# Patient Record
Sex: MALE | Race: WHITE | Employment: OTHER | URBAN - METROPOLITAN AREA
[De-identification: names, ages, dates, MRNs, and addresses within clinical notes are randomized per-mention and may not be internally consistent; named-entity substitution may affect disease eponyms.]

---

## 2017-01-23 ENCOUNTER — OFFICE VISIT (OUTPATIENT)
Dept: INTERNAL MEDICINE CLINIC | Age: 72
End: 2017-01-23

## 2017-01-23 VITALS
TEMPERATURE: 97.8 F | RESPIRATION RATE: 18 BRPM | HEIGHT: 68 IN | SYSTOLIC BLOOD PRESSURE: 143 MMHG | OXYGEN SATURATION: 97 % | HEART RATE: 81 BPM | WEIGHT: 225 LBS | BODY MASS INDEX: 34.1 KG/M2 | DIASTOLIC BLOOD PRESSURE: 82 MMHG

## 2017-01-23 DIAGNOSIS — E66.9 NON MORBID OBESITY, UNSPECIFIED OBESITY TYPE: ICD-10-CM

## 2017-01-23 DIAGNOSIS — K21.9 GASTROESOPHAGEAL REFLUX DISEASE WITHOUT ESOPHAGITIS: ICD-10-CM

## 2017-01-23 DIAGNOSIS — I87.2 VENOUS INSUFFICIENCY OF BOTH LOWER EXTREMITIES: ICD-10-CM

## 2017-01-23 DIAGNOSIS — H26.9 RIGHT CATARACT: Primary | ICD-10-CM

## 2017-01-23 DIAGNOSIS — Z01.818 PREOP EXAMINATION: ICD-10-CM

## 2017-01-23 RX ORDER — FUROSEMIDE 20 MG/1
20 TABLET ORAL
Qty: 30 TAB | Refills: 5 | Status: SHIPPED | OUTPATIENT
Start: 2017-01-23 | End: 2017-05-23 | Stop reason: ALTCHOICE

## 2017-01-23 NOTE — MR AVS SNAPSHOT
Visit Information Date & Time Provider Department Dept. Phone Encounter #  
 1/23/2017  9:00 AM Sisto Blizzard, Christopher [facility] Internal Medicine [phone] [id number] Follow-up Instructions Return if symptoms worsen or fail to improve. Upcoming Health Maintenance Date Due COLONOSCOPY 3/24/1963 Pneumococcal 65+ Low/Medium Risk (2 of 2 - PPSV23) 7/13/2016 MEDICARE YEARLY EXAM 9/28/2017 GLAUCOMA SCREENING Q2Y 11/24/2017 DTaP/Tdap/Td series (2 - Td) 9/27/2026 Allergies as of 1/23/2017  Review Complete On: 1/23/2017 By: Sisto Blizzard, DO Severity Noted Reaction Type Reactions Darvocet A500 [Propoxyphene N-acetaminophen] High 05/24/2011   Intolerance Other (comments)  
 hallucinate Current Immunizations  Reviewed on 1/23/2017 Name Date Influenza High Dose Vaccine PF 9/27/2016 Influenza Vaccine 12/29/2014, 1/3/2014 Influenza Vaccine Whole 10/14/2011 Pneumococcal Conjugate (PCV-13) 7/13/2015 Reviewed by Sisto Blizzard, DO on 1/23/2017 at  9:37 AM  
You Were Diagnosed With   
  
 Codes Comments Right cataract    -  Primary ICD-10-CM: H26.9 ICD-9-CM: 366.9 Preop examination     ICD-10-CM: T03.113 ICD-9-CM: V72.84 Non morbid obesity, unspecified obesity type     ICD-10-CM: E66.9 ICD-9-CM: 278.00 Venous insufficiency of both lower extremities     ICD-10-CM: I87.2 ICD-9-CM: 459.81 Gastroesophageal reflux disease without esophagitis     ICD-10-CM: K21.9 ICD-9-CM: 530.81 Vitals BP Pulse Temp Resp Height(growth percentile) Weight(growth percentile) 143/82 (BP 1 Location: Right arm, BP Patient Position: Sitting) 81 97.8 °F (36.6 °C) (Oral) 18 5' 8\" (1.727 m) 225 lb (102.1 kg) SpO2 BMI Smoking Status 97% 34.21 kg/m2 Never Smoker BMI and BSA Data Body Mass Index Body Surface Area  
 34.21 kg/m 2 2.21 m 2 Preferred Pharmacy Pharmacy Name Phone [facility], [address] [phone] Your Updated Medication List  
  
   
This list is accurate as of: 1/23/17  9:45 AM.  Always use your most recent med list.  
  
  
  
  
 aspirin 325 mg tablet Commonly known as:  ASPIRIN Take 325 mg by mouth daily. Cholecalciferol (Vitamin D3) 3,000 unit Tab Take 3,000 Units by mouth daily. furosemide 20 mg tablet Commonly known as:  LASIX Take 1 Tab by mouth daily as needed. HYDROcodone-acetaminophen 7.5-325 mg per tablet Commonly known as:  March Castles Take 1 Tab by mouth daily as needed for Pain. I-CAPS PO Take  by mouth.  
  
 lisinopril 10 mg tablet Commonly known as:  Sheri Primmer  
take 1 tablet by mouth once daily  
  
 multivitamin with iron and minerals tablet Commonly known as:  Lender Reels Take  by mouth daily. pantoprazole 40 mg tablet Commonly known as:  PROTONIX Take 1 Tab by mouth Daily (before breakfast). sildenafil citrate 100 mg tablet Commonly known as:  VIAGRA Take 1 Tab by mouth as needed. VITAMIN B-12 1,000 mcg tablet Generic drug:  cyanocobalamin Take 1,000 mcg by mouth daily. VITAMIN C 500 mg tablet Generic drug:  ascorbic acid (vitamin C) Take  by mouth. Prescriptions Sent to Pharmacy Refills  
 furosemide (LASIX) 20 mg tablet 5 Sig: Take 1 Tab by mouth daily as needed. Class: Normal  
 [facility]: [id number] [address] [place] [address] [address], [address] #: [phone] Route: Oral  
  
Follow-up Instructions Return if symptoms worsen or fail to improve. Introducing [facility] & HEALTH SERVICES! Dear Gal Mcmillan: Thank you for requesting a AMKAI account. Our records indicate that you already have an active AMKAI account. You can access your account anytime at [email or web address] Did you know that you can access your hospital and ER discharge instructions at any time in Woofound? You can also review all of your test results from your hospital stay or ER visit. Additional Information If you have questions, please visit the Frequently Asked Questions section of the Woofound website at [email or web address] Remember, Woofound is NOT to be used for urgent needs. For medical emergencies, dial 911. Now available from your iPhone and Android! Please provide this summary of care documentation to your next provider. Your primary care clinician is listed as Kenzie Han. If you have any questions after today's visit, please call [phone].

## 2017-01-23 NOTE — PROGRESS NOTES
Reviewed record in preparation for visit and have obtained necessary documentation. Identified pt with two pt identifiers(name and ). Health Maintenance Due   Topic    COLONOSCOPY     Pneumococcal 65+ Low/Medium Risk (2 of 2 - PPSV23)         Chief Complaint   Patient presents with    Pre-op Exam    Cataract     R    Hypertension          Learning Assessment:  :     Learning Assessment 3/29/2016 2014 12/3/2013   PRIMARY LEARNER Patient Patient Patient   HIGHEST LEVEL OF EDUCATION - PRIMARY LEARNER  GRADUATED HIGH SCHOOL OR GED GRADUATED HIGH SCHOOL OR GED GRADUATED HIGH SCHOOL OR GED   BARRIERS PRIMARY LEARNER NONE NONE NONE   CO-LEARNER CAREGIVER No No No   PRIMARY LANGUAGE ENGLISH ENGLISH ENGLISH   LEARNER PREFERENCE PRIMARY DEMONSTRATION DEMONSTRATION LISTENING   LEARNING SPECIAL TOPICS - no -   ANSWERED BY patient patient self   RELATIONSHIP SELF SELF SELF       Depression Screening:  :     PHQ 2 / 9, over the last two weeks 2016   Little interest or pleasure in doing things Not at all   Feeling down, depressed or hopeless Not at all   Total Score PHQ 2 0       Fall Risk Assessment:  :     Fall Risk Assessment, last 12 mths 2016   Able to walk? Yes   Fall in past 12 months? Yes   Fall with injury? Yes   Number of falls in past 12 months 1   Fall Risk Score 2       Abuse Screening:  :     Abuse Screening Questionnaire 2016 3/29/2016 2014   Do you ever feel afraid of your partner? N N N   Are you in a relationship with someone who physically or mentally threatens you? N N N   Is it safe for you to go home? Esau Agudelo       Coordination of Care Questionnaire:  :     1) Have you been to an emergency room, urgent care clinic since your last visit?  NO   Hospitalized since your last visit? no             2) Have you seen or consulted any other health care providers outside of [address] [place] [address] since your last visit? no  (Include any pap smears or colon screenings in this section.)    3) Do you have an Advance Directive on file? no    4) Are you interested in receiving information on Advance Directives? YES      Patient is accompanied by patient I have received [facility] consent [facility] to discuss any/all medical information while they are present in the room.

## 2017-01-23 NOTE — PROGRESS NOTES
HISTORY OF PRESENT ILLNESS  Lito Mcdaniel is a 80 y.o. male. Pt. comes in for a pre-op exam for R cataract by Dr. Fredy Tran next week. S/p L cataract surgery. Has multiple medical problems. Has  chronic joint pain. Has gained more weight. S/p gastric bypass many years ago. Reports increased pedal  Edema R>L. Lasix helped in past. Also reports GERD issues. ahs been taking omeprazole prn with some help. Reports compliance with medications and diet. Med list and most recent labs/studies reviewed with pt. All look stable. Trying to be active physically as tolerated. Continued to drink ETOH but mostly on weekends. Reports no other new c/o. Pre-op Exam   Pertinent negatives include no chest pain, no abdominal pain, no headaches and no shortness of breath. Cataract   Pertinent negatives include no chest pain, no abdominal pain, no headaches and no shortness of breath. Hypertension    Associated symptoms include blurred vision (R cataracts). Pertinent negatives include no chest pain, no headaches, no dizziness, no shortness of breath, no nausea and no vomiting. Leg Swelling   Pertinent negatives include no chest pain, no abdominal pain, no headaches and no shortness of breath. GERD   Pertinent negatives include no chest pain, no abdominal pain, no headaches and no shortness of breath. Review of Systems   Constitutional: Negative. Eyes: Positive for blurred vision (R cataracts). Respiratory: Negative for shortness of breath. Cardiovascular: Positive for leg swelling. Negative for chest pain. Gastrointestinal: Positive for heartburn. Negative for abdominal pain, nausea and vomiting. Genitourinary: Negative for dysuria. Musculoskeletal: Positive for joint pain. Negative for falls. Skin: Negative. Neurological: Negative for dizziness, sensory change and headaches. Psychiatric/Behavioral: Negative. All other systems reviewed and are negative.       Physical Exam   Constitutional: He is oriented to person, place, and time. He appears well-developed and well-nourished. No distress. Obese pleasant   HENT:   Head: Normocephalic and atraumatic. Mouth/Throat: Oropharynx is clear and moist.   Eyes: Conjunctivae are normal. No scleral icterus. R cataract   Neck: Normal range of motion. Neck supple. No JVD present. No thyromegaly present. Cardiovascular: Normal rate, regular rhythm, normal heart sounds and intact distal pulses. No murmur heard. Pulmonary/Chest: Effort normal and breath sounds normal. No respiratory distress. He has no wheezes. He has no rales. Abdominal: Soft. Bowel sounds are normal. He exhibits no distension. There is no tenderness. obese   Musculoskeletal: Normal range of motion. He exhibits edema (trace pedal, R>L, chronic). He exhibits no tenderness. Neurological: He is alert and oriented to person, place, and time. Coordination normal.   Skin: Skin is warm and dry. No rash noted. Psychiatric: He has a normal mood and affect. His behavior is normal.   Nursing note and vitals reviewed.       ASSESSMENT and PLAN  R cataract  Pre-op exam  HTN  GERD  B LE venous Insufficiency/edema    Pre-op form filled and faxed to Dr. Waleska Hein for surgery  Restart lasix 20 mg 1 every day prn edema  Omeprazole 20 mg 1 Q am  Quit ETOH use  Cont other meds    Follow-up Disposition:  Return if symptoms worsen or fail to improve.  lab results and schedule of future lab studies reviewed with patient  reviewed diet, exercise and weight control  reviewed medications and side effects in detail

## 2017-01-24 RX ORDER — HYDROCODONE BITARTRATE AND ACETAMINOPHEN 7.5; 325 MG/1; MG/1
1 TABLET ORAL
Qty: 30 TAB | Refills: 0 | Status: SHIPPED | OUTPATIENT
Start: 2017-01-24 | End: 2017-02-27 | Stop reason: SDUPTHER

## 2017-04-09 RX ORDER — LISINOPRIL 10 MG/1
TABLET ORAL
Qty: 30 TAB | Refills: 5 | Status: SHIPPED | OUTPATIENT
Start: 2017-04-09 | End: 2017-10-28 | Stop reason: SDUPTHER

## 2017-04-14 RX ORDER — HYDROCODONE BITARTRATE AND ACETAMINOPHEN 7.5; 325 MG/1; MG/1
1 TABLET ORAL
Qty: 30 TAB | Refills: 0 | Status: SHIPPED | OUTPATIENT
Start: 2017-04-14 | End: 2017-05-23 | Stop reason: SDUPTHER

## 2017-05-23 ENCOUNTER — OFFICE VISIT (OUTPATIENT)
Dept: INTERNAL MEDICINE CLINIC | Age: 72
End: 2017-05-23

## 2017-05-23 VITALS
RESPIRATION RATE: 22 BRPM | WEIGHT: 191 LBS | DIASTOLIC BLOOD PRESSURE: 67 MMHG | TEMPERATURE: 98.5 F | HEIGHT: 68 IN | SYSTOLIC BLOOD PRESSURE: 116 MMHG | HEART RATE: 84 BPM | BODY MASS INDEX: 28.95 KG/M2 | OXYGEN SATURATION: 98 %

## 2017-05-23 DIAGNOSIS — F10.29 ALCOHOL DEPENDENCE WITH UNSPECIFIED ALCOHOL-INDUCED DISORDER (HCC): ICD-10-CM

## 2017-05-23 DIAGNOSIS — Z98.84 S/P GASTRIC BYPASS: ICD-10-CM

## 2017-05-23 DIAGNOSIS — Z23 ENCOUNTER FOR IMMUNIZATION: ICD-10-CM

## 2017-05-23 DIAGNOSIS — K21.9 GASTROESOPHAGEAL REFLUX DISEASE WITHOUT ESOPHAGITIS: ICD-10-CM

## 2017-05-23 DIAGNOSIS — E66.3 OVERWEIGHT (BMI 25.0-29.9): ICD-10-CM

## 2017-05-23 DIAGNOSIS — I10 ESSENTIAL HYPERTENSION: Primary | ICD-10-CM

## 2017-05-23 DIAGNOSIS — M15.9 PRIMARY OSTEOARTHRITIS INVOLVING MULTIPLE JOINTS: ICD-10-CM

## 2017-05-23 DIAGNOSIS — I87.2 VENOUS INSUFFICIENCY OF BOTH LOWER EXTREMITIES: ICD-10-CM

## 2017-05-23 RX ORDER — HYDROCODONE BITARTRATE AND ACETAMINOPHEN 7.5; 325 MG/1; MG/1
1 TABLET ORAL
Qty: 30 TAB | Refills: 0 | Status: ON HOLD | OUTPATIENT
Start: 2017-05-23 | End: 2017-08-01

## 2017-05-23 RX ORDER — PANTOPRAZOLE SODIUM 40 MG/1
40 TABLET, DELAYED RELEASE ORAL
Qty: 30 TAB | Refills: 5 | Status: SHIPPED | OUTPATIENT
Start: 2017-05-23 | End: 2017-08-15 | Stop reason: SDUPTHER

## 2017-05-23 NOTE — PROGRESS NOTES
Chief Complaint   Patient presents with    Hypertension     follow up     1. Have you been to the ER, urgent care clinic since your last visit? Hospitalized since your last visit? No    2. Have you seen or consulted any other health care providers outside of the [facility] since your last visit? Include any pap smears or colon screening.  No    Used 2 patient I. D. 's

## 2017-05-23 NOTE — MR AVS SNAPSHOT
Visit Information Date & Time Provider Department Dept. Phone Encounter #  
 5/23/2017  8:30 AM Georgiana Garcia, [address] [facility] Internal Medicine [phone] [id number] Follow-up Instructions Return in about 6 months (around 11/23/2017). Upcoming Health Maintenance Date Due COLONOSCOPY 3/24/1963 Pneumococcal 65+ Low/Medium Risk (2 of 2 - PPSV23) 7/13/2016 INFLUENZA AGE 9 TO ADULT 8/1/2017 MEDICARE YEARLY EXAM 9/28/2017 GLAUCOMA SCREENING Q2Y 1/25/2019 DTaP/Tdap/Td series (2 - Td) 9/27/2026 Allergies as of 5/23/2017  Review Complete On: 5/23/2017 By: Georgiana Garcia DO Severity Noted Reaction Type Reactions Darvocet A500 [Propoxyphene N-acetaminophen] High 05/24/2011   Intolerance Other (comments)  
 hallucinate Current Immunizations  Reviewed on 5/23/2017 Name Date Influenza High Dose Vaccine PF 9/27/2016 Influenza Vaccine 12/29/2014, 1/3/2014 Influenza Vaccine Whole 10/14/2011 Pneumococcal Conjugate (PCV-13) 7/13/2015 Pneumococcal Polysaccharide (PPSV-23)  Incomplete Reviewed by Georgiana Garcia DO on 5/23/2017 at  9:10 AM  
You Were Diagnosed With   
  
 Codes Comments Essential hypertension    -  Primary ICD-10-CM: I10 
ICD-9-CM: 401.9 Gastroesophageal reflux disease without esophagitis     ICD-10-CM: K21.9 ICD-9-CM: 530.81 Venous insufficiency of both lower extremities     ICD-10-CM: I87.2 ICD-9-CM: 459.81 Primary osteoarthritis involving multiple joints     ICD-10-CM: M15.0 ICD-9-CM: 715.09 S/P gastric bypass     ICD-10-CM: M02.53 ICD-9-CM: V45.86 Overweight (BMI 25.0-29. 9)     ICD-10-CM: P01.3 ICD-9-CM: 278.02 Alcohol dependence with unspecified alcohol-induced disorder ([facility] [address]     ICD-10-CM: G63.37 ICD-9-CM: 303.90, 291.9 Encounter for immunization     ICD-10-CM: M14 ICD-9-CM: V03.89 Vitals BP Pulse Temp Resp Height(growth percentile) Weight(growth percentile) 116/67 84 98.5 °F (36.9 °C) (Oral) 22 5' 8\" (1.727 m) 191 lb (86.6 kg) SpO2 BMI Smoking Status 98% 29.04 kg/m2 Never Smoker BMI and BSA Data Body Mass Index Body Surface Area 29.04 kg/m 2 2.04 m 2 Preferred Pharmacy Pharmacy Name Phone [facility], [address] [phone] Your Updated Medication List  
  
   
This list is accurate as of: 5/23/17  9:11 AM.  Always use your most recent med list.  
  
  
  
  
 aspirin 325 mg tablet Commonly known as:  ASPIRIN Take 325 mg by mouth daily. Cholecalciferol (Vitamin D3) 3,000 unit Tab Take 3,000 Units by mouth daily. HYDROcodone-acetaminophen 7.5-325 mg per tablet Commonly known as:  Ceil Heap Take 1 Tab by mouth daily as needed for Pain. I-CAPS PO Take  by mouth.  
  
 lisinopril 10 mg tablet Commonly known as:  Minus Salk  
take 1 tablet by mouth once daily  
  
 multivitamin with iron and minerals tablet Commonly known as:  [place] Gazella Take  by mouth daily. pantoprazole 40 mg tablet Commonly known as:  PROTONIX Take 1 Tab by mouth Daily (before breakfast). sildenafil citrate 100 mg tablet Commonly known as:  VIAGRA Take 1 Tab by mouth as needed. VITAMIN B-12 1,000 mcg tablet Generic drug:  cyanocobalamin Take 1,000 mcg by mouth daily. VITAMIN C 500 mg tablet Generic drug:  ascorbic acid (vitamin C) Take  by mouth. Prescriptions Printed Refills HYDROcodone-acetaminophen (NORCO) 7.5-325 mg per tablet 0 Sig: Take 1 Tab by mouth daily as needed for Pain. Class: Print Route: Oral  
  
Prescriptions Sent to Pharmacy Refills  
 pantoprazole (PROTONIX) 40 mg tablet 5 Sig: Take 1 Tab by mouth Daily (before breakfast). Class: Normal  
 [facility] [place] [address], [address], [address] Ph #: [phone]  Route: Oral  
  
 We Performed the Following METABOLIC PANEL, COMPREHENSIVE [15418 CPT(R)] PNEUMOCOCCAL POLYSACCHARIDE VACCINE, 23-VALENT, ADULT OR IMMUNOSUPPRESSED PT DOSE, [90817 CPT(R)] Follow-up Instructions Return in about 6 months (around 11/23/2017). Introducing [facility] & HEALTH SERVICES! Dear Alexander: Thank you for requesting a [employer] account. Our records indicate that you already have an active [employer] account. You can access your account anytime at [email or web address] Did you know that you can access your hospital and ER discharge instructions at any time in [employer]? You can also review all of your test results from your hospital stay or ER visit. Additional Information If you have questions, please visit the Frequently Asked Questions section of the [employer] website at [email or web address] Remember, [employer] is NOT to be used for urgent needs. For medical emergencies, dial 911. Now available from your iPhone and Android! Please provide this summary of care documentation to your next provider. Your primary care clinician is listed as Latasha Vogel. If you have any questions after today's visit, please call [phone].

## 2017-05-23 NOTE — LETTER
5/25/2017 2:24 PM 
 
Mr. Elier Montero [address] [facility] [place] [address] Dear Elier Montero: 
 
Please find your most recent results below. Resulted Orders METABOLIC PANEL, COMPREHENSIVE Result Value Ref Range Glucose 102 (H) 65 - 99 mg/dL BUN 7 (L) 8 - 27 mg/dL Creatinine 0.73 (L) 0.76 - 1.27 mg/dL GFR est non-AA 93 >59 mL/min/1.73 GFR est  >59 mL/min/1.73  
 BUN/Creatinine ratio 10 10 - 24 Sodium 137 134 - 144 mmol/L Potassium 5.4 (H) 3.5 - 5.2 mmol/L Chloride 95 (L) 96 - 106 mmol/L  
 CO2 25 18 - 29 mmol/L Calcium 9.8 8.6 - 10.2 mg/dL Protein, total 6.4 6.0 - 8.5 g/dL Albumin 4.2 3.5 - 4.8 g/dL GLOBULIN, TOTAL 2.2 1.5 - 4.5 g/dL A-G Ratio 1.9 1.2 - 2.2 Bilirubin, total 1.2 0.0 - 1.2 mg/dL Alk. phosphatase 113 39 - 117 IU/L  
 AST (SGOT) 50 (H) 0 - 40 IU/L  
 ALT (SGPT) 27 0 - 44 IU/L Narrative Performed at:  [address]  [id number] : Josie Neal MD, Phone:  [phone] RECOMMENDATIONS: 
 
 
  Mild elevated liver number from alcohol use Otherwise All labs are stable Please call me if you have any questions: [phone] Sincerely, 
 
 
Deon Serum, DO

## 2017-05-23 NOTE — PROGRESS NOTES
HISTORY OF PRESENT ILLNESS  Saeed Gong is a 71 y.o. male. Pt. comes in for f/u. Has multiple medical problems. Reports chronic back,hip and leg pain. Has lost a lot of wt. Has GERD. Pedal edema is better. Stopped lasix. Continues to drink alcohol daily. No smoking. Reports compliance with medications and diet. Med list and most recent labs/studies reviewed with pt. Trying to be active physically. S/p gastric bypass a few years ago. Needs med refills and labs. Reports no other new c/o. Hypertension    Pertinent negatives include no chest pain, no blurred vision, no headaches, no dizziness and no shortness of breath. Weight Management   Pertinent negatives include no chest pain, no abdominal pain, no headaches and no shortness of breath. Follow Up Chronic Condition   Pertinent negatives include no chest pain, no abdominal pain, no headaches and no shortness of breath. GERD   Pertinent negatives include no chest pain, no abdominal pain, no headaches and no shortness of breath. Review of Systems   Constitutional: Positive for weight gain. Eyes: Negative for blurred vision. Respiratory: Negative for shortness of breath. Cardiovascular: Positive for leg swelling. Negative for chest pain. Gastrointestinal: Negative for abdominal pain. Genitourinary: Negative for dysuria. Musculoskeletal: Positive for back pain and joint pain. Negative for falls. Skin: Negative. Neurological: Negative for dizziness, sensory change and headaches. Psychiatric/Behavioral: Negative. All other systems reviewed and are negative. Physical Exam   Constitutional: He is oriented to person, place, and time. He appears well-developed and well-nourished. No distress. Obese pleasant   HENT:   Head: Normocephalic and atraumatic. Mouth/Throat: Oropharynx is clear and moist.   Eyes: Conjunctivae are normal. No scleral icterus. Neck: Normal range of motion. Neck supple. No JVD present.  No thyromegaly present. Cardiovascular: Normal rate, regular rhythm, normal heart sounds and intact distal pulses. No murmur heard. Pulmonary/Chest: Effort normal and breath sounds normal. No respiratory distress. He has no wheezes. He has no rales. Abdominal: Soft. Bowel sounds are normal. He exhibits no distension. There is no tenderness. obese   Musculoskeletal: Normal range of motion. He exhibits edema (trace pedal, bilat,) and tenderness (lumbars/hips). Neurological: He is alert and oriented to person, place, and time. Coordination normal.   Skin: Skin is warm and dry. No rash noted. Psychiatric: His behavior is normal.   Nursing note and vitals reviewed. ASSESSMENT and Kenblanca Lynne was seen today for hypertension, weight management, follow up chronic condition and gerd. Diagnoses and all orders for this visit:    Essential hypertension  -     METABOLIC PANEL, COMPREHENSIVE    Gastroesophageal reflux disease without esophagitis    Venous insufficiency of both lower extremities    Primary osteoarthritis involving multiple joints    S/P gastric bypass    Overweight (BMI 25.0-29. 9)    Alcohol dependence with unspecified alcohol-induced disorder ([facility] [address]    Encounter for immunization  -     PNEUMOCOCCAL POLYSACCHARIDE VACCINE, 23-VALENT, ADULT OR IMMUNOSUPPRESSED PT DOSE,    Other orders  -     pantoprazole (PROTONIX) 40 mg tablet; Take 1 Tab by mouth Daily (before breakfast). -     HYDROcodone-acetaminophen (NORCO) 7.5-325 mg per tablet; Take 1 Tab by mouth daily as needed for Pain. Follow-up Disposition:  Return in about 6 months (around 11/23/2017).    lab results and schedule of future lab studies reviewed with patient  reviewed diet, exercise and weight control  reviewed medications and side effects in detail  F/u with other MD's as scheduled  Stop alcohol use

## 2017-05-24 LAB
ALBUMIN SERPL-MCNC: 4.2 G/DL (ref 3.5–4.8)
ALBUMIN/GLOB SERPL: 1.9 {RATIO} (ref 1.2–2.2)
ALP SERPL-CCNC: 113 IU/L (ref 39–117)
ALT SERPL-CCNC: 27 IU/L (ref 0–44)
AST SERPL-CCNC: 50 IU/L (ref 0–40)
BILIRUB SERPL-MCNC: 1.2 MG/DL (ref 0–1.2)
BUN SERPL-MCNC: 7 MG/DL (ref 8–27)
BUN/CREAT SERPL: 10 (ref 10–24)
CALCIUM SERPL-MCNC: 9.8 MG/DL (ref 8.6–10.2)
CHLORIDE SERPL-SCNC: 95 MMOL/L (ref 96–106)
CO2 SERPL-SCNC: 25 MMOL/L (ref 18–29)
CREAT SERPL-MCNC: 0.73 MG/DL (ref 0.76–1.27)
GLOBULIN SER CALC-MCNC: 2.2 G/DL (ref 1.5–4.5)
GLUCOSE SERPL-MCNC: 102 MG/DL (ref 65–99)
POTASSIUM SERPL-SCNC: 5.4 MMOL/L (ref 3.5–5.2)
PROT SERPL-MCNC: 6.4 G/DL (ref 6–8.5)
SODIUM SERPL-SCNC: 137 MMOL/L (ref 134–144)

## 2017-07-31 ENCOUNTER — APPOINTMENT (OUTPATIENT)
Dept: CT IMAGING | Age: 72
DRG: 087 | End: 2017-07-31
Attending: EMERGENCY MEDICINE
Payer: MEDICARE

## 2017-07-31 ENCOUNTER — HOSPITAL ENCOUNTER (INPATIENT)
Age: 72
LOS: 1 days | Discharge: HOME | DRG: 087 | End: 2017-08-01
Attending: EMERGENCY MEDICINE | Admitting: FAMILY MEDICINE
Payer: MEDICARE

## 2017-07-31 DIAGNOSIS — S01.01XA SCALP LACERATION, INITIAL ENCOUNTER: ICD-10-CM

## 2017-07-31 DIAGNOSIS — F10.920 ALCOHOL INTOXICATION, UNCOMPLICATED (HCC): ICD-10-CM

## 2017-07-31 DIAGNOSIS — S06.5XAA SUBDURAL HEMATOMA: Primary | ICD-10-CM

## 2017-07-31 PROCEDURE — 99285 EMERGENCY DEPT VISIT HI MDM: CPT

## 2017-07-31 PROCEDURE — 77030018836 HC SOL IRR NACL ICUM -A

## 2017-07-31 PROCEDURE — 70450 CT HEAD/BRAIN W/O DYE: CPT

## 2017-07-31 PROCEDURE — 72125 CT NECK SPINE W/O DYE: CPT

## 2017-07-31 PROCEDURE — 77030008460 HC STPLR SKN PRECIS 3M -A

## 2017-07-31 PROCEDURE — 75810000293 HC SIMP/SUPERF WND  RPR

## 2017-07-31 NOTE — IP AVS SNAPSHOT
Monique [address] [place] [address] 
[phone] Patient: Eve Witt MRN: [id number] Current Discharge Medication List  
  
CONTINUE these medications which have NOT CHANGED Dose & Instructions Dispensing Information Comments Morning Noon Evening Bedtime FISH -1,000 mg capsule Generic drug:  fish oil-omega-3 fatty acids Your last dose was: Your next dose is: Take  by mouth daily. Takes unknown dose Refills:  0  
     
   
   
   
  
 lisinopril 10 mg tablet Commonly known as:  Neli [place] Your last dose was: Your next dose is:    
   
   
 take 1 tablet by mouth once daily Quantity:  30 Tab Refills:  5 Hold until patient is ready for   
    
   
   
   
  
 melatonin 5 mg Cap capsule Your last dose was: Your next dose is: Take  by mouth. Takes unknown dose nightly as needed Refills:  0 NORCO 7.5-325 mg per tablet Generic drug:  HYDROcodone-acetaminophen Your last dose was: Your next dose is:    
   
   
 Dose:  0.5 Tab Take 0.5 Tabs by mouth daily as needed for Pain. Refills:  0  
     
   
   
   
  
 pantoprazole 40 mg tablet Commonly known as:  PROTONIX Your last dose was: Your next dose is:    
   
   
 Dose:  40 mg Take 1 Tab by mouth Daily (before breakfast). Quantity:  30 Tab Refills:  5 VITAMIN B-12 1,000 mcg tablet Generic drug:  cyanocobalamin Your last dose was: Your next dose is: Take  by mouth daily. Takes unknown dose Refills:  0  
     
   
   
   
  
 VITAMIN C 500 mg tablet Generic drug:  ascorbic acid (vitamin C) Your last dose was: Your next dose is: Take  by mouth daily. Takes unknown dose Refills:  0 STOP taking these medications   
 aspirin 325 mg tablet Commonly known as:  ASPIRIN  
   
  
 sildenafil citrate 100 mg tablet Commonly known as:  VIAGRA

## 2017-07-31 NOTE — IP AVS SNAPSHOT
[address] [facility] [address] [place] [address] 
[phone] Patient: Iglesia Moon MRN: [id number] MAUDE:4/58/9235 You are allergic to the following Allergen Reactions Darvocet A500 (Propoxyphene N-Acetaminophen) Other (comments)  
 hallucinate Recent Documentation Height Weight BMI Smoking Status 1.778 m 91.2 kg 28.85 kg/m2 Never Smoker Emergency Contacts Name Discharge Info Relation Home Work Mobile Loni Ruiz  Spouse [3] [phone] About your hospitalization You were admitted on:  August 1, 2017 You last received care in the:  [address] [place] [address] You were discharged on:  August 1, 2017 Why you were hospitalized Your primary diagnosis was:  Subdural Hemorrhage (Hcc) Providers Seen During Your Hospitalizations Provider Role Specialty Primary office phone Annika Patton MD Attending Provider Emergency Medicine [phone] Agustina Horton MD Attending Provider [facility] [phone] Briseyda Bernal MD Attending Provider Internal Medicine [phone] Your Primary Care Physician (PCP) Primary Care Physician Office Phone Office Fax Hellen Blanc [phone] Follow-up Information Follow up With Details Comments Contact Info Santana Joshua DO In 1 week  [address] [facility] [address] [place] [address] 
[phone] Abran Durán MD In 2 weeks  [address] [place] [address] 
[phone] Current Discharge Medication List  
  
CONTINUE these medications which have NOT CHANGED Dose & Instructions Dispensing Information Comments Morning Noon Evening Bedtime FISH -1,000 mg capsule Generic drug:  fish oil-omega-3 fatty acids Your last dose was: Your next dose is: Take  by mouth daily. Takes unknown dose Refills:  0 lisinopril 10 mg tablet Commonly known as:  Tildon Michael Your last dose was: Your next dose is:    
   
   
 take 1 tablet by mouth once daily Quantity:  30 Tab Refills:  5 Hold until patient is ready for   
    
   
   
   
  
 melatonin 5 mg Cap capsule Your last dose was: Your next dose is: Take  by mouth. Takes unknown dose nightly as needed Refills:  0 NORCO 7.5-325 mg per tablet Generic drug:  HYDROcodone-acetaminophen Your last dose was: Your next dose is:    
   
   
 Dose:  0.5 Tab Take 0.5 Tabs by mouth daily as needed for Pain. Refills:  0  
     
   
   
   
  
 pantoprazole 40 mg tablet Commonly known as:  PROTONIX Your last dose was: Your next dose is:    
   
   
 Dose:  40 mg Take 1 Tab by mouth Daily (before breakfast). Quantity:  30 Tab Refills:  5 VITAMIN B-12 1,000 mcg tablet Generic drug:  cyanocobalamin Your last dose was: Your next dose is: Take  by mouth daily. Takes unknown dose Refills:  0  
     
   
   
   
  
 VITAMIN C 500 mg tablet Generic drug:  ascorbic acid (vitamin C) Your last dose was: Your next dose is: Take  by mouth daily. Takes unknown dose Refills:  0 STOP taking these medications   
 aspirin 325 mg tablet Commonly known as:  ASPIRIN  
   
  
 sildenafil citrate 100 mg tablet Commonly known as:  VIAGRA Discharge Instructions Fanaticall Activation Thank you for requesting access to Fanaticall. Please follow the instructions below to securely access and download your online medical record. Fanaticall allows you to send messages to your doctor, view your test results, renew your prescriptions, schedule appointments, and more. How Do I Sign Up? 1.  In your internet browser, go to [email or web address]. 2. Click on the First Time User? Click Here link in the Sign In box. You will see the New Member Sign Up page. 3. Enter your BioBeats Access Code exactly as it appears below. You will not need to use this code after youve completed the sign-up process. If you do not sign up before the expiration date, you must request a new code. Shipping Easyt Access Code: Activation code not generated Current BioBeats Status: Active (This is the date your Shipping Easyt access code will ) 4. Enter the last four digits of your Social Security Number (xxxx) and Date of Birth (mm/dd/yyyy) as indicated and click Submit. You will be taken to the next sign-up page. 5. Create a Shipping Easyt ID. This will be your BioBeats login ID and cannot be changed, so think of one that is secure and easy to remember. 6. Create a BioBeats password. You can change your password at any time. 7. Enter your Password Reset Question and Answer. This can be used at a later time if you forget your password. 8. Enter your e-mail address. You will receive e-mail notification when new information is available in [address] 9. Click Sign Up. You can now view and download portions of your medical record. 10. Click the Download Summary menu link to download a portable copy of your medical information. Additional Information If you have questions, please visit the Frequently Asked Questions section of the BioBeats website at [email or web address] Remember, BioBeats is NOT to be used for urgent needs. For medical emergencies, dial 911. Discharge Instructions PATIENT ID: Quynh Pope MRN: [id number] YOB: 1945 DATE OF ADMISSION: 2017 11:20 PM   
DATE OF DISCHARGE: 2017 PRIMARY CARE PROVIDER: Efe Moon DO  
 
ATTENDING PHYSICIAN: Ashlyn Dewitt MD 
DISCHARGING PROVIDER: Ashlyn Dewitt MD   
 To contact this individual call [phone] and ask the  to page. If unavailable ask to be transferred the Adult Hospitalist Department. DISCHARGE DIAGNOSES SDH 
 
CONSULTATIONS: IP CONSULT TO NEUROSURGERY 
IP CONSULT TO HOSPITALIST 
 
PROCEDURES/SURGERIES: * No surgery found * PENDING TEST RESULTS:  
At the time of discharge the following test results are still pending: FOLLOW UP APPOINTMENTS:  
Follow-up Information Follow up With Details Comments Contact Info Yunier Abdullahi DO In 1 week  [address] [facility] [place] 
[phone] Harriett Escobar MD In 2 weeks  [address] One [facility] [place] 
[phone] ADDITIONAL CARE RECOMMENDATIONS: pl do not take aspirin or viagra for next 2 weeks DIET: Regular Diet and Cardiac Diet ACTIVITY: Activity as tolerated WOUND CARE:  
 
EQUIPMENT needed:  
 
 
  
 SNF/Inpatient Rehab/LTAC Independent/assisted living Hospice Other: CDMP Checked:  
Yes x PROBLEM LIST Updated: 
Yes x Signed:  
Lex Garg MD 
8/1/2017 
3:05 PM 
 
Discharge Orders None General Information Please provide this summary of care documentation to your next provider. Introducing [facility]! Dear Lindsey Shook: Thank you for requesting a InsideTrack account. Our records indicate that you already have an active InsideTrack account. You can access your account anytime at [email or web address] Did you know that you can access your hospital and ER discharge instructions at any time in InsideTrack? You can also review all of your test results from your hospital stay or ER visit. Additional Information If you have questions, please visit the Frequently Asked Questions section of the InsideTrack website at [email or web address] Remember, InsideTrack is NOT to be used for urgent needs. For medical emergencies, dial 911. Now available from your iPhone and Android! Patient Signature:  ____________________________________________________________ Date:  ____________________________________________________________  
  
Deon Michel Provider Signature:  ____________________________________________________________ Date:  ____________________________________________________________

## 2017-08-01 ENCOUNTER — APPOINTMENT (OUTPATIENT)
Dept: CT IMAGING | Age: 72
DRG: 087 | End: 2017-08-01
Attending: FAMILY MEDICINE
Payer: MEDICARE

## 2017-08-01 ENCOUNTER — APPOINTMENT (OUTPATIENT)
Dept: GENERAL RADIOLOGY | Age: 72
DRG: 087 | End: 2017-08-01
Attending: EMERGENCY MEDICINE
Payer: MEDICARE

## 2017-08-01 VITALS
OXYGEN SATURATION: 96 % | TEMPERATURE: 98 F | SYSTOLIC BLOOD PRESSURE: 131 MMHG | BODY MASS INDEX: 28.78 KG/M2 | DIASTOLIC BLOOD PRESSURE: 79 MMHG | WEIGHT: 201.06 LBS | RESPIRATION RATE: 23 BRPM | HEART RATE: 83 BPM | HEIGHT: 70 IN

## 2017-08-01 PROBLEM — I62.00 SUBDURAL HEMORRHAGE (HCC): Status: RESOLVED | Noted: 2017-08-01 | Resolved: 2017-08-01

## 2017-08-01 PROBLEM — I62.00 SUBDURAL HEMORRHAGE (HCC): Status: ACTIVE | Noted: 2017-08-01

## 2017-08-01 LAB
ALBUMIN SERPL BCP-MCNC: 3 G/DL (ref 3.5–5)
ALBUMIN/GLOB SERPL: 0.9 {RATIO} (ref 1.1–2.2)
ALP SERPL-CCNC: 111 U/L (ref 45–117)
ALT SERPL-CCNC: 24 U/L (ref 12–78)
AMPHET UR QL SCN: NEGATIVE
ANION GAP BLD CALC-SCNC: 8 MMOL/L (ref 5–15)
APPEARANCE UR: CLEAR
AST SERPL W P-5'-P-CCNC: 29 U/L (ref 15–37)
ATRIAL RATE: 72 BPM
BACTERIA URNS QL MICRO: NEGATIVE /HPF
BARBITURATES UR QL SCN: NEGATIVE
BASOPHILS # BLD AUTO: 0 K/UL (ref 0–0.1)
BASOPHILS # BLD: 0 % (ref 0–1)
BENZODIAZ UR QL: NEGATIVE
BILIRUB SERPL-MCNC: 0.3 MG/DL (ref 0.2–1)
BILIRUB UR QL: NEGATIVE
BUN SERPL-MCNC: 10 MG/DL (ref 6–20)
BUN/CREAT SERPL: 17 (ref 12–20)
CALCIUM SERPL-MCNC: 8.5 MG/DL (ref 8.5–10.1)
CALCULATED P AXIS, ECG09: 39 DEGREES
CALCULATED R AXIS, ECG10: -29 DEGREES
CALCULATED T AXIS, ECG11: 14 DEGREES
CANNABINOIDS UR QL SCN: NEGATIVE
CHLORIDE SERPL-SCNC: 102 MMOL/L (ref 97–108)
CO2 SERPL-SCNC: 28 MMOL/L (ref 21–32)
COCAINE UR QL SCN: NEGATIVE
COLOR UR: NORMAL
CREAT SERPL-MCNC: 0.59 MG/DL (ref 0.7–1.3)
DIAGNOSIS, 93000: NORMAL
DRUG SCRN COMMENT,DRGCM: NORMAL
EOSINOPHIL # BLD: 0.2 K/UL (ref 0–0.4)
EOSINOPHIL NFR BLD: 3 % (ref 0–7)
EPITH CASTS URNS QL MICRO: NORMAL /LPF
ERYTHROCYTE [DISTWIDTH] IN BLOOD BY AUTOMATED COUNT: 13.6 % (ref 11.5–14.5)
ETHANOL SERPL-MCNC: 248 MG/DL
GLOBULIN SER CALC-MCNC: 3.4 G/DL (ref 2–4)
GLUCOSE SERPL-MCNC: 82 MG/DL (ref 65–100)
GLUCOSE UR STRIP.AUTO-MCNC: NEGATIVE MG/DL
HCT VFR BLD AUTO: 39.7 % (ref 36.6–50.3)
HGB BLD-MCNC: 13.3 G/DL (ref 12.1–17)
HGB UR QL STRIP: NEGATIVE
HYALINE CASTS URNS QL MICRO: NORMAL /LPF (ref 0–5)
INR PPP: 1 (ref 0.9–1.1)
KETONES UR QL STRIP.AUTO: NEGATIVE MG/DL
LEUKOCYTE ESTERASE UR QL STRIP.AUTO: NEGATIVE
LYMPHOCYTES # BLD AUTO: 20 % (ref 12–49)
LYMPHOCYTES # BLD: 1.2 K/UL (ref 0.8–3.5)
MCH RBC QN AUTO: 33.4 PG (ref 26–34)
MCHC RBC AUTO-ENTMCNC: 33.5 G/DL (ref 30–36.5)
MCV RBC AUTO: 99.7 FL (ref 80–99)
METHADONE UR QL: NEGATIVE
MONOCYTES # BLD: 0.7 K/UL (ref 0–1)
MONOCYTES NFR BLD AUTO: 12 % (ref 5–13)
NEUTS SEG # BLD: 3.9 K/UL (ref 1.8–8)
NEUTS SEG NFR BLD AUTO: 65 % (ref 32–75)
NITRITE UR QL STRIP.AUTO: NEGATIVE
OPIATES UR QL: NEGATIVE
P-R INTERVAL, ECG05: 126 MS
PCP UR QL: NEGATIVE
PH UR STRIP: 5 [PH] (ref 5–8)
PLATELET # BLD AUTO: 242 K/UL (ref 150–400)
POTASSIUM SERPL-SCNC: 3.8 MMOL/L (ref 3.5–5.1)
PROT SERPL-MCNC: 6.4 G/DL (ref 6.4–8.2)
PROT UR STRIP-MCNC: NEGATIVE MG/DL
PROTHROMBIN TIME: 10.5 SEC (ref 9–11.1)
Q-T INTERVAL, ECG07: 402 MS
QRS DURATION, ECG06: 138 MS
QTC CALCULATION (BEZET), ECG08: 440 MS
RBC # BLD AUTO: 3.98 M/UL (ref 4.1–5.7)
RBC #/AREA URNS HPF: NORMAL /HPF (ref 0–5)
SODIUM SERPL-SCNC: 138 MMOL/L (ref 136–145)
SP GR UR REFRACTOMETRY: 1.01 (ref 1–1.03)
TROPONIN I SERPL-MCNC: <0.04 NG/ML
UA: UC IF INDICATED,UAUC: NORMAL
UROBILINOGEN UR QL STRIP.AUTO: 1 EU/DL (ref 0.2–1)
VENTRICULAR RATE, ECG03: 72 BPM
WBC # BLD AUTO: 6 K/UL (ref 4.1–11.1)
WBC URNS QL MICRO: NORMAL /HPF (ref 0–4)

## 2017-08-01 PROCEDURE — 70450 CT HEAD/BRAIN W/O DYE: CPT

## 2017-08-01 PROCEDURE — 81001 URINALYSIS AUTO W/SCOPE: CPT | Performed by: FAMILY MEDICINE

## 2017-08-01 PROCEDURE — 97161 PT EVAL LOW COMPLEX 20 MIN: CPT

## 2017-08-01 PROCEDURE — 74011250637 HC RX REV CODE- 250/637: Performed by: INTERNAL MEDICINE

## 2017-08-01 PROCEDURE — 74011250636 HC RX REV CODE- 250/636: Performed by: FAMILY MEDICINE

## 2017-08-01 PROCEDURE — 36415 COLL VENOUS BLD VENIPUNCTURE: CPT | Performed by: EMERGENCY MEDICINE

## 2017-08-01 PROCEDURE — 80307 DRUG TEST PRSMV CHEM ANLYZR: CPT | Performed by: EMERGENCY MEDICINE

## 2017-08-01 PROCEDURE — 85025 COMPLETE CBC W/AUTO DIFF WBC: CPT | Performed by: EMERGENCY MEDICINE

## 2017-08-01 PROCEDURE — 65610000006 HC RM INTENSIVE CARE

## 2017-08-01 PROCEDURE — 0HQ0XZZ REPAIR SCALP SKIN, EXTERNAL APPROACH: ICD-10-PCS | Performed by: EMERGENCY MEDICINE

## 2017-08-01 PROCEDURE — 74011250637 HC RX REV CODE- 250/637: Performed by: NURSE PRACTITIONER

## 2017-08-01 PROCEDURE — 74011000250 HC RX REV CODE- 250: Performed by: FAMILY MEDICINE

## 2017-08-01 PROCEDURE — 71010 XR CHEST PORT: CPT

## 2017-08-01 PROCEDURE — 80307 DRUG TEST PRSMV CHEM ANLYZR: CPT | Performed by: FAMILY MEDICINE

## 2017-08-01 PROCEDURE — 80053 COMPREHEN METABOLIC PANEL: CPT | Performed by: EMERGENCY MEDICINE

## 2017-08-01 PROCEDURE — 85610 PROTHROMBIN TIME: CPT | Performed by: EMERGENCY MEDICINE

## 2017-08-01 PROCEDURE — 93005 ELECTROCARDIOGRAM TRACING: CPT

## 2017-08-01 PROCEDURE — 84484 ASSAY OF TROPONIN QUANT: CPT | Performed by: EMERGENCY MEDICINE

## 2017-08-01 RX ORDER — LORAZEPAM 2 MG/ML
2 INJECTION INTRAMUSCULAR
Status: DISCONTINUED | OUTPATIENT
Start: 2017-08-01 | End: 2017-08-01 | Stop reason: HOSPADM

## 2017-08-01 RX ORDER — LABETALOL HYDROCHLORIDE 5 MG/ML
10 INJECTION, SOLUTION INTRAVENOUS
Status: DISCONTINUED | OUTPATIENT
Start: 2017-08-01 | End: 2017-08-01 | Stop reason: HOSPADM

## 2017-08-01 RX ORDER — SODIUM CHLORIDE 9 MG/ML
75 INJECTION, SOLUTION INTRAVENOUS CONTINUOUS
Status: DISCONTINUED | OUTPATIENT
Start: 2017-08-01 | End: 2017-08-01

## 2017-08-01 RX ORDER — LORAZEPAM 2 MG/ML
1 INJECTION INTRAMUSCULAR
Status: DISCONTINUED | OUTPATIENT
Start: 2017-08-01 | End: 2017-08-01 | Stop reason: HOSPADM

## 2017-08-01 RX ORDER — ACETAMINOPHEN 325 MG/1
650 TABLET ORAL
Status: DISCONTINUED | OUTPATIENT
Start: 2017-08-01 | End: 2017-08-01 | Stop reason: HOSPADM

## 2017-08-01 RX ORDER — HYDROCODONE BITARTRATE AND ACETAMINOPHEN 7.5; 325 MG/1; MG/1
0.5 TABLET ORAL
COMMUNITY
End: 2017-08-15 | Stop reason: SDUPTHER

## 2017-08-01 RX ORDER — HYDRALAZINE HYDROCHLORIDE 20 MG/ML
10 INJECTION INTRAMUSCULAR; INTRAVENOUS
Status: DISCONTINUED | OUTPATIENT
Start: 2017-08-01 | End: 2017-08-01 | Stop reason: HOSPADM

## 2017-08-01 RX ORDER — LISINOPRIL 10 MG/1
10 TABLET ORAL DAILY
Status: DISCONTINUED | OUTPATIENT
Start: 2017-08-01 | End: 2017-08-01 | Stop reason: HOSPADM

## 2017-08-01 RX ORDER — ONDANSETRON 2 MG/ML
4 INJECTION INTRAMUSCULAR; INTRAVENOUS
Status: DISCONTINUED | OUTPATIENT
Start: 2017-08-01 | End: 2017-08-01 | Stop reason: HOSPADM

## 2017-08-01 RX ORDER — LIDOCAINE HYDROCHLORIDE AND EPINEPHRINE 20; 10 MG/ML; UG/ML
10 INJECTION, SOLUTION INFILTRATION; PERINEURAL
Status: DISCONTINUED | OUTPATIENT
Start: 2017-08-01 | End: 2017-08-01

## 2017-08-01 RX ORDER — MELATONIN 5 MG
CAPSULE ORAL
COMMUNITY
End: 2019-12-03

## 2017-08-01 RX ORDER — NALOXONE HYDROCHLORIDE 0.4 MG/ML
0.4 INJECTION, SOLUTION INTRAMUSCULAR; INTRAVENOUS; SUBCUTANEOUS AS NEEDED
Status: DISCONTINUED | OUTPATIENT
Start: 2017-08-01 | End: 2017-08-01 | Stop reason: HOSPADM

## 2017-08-01 RX ADMIN — ACETAMINOPHEN 650 MG: 325 TABLET, FILM COATED ORAL at 08:55

## 2017-08-01 RX ADMIN — LISINOPRIL 10 MG: 10 TABLET ORAL at 11:08

## 2017-08-01 RX ADMIN — ACETAMINOPHEN 650 MG: 325 TABLET, FILM COATED ORAL at 14:03

## 2017-08-01 RX ADMIN — FOLIC ACID: 5 INJECTION, SOLUTION INTRAMUSCULAR; INTRAVENOUS; SUBCUTANEOUS at 03:30

## 2017-08-01 NOTE — ED NOTES
Dr. Sandro Villa placed 3 staples in pt's head wound. Pt declined the ordered Lidocaine. Bulky dressing placed over wound. Neuro exam remains normal and unchanged.   Pt reports drinking 4-5 beers and 3-4 mixed drinks daily and states he's never had symptoms of withdrawals in the past.

## 2017-08-01 NOTE — PROGRESS NOTES
0318-arrived on stretcher to ICU pt ambulated with assist from stretcher to bed/ pt with strong smell of alcohol -oriented to room/call bell-neuro intact-plan of care/MD orders reviewed with pt-Mr. Bere Calderon voiced his understanding and has no questions at this time  0330-c/o dull HA left post head at site of laceration( pt had 3 staples placed to close laceration in ER w/o xylocaine per his request) 3/10-no prn tylenol ordered-Dr. Leonard Roberts needs to assess pt before pain meds ordered  0336-Dr. Mccauley at bedside to see pt/ CIWA 1  0345-Dr. Mccauley did not want to give pt tylenol at this time-will cont to monitor  0430-few ice chips given for comfort  0600-transported to/from CT w/o incident-scant SS ooze from left post head wound- EKG not  repeated pt had EKG in ER and has been scanned into [facility] care  0618-urine collected and sent to lab per order    0730-bedside shift report given to oncoming RN using sbar format

## 2017-08-01 NOTE — ROUTINE PROCESS
TRANSFER - IN REPORT:    Verbal report received from [facility] Herscher RN(name) on Deliliah Felty  being received from ER(unit) for routine progression of care      Report consisted of patients Situation, Background, Assessment and   Recommendations(SBAR). Information from the following report(s) SBAR, ED Summary, [facility] and Recent Results was reviewed with the receiving nurse. Opportunity for questions and clarification was provided. Assessment completed upon patients arrival to unit and care assumed.

## 2017-08-01 NOTE — ED NOTES
Found pt and his friend walking around the department, drinking water and eating crackers. Advised pt to remain in room and nothing else by mouth until evaluated by admitting doctor. Verbalizes understanding.

## 2017-08-01 NOTE — DISCHARGE SUMMARY
Discharge Summary       PATIENT ID: Liliana Cardenas  MRN: [id number]   YOB: 1945    DATE OF ADMISSION: 7/31/2017 11:20 PM    DATE OF DISCHARGE: 8/1/17   PRIMARY CARE PROVIDER: Neris Gray DO     ATTENDING PHYSICIAN: Winsome Martin  DISCHARGING PROVIDER: Britt Glez MD    To contact this individual call [phone] and ask the  to page. If unavailable ask to be transferred the Adult Hospitalist Department. CONSULTATIONS: IP CONSULT TO NEUROSURGERY  IP CONSULT TO HOSPITALIST    PROCEDURES/SURGERIES: * No surgery found *    ADMITTING DIAGNOSES & HOSPITAL COURSE:     1. Subdural hematoma. Consult with neurosurgeon. Repeat CT of the head   Stable  and follow up out pt in 2 weeks  2. Alcohol intoxication. Place on [facility] alcohol withdrawal protocol   3. Left scalp laceration, status post repair, wound closure   4. Falls. Continue with fall precautions. 5. Hypertension. Antihypertensive medications as needed. 6. Stop asa and viagra for 2 weeks           PENDING TEST RESULTS:   At the time of discharge the following test results are still pending:     FOLLOW UP APPOINTMENTS:    Follow-up Information     Follow up With Details Comments Contact Info    Neris Gray DO In 1 week  [address] [place] [address]      Preet Monaco MD In 2 weeks  [address]  [phone]             ADDITIONAL CARE RECOMMENDATIONS:     DIET: Regular Diet and Cardiac Diet  ACTIVITY: Activity as tolerated    WOUND CARE:     EQUIPMENT needed:       DISCHARGE MEDICATIONS:  Current Discharge Medication List      CONTINUE these medications which have NOT CHANGED    Details   fish oil-omega-3 fatty acids (FISH OIL) 340-1,000 mg capsule Take  by mouth daily. Takes unknown dose      melatonin 5 mg cap capsule Take  by mouth.  Takes unknown dose nightly as needed      HYDROcodone-acetaminophen (NORCO) 7.5-325 mg per tablet Take 0.5 Tabs by mouth daily as needed for Pain. lisinopril (PRINIVIL, ZESTRIL) 10 mg tablet take 1 tablet by mouth once daily  Qty: 30 Tab, Refills: 5    Comments: Hold until patient is ready for       cyanocobalamin (VITAMIN B-12) 1,000 mcg tablet Take  by mouth daily. Takes unknown dose      ascorbic acid (VITAMIN C) 500 mg tablet Take  by mouth daily. Takes unknown dose      pantoprazole (PROTONIX) 40 mg tablet Take 1 Tab by mouth Daily (before breakfast). Qty: 30 Tab, Refills: 5         STOP taking these medications       sildenafil citrate (VIAGRA) 100 mg tablet Comments:   Reason for Stopping:         aspirin (ASPIRIN) 325 mg tablet Comments:   Reason for Stopping:                 NOTIFY YOUR PHYSICIAN FOR ANY OF THE FOLLOWING:   Fever over 101 degrees for 24 hours. Chest pain, shortness of breath, fever, chills, nausea, vomiting, diarrhea, change in mentation, falling, weakness, bleeding. Severe pain or pain not relieved by medications. Or, any other signs or symptoms that you may have questions about.     DISPOSITION:  x  Home With:   OT  PT  HH  RN       Long term SNF/Inpatient Rehab    Independent/assisted living    Hospice    Other:       PATIENT CONDITION AT DISCHARGE:     Functional status    Poor    x Deconditioned     Independent      Cognition    x Lucid     Forgetful     Dementia      Catheters/lines (plus indication)    Shah     PICC     PEG    x None      Code status   x  Full code     DNR      PHYSICAL EXAMINATION AT DISCHARGE:   Refer to Progress Note      CHRONIC MEDICAL DIAGNOSES:  Problem List as of 8/1/2017  Date Reviewed: 8/1/2017          Codes Class Noted - Resolved    Hyponatremia ICD-10-CM: E87.1  ICD-9-CM: 276.1  10/4/2016 - Present        Hyperglycemia ICD-10-CM: R73.9  ICD-9-CM: 790.29  7/19/2016 - Present        Venous insufficiency of both lower extremities ICD-10-CM: I87.2  ICD-9-CM: 459.81  3/29/2016 - Present        Chronic right shoulder pain ICD-10-CM: M25.511, G89.29  ICD-9-CM: 719.41, 338.29 3/29/2016 - Present        Cataract ICD-10-CM: H26.9  ICD-9-CM: 366.9  7/13/2015 - Present        Bilateral knee pain ICD-10-CM: M25.561, M25.562  ICD-9-CM: 719.46  1/12/2015 - Present        ED (erectile dysfunction) ICD-10-CM: N52.9  ICD-9-CM: 607.84  1/12/2015 - Present        Alcohol dependence ([facility] ICD-10-CM: F10.20  ICD-9-CM: 303.90  12/3/2013 - Present        Pedal edema ICD-10-CM: R60.0  ICD-9-CM: 782.3  12/3/2013 - Present        Vitamin D deficiency ICD-10-CM: E55.9  ICD-9-CM: 268.9  11/27/2012 - Present        Anxiety ICD-10-CM: F41.9  ICD-9-CM: 300.00  12/2/2011 - Present        DJD (degenerative joint disease) ICD-10-CM: M19.90  ICD-9-CM: 715.90  5/24/2011 - Present        HTN (hypertension) ICD-10-CM: I10  ICD-9-CM: 401.9  11/15/2010 - Present        Obesity ICD-10-CM: E66.9  ICD-9-CM: 278.00  11/15/2010 - Present        S/P gastric bypass ICD-10-CM: Z98.84  ICD-9-CM: V45.86  11/15/2010 - Present        Plantar fasciitis ICD-10-CM: M72.2  ICD-9-CM: 728.71  11/15/2010 - Present        * (Principal)RESOLVED: Subdural hemorrhage ([facility] ICD-10-CM: I62.00  ICD-9-CM: 432.1  8/1/2017 - 8/1/2017        RESOLVED: Acute hyponatremia ICD-10-CM: E87.1  ICD-9-CM: 276.1  9/29/2016 - 10/2/2016              Greater than 20  minutes were spent with the patient on counseling and coordination of care    Signed:   Norberto Mckeon MD  8/1/2017  3:06 PM

## 2017-08-01 NOTE — PROGRESS NOTES
Problem: Mobility Impaired (Adult and Pediatric)  Goal: *Acute Goals and Plan of Care (Insert Text)  PHYSICAL THERAPY NEURO EVALUATION/DISCHARGE      Patient: Chyna Gallardo (89 y.o. male)  Date: 8/1/2017  Primary Diagnosis: Subdural hemorrhage (HCC)        Precautions: Fall         ASSESSMENT :  Based on the objective data described below, the patient presents with independence with mobility consistent with his baseline level prior to admission. Patient admitted for fall resulting in SDH. Cleared by RN for assessment. Patient A&O x 4 and able to recall his fall. No syncopal episode. VSS. Patient is independent with occasional supervision for all mobility this date. He requires additional time due to pain/soreness from fall. No bruising or new abrasions noted. He ambulates independently with slow gait pattern and states he is slower d/t pain. He is able to scan environment and turn on command with no LOB or difficulty. He is able to navigate stairs with step over step pattern and B UE support of railings. He has a cane at home and plans to use it until pain subsides. No PT needs at this time. Cleared to d/c home. .     Skilled physical therapy is not indicated at this time. PLAN :  Discharge Recommendations: None  Further Equipment Recommendations for Discharge: none          SUBJECTIVE:   Patient stated Denise Locks had a few beers but I wasn't drunk.       OBJECTIVE DATA SUMMARY:   HISTORY:    Past Medical History:   Diagnosis Date    DJD (degenerative joint disease)      Hypertension      Obesity       Past Surgical History:   Procedure Laterality Date    GASTRIC BYPASS,OBESE<150CM TAO-EN-Y        HX ORTHOPAEDIC         bilat hip replacements     Prior Level of Function/Home Situation: Independent. Retired.    Personal factors and/or comorbidities impacting plan of care:      Home Situation  Home Environment: Private residence  # Steps to Enter: 3  Rails to Enter: Yes  Hand Rails : Bilateral  One/Two Story Residence: One story  Living Alone: Yes  Support Systems: Spouse/Significant Other/Partner  Patient Expects to be Discharged to[de-identified] Private residence  Current DME Used/Available at Home: [address] [facility] [place], straight  Tub or Shower Type: Tub/Shower combination     EXAMINATION/PRESENTATION/DECISION MAKING:   Critical Behavior:  Neurologic State: Alert, Appropriate for age  Orientation Level: Oriented X4  Cognition: Appropriate decision making, Appropriate for age attention/concentration, Appropriate safety awareness, Follows commands     Hearing: Auditory  Auditory Impairment: None     Range Of Motion:  AROM: Within functional limits                       Strength:    Strength: Within functional limits                    Tone & Sensation:   Tone: Normal              Sensation: Intact               Coordination:  Coordination: Generally decreased, functional  Vision:      Functional Mobility:  Bed Mobility:  Rolling: Independent  Supine to Sit: Independent     Scooting: Independent  Transfers:  Sit to Stand: Independent  Stand to Sit: Independent                       Balance:   Sitting: Intact  Standing: Intact  Ambulation/Gait Training:  Distance (ft): 300 Feet (ft)  Assistive Device: Gait belt  Ambulation - Level of Assistance: Supervision     Gait Description (WDL): Exceptions to WDL  Gait Abnormalities: Decreased step clearance;Trunk sway increased; Antalgic        Base of Support: Narrowed     Speed/Mady: Slow                       Functional Measure:  Lerner Balance Test:      Sitting to Standin  Standing Unsupported: 4  Sitting with Back Unsupported: 4  Standing to Sittin  Transfers: 4  Standing Unsupported with Eyes Closed: 3  Standing Unsupported with Feet Together: 3  Reach Forward with Outstretched Arm: 4   Object: 3  Turn to Look Over Shoulders: 4  Turn 360 Degrees: 4  Alternate Foot on Step/Stool: 3  Standing Unsupported One Foot in Front: 3  Stand on One Le  Total: 49             56=Maximum possible score;   0-20=High fall risk  21-40=Moderate fall risk   41-56=Low fall risk      Lerner Balance Test and G-code impairment scale:  Percentage of Impairment CH     0%    CI     1-19% CJ     20-39% CK     40-59% CL     60-79% CM     80-99% CN      100%   Lerner   Score 0-56 56 45-55 34-44 23-33 12-22 1-11 0      G codes: In compliance with CMSs Claims Based Outcome Reporting, the following G-code set was chosen for this patient based on their primary functional limitation being treated: The outcome measure chosen to determine the severity of the functional limitation was the Nunez with a score of 49/56 which was correlated with the impairment scale. · Mobility - Walking and Moving Around:               - CURRENT STATUS:    CI - 1%-19% impaired, limited or restricted               - GOAL STATUS:           CI - 1%-19% impaired, limited or restricted               - D/C STATUS:                       CI - 1%-19% impaired, limited or restricted      Physical Therapy Evaluation Charge Determination   History Examination Presentation Decision-Making   LOW Complexity : Zero comorbidities / personal factors that will impact the outcome / POC LOW Complexity : 1-2 Standardized tests and measures addressing body structure, function, activity limitation and / or participation in recreation  LOW Complexity : Stable, uncomplicated  LOW Complexity : FOTO score of       Based on the above components, the patient evaluation is determined to be of the following complexity level: LOW      Pain:  Pain Scale 1: Numeric (0 - 10)  Pain Intensity 1: 0  Pain Location 1: Head  Pain Orientation 1: Left;Posterior  Pain Description 1: Aching;Dull  Pain Intervention(s) 1: Emotional support; Environmental changes  Activity Tolerance:   No apparent distress  Please refer to the flowsheet for vital signs taken during this treatment.   After treatment:   [X]         Patient left in no apparent distress sitting up in chair  [ ] Patient left in no apparent distress in bed  [X]         Call bell left within reach  [X]         Nursing notified  [ ]         Caregiver present  [ ]         Bed alarm activated      COMMUNICATION/EDUCATION:   Patient was educated regarding His deficit(s) of pain and slow gait as this relates to His diagnosis of SDH s/p fall. He demonstrated Good understanding as evidenced by teach back. [X]   Fall prevention education was provided and the patient/caregiver indicated understanding. [X]   Patient/family have participated as able and agree with findings and recommendations. [ ]   Patient is unable to participate in plan of care at this time.      Findings and recommendations were discussed with: Occupational Therapist and Registered Nurse     Thank you for this referral.  Jayme Matthews, PT, DPT   Time Calculation: 28 mins

## 2017-08-01 NOTE — ROUTINE PROCESS
TRANSFER - OUT REPORT:    Verbal report given to Sabiha Kahn RN (name) on [address]  being transferred to ICU 4 (unit) for routine progression of care       Report consisted of patients Situation, Background, Assessment and   Recommendations(SBAR). Information from the following report(s) SBAR, ED Summary, MAR, Recent Results and Cardiac Rhythm SR was reviewed with the receiving nurse. Lines:   Peripheral IV 08/01/17 Left Antecubital (Active)   Site Assessment Clean, dry, & intact 8/1/2017 12:28 AM   Phlebitis Assessment 0 8/1/2017 12:28 AM   Infiltration Assessment 0 8/1/2017 12:28 AM   Dressing Status Clean, dry, & intact 8/1/2017 12:28 AM   Dressing Type Transparent 8/1/2017 12:28 AM   Hub Color/Line Status Pink;Capped;Flushed;Patent 8/1/2017 12:28 AM   Action Taken Blood drawn 8/1/2017 12:28 AM        Opportunity for questions and clarification was provided.       Patient transported with:   Monitor  Registered Nurse

## 2017-08-01 NOTE — ED TRIAGE NOTES
Patient arrives to ED via EMS for a GLF, patient tripped and fell backward hitting the back of his head, resulting in a small lac to posterior of head, bleeding well controlled, + ETOH.

## 2017-08-01 NOTE — DISCHARGE INSTRUCTIONS
OpenFeinth[employer]Hound Activation    Thank you for requesting access to [facility]. Please follow the instructions below to securely access and download your online medical record. [facility] allows you to send messages to your doctor, view your test results, renew your prescriptions, schedule appointments, and more. How Do I Sign Up? 1. In your internet browser, go to [email or web address]. 2. Click on the First Time User? Click Here link in the Sign In box. You will see the New Member Sign Up page. 3. Enter your [facility] [facility] Access Code exactly as it appears below. You will not need to use this code after youve completed the sign-up process. If you do not sign up before the expiration date, you must request a new code. [facility] [facility] Access Code: Activation code not generated  Current [facility] Status: Active (This is the date your [facility] [facility] access code will )    4. Enter the last four digits of your Social Security Number (xxxx) and Date of Birth (mm/dd/yyyy) as indicated and click Submit. You will be taken to the next sign-up page. 5. Create a [facility] ID. This will be your [facility] login ID and cannot be changed, so think of one that is secure and easy to remember. 6. Create a [facility] [facility] password. You can change your password at any time. 7. Enter your Password Reset Question and Answer. This can be used at a later time if you forget your password. 8. Enter your e-mail address. You will receive e-mail notification when new information is available in [address] 9. Click Sign Up. You can now view and download portions of your medical record. 10. Click the Download Summary menu link to download a portable copy of your medical information. Additional Information    If you have questions, please visit the Frequently Asked Questions section of the [employer] website at [email or web address] Remember, [facility] [facility] is NOT to be used for urgent needs. For medical emergencies, dial 911.        Discharge Instructions       PATIENT ID: Sascha Dueñas  MRN: [id number]   YOB: 1945    DATE OF ADMISSION: 7/31/2017 11:20 PM    DATE OF DISCHARGE: 8/1/2017    PRIMARY CARE PROVIDER: Virgen Jacobson DO     ATTENDING PHYSICIAN: Lj Altman MD  DISCHARGING PROVIDER: Lj Altman MD    To contact this individual call [phone] and ask the  to page. If unavailable ask to be transferred the Adult Hospitalist Department. DISCHARGE DIAGNOSES SDH    CONSULTATIONS: IP CONSULT TO NEUROSURGERY  IP CONSULT TO HOSPITALIST    PROCEDURES/SURGERIES: * No surgery found *    PENDING TEST RESULTS:   At the time of discharge the following test results are still pending:     FOLLOW UP APPOINTMENTS:   Follow-up Information     Follow up With Details Comments [address], DO In 1 week  [address] [facility]  [address] [facility] [address] Regis Burkitt, MD In 2 weeks  [address] [facility]  [phone]             ADDITIONAL CARE RECOMMENDATIONS: pl do not take aspirin or viagra for next 2 weeks     DIET: Regular Diet and Cardiac Diet    ACTIVITY: Activity as tolerated    WOUND CARE:     EQUIPMENT needed:       DISCHARGE MEDICATIONS:   See Medication Reconciliation Form    · It is important that you take the medication exactly as they are prescribed. · Keep your medication in the bottles provided by the pharmacist and keep a list of the medication names, dosages, and times to be taken in your wallet. · Do not take other medications without consulting your doctor. NOTIFY YOUR PHYSICIAN FOR ANY OF THE FOLLOWING:   Fever over 101 degrees for 24 hours. Chest pain, shortness of breath, fever, chills, nausea, vomiting, diarrhea, change in mentation, falling, weakness, bleeding. Severe pain or pain not relieved by medications. Or, any other signs or symptoms that you may have questions about.       DISPOSITION:  x  Home With:   OT  PT  [facility]  RN SNF/Inpatient Rehab/LTAC    Independent/assisted living    Hospice    Other:     CDMP Checked:   Yes x     PROBLEM LIST Updated:  Yes x       Signed:   Henry Das MD  8/1/2017  3:05 PM

## 2017-08-01 NOTE — PROGRESS NOTES
0800- Bedside and Verbal shift change report given to Mason [address] (oncoming nurse) by Alessandra Corrales (offgoing nurse). Report included the following information SBAR, Kardex, Intake/Output, MAR and Recent Results. 1100- Patient ambulating around unit with PT.  1515- DISCHARGE SUMMARY from Nurse    PATIENT INSTRUCTIONS:    After general anesthesia or intravenous sedation, for 24 hours or while taking prescription Narcotics:  · Limit your activities  · Do not drive and operate hazardous machinery  · Do not make important personal or business decisions  · Do  not drink alcoholic beverages  · If you have not urinated within 8 hours after discharge, please contact your surgeon on call. Report the following to your surgeon:  · Excessive pain, swelling, redness or odor of or around the surgical area  · Temperature over 100.5  · Nausea and vomiting lasting longer than 4 hours or if unable to take medications  · Any signs of decreased circulation or nerve impairment to extremity: change in color, persistent  numbness, tingling, coldness or increase pain  · Any questions    What to do at Home:  Recommended activity: Activity as tolerated  *  Please give a list of your current medications to your Primary Care Provider. *  Please update this list whenever your medications are discontinued, doses are      changed, or new medications (including over-the-counter products) are added. *  Please carry medication information at all times in case of emergency situations. These are general instructions for a healthy lifestyle:    No smoking/ No tobacco products/ Avoid exposure to second hand smoke    Surgeon General's Warning:  Quitting smoking now greatly reduces serious risk to your health.     Obesity, smoking, and sedentary lifestyle greatly increases your risk for illness    A healthy diet, regular physical exercise & weight monitoring are important for maintaining a healthy lifestyle    Recognize signs and symptoms of STROKE:    F-face looks uneven    A-arms unable to move or move unevenly    S-speech slurred or non-existent    T-time-call 911 as soon as signs and symptoms begin-DO NOT go       Back to bed or wait to see if you get better-TIME IS BRAIN. Warning Signs of HEART ATTACK     Call 911 if you have these symptoms:   Chest discomfort. Most heart attacks involve discomfort in the center of the chest that lasts more than a few minutes, or that goes away and comes back. It can feel like uncomfortable pressure, squeezing, fullness, or pain.  Discomfort in other areas of the upper body. Symptoms can include pain or discomfort in one or both arms, the back, neck, jaw, or stomach.  Shortness of breath with or without chest discomfort.  Other signs may include breaking out in a cold sweat, nausea, or lightheadedness. Don't wait more than five minutes to call [address]! Fast action can save your life. Calling 911 is almost always the fastest way to get lifesaving treatment. Emergency Medical Services staff can begin treatment when they arrive  up to an hour sooner than if someone gets to the hospital by car. The discharge information has been reviewed with the patient. The patient verbalized understanding. Discharge medications reviewed with the patient and appropriate educational materials and side effects teaching were provided.

## 2017-08-01 NOTE — PROGRESS NOTES
Reviewed chart and discussed case with nsg who reports no difficulty with speech or swallowing function. Patient currently eating meal tray at bedside without difficulty. Formal SLP evaluation not clinically indicated at this time. Please re-consult if further needs arise. Thanks. Kristina Mccray M.CD.  CCC-SLP

## 2017-08-01 NOTE — ED PROVIDER NOTES
HPI Comments: 71 y.o. male with past medical history significant for HTN, DJD, Gastric bypass, b/l hip replacements who presents via EMS for evaluation s/p fall. Pt reports while ambulating out of his  truck this evening, he lost his balance and fell backwards striking his head. Per EMS, moderate blood noted from laceration to left scalp. While in ED, pt states he consumed \"3-4 beers\" this evening. He denies any pain. He has an abrasion to his left elbow of which he reports occurred while on tractor today. He is takes 325mg ASA daily. Pt further notes 1 week hx of cough, congestion and wheezing which is most significant at night. He has not taken anything for his symptoms. Pt denies headache, chest pain, SOB, syncope, LOC, dizziness, lightheadedness, numbness, weakness, nausea, vomiting, abdominal pain, urinary symptoms, back pain, neck pain, leg pain, leg swelling or fever. No hx of stroke or MI. There are no other acute medical concerns at this time. Social hx: + EtOH (daily, 3+ beers), No tobacco use. PCP: Reed Baxter DO    Note written by Inge Cagle, as dictated by Page Morrison MD 12:03 AM    The history is provided by the patient. No  was used. Past Medical History:   Diagnosis Date    DJD (degenerative joint disease)     Hypertension     Obesity        Past Surgical History:   Procedure Laterality Date    GASTRIC BYPASS,OBESE<150CM TAO-EN-Y      HX ORTHOPAEDIC      bilat hip replacements         Family History:   Problem Relation Age of Onset    Diabetes Maternal Grandmother     Heart Disease Maternal Grandmother     Diabetes Maternal Grandfather     Heart Disease Maternal Grandfather     No Known Problems Mother     Stroke Father        Social History     Social History    Marital status:      Spouse name: N/A    Number of children: N/A    Years of education: N/A     Occupational History    Not on file.      Social History Main Topics    Smoking status: Never Smoker    Smokeless tobacco: Never Used    Alcohol use 81.6 oz/week     10 Shots of liquor, 126 Cans of beer per week      Comment: 18 beer a day & /8-10 shots per week    Drug use: Yes     Special: Marijuana      Comment: rare    Sexual activity: Yes     Partners: Female     Other Topics Concern    Not on file     Social History Narrative         ALLERGIES: Darvocet a500 [propoxyphene n-acetaminophen]    Review of Systems   Constitutional: Negative for fever. HENT: Positive for congestion. Respiratory: Positive for cough and wheezing. Negative for shortness of breath. Cardiovascular: Negative for chest pain and leg swelling. Gastrointestinal: Negative for abdominal pain, nausea and vomiting. Musculoskeletal: Negative for back pain and neck pain. Skin: Positive for wound (left scalp). Neurological: Negative for dizziness, syncope, speech difficulty, weakness, light-headedness, numbness and headaches. - LOC   All other systems reviewed and are negative. Vitals:    07/31/17 2341   BP: 146/88   Pulse: 85   Resp: 18   Temp: 98.1 °F (36.7 °C)   SpO2: 96%   Weight: 132 kg (291 lb)   Height: 5' 10\" (1.778 m)            Physical Exam   Constitutional: He is oriented to person, place, and time. He appears well-developed and well-nourished. HENT:   Head: Normocephalic. Nose: Nose normal.   laceration left parietal scalp   Eyes: Conjunctivae and EOM are normal. Pupils are equal, round, and reactive to light. Neck: Normal range of motion. Neck supple. Cardiovascular: Normal rate, regular rhythm, normal heart sounds and intact distal pulses. Pulmonary/Chest: Effort normal. He has wheezes. scant wheezing on left   Abdominal: Soft. Bowel sounds are normal.   Musculoskeletal: Normal range of motion. Neurological: He is oriented to person, place, and time. He has normal reflexes. Skin: Skin is warm and dry. Abrasion (left elbow) noted.    Psychiatric: He has a normal mood and affect. His behavior is normal.   Nursing note and vitals reviewed. Note written by Inge Quinones, as dictated by Ignacio Lazaro MD 12:43 AM    [facility]  ED Course     CONSULT NOTE:  1:23 AM Ignacio Lazaro MD spoke with Dr. Russel Oden, Consult for Neurosurgery. Discussed available diagnostic tests and clinical findings. Admit to hospitalist and repeat CT in 6 hours. CONSULT NOTE:  1:27 AM Ignacio Lazaro MD spoke with Dr. Jacquelyn Henson, Consult for Hospitalist.  Discussed available diagnostic tests and clinical findings. Dr. Jacquelyn Henson will see and admit. Wound Repair  Date/Time: 8/1/2017 3:12 AM  Performed by: attendingPreparation: skin prepped with Betadine  Pre-procedure re-eval: Immediately prior to the procedure, the patient was reevaluated and found suitable for the planned procedure and any planned medications. Time out: Immediately prior to the procedure a time out was called to verify the correct patient, procedure, equipment, staff and marking as appropriate. .  Location details: scalp  Wound length:2.6 - 7.5 cm  Irrigation solution: saline  Irrigation method: jet lavage  Debridement: none  Skin closure: staples  Number of sutures: 3  Technique: simple  Approximation: close  Dressing: antibiotic ointment  Patient tolerance: Patient tolerated the procedure well with no immediate complications  My total time at bedside, performing this procedure was 1-15 minutes. ED EKG interpretation:  Rhythm: normal sinus rhythm; and regular . Rate (approx.): 72; Axis: left axis deviation; P wave: normal; QRS interval: prolonged; ST/T wave: non-specific changes; This EKG was interpreted by Ignacio Lazaro MD,ED Provider. Subdural- non focal neuro exam, A&Ox4, labs ok, not on any blood thinners. Repeat CT in 6 hours, admit to medicine.

## 2017-08-01 NOTE — CONSULTS
Full consult to follow. Briefly, 73yo s/p fall, no LOC, + EtOH. Neuro intact. Head CT with very small parafalcine SDH. SDH slightly improved on repeat imaging. No events overnight. OK to transfer out of ICU. Thank you for this consultation.

## 2017-08-01 NOTE — PROGRESS NOTES
Admission Medication Reconciliation:    Information obtained from: patient, rx query    Significant PMH/Disease States:   Past Medical History:   Diagnosis Date    DJD (degenerative joint disease)     Hypertension     Obesity        Chief Complaint for this Admission:    Chief Complaint   Patient presents with    Fall         Allergies:  Darvocet a500 [propoxyphene n-acetaminophen]    Prior to Admission Medications:   Prior to Admission Medications   Prescriptions Last Dose Informant Patient Reported? Taking? HYDROcodone-acetaminophen (NORCO) 7.5-325 mg per tablet   Yes Yes   Sig: Take 0.5 Tabs by mouth daily as needed for Pain. ascorbic acid (VITAMIN C) 500 mg tablet 7/25/2017 at Unknown time  Yes Yes   Sig: Take  by mouth daily. Takes unknown dose   aspirin (ASPIRIN) 325 mg tablet 7/31/2017 at am  Yes Yes   Sig: Take 325 mg by mouth daily. cyanocobalamin (VITAMIN B-12) 1,000 mcg tablet 7/25/2017 at Unknown time  Yes Yes   Sig: Take  by mouth daily. Takes unknown dose   fish oil-omega-3 fatty acids (FISH OIL) 340-1,000 mg capsule 7/25/2017 at Unknown time  Yes Yes   Sig: Take  by mouth daily. Takes unknown dose   lisinopril (PRINIVIL, ZESTRIL) 10 mg tablet 7/31/2017 at am  No Yes   Sig: take 1 tablet by mouth once daily   melatonin 5 mg cap capsule   Yes Yes   Sig: Take  by mouth. Takes unknown dose nightly as needed   pantoprazole (PROTONIX) 40 mg tablet Not Taking at Unknown time  No No   Sig: Take 1 Tab by mouth Daily (before breakfast). sildenafil citrate (VIAGRA) 100 mg tablet   No Yes   Sig: Take 1 Tab by mouth as needed. Facility-Administered Medications: None         Comments/Recommendations:   Patient was a good historian but did not know doses of the vitamins/supplements he takes. Has a prescription for pantoprazole which he has not yet filled but would like to start while admitted due to symptoms prior to admission. Added melatonin and fish oil.   Removed I-CAPS, vitamin D3, and Afshan Babb Changed norco to 0.5 tab daily as needed.

## 2017-08-01 NOTE — PROGRESS NOTES
Neurosurgery Progress Note    Lorne Wray, ACNP-BC  [phone]    Admit Date: 2017   LOS: 0 days        Daily Progress Note: 2017      Subjective: The patient fell yesterday while trying to exit his truck intoxicated. He drank about 6 beers prior to his fall. He hit his head. He called his friend who came to him and called EMS. In the ER, the patient was found to have a small amount of SDH along the interhemispheric falx. He was admitted to the ICU. This morning the patient is sitting up in bed, stating he is hungry and hoping he will be able to go home today. He is oozing from his scalp wound. Two staples were placed in the ER. He admits to taking a full strength aspirin daily. Alcohol level was 248 mg/DL at admission. Denies numbness, tingling, chest pain, leg pain, nausea, vomiting, difficulty swallowing, and dyspnea. Objective:     Vital signs  Temp (24hrs), Av.1 °F (36.7 °C), Min:97.8 °F (36.6 °C), Max:98.4 °F (36.9 °C)   701 -  1900  In: 375 [I.V.:375]  Out: -   1901 -  0700  In: 412.5 [P.O.:100; I.V.:312.5]  Out: 700 [Urine:700]    Visit Vitals    /81    Pulse 73    Temp 97.8 °F (36.6 °C)    Resp 14    Ht 5' 10\" (1.778 m)    Wt 91.2 kg (201 lb 1 oz)    SpO2 100%    BMI 28.85 kg/m2      O2 Device: Room air     Pain control  Pain Assessment  Pain Scale 1: Numeric (0 - 10)  Pain Intensity 1: 0  Pain Onset 1: post fall  Pain Location 1: Head  Pain Orientation 1: Left, Posterior  Pain Description 1: Aching, Dull  Pain Intervention(s) 1: Emotional support, Environmental changes    PT/OT  Gait                 Physical Exam:  Gen:NAD. Neuro: A&Ox3. Follows commands. Speech clear. Affect normal.  PERRL. EOMI. Face symmetric. Palate symmetric. Tongue midline. TIM. Strength 5/5 in UE and LE BL. Negative drift. Gait deferred. Skin: Left parietal scalp laceration and abrasion. Two staples noted. Loose skin in wound bed.  Area trimmed up, cleaned, redressed. CT head without contrast on 07/31/17 shows small amount of acute subdural hemorrhage along the interhemispheric falx. Left parietal scalp hematoma. CT C-spine without contrast on 07/31/17 shows no acute fracture. CT head without contrast on 08/01/17 shows trace stable subdural hemorrhage along the interhemispheric falx. Left parietal scalp hematoma diminished in size. 24 hour results:    Recent Results (from the past 24 hour(s))   ETHYL ALCOHOL    Collection Time: 08/01/17 12:17 AM   Result Value Ref Range    ALCOHOL(ETHYL),SERUM 248 (H) <10 MG/DL   CBC WITH AUTOMATED DIFF    Collection Time: 08/01/17 12:17 AM   Result Value Ref Range    WBC 6.0 4.1 - 11.1 K/uL    RBC 3.98 (L) 4.10 - 5.70 M/uL    HGB 13.3 12.1 - 17.0 g/dL    HCT 39.7 36.6 - 50.3 %    MCV 99.7 (H) 80.0 - 99.0 FL    MCH 33.4 26.0 - 34.0 PG    MCHC 33.5 30.0 - 36.5 g/dL    RDW 13.6 11.5 - 14.5 %    PLATELET 241 039 - 218 K/uL    NEUTROPHILS 65 32 - 75 %    LYMPHOCYTES 20 12 - 49 %    MONOCYTES 12 5 - 13 %    EOSINOPHILS 3 0 - 7 %    BASOPHILS 0 0 - 1 %    ABS. NEUTROPHILS 3.9 1.8 - 8.0 K/UL    ABS. LYMPHOCYTES 1.2 0.8 - 3.5 K/UL    ABS. MONOCYTES 0.7 0.0 - 1.0 K/UL    ABS. EOSINOPHILS 0.2 0.0 - 0.4 K/UL    ABS. BASOPHILS 0.0 0.0 - 0.1 K/UL   METABOLIC PANEL, COMPREHENSIVE    Collection Time: 08/01/17 12:17 AM   Result Value Ref Range    Sodium 138 136 - 145 mmol/L    Potassium 3.8 3.5 - 5.1 mmol/L    Chloride 102 97 - 108 mmol/L    CO2 28 21 - 32 mmol/L    Anion gap 8 5 - 15 mmol/L    Glucose 82 65 - 100 mg/dL    BUN 10 6 - 20 MG/DL    Creatinine 0.59 (L) 0.70 - 1.30 MG/DL    BUN/Creatinine ratio 17 12 - 20      GFR est AA >60 >60 ml/min/1.73m2    GFR est non-AA >60 >60 ml/min/1.73m2    Calcium 8.5 8.5 - 10.1 MG/DL    Bilirubin, total 0.3 0.2 - 1.0 MG/DL    ALT (SGPT) 24 12 - 78 U/L    AST (SGOT) 29 15 - 37 U/L    Alk.  phosphatase 111 45 - 117 U/L    Protein, total 6.4 6.4 - 8.2 g/dL    Albumin 3.0 (L) 3.5 - 5.0 g/dL    Globulin 3.4 2.0 - 4.0 g/dL    A-G Ratio 0.9 (L) 1.1 - 2.2     EKG, 12 LEAD, INITIAL    Collection Time: 08/01/17 12:21 AM   Result Value Ref Range    Ventricular Rate 72 BPM    Atrial Rate 72 BPM    P-R Interval 126 ms    QRS Duration 138 ms    Q-T Interval 402 ms    QTC Calculation (Bezet) 440 ms    Calculated P Axis 39 degrees    Calculated R Axis -29 degrees    Calculated T Axis 14 degrees    Diagnosis       Normal sinus rhythm with sinus arrhythmia  Right bundle branch block  When compared with ECG of 29-SEP-2016 03:40,  No significant change was found  Confirmed by Sharna Pete M.D., Anna Marie Borjas ([id number]) on 8/1/2017 8:20:14 AM     PROTHROMBIN TIME + INR    Collection Time: 08/01/17 12:32 AM   Result Value Ref Range    INR 1.0 0.9 - 1.1      Prothrombin time 10.5 9.0 - 11.1 sec   TROPONIN I    Collection Time: 08/01/17 12:32 AM   Result Value Ref Range    Troponin-I, Qt. <0.04 <0.05 ng/mL   DRUG SCREEN, URINE    Collection Time: 08/01/17  6:18 AM   Result Value Ref Range    AMPHETAMINES NEGATIVE  NEG      BARBITURATES NEGATIVE  NEG      BENZODIAZEPINE NEGATIVE  NEG      COCAINE NEGATIVE  NEG      METHADONE NEGATIVE  NEG      OPIATES NEGATIVE  NEG      PCP(PHENCYCLIDINE) NEGATIVE  NEG      THC (TH-CANNABINOL) NEGATIVE  NEG      Drug screen comment (NOTE)    URINALYSIS W/ REFLEX CULTURE    Collection Time: 08/01/17  6:18 AM   Result Value Ref Range    Color YELLOW/STRAW      Appearance CLEAR CLEAR      Specific gravity 1.007 1.003 - 1.030      pH (UA) 5.0 5.0 - 8.0      Protein NEGATIVE  NEG mg/dL    Glucose NEGATIVE  NEG mg/dL    Ketone NEGATIVE  NEG mg/dL    Bilirubin NEGATIVE  NEG      Blood NEGATIVE  NEG      Urobilinogen 1.0 0.2 - 1.0 EU/dL    Nitrites NEGATIVE  NEG      Leukocyte Esterase NEGATIVE  NEG      WBC 0-4 0 - 4 /hpf    RBC 0-5 0 - 5 /hpf    Epithelial cells FEW FEW /lpf    Bacteria NEGATIVE  NEG /hpf    UA:UC IF INDICATED CULTURE NOT INDICATED BY UA RESULT CNI      Hyaline cast 0-2 0 - 5 /lpf          Assessment: Principal Problem:    Subdural hemorrhage ([facility] [address] (8/1/2017)        Plan:   1. SDH   - Stable head CT   - Mobilize   - Ok to transfer out of ICU   - Hold ASA for 2 weeks   - Can f/u with PCP   - Ok to eat. No surgical intervention necessary.   - Most likely can be discharged to home today from [address] [place] [address] standpoint after Dr. Yousif Jenkins sees the patient  2. Alcohol abuse   - Goody bag   - CIWA  3. HTN   - Restart lisinopril from home   - SBP<160      Activity: up with assist  DVT ppx: SCDS  Dispo: home    Plan d/w Dr. Yousif Jenkins. She will be by to see the patient.        Valorie Sky, NP

## 2017-08-01 NOTE — CONSULTS
[address] [facility] [place] [address]   193 [facility]       Name:  Shania Nam   MR#:  [id number]   :  1945   Account #:  [de-identified]    Date of Consultation:  2017   Date of Adm:  2017       NEUROSURGERY CONSULTATION    REASON FOR CONSULTATION: Intracranial hemorrhage. HISTORY OF PRESENT ILLNESS: This is a 73-year-old gentleman   who has a significant history of alcohol use, who yesterday evening   had a fall. He said that he was walking through his   garage between his car and truck when he fell and he hit his head. He   called a friend. When the friend arrived he said that there was a large   amount of blood from a scalp laceration. The friend brought him to the   emergency room. Workup there included a head CT that showed a   small parafalcine subdural and he was transferred to [address] [place] [address] for   further evaluation. He did not have any neurologic deficit, had a slight   headache. He was complaining of 1 week history of chest congestion   and cough, but otherwise was feeling well. No nausea or vomiting. PAST MEDICAL HISTORY: Hypertension, degenerative joint disease,   GI bypass. PAST SURGICAL HISTORY: Bilateral hip replacement. MEDICATIONS AT HOME INCLUDE: 325 mg of aspirin daily. FAMILY HISTORY: Diabetes, coronary artery disease, stroke. SOCIAL HISTORY: He is . Positive for excessive alcohol use. Negative   tobacco. Positive for occasional marijuana. REVIEW OF SYSTEMS   Denies any headache, vision changes, hearing difficulty, chest pain,   shortness of breath, abdominal pain, urinary dysfunction, numbness,   muscle spasm or skin eruption. PHYSICAL EXAMINATION   VITAL SIGNS: Afebrile, vital signs stable. GENERAL: This is a well-developed, well-nourished gentleman in no   apparent distress. NEUROLOGIC: Alert, oriented x3. Normal affect. Fluent speech,   conjugate gaze, symmetric face.  Full strength in bilateral upper extremities. No pronator drift. Full strength in bilateral lower   extremities. Sensory is grossly intact. He does have a bandage over   the left side of his head from a scalp laceration. IMAGING STUDIES: He has a head CT with repeat imaging that   shows improvement and resolution of his parafalcine subdural without   any mass effect. No other areas of bleeding. ASSESSMENT AND PLAN: This is a 71-year-old gentleman who had   a fall with a small parafalcine subdural hematoma. He neurologically is   intact. We discussed holding his aspirin until he comes to see me in   followup in 2 weeks. Also discussed decrease in his alcohol intake. I told him it can make it difficult for the blood to clot. He says he has   also had difficulty with low sodium in the past and we discussed that   could be related to alcohol as well. He says he is going to consider   decreasing his alcohol intake and that he has quit for several years in   the past. At this point he is okay to be discharged and will follow up   with me in 2 weeks.          MD BRITTANY Walls / Leroy   D:  08/01/2017   15:08   T:  08/01/2017   15:38   Job #:  [id number]

## 2017-08-01 NOTE — PROGRESS NOTES
Occupational Therapy  Order received, chart reviewed. Spoke with physical therapy who report pt is at his functional baseline with no neurological deficits. OT evaluation is not indicated at this time. Completing orders, thank you for this consult.      Haresh Story OTR/L

## 2017-08-01 NOTE — CONSULTS
PULMONARY/CRITICAL CARE/SLEEP MEDICINE    Initial Physician Consultation Note- Intensivist    Name: Roseann Ragsdale   : 1945   MRN: [id number]   Date: 2017      Subjective:   Consult Note: 2017     This patient has been seen and evaluated as a new admission to the ICU. Medical records and data reviewed. Patient is a 71 y.o. male who presented to the hospital with complaints of fall. He was found to have alcohol intoxication and a small SDH that is being managed conservatively. He is neurologically intact and appears stable for transfer out of ICU    Assessment:   SDH  Alcohol intoxication on presentation  Bibasilar atelectasis  HTN    Recommendations:   Neurosurgery following  Mobilize  SCDs  Diet  Stable for transfer  We will be available to help again as needed      Active Problem List:     Problem List  Date Reviewed: 2017          Codes Class    * (Principal)Subdural hemorrhage ([facility].) ICD-10-CM: I62.00  ICD-9-CM: 432.1         Hyponatremia ICD-10-CM: E87.1  ICD-9-CM: 276.1         Hyperglycemia ICD-10-CM: R73.9  ICD-9-CM: 790.29         Venous insufficiency of both lower extremities ICD-10-CM: I87.2  ICD-9-CM: 459.81         Chronic right shoulder pain ICD-10-CM: M25.511, G89.29  ICD-9-CM: 719.41, 338.29         Cataract ICD-10-CM: H26.9  ICD-9-CM: 366.9         Bilateral knee pain ICD-10-CM: M25.561, M25.562  ICD-9-CM: 719.46         ED (erectile dysfunction) ICD-10-CM: N52.9  ICD-9-CM: 607.84         Alcohol dependence ([facility] 75.) ICD-10-CM: F10.20  ICD-9-CM: 303.90         Pedal edema ICD-10-CM: R60.0  ICD-9-CM: 782. 3         Vitamin D deficiency ICD-10-CM: E55.9  ICD-9-CM: 268.9         Anxiety ICD-10-CM: F41.9  ICD-9-CM: 300.00         DJD (degenerative joint disease) ICD-10-CM: M19.90  ICD-9-CM: 715.90         HTN (hypertension) ICD-10-CM: I10  ICD-9-CM: 401.9         Obesity ICD-10-CM: E66.9  ICD-9-CM: 278.00         S/P gastric bypass ICD-10-CM: Z98.84  ICD-9-CM: V45.86         Plantar fasciitis ICD-10-CM: M72.2  ICD-9-CM: 728.71               Past Medical History:      has a past medical history of DJD (degenerative joint disease); Hypertension; and Obesity. Past Surgical History:      has a past surgical history that includes gastric bypass,obese<150cm josé miguel-en-y and orthopaedic. Home Medications:     Prior to Admission medications    Medication Sig Start Date End Date Taking? Authorizing Provider   fish oil-omega-3 fatty acids (FISH OIL) 340-1,000 mg capsule Take  by mouth daily. Takes unknown dose   Yes Historical Provider   melatonin 5 mg cap capsule Take  by mouth. Takes unknown dose nightly as needed   Yes Historical Provider   HYDROcodone-acetaminophen (NORCO) 7.5-325 mg per tablet Take 0.5 Tabs by mouth daily as needed for Pain. Yes Historical Provider   lisinopril (PRINIVIL, ZESTRIL) 10 mg tablet take 1 tablet by mouth once daily 4/9/17  Yes Rusty Gonzalez DO   sildenafil citrate (VIAGRA) 100 mg tablet Take 1 Tab by mouth as needed. 3/29/16  Yes Rusty Gonzalez DO   cyanocobalamin (VITAMIN B-12) 1,000 mcg tablet Take  by mouth daily. Takes unknown dose   Yes Historical Provider   ascorbic acid (VITAMIN C) 500 mg tablet Take  by mouth daily. Takes unknown dose   Yes Historical Provider   aspirin (ASPIRIN) 325 mg tablet Take 325 mg by mouth daily. Yes Historical Provider   pantoprazole (PROTONIX) 40 mg tablet Take 1 Tab by mouth Daily (before breakfast). 5/23/17   Ronnie Garza DO       Allergies/Social/Family History:      Allergies   Allergen Reactions    Darvocet A500 [Propoxyphene N-Acetaminophen] Other (comments)     hallucinate      Social History   Substance Use Topics    Smoking status: Never Smoker    Smokeless tobacco: Never Used    Alcohol use 81.6 oz/week     126 Cans of beer, 10 Shots of liquor per week      Comment: Hx of abuse      Family History   Problem Relation Age of Onset    Diabetes Maternal Grandmother     Heart Disease Maternal Grandmother     Diabetes Maternal Grandfather     Heart Disease Maternal Grandfather     No Known Problems Mother     Stroke Father         Review of Systems:     Review of systems not obtained due to patient factors. Objective:   Vital Signs:  Visit Vitals    /87    Pulse (!) 110    Temp 97.8 °F (36.6 °C)    Resp 20    Ht 5' 10\" (1.778 m)    Wt 91.2 kg (201 lb 1 oz)    SpO2 100%    BMI 28.85 kg/m2      O2 Device: Room air Temp (24hrs), Av.1 °F (36.7 °C), Min:97.8 °F (36.6 °C), Max:98.4 °F (36.9 °C)           Intake/Output:     Intake/Output Summary (Last 24 hours) at 17 1128  Last data filed at 17 1100   Gross per 24 hour   Intake           1397.5 ml   Output              700 ml   Net            697.5 ml       Physical Exam:   General:  Alert, cooperative, no distress, appears stated age. Head:  Normocephalic, without obvious abnormality, atraumatic. Eyes:  Conjunctivae/corneas clear. PERRL, EOMs intact. Neck: Supple, symmetrical, trachea midline, no adenopathy, thyroid: no enlargment/tenderness/nodules, no carotid bruit and no JVD. Lungs:   Clear to auscultation bilaterally. Chest wall:  No tenderness or deformity. Heart:  Regular rate and rhythm, S1, S2 normal, no murmur, click, rub or gallop. Abdomen:   Soft, non-tender. Bowel sounds normal. No masses,  No organomegaly. Extremities: Extremities normal, atraumatic, no cyanosis or edema. Pulses: 2+ and symmetric all extremities.    Skin: Skin color, texture, turgor normal. No rashes or lesions   Neurologic: Grossly nonfocal         LABS AND  DATA: Personally reviewed  Recent Labs      17   001   WBC  6.0   HGB  13.3   HCT  39.7   PLT  242     Recent Labs      17   001   NA  138   K  3.8   CL  102   CO2  28   BUN  10   CREA  0.59*   GLU  82   CA  8.5     Recent Labs      17   001   SGOT  29   AP  111   TP  6.4   ALB  3.0*   GLOB  3.4     Recent Labs      17   0032   INR  1.0   PTP  10.5      No results for input(s): PHI, PCO2I, PO2I, FIO2I in the last 72 hours. Recent Labs      08/01/17   0032   TROIQ  <0.04       MEDS: Reviewed    Chest X-Ray: personally reviewed and report checked    EKG/ Tele      Thank you for allowing me to participate in this patient's care.     MD Carmela Lucio MD, [facility]  [facility]

## 2017-08-01 NOTE — ED NOTES
Contacted lab about delay on Troponin and PT that were added on. Currently in process.   Pt has ambulated to restroom with steady gait

## 2017-08-01 NOTE — H&P
[address]   174 [facility], [address] [place] [address]   HISTORY AND PHYSICAL       Name:  Jessica Cooper   MR#:  [id number]   :  1945   Account #:  [de-identified]        Date of Adm:  2017       CHIEF COMPLAINT: Lost balance and fell. HISTORY OF PRESENT ILLNESS: A 43-year-old white male with past   medical history of degenerative joint disease, hypertension, presented   to the emergency department from home via EMS status post fall. The   patient reportedly fell when trying to exit a pickup truck. He lost his   balance, fell backwards, hitting the back of his head. He had a   subsequent scalp laceration and noted bleeding. He also admits to   drinking approximately 6 beers on some days. Initially he was not   disclosing of his exact amount of alcohol consumption, however, later   admits to 6 beers and hard liquor. On arrival to the emergency   department, initial recorded vital signs were blood pressure 146/88,   heart rate 85, respiratory rate 18, O2 saturation 98% on room air. CT   of the head without contrast shows small amount of acute subdural   hematoma along the anterior hemispheric falx and a left parietal scalp   hematoma. CT of the cervical spine without contrast showed no acute   abnormalities, degenerative disk changes at C5-C6. Chest x-ray,   portable, showed no acute cardiopulmonary process. Labs revealed a   blood alcohol level of 248 mg per deciliter. The patient is now seen for   admission to the hospitalist service for continued evaluation and   treatment. PAST MEDICAL HISTORY:   1. Degenerative joint disease. 2. Hypertension. 3. Alcohol abuse. PAST SURGICAL HISTORY:   1. Gastric bypass. 2. Bilateral total hip replacement. MEDICATIONS:   1. I-CAPS 1 capsule p.o. daily. 2. Vitamin C 500 mg p.o. daily. 3. Aspirin 325 mg p.o. daily. 4. Vitamin D3 3000 units p.o. daily. 5. Vitamin B12 1000 mcg p.o. daily. 6. Norco 7.5-325 one tablet p.o. daily p.r.n.   7. lisinopril (PRINIVIL, ZESTRIL) 10 mg p.o. daily. 8. Therapeutic multivitamin 1 tablet p.o. daily. 9. Pantoprazole 40 mg p.o. daily. 10. Viagra 100 mg p.o. p.r.n. ALLERGIES: DARVOCET A500. SOCIAL HISTORY: Negative for smoking. Positive for marijuana. Admits to drinking approximately 6 beers some days. FAMILY HISTORY:   1. Diabetes - maternal grandmother, maternal grandfather. 2. Heart disease - maternal grandmother, maternal grandfather. 3. Stroke - father. REVIEW OF SYSTEMS: 11 systems. Pertinent positives were as in   HPI, otherwise negative. PHYSICAL EXAMINATION:   VITAL SIGNS: Temperature 98.0 degrees Fahrenheit, blood pressure   149/116, heart rate 87, respiratory rate 19, saturations 100% on room   air. Recorded weight 201 pounds (91.2 kg). Recorded height of 5 feet   10 inches tall. GENERAL: The patient in no acute respiratory distress. PSYCHIATRIC: The patient is awake, alert, oriented x3. NEUROLOGIC: GCS of 15. Moves extremities x4. The patient   is grossly intact without slurred speech or facial droop. HEENT: Posterior occipital scalp laceration. Wound intact with staples   and dressing. PERRLA. EOMs intact. Sclerae are anicteric. Conjunctivae clear. Nares patent. Oropharynx clear. Tongue midline,   nonedematous. NECK: Supple, without lymphadenopathy, JVD, carotid bruits,   thyromegaly. LYMPH: Negative for cervical or supraclavicular lymphadenopathy. RESPIRATORY: Lungs clear to auscultation bilaterally. CARDIOVASCULAR: Heart regular rate and rhythm, normal S1, S2,   without murmurs, rubs or gallops. GASTROINTESTINAL: Abdomen soft, nontender, nondistended,   normoactive bowel sounds. No rebound, guarding or rigidity. No   auscultated bruits. No pulsatile mass. BACK: No CVA tenderness. No step-off deformity. MUSCULOSKELETAL: No acute palpable deformity, negative calf   tenderness.    VASCULAR: 2+ radial, 1+ dorsalis pedis pulses, without cyanosis,   clubbing. There is nonpitting edema of bilateral lower extremities. SKIN: Warm and dry. LABORATORY DATA: Sodium 138, potassium 3.8, chloride 102, CO2   of 28, BUN 10, creatinine 0.59, glucose 85, anion gap of 8, calcium   8.5, GFR 36, total bilirubin 0.3, total protein 6.4, albumin 3.0, ALT 24,   AST 29, alkaline phosphatase 111, troponin I less than 0.04. WBC 6.0,   hemoglobin 13.3, hematocrit 39.7, platelets 591, neutrophils 55%. INR   1.0, PT 10.5. Serum alcohol 248. DIAGNOSTIC DATA: CTA of the head, without contrast, results were   noted. CT of the cervical spine, with and without contrast, showed no   acute process. IMPRESSION AND PLAN:   1. Subdural hematoma. Admit to ICU. Place on neurovascular checks,   fall precautions. Consult with neurosurgeon. Repeat CT of the head   later this a.m. and follow up evaluation. 2. Alcohol intoxication. Place on [facility] alcohol withdrawal protocol with   Ativan p.r.n. Order banana bag therapy, multivitamins, thiamine, and   folate daily therapy. Strongly encourage the patient on alcohol   cessation. 3. Left scalp laceration, status post repair, wound closure per the ED. Continue wound care. 4. Falls. Continue with fall precautions. 5. Hypertension. Antihypertensive medications as needed. 6. Venous thromboembolism prophylaxis. Sequential compression   devices, bilateral lower extremities. DANIEL Law MD MP / TS   D:  08/01/2017   05:12   T:  08/01/2017   06:30   Job #:  [id number]

## 2017-08-01 NOTE — ED NOTES
Pt's head wound cleaned. No actual laceration noted. Only two small puncture wounds. Pt has an abrasion to LEFT elbow. This was cleaned as well. Advised pt of his BAL. Instructed on risk reduction of drinking and driving. Pt seems to understand. Neuro exam is normal.  Awaiting eval of head wound by Dr. Pendleton Has before transferring patient up to receiving unit.

## 2017-08-03 ENCOUNTER — PATIENT OUTREACH (OUTPATIENT)
Dept: INTERNAL MEDICINE CLINIC | Age: 72
End: 2017-08-03

## 2017-08-03 NOTE — PROGRESS NOTES
Seferino Ruiz 71 [facility] Daily report 8/2/17. Patient discharged from [facility] for subdural hematoma on 8/1/17. RRAT score: 16 Moderate    Nurse Navigator(NN) contacted the patient by telephone to perform post hospital discharge assessment. Verified as patient with 2 identifiers. Provided introduction to self, and explanation of the Nurses Navigator role. Call Information: Spoke with patient regarding his recent fall and hospitalization. Pt states he is doing well. Feels himself with no left over symptoms. States he had a friend stay with him on the 1st and 2nd just to make sure there were no changes. States there is no bleeding from his head around the staples. The skin around the staples is intact, not red or irritated at this time. Pt states Neurologist stated he needed to f/u with her in 2 weeks and she needed to remove the staples on the 15th. However, the MD is out of town until the 22nd. Pt states he would prefer to have his PCP visit on the 15th so we can remove the staples instead. Pt notes he will pay attention to the red flags and call PCP office if there are any changes or concerns. Pt is well known to this writer and trusts his word to contact with any arising problems. Writer did address the concerns of drinking with the patient. Pt does admit that he had start drinking again. Pt had stopped drinking after an incident last year. Pt notes he has not had any alcohol since discharge and is aware he needs to not be drinking per the conversation by the Neurologist during their talk during hospitalization. Writer also impressed the importance of not drinking. Informed that ETOH increases the blood thinness for one. Also it will make the mentation unclear and therefore make a neurological exam unreliable.  Writer also notes that for the pt's overall health and wellbeing, the ongoing ETOH use needs to be controlled because the pt has a history of using the alcohol excessively. Pt verbalizes agreement and understanding. Appointment date and time confirmed. Pt will be seeing the NP for this examination. Pt would like to see writer during his appointment as well. Writer has agreed to speak with patient at this time. Red Flags:  Fever over 101 for 24 hours, CP, SOB, Fever, Chill, N/V/D, change in mentation, falling, weakness, bleeding, severe headache not relieved by medication. Discharge Instructions :  Reviewed discharge instructions with patient. Patient verbalizes understanding of discharge instructions and follow-up care. PCP/Specialist Follow Up:   Patient scheduled to follow up with Billy Steel NP on 8/15/17 @ 8:30am.      Reviewed red flags with patient, and patient verbalizes understanding. Opportunity given to ask questions. No other clinical/social/functional needs noted. Plan:  Reviewed plan of care. Patient verbalized understanding and agreement with plan. The patient agrees to contact the PCP office for questions related to their healthcare. NN contact information given to call prn. Goal:  Goals      Attends follow-up appointments as directed. PCP 8/15/17 w/Josette Stevenson NP  Neurology f/u 2 weeks, but MD is out of town until 22nd. [facility] 8/3/17       Knowledge and adherence of prescribed medication (ie. action, side effects, missed dose, etc.). Hold ASA and Viagra [facility] 8/3/17       Understands red flags post discharge. Fever over 101 for 24 hours, CP, SOB, Fever, Chill, N/V/D, change in mentation, falling, weakness, bleeding, severe headache not relieved by medication.  [facility] 8/3/17            Medical History:     Past Medical History:   Diagnosis Date    DJD (degenerative joint disease)     Hypertension     Obesity        Labs Reviewed:  Recent Labs      08/01/17   0017   WBC  6.0   HGB  13.3   HCT  39.7   PLT  242     Recent Labs      08/01/17   0032  08/01/17   0017   NA   --   138 K   --   3.8   CL   --   102   CO2   --   28   GLU   --   82   BUN   --   10   CREA   --   0.59*   CA   --   8.5   ALB   --   3.0*   SGOT   --   29   ALT   --   24   INR  1.0   --      No components found for: GLPOC  No results for input(s): PH, PCO2, PO2, HCO3, FIO2 in the last 72 hours.   Recent Labs      08/01/17   0032   INR  1.0

## 2017-08-15 ENCOUNTER — OFFICE VISIT (OUTPATIENT)
Dept: INTERNAL MEDICINE CLINIC | Age: 72
End: 2017-08-15

## 2017-08-15 ENCOUNTER — PATIENT OUTREACH (OUTPATIENT)
Dept: INTERNAL MEDICINE CLINIC | Age: 72
End: 2017-08-15

## 2017-08-15 VITALS
TEMPERATURE: 98.4 F | SYSTOLIC BLOOD PRESSURE: 117 MMHG | OXYGEN SATURATION: 96 % | HEIGHT: 70 IN | WEIGHT: 199 LBS | HEART RATE: 85 BPM | DIASTOLIC BLOOD PRESSURE: 70 MMHG | RESPIRATION RATE: 20 BRPM | BODY MASS INDEX: 28.49 KG/M2

## 2017-08-15 DIAGNOSIS — K21.9 GASTROESOPHAGEAL REFLUX DISEASE WITHOUT ESOPHAGITIS: ICD-10-CM

## 2017-08-15 DIAGNOSIS — M25.562 CHRONIC PAIN OF BOTH KNEES: ICD-10-CM

## 2017-08-15 DIAGNOSIS — M25.561 CHRONIC PAIN OF BOTH KNEES: ICD-10-CM

## 2017-08-15 DIAGNOSIS — R60.0 PEDAL EDEMA: ICD-10-CM

## 2017-08-15 DIAGNOSIS — G89.29 CHRONIC RIGHT SHOULDER PAIN: ICD-10-CM

## 2017-08-15 DIAGNOSIS — S01.01XD LACERATION OF SCALP WITHOUT FOREIGN BODY, SUBSEQUENT ENCOUNTER: ICD-10-CM

## 2017-08-15 DIAGNOSIS — M25.511 CHRONIC RIGHT SHOULDER PAIN: ICD-10-CM

## 2017-08-15 DIAGNOSIS — G89.29 CHRONIC PAIN OF BOTH KNEES: ICD-10-CM

## 2017-08-15 DIAGNOSIS — S06.5XAA SUBDURAL HEMATOMA: Primary | ICD-10-CM

## 2017-08-15 RX ORDER — HYDROCODONE BITARTRATE AND ACETAMINOPHEN 7.5; 325 MG/1; MG/1
0.5 TABLET ORAL
Qty: 30 TAB | Refills: 0 | Status: SHIPPED | OUTPATIENT
Start: 2017-08-15 | End: 2017-12-01 | Stop reason: SDUPTHER

## 2017-08-15 RX ORDER — PANTOPRAZOLE SODIUM 40 MG/1
40 TABLET, DELAYED RELEASE ORAL
Qty: 30 TAB | Refills: 5 | Status: SHIPPED | OUTPATIENT
Start: 2017-08-15 | End: 2017-12-01 | Stop reason: ALTCHOICE

## 2017-08-15 NOTE — MR AVS SNAPSHOT
Visit Information Date & Time Provider Department Dept. Phone Encounter #  
 8/15/2017  8:30 AM Michael Lowery NP [facility] [phone] [id number] Your Appointments 11/28/2017  8:30 AM  
ROUTINE CARE with Santosh Ames DO [facility] ([address]) Appt Note: 6 month f/u  
 [address] [place] [address]  
[phone]  
  
   
 [address] [facility] [address] Upcoming Health Maintenance Date Due COLONOSCOPY 3/24/1963 INFLUENZA AGE 9 TO ADULT 8/1/2017 MEDICARE YEARLY EXAM 9/28/2017 GLAUCOMA SCREENING Q2Y 1/25/2019 DTaP/Tdap/Td series (2 - Td) 9/27/2026 Allergies as of 8/15/2017  Review Complete On: 8/15/2017 By: Michael Lowery NP Severity Noted Reaction Type Reactions Darvocet A500 [Propoxyphene N-acetaminophen] High 05/24/2011   Intolerance Other (comments)  
 hallucinate Current Immunizations  Reviewed on 5/23/2017 Name Date Influenza High Dose Vaccine PF 9/27/2016 Influenza Vaccine 12/29/2014, 1/3/2014 Influenza Vaccine Whole 10/14/2011 Pneumococcal Conjugate (PCV-13) 7/13/2015 Pneumococcal Polysaccharide (PPSV-23) 5/23/2017 Not reviewed this visit Vitals BP Pulse Temp Resp Height(growth percentile) Weight(growth percentile) 117/70 (BP 1 Location: Right arm, BP Patient Position: Sitting) 85 98.4 °F (36.9 °C) (Oral) 20 5' 10\" (1.778 m) 199 lb (90.3 kg) SpO2 BMI Smoking Status 96% 28.55 kg/m2 Never Smoker BMI and BSA Data Body Mass Index Body Surface Area 28.55 kg/m 2 2.11 m 2 Preferred Pharmacy Pharmacy Name Phone [facility] [phone] Your Updated Medication List  
  
   
This list is accurate as of: 8/15/17  8:48 AM.  Always use your most recent med list.  
  
  
  
  
 FISH -1,000 mg capsule Generic drug:  fish oil-omega-3 fatty acids Take  by mouth daily. Takes unknown dose HYDROcodone-acetaminophen 7.5-325 mg per tablet Commonly known as:  Chastity [place] Take 0.5 Tabs by mouth daily as needed for Pain. lisinopril 10 mg tablet Commonly known as:  PRINIVIL, ZESTRIL  
take 1 tablet by mouth once daily  
  
 melatonin 5 mg Cap capsule Take  by mouth. Takes unknown dose nightly as needed  
  
 pantoprazole 40 mg tablet Commonly known as:  PROTONIX Take 1 Tab by mouth Daily (before breakfast). VITAMIN B-12 1,000 mcg tablet Generic drug:  cyanocobalamin Take  by mouth daily. Takes unknown dose VITAMIN C 500 mg tablet Generic drug:  ascorbic acid (vitamin C) Take  by mouth daily. Takes unknown dose Prescriptions Printed Refills HYDROcodone-acetaminophen (NORCO) 7.5-325 mg per tablet 0 Sig: Take 0.5 Tabs by mouth daily as needed for Pain. Class: Print Route: Oral  
  
Prescriptions Sent to Pharmacy Refills  
 pantoprazole (PROTONIX) 40 mg tablet 5 Sig: Take 1 Tab by mouth Daily (before breakfast). Class: Normal  
 Pharmacy: [facility] [place] [address], [address] [place] [address] #: [phone] Route: Oral  
  
Introducing [facility]! Dear Jonah Phelps: Thank you for requesting a [employer] account. Our records indicate that you already have an active [employer] account. You can access your account anytime at [email or web address] Did you know that you can access your hospital and ER discharge instructions at any time in [employer]? You can also review all of your test results from your hospital stay or ER visit. Additional Information If you have questions, please visit the Frequently Asked Questions section of the [employer] website at [email or web address] Remember, [employer] is NOT to be used for urgent needs. For medical emergencies, dial 911. Now available from your iPhone and Android! Please provide this summary of care documentation to your next provider. Your primary care clinician is listed as Chyna Quiroga. If you have any questions after today's visit, please call [phone].

## 2017-08-15 NOTE — PROGRESS NOTES
Pt saw NP today for staple removal and post hospitalization follow up for subdural hemorrhage. Per note, pt doing well. 3 staples removed. No negative side effects or residual from incident. Pt has 1+ pedal edema which is chronic. Has GERD and refill for Protonix given. Has chronic shoulder and knee pain; Hydrocodone refilled. Pt and writer had plan was to speak post appointment. NP was not aware writer was in office already when pt's visit was completed and informed him that writer was not in. Pt left without speaking to writer. Writer called pt's cell phone just a few minutes after learning he had recently walked out of the appointment. VM was left to return call at his convenience.
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
## 2017-08-15 NOTE — PROGRESS NOTES
Chief Complaint   Patient presents with    Suture Removal    Heartburn     1. Have you been to the ER, urgent care clinic since your last visit? Hospitalized since your last visit? No    2. Have you seen or consulted any other health care providers outside of the [address] [place] [address] since your last visit? Include any pap smears or colon screening.  No

## 2017-08-16 ENCOUNTER — PATIENT OUTREACH (OUTPATIENT)
Dept: INTERNAL MEDICINE CLINIC | Age: 72
End: 2017-08-16

## 2017-08-16 NOTE — PROGRESS NOTES
Pt returning writer's call. States doing well. Staples out. Feels good to have them out. Has f/u next Tuesday with Neurologist and should hopefully be released as being cleared all together. States he has remained asymptomatic. Will surely contact writer or PCP office if there are any changes. Voices appreciation for writer reaching out and staying up to date with his progress. Has no questions or concerns at this time. Writer will reach out once more in a couple of weeks and close episode if all continues to go smoothly.

## 2017-10-30 RX ORDER — LISINOPRIL 10 MG/1
TABLET ORAL
Qty: 30 TAB | Refills: 5 | Status: SHIPPED | OUTPATIENT
Start: 2017-10-30 | End: 2018-05-08 | Stop reason: SDUPTHER

## 2017-12-01 ENCOUNTER — OFFICE VISIT (OUTPATIENT)
Dept: INTERNAL MEDICINE CLINIC | Age: 72
End: 2017-12-01

## 2017-12-01 VITALS
HEART RATE: 59 BPM | RESPIRATION RATE: 19 BRPM | WEIGHT: 208 LBS | HEIGHT: 70 IN | OXYGEN SATURATION: 100 % | DIASTOLIC BLOOD PRESSURE: 70 MMHG | SYSTOLIC BLOOD PRESSURE: 122 MMHG | BODY MASS INDEX: 29.78 KG/M2

## 2017-12-01 DIAGNOSIS — M25.562 CHRONIC PAIN OF BOTH KNEES: ICD-10-CM

## 2017-12-01 DIAGNOSIS — M25.561 CHRONIC PAIN OF BOTH KNEES: ICD-10-CM

## 2017-12-01 DIAGNOSIS — Z98.84 S/P GASTRIC BYPASS: ICD-10-CM

## 2017-12-01 DIAGNOSIS — F10.29 ALCOHOL DEPENDENCE WITH UNSPECIFIED ALCOHOL-INDUCED DISORDER (HCC): ICD-10-CM

## 2017-12-01 DIAGNOSIS — K21.9 GASTROESOPHAGEAL REFLUX DISEASE WITHOUT ESOPHAGITIS: ICD-10-CM

## 2017-12-01 DIAGNOSIS — I10 ESSENTIAL HYPERTENSION: ICD-10-CM

## 2017-12-01 DIAGNOSIS — M25.511 CHRONIC RIGHT SHOULDER PAIN: ICD-10-CM

## 2017-12-01 DIAGNOSIS — Z23 ENCOUNTER FOR IMMUNIZATION: Primary | ICD-10-CM

## 2017-12-01 DIAGNOSIS — E66.9 CLASS 1 OBESITY WITHOUT SERIOUS COMORBIDITY WITH BODY MASS INDEX (BMI) OF 30.0 TO 30.9 IN ADULT, UNSPECIFIED OBESITY TYPE: ICD-10-CM

## 2017-12-01 DIAGNOSIS — G89.29 CHRONIC PAIN OF BOTH KNEES: ICD-10-CM

## 2017-12-01 DIAGNOSIS — G89.29 CHRONIC RIGHT SHOULDER PAIN: ICD-10-CM

## 2017-12-01 RX ORDER — HYDROCODONE BITARTRATE AND ACETAMINOPHEN 7.5; 325 MG/1; MG/1
0.5 TABLET ORAL
Qty: 30 TAB | Refills: 0 | Status: SHIPPED | OUTPATIENT
Start: 2017-12-01 | End: 2018-06-04 | Stop reason: SDUPTHER

## 2017-12-01 RX ORDER — GUAIFENESIN 100 MG/5ML
81 LIQUID (ML) ORAL DAILY
COMMUNITY

## 2017-12-01 NOTE — MR AVS SNAPSHOT
Visit Information Date & Time Provider Department Dept. Phone Encounter #  
 12/1/2017  9:45 AM Alicia Dickerson [facility] [phone] [id number] Follow-up Instructions Return in about 6 months (around 6/1/2018). Upcoming Health Maintenance Date Due COLONOSCOPY 3/24/1963 Influenza Age 5 to Adult 8/1/2017 MEDICARE YEARLY EXAM 9/28/2017 GLAUCOMA SCREENING Q2Y 1/25/2019 DTaP/Tdap/Td series (2 - Td) 9/27/2026 Allergies as of 12/1/2017  Review Complete On: 12/1/2017 By: Georgiana Garcia DO Severity Noted Reaction Type Reactions Darvocet A500 [Propoxyphene N-acetaminophen] High 05/24/2011   Intolerance Other (comments)  
 hallucinate Current Immunizations  Reviewed on 12/1/2017 Name Date Influenza High Dose Vaccine PF  Incomplete, 9/27/2016 Influenza Vaccine 12/29/2014, 1/3/2014 Influenza Vaccine Whole 10/14/2011 Pneumococcal Conjugate (PCV-13) 7/13/2015 Pneumococcal Polysaccharide (PPSV-23) 5/23/2017 Reviewed by Georgiana Garcia DO on 12/1/2017 at 10:03 AM  
You Were Diagnosed With   
  
 Codes Comments Encounter for immunization    -  Primary ICD-10-CM: E38 ICD-9-CM: V03.89 Essential hypertension     ICD-10-CM: I10 
ICD-9-CM: 401.9 Class 1 obesity without serious comorbidity with body mass index (BMI) of 30.0 to 30.9 in adult, unspecified obesity type     ICD-10-CM: E66.9, Z68.30 ICD-9-CM: 278.00, V85.30 S/P gastric bypass     ICD-10-CM: J54.50 ICD-9-CM: V45.86 Gastroesophageal reflux disease without esophagitis     ICD-10-CM: K21.9 ICD-9-CM: 530.81 Chronic right shoulder pain     ICD-10-CM: M25.511, G89.29 ICD-9-CM: 719.41, 338.29 Chronic pain of both knees     ICD-10-CM: M25.561, M25.562, G89.29 ICD-9-CM: 719.46, 338.29 Alcohol dependence with unspecified alcohol-induced disorder ([facility]     ICD-10-CM: C72.92 ICD-9-CM: 303.90, 291.9 Vitals BP Pulse Resp Height(growth percentile) Weight(growth percentile) SpO2  
 122/70 (BP 1 Location: Right arm, BP Patient Position: Sitting) (!) 59 19 5' 10\" (1.778 m) 208 lb (94.3 kg) 100% BMI Smoking Status 29.84 kg/m2 Never Smoker Vitals History BMI and BSA Data Body Mass Index Body Surface Area  
 29.84 kg/m 2 2.16 m 2 Preferred Pharmacy Pharmacy Name Phone [phone] [phone] Your Updated Medication List  
  
   
This list is accurate as of: 12/1/17 10:03 AM.  Always use your most recent med list.  
  
  
  
  
 aspirin 81 mg chewable tablet Take 81 mg by mouth daily. FISH -1,000 mg capsule Generic drug:  fish oil-omega-3 fatty acids Take  by mouth daily. Takes unknown dose HYDROcodone-acetaminophen 7.5-325 mg per tablet Commonly known as:  [address] Take 0.5 Tabs by mouth daily as needed for Pain. lisinopril 10 mg tablet Commonly known as:  PRINIVIL, ZESTRIL  
take 1 tablet by mouth once daily  
  
 melatonin 5 mg Cap capsule Take  by mouth. Takes unknown dose nightly as needed VITAMIN B-12 1,000 mcg tablet Generic drug:  cyanocobalamin Take  by mouth daily. Takes unknown dose VITAMIN C 500 mg tablet Generic drug:  ascorbic acid (vitamin C) Take  by mouth daily. Takes unknown dose Prescriptions Printed Refills HYDROcodone-acetaminophen (NORCO) 7.5-325 mg per tablet 0 Sig: Take 0.5 Tabs by mouth daily as needed for Pain. Class: Print Route: Oral  
  
We Performed the Following INFLUENZA VIRUS VACCINE, HIGH DOSE SEASONAL, PRESERVATIVE FREE [74982 CPT(R)] Follow-up Instructions Return in about 6 months (around 6/1/2018). Introducing [facility]! Dear Yamilet Lane: Thank you for requesting a [employer] account.   Our records indicate that you already have an active Consumer Agent Portal (CAP) account. You can access your account anytime at [email or web address] Did you know that you can access your hospital and ER discharge instructions at any time in Consumer Agent Portal (CAP)? You can also review all of your test results from your hospital stay or ER visit. Additional Information If you have questions, please visit the Frequently Asked Questions section of the Consumer Agent Portal (CAP) website at [email or web address] Remember, Consumer Agent Portal (CAP) is NOT to be used for urgent needs. For medical emergencies, dial 911. Now available from your iPhone and Android! Please provide this summary of care documentation to your next provider. Your primary care clinician is listed as Corbin Parks. If you have any questions after today's visit, please call [phone].

## 2017-12-01 NOTE — PROGRESS NOTES
Health Maintenance Due   Topic Date Due    COLONOSCOPY  03/24/1963    Influenza Age 5 to Adult  08/01/2017    MEDICARE YEARLY EXAM  09/28/2017       Chief Complaint   Patient presents with    Hypertension    Vitamin D Deficiency    Follow Up Chronic Condition       1. Have you been to the ER, urgent care clinic since your last visit? Hospitalized since your last visit? No    2. Have you seen or consulted any other health care providers outside of the [address] [place] [address] since your last visit? Include any pap smears or colon screening. No    3) Do you have an Advance Directive on file? no    4) Are you interested in receiving information on Advance Directives? NO      Patient is accompanied by self I have received verbal consent from Addy Torrez to discuss any/all medical information while they are present in the room.

## 2017-12-01 NOTE — PROGRESS NOTES
HISTORY OF PRESENT ILLNESS  Lito Mcdaniel is a 71 y.o. male. Pt. comes in for f/u. Has multiple medical problems. Continues to have chronic arthritic pain in lower back, shoulders and knees. Takes Norco here and there and needs a refill. Continues to drink 5 or 6 beers daily. Denies smoking. Denies any recent falls. Has gained more weight. Has had a gastric bypass a few years ago. Has some GERD issues but Protonix did not help. OTC meds to better for him. Reports compliance with medications and diet. Med list and most recent labs/studies reviewed with pt. Trying to be active physically as tolerated. Reports no other new c/o. Hypertension    Pertinent negatives include no chest pain, no blurred vision, no headaches, no dizziness and no shortness of breath. Follow Up Chronic Condition   Pertinent negatives include no chest pain, no abdominal pain, no headaches and no shortness of breath. Review of Systems   HENT: Negative. Eyes: Negative for blurred vision. Respiratory: Negative for shortness of breath. Cardiovascular: Negative for chest pain and leg swelling. Gastrointestinal: Negative for abdominal pain. Genitourinary: Negative for dysuria. Musculoskeletal: Positive for back pain and joint pain. Negative for falls. Skin: Negative. Neurological: Negative for dizziness, sensory change and headaches. Psychiatric/Behavioral: Negative. All other systems reviewed and are negative. Physical Exam   Constitutional: He is oriented to person, place, and time. He appears well-developed and well-nourished. No distress. Obese pleasant   HENT:   Head: Normocephalic and atraumatic. Mouth/Throat: Oropharynx is clear and moist.   Eyes: Conjunctivae are normal. No scleral icterus. Neck: Normal range of motion. Neck supple. No JVD present. No thyromegaly present. Cardiovascular: Normal rate, regular rhythm, normal heart sounds and intact distal pulses.     No murmur heard.  Pulmonary/Chest: Effort normal and breath sounds normal. No respiratory distress. He has no wheezes. He has no rales. Abdominal: Soft. Bowel sounds are normal. He exhibits no distension. There is no tenderness. obese   Musculoskeletal: Normal range of motion. He exhibits edema (trace pedal, bilat,) and tenderness (lumbars/hips). Neurological: He is alert and oriented to person, place, and time. Coordination normal.   Skin: Skin is warm and dry. No rash noted. Psychiatric: His behavior is normal.   Nursing note and vitals reviewed. ASSESSMENT and PLAN  Diagnoses and all orders for this visit:    1. Encounter for immunization  -     Influenza virus vaccine (FLUZONE HIGH DOSE) 65 years and older (8[address]80)    2. Essential hypertension    3. Class 1 obesity without serious comorbidity with body mass index (BMI) of 30.0 to 30.9 in adult, unspecified obesity type    4. S/P gastric bypass    5. Gastroesophageal reflux disease without esophagitis    6. Chronic right shoulder pain  -     HYDROcodone-acetaminophen (NORCO) 7.5-325 mg per tablet; Take 0.5 Tabs by mouth daily as needed for Pain. 7. Chronic pain of both knees  -     HYDROcodone-acetaminophen (NORCO) 7.5-325 mg per tablet; Take 0.5 Tabs by mouth daily as needed for Pain. 8. Alcohol dependence with unspecified alcohol-induced disorder ([facility]      Follow-up Disposition:  Return in about 6 months (around 6/1/2018).    lab results and schedule of future lab studies reviewed with patient  reviewed diet, exercise and weight control  reviewed medications and side effects in detail  Advised patient to cut back on alcohol consumption

## 2018-05-08 RX ORDER — LISINOPRIL 10 MG/1
TABLET ORAL
Qty: 30 TAB | Refills: 5 | Status: SHIPPED | OUTPATIENT
Start: 2018-05-08 | End: 2018-06-04 | Stop reason: SDUPTHER

## 2018-06-04 ENCOUNTER — OFFICE VISIT (OUTPATIENT)
Dept: INTERNAL MEDICINE CLINIC | Age: 73
End: 2018-06-04

## 2018-06-04 VITALS
SYSTOLIC BLOOD PRESSURE: 135 MMHG | OXYGEN SATURATION: 97 % | HEIGHT: 70 IN | HEART RATE: 96 BPM | RESPIRATION RATE: 20 BRPM | WEIGHT: 219 LBS | DIASTOLIC BLOOD PRESSURE: 81 MMHG | BODY MASS INDEX: 31.35 KG/M2 | TEMPERATURE: 97 F

## 2018-06-04 DIAGNOSIS — M25.561 CHRONIC PAIN OF BOTH KNEES: ICD-10-CM

## 2018-06-04 DIAGNOSIS — Z98.84 S/P GASTRIC BYPASS: ICD-10-CM

## 2018-06-04 DIAGNOSIS — R09.82 PND (POST-NASAL DRIP): ICD-10-CM

## 2018-06-04 DIAGNOSIS — G89.29 CHRONIC PAIN OF BOTH KNEES: ICD-10-CM

## 2018-06-04 DIAGNOSIS — M25.511 CHRONIC RIGHT SHOULDER PAIN: ICD-10-CM

## 2018-06-04 DIAGNOSIS — N40.1 BENIGN PROSTATIC HYPERPLASIA WITH NOCTURIA: ICD-10-CM

## 2018-06-04 DIAGNOSIS — I87.2 VENOUS INSUFFICIENCY OF BOTH LOWER EXTREMITIES: ICD-10-CM

## 2018-06-04 DIAGNOSIS — I10 ESSENTIAL HYPERTENSION: Primary | ICD-10-CM

## 2018-06-04 DIAGNOSIS — M25.562 CHRONIC PAIN OF BOTH KNEES: ICD-10-CM

## 2018-06-04 DIAGNOSIS — R35.1 BENIGN PROSTATIC HYPERPLASIA WITH NOCTURIA: ICD-10-CM

## 2018-06-04 DIAGNOSIS — E66.9 CLASS 1 OBESITY WITHOUT SERIOUS COMORBIDITY WITH BODY MASS INDEX (BMI) OF 30.0 TO 30.9 IN ADULT, UNSPECIFIED OBESITY TYPE: ICD-10-CM

## 2018-06-04 DIAGNOSIS — F10.20 UNCOMPLICATED ALCOHOL DEPENDENCE (HCC): ICD-10-CM

## 2018-06-04 DIAGNOSIS — G89.29 CHRONIC RIGHT SHOULDER PAIN: ICD-10-CM

## 2018-06-04 DIAGNOSIS — R20.2 PARESTHESIA OF BOTH HANDS: ICD-10-CM

## 2018-06-04 RX ORDER — HYDROCODONE BITARTRATE AND ACETAMINOPHEN 7.5; 325 MG/1; MG/1
0.5 TABLET ORAL
Qty: 30 TAB | Refills: 0 | Status: SHIPPED | OUTPATIENT
Start: 2018-06-04 | End: 2018-09-12 | Stop reason: SDUPTHER

## 2018-06-04 RX ORDER — LISINOPRIL 10 MG/1
TABLET ORAL
Qty: 90 TAB | Refills: 1 | Status: SHIPPED | OUTPATIENT
Start: 2018-06-04 | End: 2018-11-29 | Stop reason: SDUPTHER

## 2018-06-04 RX ORDER — FLUTICASONE PROPIONATE 50 MCG
2 SPRAY, SUSPENSION (ML) NASAL DAILY
Qty: 1 BOTTLE | Refills: 1 | Status: SHIPPED | OUTPATIENT
Start: 2018-06-04 | End: 2019-12-03

## 2018-06-04 NOTE — PROGRESS NOTES
HISTORY OF PRESENT ILLNESS  Chyna Gallardo is a 68 y.o. male. Pt. comes in for f/u. Has multiple medical problems. BP is stable. Continues to have arthritic pain especially in hands, hips and back. Takes Norco once in a while. Reports having a dry cough for the last couple weeks with some allergies. Reports compliance with medications but not his diet. Med list and most recent labs/studies reviewed with pt. Trying to be active physically but has gained more weight. Needs med refills. Due for repeat labs. Denies smoking. Continues to drink alcohol on daily basis. Reports numbness and tingling in hands as well. Gait is slow but no falls. Reports no other new c/o. Hypertension    Pertinent negatives include no chest pain, no blurred vision, no headaches, no dizziness and no shortness of breath. Cough   Pertinent negatives include no chest pain, no abdominal pain, no headaches and no shortness of breath. Hip Pain    Associated symptoms include back pain. Obesity   Pertinent negatives include no chest pain, no abdominal pain, no headaches and no shortness of breath. Review of Systems   Constitutional: Negative. HENT: Negative. Eyes: Negative for blurred vision. Respiratory: Positive for cough. Negative for shortness of breath. Cardiovascular: Positive for leg swelling. Negative for chest pain. Gastrointestinal: Negative for abdominal pain. Genitourinary: Negative for dysuria. Musculoskeletal: Positive for back pain and joint pain. Negative for falls. Skin: Negative. Neurological: Negative for dizziness, sensory change, focal weakness and headaches. Psychiatric/Behavioral: Negative. All other systems reviewed and are negative. Physical Exam   Constitutional: He is oriented to person, place, and time. He appears well-developed and well-nourished. No distress. Obese pleasant   HENT:   Head: Normocephalic and atraumatic.    Mouth/Throat: Oropharynx is clear and moist. Eyes: Conjunctivae are normal.   Neck: Normal range of motion. Neck supple. No JVD present. No thyromegaly present. Cardiovascular: Normal rate, regular rhythm, normal heart sounds and intact distal pulses. No murmur heard. Pulmonary/Chest: Effort normal and breath sounds normal. No respiratory distress. He has no wheezes. He has no rales. Abdominal: Soft. Bowel sounds are normal. He exhibits no distension. There is no tenderness. obese   Musculoskeletal: Normal range of motion. He exhibits edema (trace pedal, bilat,) and tenderness (lumbars/hips). Neurological: He is alert and oriented to person, place, and time. Coordination normal.   Skin: Skin is warm and dry. No rash noted. Psychiatric: His behavior is normal.   Nursing note and vitals reviewed. ASSESSMENT and PLAN  Diagnoses and all orders for this visit:    1. Essential hypertension  -     METABOLIC PANEL, COMPREHENSIVE  -     LIPID PANEL  -     CBC W/O DIFF  -     TSH 3RD GENERATION  -     VITAMIN B12    2. Class 1 obesity without serious comorbidity with body mass index (BMI) of 30.0 to 30.9 in adult, unspecified obesity type  -     METABOLIC PANEL, COMPREHENSIVE  -     LIPID PANEL  -     CBC W/O DIFF    3. Chronic pain of both knees  -     HYDROcodone-acetaminophen (NORCO) 7.5-325 mg per tablet; Take 0.5 Tabs by mouth daily as needed for Pain. 4. Venous insufficiency of both lower extremities    5. S/P gastric bypass    6. Paresthesia of both hands  -     TSH 3RD GENERATION  -     VITAMIN B12    7. Uncomplicated alcohol dependence ([facility]    8. Chronic right shoulder pain  -     HYDROcodone-acetaminophen (NORCO) 7.5-325 mg per tablet; Take 0.5 Tabs by mouth daily as needed for Pain. 9. Benign prostatic hyperplasia with nocturia    10.  PND (post-nasal drip)    Other orders  -     lisinopril (PRINIVIL, ZESTRIL) 10 mg tablet; take 1 tablet by mouth once daily  -     fluticasone (FLONASE) 50 mcg/actuation nasal spray; 2 Sprays by Both Nostrils route daily. Follow-up Disposition:  Return in about 6 months (around 12/4/2018).    lab results and schedule of future lab studies reviewed with patient  reviewed diet, exercise and weight control  reviewed medications and side effects in detail  F/u with other MD's as scheduled  Overall stable

## 2018-06-04 NOTE — LETTER
6/12/2018 8:04 AM 
 
Mr. Bailey Rivas [address] [facility] [place] [id number] Dear Bailey Rivas: 
 
Please find your most recent results below. Resulted Orders METABOLIC PANEL, COMPREHENSIVE Result Value Ref Range Glucose 87 65 - 99 mg/dL BUN 14 8 - 27 mg/dL Creatinine 0.77 0.76 - 1.27 mg/dL GFR est non-AA 91 >59 mL/min/1.73 GFR est  >59 mL/min/1.73  
 BUN/Creatinine ratio 18 10 - 24 Sodium 142 134 - 144 mmol/L Potassium 5.3 (H) 3.5 - 5.2 mmol/L Chloride 105 96 - 106 mmol/L  
 CO2 19 18 - 29 mmol/L Comment: **Effective June 11, 2018 Carbon Dioxide, Total** 
  reference interval will be changing to: Age                  Male          Female 
     0 days   - 30 days         16 - 34        16 - 34 
    31 days   -  1 year         15 - 22        15 - 25 
     2 years  -  5 years        16 - 34        14 - 32 
     6 years  - 15 years        23 - 32        22 - 32 
               >12 years        21 - 32        18 - 34 Calcium 9.6 8.6 - 10.2 mg/dL Protein, total 6.6 6.0 - 8.5 g/dL Albumin 4.0 3.5 - 4.8 g/dL GLOBULIN, TOTAL 2.6 1.5 - 4.5 g/dL A-G Ratio 1.5 1.2 - 2.2 Bilirubin, total 0.3 0.0 - 1.2 mg/dL Alk. phosphatase 110 39 - 117 IU/L  
 AST (SGOT) 27 0 - 40 IU/L  
 ALT (SGPT) 15 0 - 44 IU/L Narrative Performed at:  [address]  [id number] : Felicita Díaz MD, Phone:  [phone] LIPID PANEL Result Value Ref Range Cholesterol, total 151 100 - 199 mg/dL Triglyceride 70 0 - 149 mg/dL HDL Cholesterol 86 >39 mg/dL VLDL, calculated 14 5 - 40 mg/dL LDL, calculated 51 0 - 99 mg/dL Narrative Performed at:  [address]  [id number] : Felicita Díaz MD, Phone:  [phone] CBC W/O DIFF Result Value Ref Range WBC 7.1 3.4 - 10.8 x10E3/uL  
 RBC 4.26 4.14 - 5.80 x10E6/uL HGB 11.8 (L) 13.0 - 17.7 g/dL HCT 38.6 37.5 - 51.0 % MCV 91 79 - 97 fL  
 MCH 27.7 26.6 - 33.0 pg  
 MCHC 30.6 (L) 31.5 - 35.7 g/dL  
 RDW 14.8 12.3 - 15.4 % PLATELET 802 (H) 238 - 379 x10E3/uL Narrative Performed at:  [address]  [id number] : Ruchi Boston MD, Phone:  [phone] TSH 3RD GENERATION Result Value Ref Range TSH 2.900 0.450 - 4.500 uIU/mL Narrative Performed at:  [address]  [id number] : Ruchi Boston MD, Phone:  [phone] VITAMIN B12 Result Value Ref Range Vitamin B12 178 (L) 232 - 1245 pg/mL Narrative Performed at:  [address]  [id number] : Ruchi Boston MD, Phone:  [phone] CVD REPORT Result Value Ref Range INTERPRETATION Note Comment:  
   Supplemental report is available. Narrative Performed at:  [address] [place] [address]  [id number] : Mariia Valenzuela MD, Phone:  [phone] RECOMMENDATIONS: 
Tomeka Lemuel your labs indicate that your B12 level is low, please take B12 1000mcg everyday and we will recheck your B12 level in 1 month. I have enclosed that lab slip for your convenience. All other labs are stable Please call me if you have any questions: [phone] Sincerely, 
 
 
Maxine Levi, DO

## 2018-06-04 NOTE — PROGRESS NOTES
Chief Complaint   Patient presents with    Hypertension    Cough     cough X 6 weeks    Hip Pain     pain level 8/10 both hips     1. Have you been to the ER, urgent care clinic since your last visit? Hospitalized since your last visit? No    2. Have you seen or consulted any other health care providers outside of the [address] [place] [address] since your last visit? Include any pap smears or colon screening.  No

## 2018-06-04 NOTE — MR AVS SNAPSHOT
[address] [place] [address] [address] [address] 
[phone] Patient: Eve Witt MRN: [id number] Visit Information Date & Time Provider Department Dept. Phone Encounter #  
 6/4/2018  8:30 AM Santosh Ames, [facility] Internal Medicine [phone] [id number] Follow-up Instructions Return in about 6 months (around 12/4/2018). Upcoming Health Maintenance Date Due COLONOSCOPY 3/24/1963 MEDICARE YEARLY EXAM 3/14/2018 Influenza Age 5 to Adult 8/1/2018 GLAUCOMA SCREENING Q2Y 1/25/2019 DTaP/Tdap/Td series (2 - Td) 9/27/2026 Allergies as of 6/4/2018  Review Complete On: 6/4/2018 By: Santosh Ames, DO Severity Noted Reaction Type Reactions Darvocet A500 [Propoxyphene N-acetaminophen] High 05/24/2011   Intolerance Other (comments)  
 hallucinate Current Immunizations  Reviewed on 12/1/2017 Name Date Influenza High Dose Vaccine PF 12/1/2017, 12/1/2017, 9/27/2016 Influenza Vaccine 12/29/2014, 1/3/2014 Influenza Vaccine Whole 10/14/2011 Pneumococcal Conjugate (PCV-13) 7/13/2015 Pneumococcal Polysaccharide (PPSV-23) 5/23/2017 Not reviewed this visit You Were Diagnosed With   
  
 Codes Comments Essential hypertension    -  Primary ICD-10-CM: I10 
ICD-9-CM: 401.9 Class 1 obesity without serious comorbidity with body mass index (BMI) of 30.0 to 30.9 in adult, unspecified obesity type     ICD-10-CM: E66.9, Z68.30 ICD-9-CM: 278.00, V85.30 Chronic pain of both knees     ICD-10-CM: M25.561, M25.562, G89.29 ICD-9-CM: 719.46, 338.29 Venous insufficiency of both lower extremities     ICD-10-CM: I87.2 ICD-9-CM: 459.81 S/P gastric bypass     ICD-10-CM: C60.55 ICD-9-CM: V45.86 Paresthesia of both hands     ICD-10-CM: R20.2 ICD-9-CM: 782.0 Uncomplicated alcohol dependence ([facility]     ICD-10-CM: F10.20 ICD-9-CM: 303.90 Chronic right shoulder pain     ICD-10-CM: M25.511, G89.29 ICD-9-CM: 719.41, 338.29 Benign prostatic hyperplasia with nocturia     ICD-10-CM: N40.1, R35.1 ICD-9-CM: 600.01, 788.43 PND (post-nasal drip)     ICD-10-CM: R09.82 ICD-9-CM: 784.91 Vitals BP Pulse Temp Resp Height(growth percentile) Weight(growth percentile) 135/81 (BP 1 Location: Right arm, BP Patient Position: Sitting) 96 97 °F (36.1 °C) (Oral) 20 5' 10\" (1.778 m) 219 lb (99.3 kg) SpO2 BMI Smoking Status 97% 31.42 kg/m2 Never Smoker Vitals History BMI and BSA Data Body Mass Index Body Surface Area  
 31.42 kg/m 2 2.21 m 2 Preferred Pharmacy Pharmacy Name Phone [phone], [address] [phone] Your Updated Medication List  
  
   
This list is accurate as of 6/4/18  8:51 AM.  Always use your most recent med list.  
  
  
  
  
 aspirin 81 mg chewable tablet Take 81 mg by mouth daily. FISH -1,000 mg capsule Generic drug:  fish oil-omega-3 fatty acids Take  by mouth daily. Takes unknown dose  
  
 fluticasone 50 mcg/actuation nasal spray Commonly known as:  Vidal Granado 2 Sprays by Both Nostrils route daily. HYDROcodone-acetaminophen 7.5-325 mg per tablet Commonly known as:  Vervivek Nelsonck Take 0.5 Tabs by mouth daily as needed for Pain. lisinopril 10 mg tablet Commonly known as:  PRINIVIL, ZESTRIL  
take 1 tablet by mouth once daily  
  
 melatonin 5 mg Cap capsule Take  by mouth. Takes unknown dose nightly as needed VITAMIN B-12 1,000 mcg tablet Generic drug:  cyanocobalamin Take  by mouth daily. Takes unknown dose VITAMIN C 500 mg tablet Generic drug:  ascorbic acid (vitamin C) Take  by mouth daily. Takes unknown dose Prescriptions Printed Refills  HYDROcodone-acetaminophen (NORCO) 7.5-325 mg per tablet 0  
 Sig: Take 0.5 Tabs by mouth daily as needed for Pain. Class: Print Route: Oral  
  
Prescriptions Sent to Pharmacy Refills  
 lisinopril (PRINIVIL, ZESTRIL) 10 mg tablet 1 Sig: take 1 tablet by mouth once daily Class: Normal  
 Pharm[facility]: [facility], [address] [place] [address] Ph #: [phone]  
 fluticasone (FLONASE) 50 mcg/actuation nasal spray 1 Si Sprays by Both Nostrils route daily. Class: Normal  
 Pharmacy: [facility] [address], [address] Ph #: [phone] Route: Both Nostrils We Performed the Following CBC W/O DIFF [36743 CPT(R)] LIPID PANEL [39286 CPT(R)] METABOLIC PANEL, COMPREHENSIVE [69628 CPT(R)] TSH 3RD GENERATION [36220 CPT(R)] VITAMIN B12 [id number] CPT(R)] Follow-up Instructions Return in about 6 months (around 2018). Introducing [address] Dear Anna Marie Borjas: Thank you for requesting a [employer] account. Our records indicate that you already have an active [employer] account. You can access your account anytime at [email or web address] charity: [email or web address] Did you know that you can access your hospital and ER discharge instructions at any time in [employer]? You can also review all of your test results from your hospital stay or ER visit. Additional Information If you have questions, please visit the Frequently Asked Questions section of the [employer] website at [email or web address]: [email or web address] Remember, [facility] is NOT to be used for urgent needs. For medical emergencies, dial 911. Now available from your iPhone and Android! Please provide this summary of care documentation to your next provider. Your primary care clinician is listed as Gifty Sams. If you have any questions after today's visit, please call [phone].

## 2018-06-05 LAB
ALBUMIN SERPL-MCNC: 4 G/DL (ref 3.5–4.8)
ALBUMIN/GLOB SERPL: 1.5 {RATIO} (ref 1.2–2.2)
ALP SERPL-CCNC: 110 IU/L (ref 39–117)
ALT SERPL-CCNC: 15 IU/L (ref 0–44)
AST SERPL-CCNC: 27 IU/L (ref 0–40)
BILIRUB SERPL-MCNC: 0.3 MG/DL (ref 0–1.2)
BUN SERPL-MCNC: 14 MG/DL (ref 8–27)
BUN/CREAT SERPL: 18 (ref 10–24)
CALCIUM SERPL-MCNC: 9.6 MG/DL (ref 8.6–10.2)
CHLORIDE SERPL-SCNC: 105 MMOL/L (ref 96–106)
CHOLEST SERPL-MCNC: 151 MG/DL (ref 100–199)
CO2 SERPL-SCNC: 19 MMOL/L (ref 18–29)
CREAT SERPL-MCNC: 0.77 MG/DL (ref 0.76–1.27)
ERYTHROCYTE [DISTWIDTH] IN BLOOD BY AUTOMATED COUNT: 14.8 % (ref 12.3–15.4)
GFR SERPLBLD CREATININE-BSD FMLA CKD-EPI: 105 ML/MIN/1.73
GFR SERPLBLD CREATININE-BSD FMLA CKD-EPI: 91 ML/MIN/1.73
GLOBULIN SER CALC-MCNC: 2.6 G/DL (ref 1.5–4.5)
GLUCOSE SERPL-MCNC: 87 MG/DL (ref 65–99)
HCT VFR BLD AUTO: 38.6 % (ref 37.5–51)
HDLC SERPL-MCNC: 86 MG/DL
HGB BLD-MCNC: 11.8 G/DL (ref 13–17.7)
INTERPRETATION, 910389: NORMAL
LDLC SERPL CALC-MCNC: 51 MG/DL (ref 0–99)
MCH RBC QN AUTO: 27.7 PG (ref 26.6–33)
MCHC RBC AUTO-ENTMCNC: 30.6 G/DL (ref 31.5–35.7)
MCV RBC AUTO: 91 FL (ref 79–97)
PLATELET # BLD AUTO: 415 X10E3/UL (ref 150–379)
POTASSIUM SERPL-SCNC: 5.3 MMOL/L (ref 3.5–5.2)
PROT SERPL-MCNC: 6.6 G/DL (ref 6–8.5)
RBC # BLD AUTO: 4.26 X10E6/UL (ref 4.14–5.8)
SODIUM SERPL-SCNC: 142 MMOL/L (ref 134–144)
TRIGL SERPL-MCNC: 70 MG/DL (ref 0–149)
TSH SERPL DL<=0.005 MIU/L-ACNC: 2.9 UIU/ML (ref 0.45–4.5)
VIT B12 SERPL-MCNC: 178 PG/ML (ref 232–1245)
VLDLC SERPL CALC-MCNC: 14 MG/DL (ref 5–40)
WBC # BLD AUTO: 7.1 X10E3/UL (ref 3.4–10.8)

## 2018-06-11 NOTE — PROGRESS NOTES
Low B12  Start OTC B12 1000 mcg daily (or can try injection if he wants)  Recheck B12 level in a month  Other labs are ok

## 2018-06-12 DIAGNOSIS — E53.8 B12 DEFICIENCY: Primary | ICD-10-CM

## 2018-06-25 NOTE — LETTER
6/26/2018 2:48 PM 
 
Mr. Tomeka Hdez [address] [address] Dear Tomeka Hdez: 
 
Please find your most recent results below. Resulted Orders VITAMIN B12 Result Value Ref Range Vitamin B12 440 232 - 1245 pg/mL Narrative Performed at:  [address]  [id number] : Ruchi Boston MD, Phone:  [phone] RECOMMENDATIONS: 
B12 is WNL Please call me if you have any questions: [phone] Sincerely, 
 
 
Maxine Levi, DO

## 2018-06-26 LAB — VIT B12 SERPL-MCNC: 440 PG/ML (ref 232–1245)

## 2018-09-12 DIAGNOSIS — G89.29 CHRONIC RIGHT SHOULDER PAIN: ICD-10-CM

## 2018-09-12 DIAGNOSIS — M25.562 CHRONIC PAIN OF BOTH KNEES: ICD-10-CM

## 2018-09-12 DIAGNOSIS — M25.511 CHRONIC RIGHT SHOULDER PAIN: ICD-10-CM

## 2018-09-12 DIAGNOSIS — G89.29 CHRONIC PAIN OF BOTH KNEES: ICD-10-CM

## 2018-09-12 DIAGNOSIS — M25.561 CHRONIC PAIN OF BOTH KNEES: ICD-10-CM

## 2018-09-12 RX ORDER — HYDROCODONE BITARTRATE AND ACETAMINOPHEN 7.5; 325 MG/1; MG/1
0.5 TABLET ORAL
Qty: 30 TAB | Refills: 0 | Status: SHIPPED | OUTPATIENT
Start: 2018-09-12 | End: 2018-12-03 | Stop reason: SDUPTHER

## 2018-09-12 NOTE — TELEPHONE ENCOUNTER
Last OV: 6-4-18  Next visit: 12-3-18  Last labs: 6-4-18    Refill request for hydrocodone-acetaminophen (NORCO) 7.5-325 mg per tablet forwarded to provider for approval.

## 2018-09-13 ENCOUNTER — TELEPHONE (OUTPATIENT)
Dept: INTERNAL MEDICINE CLINIC | Age: 73
End: 2018-09-13

## 2018-09-13 NOTE — TELEPHONE ENCOUNTER
Spoke with patient after verifying name and . Notified patient Norco prescription is ready for . Pt verified picking up from office.

## 2018-11-29 RX ORDER — LISINOPRIL 10 MG/1
TABLET ORAL
Qty: 90 TAB | Refills: 1 | Status: SHIPPED | OUTPATIENT
Start: 2018-11-29 | End: 2019-06-03 | Stop reason: SDUPTHER

## 2018-12-03 ENCOUNTER — OFFICE VISIT (OUTPATIENT)
Dept: INTERNAL MEDICINE CLINIC | Age: 73
End: 2018-12-03

## 2018-12-03 VITALS
DIASTOLIC BLOOD PRESSURE: 76 MMHG | SYSTOLIC BLOOD PRESSURE: 128 MMHG | HEIGHT: 70 IN | HEART RATE: 82 BPM | BODY MASS INDEX: 33.66 KG/M2 | RESPIRATION RATE: 14 BRPM | WEIGHT: 235.1 LBS | TEMPERATURE: 97.9 F | OXYGEN SATURATION: 98 %

## 2018-12-03 DIAGNOSIS — Z12.11 COLON CANCER SCREENING: ICD-10-CM

## 2018-12-03 DIAGNOSIS — E66.9 OBESITY (BMI 30.0-34.9): ICD-10-CM

## 2018-12-03 DIAGNOSIS — M25.511 CHRONIC RIGHT SHOULDER PAIN: ICD-10-CM

## 2018-12-03 DIAGNOSIS — Z00.00 MEDICARE ANNUAL WELLNESS VISIT, SUBSEQUENT: ICD-10-CM

## 2018-12-03 DIAGNOSIS — F10.20 UNCOMPLICATED ALCOHOL DEPENDENCE (HCC): ICD-10-CM

## 2018-12-03 DIAGNOSIS — M15.9 PRIMARY OSTEOARTHRITIS INVOLVING MULTIPLE JOINTS: ICD-10-CM

## 2018-12-03 DIAGNOSIS — M25.561 CHRONIC PAIN OF BOTH KNEES: ICD-10-CM

## 2018-12-03 DIAGNOSIS — Z23 ENCOUNTER FOR IMMUNIZATION: ICD-10-CM

## 2018-12-03 DIAGNOSIS — I10 ESSENTIAL HYPERTENSION: Primary | ICD-10-CM

## 2018-12-03 DIAGNOSIS — G89.29 CHRONIC PAIN OF BOTH KNEES: ICD-10-CM

## 2018-12-03 DIAGNOSIS — Z71.89 ACP (ADVANCE CARE PLANNING): ICD-10-CM

## 2018-12-03 DIAGNOSIS — E53.8 B12 DEFICIENCY: ICD-10-CM

## 2018-12-03 DIAGNOSIS — M25.562 CHRONIC PAIN OF BOTH KNEES: ICD-10-CM

## 2018-12-03 DIAGNOSIS — G89.29 CHRONIC RIGHT SHOULDER PAIN: ICD-10-CM

## 2018-12-03 RX ORDER — HYDROCODONE BITARTRATE AND ACETAMINOPHEN 7.5; 325 MG/1; MG/1
0.5 TABLET ORAL
Qty: 30 TAB | Refills: 0 | Status: SHIPPED | OUTPATIENT
Start: 2018-12-03 | End: 2019-02-26 | Stop reason: SDUPTHER

## 2018-12-03 NOTE — PROGRESS NOTES
Schedule of Personalized Health Plan (Provide Copy to Patient) The best way to stay healthy is to live a healthy lifestyle. A healthy lifestyle includes regular exercise, eating a well-balanced diet, keeping a healthy weight and not smoking. Regular physical exams and screening tests are another important way to take care of yourself. Preventive exams provided by health care providers can find health problems early when treatment works best and can keep you from getting certain diseases or illnesses. Preventive services include exams, lab tests, screenings, shots, monitoring and information to help you take care of your own health. All people over 65 should have a pneumonia shot. Pneumonia shots are usually only needed once in a lifetime unless your doctor decides differently. All people over 65 should have a yearly flu shot. People over 65 are at medium to high risk for Hepatitis B. Three shots are needed for complete protection. In addition to your physical exam, some screening tests are recommended: 
 
Bone mass measurement (dexa scan) is recommended every two years if you have certain risk factors, such as personal history of vertebral fracture or chronic steroid medication use Diabetes Mellitus screening is recommended every year. Glaucoma is an eye disease caused by high pressure in the eye. An eye exam is recommended every year. Cardiovascular screening tests that check your cholesterol and other blood fat (lipid) levels are recommended every five years. Colorectal Cancer screening tests help to find pre-cancerous polyps (growths in the colon) so they can be removed before they turn into cancer. Tests ordered for screening depend on your personal and family history risk factors. Screening for Prostate Cancer is recommended yearly with a digital rectal exam and/or a PSA test 
 
Here is a list of your current Health Maintenance items with a due date: 
Health Maintenance Topic Date Due  
 COLONOSCOPY  03/24/1963  Shingrix Vaccine Age 50> (1 of 2) 03/24/1996  Influenza Age 5 to Adult  08/01/2018  GLAUCOMA SCREENING Q2Y  01/25/2019  MEDICARE YEARLY EXAM  12/04/2019  
 DTaP/Tdap/Td series (2 - Td) 09/27/2026  Pneumococcal 65+ Low/Medium Risk  Completed  Hepatitis C Screening  Addressed

## 2018-12-03 NOTE — PROGRESS NOTES
Chief Complaint Patient presents with  Hypertension 6 month follow up 1. Have you been to the ER, urgent care clinic since your last visit? Hospitalized since your last visit? No 
 
2. Have you seen or consulted any other health care providers outside of the [address] [place] [address] since your last visit? Include any pap smears or colon screening.  No

## 2018-12-03 NOTE — PROGRESS NOTES
HISTORY OF PRESENT ILLNESS Jennifer Lucia is a 68 y.o. male. Pt. comes in for f/u. Has multiple medical problems. BP is stable. Continues to have back and hip pain. Clarissa Calico as needed. Reports continued heavy alcohol use. 8-10 beers daily. Denies any blackouts. Denies any falls. Denies smoking cigarettes. Smokes marijuana occasionally. Not very active physically. Has gained 15 pounds in 6 months. Has had a gastric bypass in the past.  Lives alone. Reports compliance with medications . Med list and most recent labs/studies reviewed with pt. ACP discussed. Ex-wife is POA. Needs med refills. Due for repeat labs. Reports no other new c/o. HPI Review of Systems Constitutional: Negative. HENT: Negative. Eyes: Negative for blurred vision. Respiratory: Negative for shortness of breath. Cardiovascular: Positive for leg swelling. Negative for chest pain. Gastrointestinal: Negative for abdominal pain. Genitourinary: Negative for dysuria. Musculoskeletal: Positive for back pain and joint pain. Negative for falls. Skin: Negative. Neurological: Negative for dizziness, sensory change, focal weakness and headaches. Psychiatric/Behavioral: Positive for substance abuse (alcohol). Negative for depression. All other systems reviewed and are negative. Physical Exam  
Constitutional: He is oriented to person, place, and time. He appears well-developed and well-nourished. No distress. Obese pleasant HENT:  
Head: Normocephalic and atraumatic. Mouth/Throat: Oropharynx is clear and moist.  
Eyes: Conjunctivae are normal.  
Neck: Normal range of motion. Neck supple. No JVD present. No thyromegaly present. Cardiovascular: Normal rate, regular rhythm, normal heart sounds and intact distal pulses. No murmur heard. Pulmonary/Chest: Effort normal and breath sounds normal. No respiratory distress. He has no wheezes. He has no rales. Abdominal: Soft. Bowel sounds are normal. He exhibits no distension. There is no tenderness. obese Musculoskeletal: Normal range of motion. He exhibits edema (trace pedal, bilat,) and tenderness (lumbars/hips). Neurological: He is alert and oriented to person, place, and time. Coordination normal.  
Skin: Skin is warm and dry. No rash noted. Psychiatric: His behavior is normal.  
Nursing note and vitals reviewed. ASSESSMENT and PLAN Diagnoses and all orders for this visit: 1. Essential hypertension -     METABOLIC PANEL, COMPREHENSIVE 
-     VITAMIN B12 
 
2. Uncomplicated alcohol dependence ([facility] [address] 3. Primary osteoarthritis involving multiple joints 4. Obesity (BMI 30.0-34.9) 5. Chronic right shoulder pain 
-     HYDROcodone-acetaminophen (NORCO) 7.5-325 mg per tablet; Take 0.5 Tabs by mouth daily as needed for Pain. 6. Chronic pain of both knees 
-     HYDROcodone-acetaminophen (NORCO) 7.5-325 mg per tablet; Take 0.5 Tabs by mouth daily as needed for Pain. 7. B12 deficiency -     METABOLIC PANEL, COMPREHENSIVE 
-     VITAMIN B12 
 
8. Medicare annual wellness visit, subsequent 9. ACP (advance care planning) 10. Encounter for immunization 
-     INFLUENZA VACCINE INACTIVATED (IIV), SUBUNIT, ADJUVANTED, IM 
 
11. Colon cancer screening 
-     REFERRAL TO COLON AND RECTAL SURGERY Follow-up Disposition: 
Return in about 6 months (around 6/3/2019). lab results and schedule of future lab studies reviewed with patient 
reviewed diet, exercise and weight control 
reviewed medications and side effects in detail Again advised patient strongly to quit drinking heavy alcohol Advised patient to watch his diet especially carbs, increase activity and lose weight

## 2018-12-03 NOTE — PROGRESS NOTES
Jose Bush is a 68 y.o. male and presents for annual Medicare Wellness Visit. Problem List: Reviewed with patient and discussed risk factors. Patient Active Problem List  
Diagnosis Code  
 HTN (hypertension) I10  
 Obesity E66.9  
 S/P gastric bypass Z98.84  
 Plantar fasciitis M72.2  DJD (degenerative joint disease) M19.90  
 Anxiety F41.9  Vitamin D deficiency E55.9  Alcohol dependence ([address] F10.20  Pedal edema R60.0  Bilateral knee pain M25.561, M25.562  ED (erectile dysfunction) N52.9  Cataract H26.9  Venous insufficiency of both lower extremities I87.2  Chronic right shoulder pain M25.511, G89.29  
 Hyperglycemia R73.9  Hyponatremia E87.1  Subdural hematoma (HCC) S06.5X9A  
 Gastroesophageal reflux disease without esophagitis K21.9  Paresthesia of both hands R20.2 Current medical providers:  Patient Care Team: 
Abbe Lazaro DO as PCP - General (Internal Medicine) Cesar Gonzalez RN as Nurse Navigator (Internal Medicine) Germania Medina MD as Consulting Provider (Ophthalmology) Israel Salazar LPN as Ambulatory Care Navigator (Internal Medicine) PSH: Reviewed with patient Past Surgical History:  
Procedure Laterality Date  GASTRIC BYPASS,OBESE<150CM TAO-EN-Y    
 HX ORTHOPAEDIC    
 bilat hip replacements SH: Reviewed with patient Social History Tobacco Use  Smoking status: Never Smoker  Smokeless tobacco: Never Used Substance Use Topics  Alcohol use: Yes Alcohol/week: 81.6 oz Types: 126 Cans of beer, 10 Shots of liquor per week Comment: Hx of abuse  Drug use: Yes Types: Marijuana Comment: rare FH: Reviewed with patient Family History Problem Relation Age of Onset  Diabetes Maternal Grandmother  Heart Disease Maternal Grandmother  Diabetes Maternal Grandfather  Heart Disease Maternal Grandfather  No Known Problems Mother  Stroke Father Medications/Allergies: Reviewed with patient Current Outpatient Medications on File Prior to Visit Medication Sig Dispense Refill  lisinopril (PRINIVIL, ZESTRIL) 10 mg tablet take 1 tablet by mouth once daily 90 Tab 1  
 HYDROcodone-acetaminophen (NORCO) 7.5-325 mg per tablet Take 0.5 Tabs by mouth daily as needed for Pain. 30 Tab 0  
 fluticasone (FLONASE) 50 mcg/actuation nasal spray 2 Sprays by Both Nostrils route daily. 1 Bottle 1  
 aspirin 81 mg chewable tablet Take 81 mg by mouth daily.  fish oil-omega-3 fatty acids (FISH OIL) 340-1,000 mg capsule Take  by mouth daily. Takes unknown dose  melatonin 5 mg cap capsule Take  by mouth. Takes unknown dose nightly as needed  cyanocobalamin (VITAMIN B-12) 1,000 mcg tablet Take  by mouth daily. Takes unknown dose  ascorbic acid (VITAMIN C) 500 mg tablet Take  by mouth daily. Takes unknown dose No current facility-administered medications on file prior to visit. Allergies Allergen Reactions  Darvocet A500 [Propoxyphene N-Acetaminophen] Other (comments)  
  hallucinate Objective: 
Visit Vitals /76 (BP 1 Location: Left arm, BP Patient Position: Sitting) Pulse 82 Temp 97.9 °F (36.6 °C) (Oral) Resp 14 Ht 5' 10\" (1.778 m) Wt 235 lb 1.6 oz (106.6 kg) SpO2 98% BMI 33.73 kg/m² Body mass index is 33.73 kg/m². Assessment of cognitive impairment: Alert and oriented x 3 Depression Screen: PHQ over the last two weeks 12/3/2018 Little interest or pleasure in doing things Not at all Feeling down, depressed, irritable, or hopeless Not at all Total Score PHQ 2 0 Fall Risk Assessment:   
Fall Risk Assessment, last 12 mths 12/3/2018 Able to walk? Yes Fall in past 12 months? No  
Fall with injury? -  
Number of falls in past 12 months - Fall Risk Score - Functional Ability:  
Does the patient exhibit a steady gait? yes How long did it take the patient to get up and walk from a sitting position? 7 sec Is the patient self reliant?  (ie can do own laundry, meals, household chores)  yes Does the patient handle his/her own medications? yes Does the patient handle his/her own money? yes Is the patients home safe (ie good lighting, handrails on stairs and bath, etc.)? yes Did you notice or did patient express any hearing difficulties? no 
  
Did you notice or did patient express any vision difficulties?   no 
  
Were distance and reading eye charts used? no 
  
 
Advance Care Planning:  
Patient was offered the opportunity to discuss advance care planning:  yes Does patient have an Advance Directive:  no If no, did you provide information on Caring Connections? yes Plan: No orders of the defined types were placed in this encounter. Health Maintenance Topic Date Due  
 COLONOSCOPY  03/24/1963  Shingrix Vaccine Age 50> (1 of 2) 03/24/1996  Influenza Age 5 to Adult  08/01/2018  GLAUCOMA SCREENING Q2Y  01/25/2019  MEDICARE YEARLY EXAM  12/04/2019  
 DTaP/Tdap/Td series (2 - Td) 09/27/2026  Pneumococcal 65+ Low/Medium Risk  Completed  Hepatitis C Screening  Addressed *Patient verbalized understanding and agreement with the plan. A copy of the After Visit Summary with personalized health plan was given to the patient today.

## 2018-12-03 NOTE — ACP (ADVANCE CARE PLANNING)
Advance Care Planning (ACP) Provider Conversation Snapshot    Date of ACP Conversation: 12/03/18  Persons included in Conversation:  patient  Length of ACP Conversation in minutes:  16 minutes    Authorized Decision Maker (if patient is incapable of making informed decisions):    This person is:   Healthcare Agent/Medical Power of  under Advance Directive          For Patients with Decision Making Capacity:   Values/Goals: Exploration of values, goals, and preferences if recovery is not expected, even with continued medical treatment in the event of:  Imminent death    Conversation Outcomes / Follow-Up Plan:   Recommended completion of Advance Directive form after review of ACP materials and conversation with prospective healthcare agent

## 2018-12-04 LAB
ALBUMIN SERPL-MCNC: 4.1 G/DL (ref 3.5–4.8)
ALBUMIN/GLOB SERPL: 1.8 {RATIO} (ref 1.2–2.2)
ALP SERPL-CCNC: 94 IU/L (ref 39–117)
ALT SERPL-CCNC: 19 IU/L (ref 0–44)
AST SERPL-CCNC: 33 IU/L (ref 0–40)
BILIRUB SERPL-MCNC: 0.3 MG/DL (ref 0–1.2)
BUN SERPL-MCNC: 8 MG/DL (ref 8–27)
BUN/CREAT SERPL: 10 (ref 10–24)
CALCIUM SERPL-MCNC: 9 MG/DL (ref 8.6–10.2)
CHLORIDE SERPL-SCNC: 106 MMOL/L (ref 96–106)
CO2 SERPL-SCNC: 21 MMOL/L (ref 20–29)
CREAT SERPL-MCNC: 0.77 MG/DL (ref 0.76–1.27)
GLOBULIN SER CALC-MCNC: 2.3 G/DL (ref 1.5–4.5)
GLUCOSE SERPL-MCNC: 82 MG/DL (ref 65–99)
POTASSIUM SERPL-SCNC: 5.1 MMOL/L (ref 3.5–5.2)
PROT SERPL-MCNC: 6.4 G/DL (ref 6–8.5)
SODIUM SERPL-SCNC: 145 MMOL/L (ref 134–144)
VIT B12 SERPL-MCNC: 661 PG/ML (ref 232–1245)

## 2019-02-07 ENCOUNTER — PATIENT MESSAGE (OUTPATIENT)
Dept: INTERNAL MEDICINE CLINIC | Age: 74
End: 2019-02-07

## 2019-02-26 DIAGNOSIS — M25.511 CHRONIC RIGHT SHOULDER PAIN: ICD-10-CM

## 2019-02-26 DIAGNOSIS — G89.29 CHRONIC PAIN OF BOTH KNEES: ICD-10-CM

## 2019-02-26 DIAGNOSIS — M25.562 CHRONIC PAIN OF BOTH KNEES: ICD-10-CM

## 2019-02-26 DIAGNOSIS — G89.29 CHRONIC RIGHT SHOULDER PAIN: ICD-10-CM

## 2019-02-26 DIAGNOSIS — M25.561 CHRONIC PAIN OF BOTH KNEES: ICD-10-CM

## 2019-02-26 RX ORDER — HYDROCODONE BITARTRATE AND ACETAMINOPHEN 7.5; 325 MG/1; MG/1
0.5 TABLET ORAL
Qty: 30 TAB | Refills: 0 | Status: SHIPPED | OUTPATIENT
Start: 2019-02-26 | End: 2019-03-28

## 2019-02-27 ENCOUNTER — TELEPHONE (OUTPATIENT)
Dept: INTERNAL MEDICINE CLINIC | Age: 74
End: 2019-02-27

## 2019-02-27 NOTE — TELEPHONE ENCOUNTER
Called and verified pt with Name and . Informed pt him/her Norco Rx has been approved and is at the  ready for . Reminded pt that an ID must be presented at the time of  to have the script released. Pt verbalized understanding and had no further questions at this time. Encouraged pt to call with any further questions or concerns.

## 2019-06-03 ENCOUNTER — OFFICE VISIT (OUTPATIENT)
Dept: INTERNAL MEDICINE CLINIC | Age: 74
End: 2019-06-03

## 2019-06-03 VITALS
RESPIRATION RATE: 16 BRPM | BODY MASS INDEX: 33.07 KG/M2 | WEIGHT: 231 LBS | DIASTOLIC BLOOD PRESSURE: 68 MMHG | TEMPERATURE: 97.9 F | OXYGEN SATURATION: 96 % | HEART RATE: 68 BPM | SYSTOLIC BLOOD PRESSURE: 122 MMHG | HEIGHT: 70 IN

## 2019-06-03 DIAGNOSIS — E66.9 OBESITY (BMI 30.0-34.9): ICD-10-CM

## 2019-06-03 DIAGNOSIS — M25.562 CHRONIC PAIN OF BOTH KNEES: ICD-10-CM

## 2019-06-03 DIAGNOSIS — H91.92 HEARING LOSS OF LEFT EAR, UNSPECIFIED HEARING LOSS TYPE: ICD-10-CM

## 2019-06-03 DIAGNOSIS — M25.561 CHRONIC PAIN OF BOTH KNEES: ICD-10-CM

## 2019-06-03 DIAGNOSIS — E53.8 B12 DEFICIENCY: ICD-10-CM

## 2019-06-03 DIAGNOSIS — I10 ESSENTIAL HYPERTENSION: Primary | ICD-10-CM

## 2019-06-03 DIAGNOSIS — Z98.84 S/P GASTRIC BYPASS: ICD-10-CM

## 2019-06-03 DIAGNOSIS — F10.20 UNCOMPLICATED ALCOHOL DEPENDENCE (HCC): ICD-10-CM

## 2019-06-03 DIAGNOSIS — I87.2 VENOUS INSUFFICIENCY OF BOTH LOWER EXTREMITIES: ICD-10-CM

## 2019-06-03 DIAGNOSIS — G89.29 CHRONIC PAIN OF BOTH KNEES: ICD-10-CM

## 2019-06-03 PROBLEM — S06.5XAA SUBDURAL HEMATOMA: Status: RESOLVED | Noted: 2017-08-15 | Resolved: 2019-06-03

## 2019-06-03 RX ORDER — LISINOPRIL 10 MG/1
TABLET ORAL
Qty: 90 TAB | Refills: 1 | Status: SHIPPED | OUTPATIENT
Start: 2019-06-03 | End: 2019-12-07 | Stop reason: SDUPTHER

## 2019-06-03 RX ORDER — HYDROCODONE BITARTRATE AND ACETAMINOPHEN 5; 325 MG/1; MG/1
1 TABLET ORAL
Qty: 30 TAB | Refills: 0 | Status: SHIPPED | OUTPATIENT
Start: 2019-06-03 | End: 2019-08-06 | Stop reason: SDUPTHER

## 2019-06-03 NOTE — PROGRESS NOTES
Korin Coker is a 69 y.o. male    Chief Complaint   Patient presents with    Hypertension    Vitamin D Deficiency    Anxiety     1. Have you been to the ER, urgent care clinic since your last visit? Hospitalized since your last visit? No     2. Have you seen or consulted any other health care providers outside of the [address] [place] [address] since your last visit? Include any pap smears or colon screening.   No      Visit Vitals  /68 (BP 1 Location: Right arm, BP Patient Position: Sitting)   Pulse 68   Temp 97.9 °F (36.6 °C) (Oral)   Resp 16   Ht 5' 10\" (1.778 m)   Wt 231 lb (104.8 kg)   SpO2 96%   BMI 33.15 kg/m²

## 2019-08-06 DIAGNOSIS — G89.29 CHRONIC PAIN OF BOTH KNEES: ICD-10-CM

## 2019-08-06 DIAGNOSIS — M25.562 CHRONIC PAIN OF BOTH KNEES: ICD-10-CM

## 2019-08-06 DIAGNOSIS — M25.561 CHRONIC PAIN OF BOTH KNEES: ICD-10-CM

## 2019-08-06 RX ORDER — HYDROCODONE BITARTRATE AND ACETAMINOPHEN 5; 325 MG/1; MG/1
1 TABLET ORAL
Qty: 30 TAB | Refills: 0 | Status: SHIPPED | OUTPATIENT
Start: 2019-08-06 | End: 2019-09-05

## 2019-08-06 NOTE — TELEPHONE ENCOUNTER
Called and verified pt with Name and . Informed pt him/her Norco 5-325 mg Rx has been approved and is at the  ready for . Reminded pt that an ID must be presented at the time of  to have the script released. Pt verbalized understanding and had no further questions at this time. Encouraged pt to call with any further questions or concerns.

## 2019-12-03 ENCOUNTER — OFFICE VISIT (OUTPATIENT)
Dept: INTERNAL MEDICINE CLINIC | Age: 74
End: 2019-12-03

## 2019-12-03 VITALS
DIASTOLIC BLOOD PRESSURE: 80 MMHG | SYSTOLIC BLOOD PRESSURE: 122 MMHG | RESPIRATION RATE: 20 BRPM | TEMPERATURE: 98.2 F | BODY MASS INDEX: 33.01 KG/M2 | OXYGEN SATURATION: 96 % | HEIGHT: 70 IN | HEART RATE: 80 BPM | WEIGHT: 230.6 LBS

## 2019-12-03 DIAGNOSIS — I10 ESSENTIAL HYPERTENSION: ICD-10-CM

## 2019-12-03 DIAGNOSIS — F10.20 UNCOMPLICATED ALCOHOL DEPENDENCE (HCC): ICD-10-CM

## 2019-12-03 DIAGNOSIS — M15.9 PRIMARY OSTEOARTHRITIS INVOLVING MULTIPLE JOINTS: ICD-10-CM

## 2019-12-03 DIAGNOSIS — I87.2 VENOUS INSUFFICIENCY OF BOTH LOWER EXTREMITIES: ICD-10-CM

## 2019-12-03 DIAGNOSIS — Z00.00 MEDICARE ANNUAL WELLNESS VISIT, SUBSEQUENT: ICD-10-CM

## 2019-12-03 DIAGNOSIS — E66.9 OBESITY (BMI 30.0-34.9): ICD-10-CM

## 2019-12-03 DIAGNOSIS — Z23 ENCOUNTER FOR IMMUNIZATION: Primary | ICD-10-CM

## 2019-12-03 RX ORDER — HYDROCODONE BITARTRATE AND ACETAMINOPHEN 7.5; 325 MG/1; MG/1
1 TABLET ORAL AS NEEDED
COMMUNITY
End: 2019-12-03 | Stop reason: SDUPTHER

## 2019-12-03 RX ORDER — HYDROCODONE BITARTRATE AND ACETAMINOPHEN 7.5; 325 MG/1; MG/1
1 TABLET ORAL AS NEEDED
Qty: 30 TAB | Refills: 0 | Status: SHIPPED | OUTPATIENT
Start: 2019-12-03 | End: 2020-01-02

## 2019-12-03 NOTE — PROGRESS NOTES
Health Maintenance Due   Topic Date Due    COLONOSCOPY  03/24/1963    Shingrix Vaccine Age 50> (1 of 2) 03/24/1996    GLAUCOMA SCREENING Q2Y  01/25/2019    Influenza Age 9 to Adult  08/01/2019    MEDICARE YEARLY EXAM  12/04/2019       Chief Complaint   Patient presents with    GERD     6 mo f/u    Hypertension    Vitamin D Deficiency    Annual Wellness Visit    Shoulder Pain     intermittent pain under right shoulder blade       1. Have you been to the ER, urgent care clinic since your last visit? Hospitalized since your last visit? No    2. Have you seen or consulted any other health care providers outside of the [address] [place] [address] since your last visit? Include any pap smears or colon screening. No    3) Do you have an Advance Directive on file? no    4) Are you interested in receiving information on Advance Directives? NO      Patient is accompanied by self I have received verbal consent from Mora Anderson to discuss any/all medical information while they are present in the room. Mora Anderson is a 69 y.o. male  who presents for routine immunizations. He/She denies any symptoms , reactions or allergies that would exclude them from being immunized today. Risks and adverse reactions were discussed and the VIS was given to them. All questions were addressed. He/She was observed for 10 min post injection. There were no reactions observed. Cinthia Davis, CLAUDIAN

## 2019-12-03 NOTE — PROGRESS NOTES
Schedule of Personalized Health Plan  (Provide Copy to Patient)  The best way to stay healthy is to live a healthy lifestyle. A healthy lifestyle includes regular exercise, eating a well-balanced diet, keeping a healthy weight and not smoking. Regular physical exams and screening tests are another important way to take care of yourself. Preventive exams provided by health care providers can find health problems early when treatment works best and can keep you from getting certain diseases or illnesses. Preventive services include exams, lab tests, screenings, shots, monitoring and information to help you take care of your own health. All people over 65 should have a pneumonia shot. Pneumonia shots are usually only needed once in a lifetime unless your doctor decides differently. All people over 65 should have a yearly flu shot. People over 65 are at medium to high risk for Hepatitis B. Three shots are needed for complete protection. In addition to your physical exam, some screening tests are recommended:    Bone mass measurement (dexa scan) is recommended every two years if you have certain risk factors, such as personal history of vertebral fracture or chronic steroid medication use    Diabetes Mellitus screening is recommended every year. Glaucoma is an eye disease caused by high pressure in the eye. An eye exam is recommended every year. Cardiovascular screening tests that check your cholesterol and other blood fat (lipid) levels are recommended every five years. Colorectal Cancer screening tests help to find pre-cancerous polyps (growths in the colon) so they can be removed before they turn into cancer. Tests ordered for screening depend on your personal and family history risk factors.     Screening for Prostate Cancer is recommended yearly with a digital rectal exam and/or a PSA test    Here is a list of your current Health Maintenance items with a due date:  Health Maintenance Topic Date Due    COLONOSCOPY  03[birth year]24/1963    Shingrix Vaccine Age 50> (1 of 2) 03/24/1996    GLAUCOMA SCREENING Q2Y  01/25/2019    Influenza Age 5 to Adult  08/01/2019    MEDICARE YEARLY EXAM  12/03/2020    DTaP/Tdap/Td series (2 - Td) 09/27/2026    Pneumococcal 65+ years  Completed    Hepatitis C Screening  Addressed

## 2019-12-03 NOTE — PROGRESS NOTES
HISTORY OF PRESENT ILLNESS  Haleigh Fisher is a 69 y.o. male. Pt. comes in for f/u. Has a few chronic medical issues as documented. BP is stable. He is obese despite having gastric bypass many years ago. Continues to drink alcohol heavily on daily basis. Reports continued arthritic pains especially in back and knees. Yaquelin Dykes as needed. He is still working. Gait is slow but no recent falls. He is due for repeat labs. Also needs refill of Norco.  Lives with a lady friend. PMH/PSH/Allergies/Social History/medication list and most recent studies reviewed with patient. Tobacco use: No  Alcohol use: Yes/heavy    Reports compliance with medications and diet. Trying to be active physically to control weight. Reports no other new c/o. HPI    Review of Systems   Constitutional: Negative. HENT: Negative. Eyes: Negative for blurred vision. Respiratory: Negative for shortness of breath. Cardiovascular: Positive for leg swelling. Negative for chest pain. Gastrointestinal: Negative for abdominal pain. Genitourinary: Negative for dysuria. Musculoskeletal: Positive for back pain and joint pain. Negative for falls. Skin: Negative. Neurological: Negative for dizziness, sensory change, focal weakness and headaches. Endo/Heme/Allergies: Negative. Psychiatric/Behavioral: Positive for substance abuse (alcohol). Negative for depression. The patient is not nervous/anxious and does not have insomnia. All other systems reviewed and are negative. Physical Exam  Vitals signs and nursing note reviewed. Constitutional:       General: He is not in acute distress. Appearance: He is well-developed. Comments: Obese pleasant   HENT:      Head: Normocephalic and atraumatic. Eyes:      Conjunctiva/sclera: Conjunctivae normal.   Neck:      Musculoskeletal: Normal range of motion and neck supple. Thyroid: No thyromegaly. Vascular: No JVD.    Cardiovascular:      Rate and Rhythm: Normal rate and regular rhythm. Heart sounds: Normal heart sounds. No murmur. Pulmonary:      Effort: Pulmonary effort is normal. No respiratory distress. Breath sounds: Normal breath sounds. No wheezing or rales. Abdominal:      General: Bowel sounds are normal. There is no distension. Palpations: Abdomen is soft. Tenderness: There is no tenderness. Comments: obese   Musculoskeletal: Normal range of motion. General: Swelling and tenderness (lumbars/hips, chronic) present. Right lower leg: Edema present. Left lower leg: Edema present. Skin:     General: Skin is warm and dry. Findings: No rash. Neurological:      Mental Status: He is alert and oriented to person, place, and time. Coordination: Coordination abnormal.      Gait: Gait abnormal.   Psychiatric:         Behavior: Behavior normal.         ASSESSMENT and PLAN  Diagnoses and all orders for this visit:    1. Encounter for immunization  -     INFLUENZA VACCINE INACTIVATED (IIV), SUBUNIT, ADJUVANTED, IM  -     MA IMMUNIZ ADMIN,1 SINGLE/COMB VAC/TOXOID    2. Essential hypertension  -     METABOLIC PANEL, COMPREHENSIVE  -     CBC W/O DIFF  -     LIPID PANEL    3. Venous insufficiency of both lower extremities    4. Obesity (BMI 99.4-37.4)  -     METABOLIC PANEL, COMPREHENSIVE  -     CBC W/O DIFF  -     LIPID PANEL    5. Uncomplicated alcohol dependence ([facility] [address]    6. Primary osteoarthritis involving multiple joints  -     HYDROcodone-acetaminophen (NORCO) 7.5-325 mg per tablet; Take 1 Tab by mouth as needed for Pain for up to 30 days. Max Daily Amount: 96 Tabs. Indications: pain    7. Medicare annual wellness visit, subsequent      Follow-up and Dispositions    · Return in about 6 months (around 6/3/2020).      lab results and schedule of future lab studies reviewed with patient  reviewed diet, exercise and weight control  reviewed medications and side effects in detail  F/u with other MD's as scheduled  Advised patient to lose weight by watching diet (decreasing sugars/carbs/fat, increasing fruits/vegetables), exercising at least 30 minutes daily, getting 7-8 hours of sleep daily, drinking plenty of water, and decreasing stress  Advised pt strongly to decrease or quit alcohol use  Advised pt to cut back on pain meds gradually and as tolerated  Fall precautions discussed  Overall stable

## 2019-12-03 NOTE — PROGRESS NOTES
This is the Subsequent Medicare Annual Wellness Exam, performed 12 months or more after the Initial AWV or the last Subsequent AWV    I have reviewed the patient's medical history in detail and updated the computerized patient record. History     Patient Active Problem List   Diagnosis Code    HTN (hypertension) I10    Obesity E66.9    S/P gastric bypass Z98.84    Plantar fasciitis M72.2    DJD (degenerative joint disease) M19.90    Anxiety F41.9    Vitamin D deficiency E55.9    Alcohol dependence ([address] F10.20    Pedal edema R60.0    Bilateral knee pain M25.561, M25.562    ED (erectile dysfunction) N52.9    Cataract H26.9    Venous insufficiency of both lower extremities I87.2    Chronic right shoulder pain M25.511, G89.29    Hyperglycemia R73.9    Hyponatremia E87.1    Gastroesophageal reflux disease without esophagitis K21.9    Paresthesia of both hands R20.2    Hearing loss of left ear H91.92     Past Medical History:   Diagnosis Date    DJD (degenerative joint disease)     Hypertension     Obesity       Past Surgical History:   Procedure Laterality Date    GASTRIC BYPASS,OBESE<150CM TAO-EN-Y      HX ORTHOPAEDIC      bilat hip replacements     Current Outpatient Medications   Medication Sig Dispense Refill    HYDROcodone-acetaminophen (NORCO) 7.5-325 mg per tablet Take 1 Tab by mouth as needed for Pain. Indications: pain      lisinopril (PRINIVIL, ZESTRIL) 10 mg tablet take 1 tablet by mouth once daily 90 Tab 1    aspirin 81 mg chewable tablet Take 81 mg by mouth daily.  cyanocobalamin (VITAMIN B-12) 1,000 mcg tablet Take  by mouth daily. Takes unknown dose      ascorbic acid (VITAMIN C) 500 mg tablet Take  by mouth daily.  Takes unknown dose       Allergies   Allergen Reactions    [de-identified] A500 [Propoxyphene N-Acetaminophen] Other (comments)     hallucinate       Family History   Problem Relation Age of Onset    Diabetes Maternal Grandmother     Heart Disease Maternal Grandmother     Diabetes Maternal Grandfather     Heart Disease Maternal Grandfather     No Known Problems Mother     Stroke Father      Social History     Tobacco Use    Smoking status: Never Smoker    Smokeless tobacco: Never Used   Substance Use Topics    Alcohol use: Yes     Alcohol/week: 136.0 standard drinks     Types: 126 Cans of beer, 10 Shots of liquor per week     Comment: Hx of abuse       Depression Risk Factor Screening:     3 most recent PHQ Screens 12/3/2019   Little interest or pleasure in doing things Not at all   Feeling down, depressed, irritable, or hopeless Not at all   Total Score PHQ 2 0       Alcohol Risk Factor Screening (MALE > 65): Do you average more 1 drink per night or more than 7 drinks a week: Yes    In the past three months have you have had more than 4 drinks containing alcohol on one occasion: Yes      Functional Ability and Level of Safety:   Hearing: Hearing is good. Activities of Daily Living: The home contains: grab bars  Patient does total self care    Ambulation: with no difficulty    Fall Risk:  Fall Risk Assessment, last 12 mths 12/3/2019   Able to walk? Yes   Fall in past 12 months? No   Fall with injury? -   Number of falls in past 12 months -   Fall Risk Score -       Abuse Screen:  Patient is not abused    Cognitive Screening   Has your family/caregiver stated any concerns about your memory: no  Cognitive Screening: A+O x 3    Patient Care Team   Patient Care Team:  Caleb Brock DO as PCP - General (Internal Medicine)  Caleb Brock DO as PCP - [facility] Empaneled Provider  Jazmyne Zuniga MD as Consulting Provider (Ophthalmology)    Assessment/Plan   Education and counseling provided:  Are appropriate based on today's review and evaluation    Diagnoses and all orders for this visit:    1. Encounter for immunization  -     INFLUENZA VACCINE INACTIVATED (IIV), SUBUNIT, ADJUVANTED, IM  -     SC IMMUNIZ ADMIN,1 SINGLE/COMB VAC/TOXOID    2.  Essential hypertension    3. Venous insufficiency of both lower extremities    4. Obesity (BMI 30.0-34.9)    5. Uncomplicated alcohol dependence ([facility] [address]    6.  Primary osteoarthritis involving multiple joints        Health Maintenance Due   Topic Date Due    COLONOSCOPY  03/24/1963    Shingrix Vaccine Age 50> (1 of 2) 03/24/1996    GLAUCOMA SCREENING Q2Y  01/25/2019    Influenza Age 9 to Adult  08/01/2019

## 2019-12-04 LAB
ALBUMIN SERPL-MCNC: 4 G/DL (ref 3.5–4.8)
ALBUMIN/GLOB SERPL: 1.8 {RATIO} (ref 1.2–2.2)
ALP SERPL-CCNC: 115 IU/L (ref 39–117)
ALT SERPL-CCNC: 21 IU/L (ref 0–44)
AST SERPL-CCNC: 40 IU/L (ref 0–40)
BILIRUB SERPL-MCNC: 0.5 MG/DL (ref 0–1.2)
BUN SERPL-MCNC: 11 MG/DL (ref 8–27)
BUN/CREAT SERPL: 14 (ref 10–24)
CALCIUM SERPL-MCNC: 8.9 MG/DL (ref 8.6–10.2)
CHLORIDE SERPL-SCNC: 102 MMOL/L (ref 96–106)
CHOLEST SERPL-MCNC: 179 MG/DL (ref 100–199)
CO2 SERPL-SCNC: 22 MMOL/L (ref 20–29)
CREAT SERPL-MCNC: 0.76 MG/DL (ref 0.76–1.27)
ERYTHROCYTE [DISTWIDTH] IN BLOOD BY AUTOMATED COUNT: 15.3 % (ref 12.3–15.4)
GLOBULIN SER CALC-MCNC: 2.2 G/DL (ref 1.5–4.5)
GLUCOSE SERPL-MCNC: 92 MG/DL (ref 65–99)
HCT VFR BLD AUTO: 37 % (ref 37.5–51)
HDLC SERPL-MCNC: 111 MG/DL
HGB BLD-MCNC: 11.8 G/DL (ref 13–17.7)
INTERPRETATION, 910389: NORMAL
LDLC SERPL CALC-MCNC: 59 MG/DL (ref 0–99)
MCH RBC QN AUTO: 28.8 PG (ref 26.6–33)
MCHC RBC AUTO-ENTMCNC: 31.9 G/DL (ref 31.5–35.7)
MCV RBC AUTO: 90 FL (ref 79–97)
PLATELET # BLD AUTO: 329 X10E3/UL (ref 150–450)
POTASSIUM SERPL-SCNC: 4.3 MMOL/L (ref 3.5–5.2)
PROT SERPL-MCNC: 6.2 G/DL (ref 6–8.5)
RBC # BLD AUTO: 4.1 X10E6/UL (ref 4.14–5.8)
SODIUM SERPL-SCNC: 139 MMOL/L (ref 134–144)
TRIGL SERPL-MCNC: 44 MG/DL (ref 0–149)
VLDLC SERPL CALC-MCNC: 9 MG/DL (ref 5–40)
WBC # BLD AUTO: 7 X10E3/UL (ref 3.4–10.8)

## 2020-01-16 ENCOUNTER — TELEPHONE (OUTPATIENT)
Dept: INTERNAL MEDICINE CLINIC | Age: 75
End: 2020-01-16

## 2020-01-16 NOTE — TELEPHONE ENCOUNTER
Pt called and advised that he thinks that he has got gout in his foot again and wants to know if Dr. Dillan Lyon call Rx [facility] to the [facility] for [facility].

## 2020-01-17 NOTE — TELEPHONE ENCOUNTER
Called and verified pt with name and . Pt stated has not had gout in a few years. Recommended pt be seen for evaluation and pt agreed. Pt scheduled with Dr. Elvira Chiu on Monday, 2020 @ 4673. Pt verbalized understanding with no further questions at this time.

## 2020-01-20 ENCOUNTER — OFFICE VISIT (OUTPATIENT)
Dept: INTERNAL MEDICINE CLINIC | Age: 75
End: 2020-01-20

## 2020-01-20 VITALS
BODY MASS INDEX: 33.5 KG/M2 | RESPIRATION RATE: 18 BRPM | HEART RATE: 84 BPM | TEMPERATURE: 98 F | OXYGEN SATURATION: 98 % | WEIGHT: 234 LBS | DIASTOLIC BLOOD PRESSURE: 74 MMHG | HEIGHT: 70 IN | SYSTOLIC BLOOD PRESSURE: 136 MMHG

## 2020-01-20 DIAGNOSIS — G89.29 CHRONIC PAIN OF BOTH KNEES: ICD-10-CM

## 2020-01-20 DIAGNOSIS — I10 ESSENTIAL HYPERTENSION: ICD-10-CM

## 2020-01-20 DIAGNOSIS — M79.672 LEFT FOOT PAIN: Primary | ICD-10-CM

## 2020-01-20 DIAGNOSIS — I87.2 VENOUS INSUFFICIENCY OF BOTH LOWER EXTREMITIES: ICD-10-CM

## 2020-01-20 DIAGNOSIS — M25.561 CHRONIC PAIN OF BOTH KNEES: ICD-10-CM

## 2020-01-20 DIAGNOSIS — F10.20 UNCOMPLICATED ALCOHOL DEPENDENCE (HCC): ICD-10-CM

## 2020-01-20 DIAGNOSIS — M25.562 CHRONIC PAIN OF BOTH KNEES: ICD-10-CM

## 2020-01-20 DIAGNOSIS — E66.9 OBESITY (BMI 30.0-34.9): ICD-10-CM

## 2020-01-20 RX ORDER — HYDROCODONE BITARTRATE AND ACETAMINOPHEN 7.5; 325 MG/1; MG/1
1 TABLET ORAL
Qty: 30 TAB | Refills: 0 | Status: SHIPPED | OUTPATIENT
Start: 2020-01-20 | End: 2020-05-19 | Stop reason: SDUPTHER

## 2020-01-20 NOTE — PROGRESS NOTES
HISTORY OF PRESENT ILLNESS  Jovan Murillo is a 69 y.o. male. Pt. comes in for f/u. Has a few chronic medical issues as documented. Reports having recent left foot pain and swelling. His symptoms was gout. Took some Indocin from his friend which helped. He is eating a lot of red meat and seafood. Continues to drink alcohol heavily on a regular basis. Is very obese despite previous gastric bypass. Continues to have chronic arthritic pains especially knees. Marinaen Sylvia as needed. Today's acute issues include :    PMH/PSH/Allergies/Social History/medication list and most recent studies reviewed with patient. Tobacco use: No  Alcohol use: Yes/daily    Reports compliance with medications and diet. Trying to be active physically but not losing much weight. Reports no other new c/o. HPI    Review of Systems   Constitutional: Negative. HENT: Negative. Eyes: Negative for blurred vision. Respiratory: Negative for shortness of breath. Cardiovascular: Positive for leg swelling. Negative for chest pain. Gastrointestinal: Negative for abdominal pain. Genitourinary: Negative for dysuria. Musculoskeletal: Positive for back pain and joint pain. Negative for falls. Skin: Negative. Neurological: Negative for dizziness, sensory change, focal weakness and headaches. Endo/Heme/Allergies: Negative. Psychiatric/Behavioral: Positive for substance abuse (alcohol). Negative for depression. The patient is not nervous/anxious and does not have insomnia. All other systems reviewed and are negative. Physical Exam  Vitals signs and nursing note reviewed. Constitutional:       General: He is not in acute distress. Appearance: He is well-developed. Comments: Obese pleasant   HENT:      Head: Normocephalic and atraumatic. Eyes:      Conjunctiva/sclera: Conjunctivae normal.   Neck:      Musculoskeletal: Normal range of motion and neck supple. Thyroid: No thyromegaly.       Vascular: No JVD. Cardiovascular:      Rate and Rhythm: Normal rate and regular rhythm. Heart sounds: Normal heart sounds. No murmur. Pulmonary:      Effort: Pulmonary effort is normal. No respiratory distress. Breath sounds: Normal breath sounds. No wheezing or rales. Abdominal:      General: Bowel sounds are normal. There is no distension. Palpations: Abdomen is soft. Tenderness: There is no tenderness. Comments: obese   Musculoskeletal: Normal range of motion. General: Tenderness (lumbars/hips, chronic//left lateral foot without swelling or redness) present. Skin:     General: Skin is warm and dry. Findings: No rash. Neurological:      Mental Status: He is alert and oriented to person, place, and time. Coordination: Coordination normal.   Psychiatric:         Behavior: Behavior normal.         ASSESSMENT and PLAN  Diagnoses and all orders for this visit:    1. Left foot pain  -     SED RATE (ESR)  -     URIC ACID  -     HYDROcodone-acetaminophen (NORCO) 7.5-325 mg per tablet; Take 1 Tab by mouth daily as needed for Pain for up to 30 days. 2. Essential hypertension    3. Venous insufficiency of both lower extremities    4. Obesity (BMI 30.0-34.9)    5. Uncomplicated alcohol dependence ([facility] [address]    6. Chronic pain of both knees  -     HYDROcodone-acetaminophen (NORCO) 7.5-325 mg per tablet; Take 1 Tab by mouth daily as needed for Pain for up to 30 days.       Follow-up and Dispositions    · Return if symptoms worsen or fail to improve.     lab results and schedule of future lab studies reviewed with patient  reviewed diet, exercise and weight control  reviewed medications and side effects in detail  Advised patient to lose weight by watching diet (decreasing sugars/carbs/fat, increasing fruits/vegetables), exercising at least 30 minutes daily, getting 7-8 hours of sleep daily, drinking plenty of water, and decreasing stress  Advised pt strongly to decrease or quit alcohol use  Reassured patient that what he describes and what I see does not look like gout but I will run some tests to rule that out  Advised patient to avoid NSAIDs like Indocin because of previous gastric bypass

## 2020-01-20 NOTE — PROGRESS NOTES
Health Maintenance Due   Topic Date Due    COLONOSCOPY  03/24/1963    Shingrix Vaccine Age 50> (1 of 2) 03/24/1996    GLAUCOMA SCREENING Q2Y  01/25/2019       Chief Complaint   Patient presents with    Foot Pain     left leg and foot swelling, states he took a few friends indocin and has has some improvement       1. Have you been to the ER, urgent care clinic since your last visit? Hospitalized since your last visit? No    2. Have you seen or consulted any other health care providers outside of the [address] [place] [address] since your last visit? Include any pap smears or colon screening. No    3) Do you have an Advance Directive on file? no    4) Are you interested in receiving information on Advance Directives? NO      Patient is accompanied by patient I have received verbal consent from Leo Shipley to discuss any/all medical information while they are present in the room.

## 2020-01-21 LAB
ERYTHROCYTE [SEDIMENTATION RATE] IN BLOOD BY WESTERGREN METHOD: 6 MM/HR (ref 0–30)
URATE SERPL-MCNC: 4.4 MG/DL (ref 3.7–8.6)

## 2020-01-23 RX ORDER — FUROSEMIDE 20 MG/1
20 TABLET ORAL
Qty: 30 TAB | Refills: 5 | Status: SHIPPED | OUTPATIENT
Start: 2020-01-23 | End: 2020-05-19 | Stop reason: SDUPTHER

## 2020-05-19 DIAGNOSIS — M79.672 LEFT FOOT PAIN: ICD-10-CM

## 2020-05-19 DIAGNOSIS — G89.29 CHRONIC PAIN OF BOTH KNEES: ICD-10-CM

## 2020-05-19 DIAGNOSIS — M25.561 CHRONIC PAIN OF BOTH KNEES: ICD-10-CM

## 2020-05-19 DIAGNOSIS — M25.562 CHRONIC PAIN OF BOTH KNEES: ICD-10-CM

## 2020-05-19 NOTE — TELEPHONE ENCOUNTER
Requested Prescriptions     Pending Prescriptions Disp Refills    HYDROcodone-acetaminophen (NORCO) 7.5-325 mg per tablet 30 Tab 0     Sig: Take 1 Tab by mouth daily as needed for Pain for up to 30 days.  furosemide (LASIX) 20 mg tablet 30 Tab 5     Sig: Take 1 Tab by mouth daily as needed (leg swelling).     lisinopriL (PRINIVIL, ZESTRIL) 10 mg tablet 90 Tab 1     01/20/2020 06/01/2020  bob

## 2020-05-21 RX ORDER — HYDROCODONE BITARTRATE AND ACETAMINOPHEN 7.5; 325 MG/1; MG/1
1 TABLET ORAL
Qty: 30 TAB | Refills: 0 | Status: SHIPPED | OUTPATIENT
Start: 2020-05-21 | End: 2020-06-20

## 2020-05-21 RX ORDER — FUROSEMIDE 20 MG/1
20 TABLET ORAL
Qty: 30 TAB | Refills: 5 | Status: SHIPPED | OUTPATIENT
Start: 2020-05-21 | End: 2020-11-23

## 2020-05-21 RX ORDER — LISINOPRIL 10 MG/1
10 TABLET ORAL DAILY
Qty: 90 TAB | Refills: 1 | Status: SHIPPED | OUTPATIENT
Start: 2020-05-21 | End: 2020-11-27

## 2020-08-10 ENCOUNTER — VIRTUAL VISIT (OUTPATIENT)
Dept: INTERNAL MEDICINE CLINIC | Age: 75
End: 2020-08-10

## 2020-08-10 DIAGNOSIS — G89.29 CHRONIC MIDLINE LOW BACK PAIN WITHOUT SCIATICA: ICD-10-CM

## 2020-08-10 DIAGNOSIS — I10 ESSENTIAL HYPERTENSION: Primary | ICD-10-CM

## 2020-08-10 DIAGNOSIS — M15.9 PRIMARY OSTEOARTHRITIS INVOLVING MULTIPLE JOINTS: ICD-10-CM

## 2020-08-10 DIAGNOSIS — E66.9 OBESITY (BMI 30.0-34.9): ICD-10-CM

## 2020-08-10 DIAGNOSIS — M54.50 CHRONIC MIDLINE LOW BACK PAIN WITHOUT SCIATICA: ICD-10-CM

## 2020-08-10 DIAGNOSIS — F10.20 UNCOMPLICATED ALCOHOL DEPENDENCE (HCC): ICD-10-CM

## 2020-08-10 DIAGNOSIS — I87.2 VENOUS INSUFFICIENCY OF BOTH LOWER EXTREMITIES: ICD-10-CM

## 2020-08-10 PROCEDURE — G8536 NO DOC ELDER MAL SCRN: HCPCS | Performed by: INTERNAL MEDICINE

## 2020-08-10 PROCEDURE — 1101F PT FALLS ASSESS-DOCD LE1/YR: CPT | Performed by: INTERNAL MEDICINE

## 2020-08-10 PROCEDURE — G8756 NO BP MEASURE DOC: HCPCS | Performed by: INTERNAL MEDICINE

## 2020-08-10 PROCEDURE — 3017F COLORECTAL CA SCREEN DOC REV: CPT | Performed by: INTERNAL MEDICINE

## 2020-08-10 PROCEDURE — 99214 OFFICE O/P EST MOD 30 MIN: CPT | Performed by: INTERNAL MEDICINE

## 2020-08-10 PROCEDURE — G8510 SCR DEP NEG, NO PLAN REQD: HCPCS | Performed by: INTERNAL MEDICINE

## 2020-08-10 PROCEDURE — G8417 CALC BMI ABV UP PARAM F/U: HCPCS | Performed by: INTERNAL MEDICINE

## 2020-08-10 PROCEDURE — G8427 DOCREV CUR MEDS BY ELIG CLIN: HCPCS | Performed by: INTERNAL MEDICINE

## 2020-08-10 PROCEDURE — G0444 DEPRESSION SCREEN ANNUAL: HCPCS | Performed by: INTERNAL MEDICINE

## 2020-08-10 RX ORDER — HYDROCODONE BITARTRATE AND ACETAMINOPHEN 7.5; 325 MG/1; MG/1
1 TABLET ORAL
Qty: 30 TAB | Refills: 0 | Status: SHIPPED | OUTPATIENT
Start: 2020-08-10 | End: 2020-09-09

## 2020-08-10 NOTE — PROGRESS NOTES
Shweta Holliday is a 77 y.o. male who was seen by synchronous (real-time) audio-video technology on 8/10/2020 for Hypertension; GERD; Pain (Chronic) (needs refills ); and Obesity        Assessment & Plan:   Diagnoses and all orders for this visit:    1. Essential hypertension    2. Venous insufficiency of both lower extremities    3. Obesity (BMI 30.0-34.9)    4. Primary osteoarthritis involving multiple joints  -     HYDROcodone-acetaminophen (NORCO) 7.5-325 mg per tablet; Take 1 Tab by mouth daily as needed for Pain for up to 30 days. 5. Uncomplicated alcohol dependence ([facility] [address]    6. Chronic midline low back pain without sciatica  -     HYDROcodone-acetaminophen (NORCO) 7.5-325 mg per tablet; Take 1 Tab by mouth daily as needed for Pain for up to 30 days. I spent at least 25 minutes on this visit with this established patient. Subjective:     Pt. is seen virtually for f/u. Has a few chronic medical issues as documented. Reports BP being stable at home. He is obese but has lost some weight. History of gastric bypass. Reports improvement of pedal edema with Lasix. Has chronic arthritic pain especially in knees and back. Takes Norco occasionally. Needs a refill. Gait is slow and unsteady but no falls. Taking precautions for Covid 19. Denies any related signs or symptoms including fever, cough, SOB or CP. PMH/PSH/Allergies/Social History/medication list and most recent studies reviewed with patient. Tobacco use: No  Alcohol use: Yes, 6-7 beers daily    Reports compliance with medications and diet. Trying to be active physically as tolerated. Reports no other new c/o.     Plan:  Refill Norco No. 30  Continue other meds as directed  Advised patient to lose weight by watching diet (decreasing sugars/carbs/fat, increasing fruits/vegetables), exercising at least 30 minutes daily, getting 7-8 hours of sleep daily, drinking plenty of water, and decreasing stress  Advised pt strongly to decrease or quit alcohol use  F/u with other MD's as scheduled  Fall precautions discussed  COVID-19 precautions discussed with pt  Follow-up 6 months or as needed  Prior to Admission medications    Medication Sig Start Date End Date Taking? Authorizing Provider   HYDROcodone-acetaminophen (NORCO) 7.5-325 mg per tablet Take 1 Tab by mouth daily as needed for Pain for up to 30 days. 8/10/20 9/9/20 Yes Rusty Gonzalez DO   furosemide (LASIX) 20 mg tablet Take 1 Tab by mouth daily as needed (leg swelling). 5/21/20  Yes Rusty Gonzalez DO   lisinopriL (PRINIVIL, ZESTRIL) 10 mg tablet Take 1 Tab by mouth daily. 5/21/20  Yes Rusty Gonzalez DO   aspirin 81 mg chewable tablet Take 81 mg by mouth daily. Yes Provider, Historical   cyanocobalamin (VITAMIN B-12) 1,000 mcg tablet Take  by mouth daily. Takes unknown dose   Yes Provider, Historical   ascorbic acid (VITAMIN C) 500 mg tablet Take  by mouth daily. Takes unknown dose   Yes Provider, Historical     Patient Active Problem List    Diagnosis Date Noted    Hearing loss of left ear 06/03/2019    Paresthesia of both hands 06/04/2018    Gastroesophageal reflux disease without esophagitis 12/01/2017    Hyponatremia 10/04/2016    Hyperglycemia 07/19/2016    Venous insufficiency of both lower extremities 03/29/2016    Chronic right shoulder pain 03/29/2016    Cataract 07/13/2015    Bilateral knee pain 01/12/2015    ED (erectile dysfunction) 01/12/2015    Alcohol dependence ([facility] [address] 12/03/2013    Pedal edema 12/03/2013    Vitamin D deficiency 11/27/2012    Anxiety 12/02/2011    DJD (degenerative joint disease) 05/24/2011    HTN (hypertension) 11/15/2010    Obesity 11/15/2010    S/P gastric bypass 11/15/2010    Plantar fasciitis 11/15/2010     Current Outpatient Medications   Medication Sig Dispense Refill    HYDROcodone-acetaminophen (NORCO) 7.5-325 mg per tablet Take 1 Tab by mouth daily as needed for Pain for up to 30 days.  30 Tab 0    furosemide (LASIX) 20 mg tablet Take 1 Tab by mouth daily as needed (leg swelling). 30 Tab 5    lisinopriL (PRINIVIL, ZESTRIL) 10 mg tablet Take 1 Tab by mouth daily. 90 Tab 1    aspirin 81 mg chewable tablet Take 81 mg by mouth daily.  cyanocobalamin (VITAMIN B-12) 1,000 mcg tablet Take  by mouth daily. Takes unknown dose      ascorbic acid (VITAMIN C) 500 mg tablet Take  by mouth daily. Takes unknown dose       Allergies   Allergen Reactions    Propoxyphene N-Acetaminophen Other (comments)     hallucinate  Other reaction(s): Adverse reaction to substance      Past Medical History:   Diagnosis Date    DJD (degenerative joint disease)     Hypertension     Obesity      Past Surgical History:   Procedure Laterality Date    GASTRIC BYPASS,OBESE<150CM TAO-EN-Y      HX ORTHOPAEDIC      bilat hip replacements     Social History     Tobacco Use    Smoking status: Never Smoker    Smokeless tobacco: Never Used   Substance Use Topics    Alcohol use:  Yes     Alcohol/week: 136.0 standard drinks     Types: 126 Cans of beer, 10 Shots of liquor per week     Comment: Hx of abuse       ROS    Objective:     Patient-Reported Vitals 8/10/2020   Patient-Reported Weight 229lb   Patient-Reported Height 5'10        [INSTRUCTIONS:  \"[x]\" Indicates a positive item  \"[]\" Indicates a negative item  -- DELETE ALL ITEMS NOT EXAMINED]    Constitutional: [x] Appears well-developed and well-nourished [x] No apparent distress      [] Abnormal -     Mental status: [x] Alert and awake  [x] Oriented to person/place/time [x] Able to follow commands    [] Abnormal -     Eyes:   EOM    [x]  Normal    [] Abnormal -   Sclera  [x]  Normal    [] Abnormal -          Discharge [x]  None visible   [] Abnormal -     HENT: [x] Normocephalic, atraumatic  [] Abnormal -   [x] Mouth/Throat: Mucous membranes are moist    External Ears [x] Normal  [] Abnormal -    Neck: [x] No visualized mass [] Abnormal -     Pulmonary/Chest: [x] Respiratory effort normal   [x] No visualized signs of difficulty breathing or respiratory distress        [] Abnormal -      Musculoskeletal:   [x] Normal gait with no signs of ataxia         [x] Normal range of motion of neck        [] Abnormal -     Neurological:        [x] No Facial Asymmetry (Cranial nerve 7 motor function) (limited exam due to video visit)          [x] No gaze palsy        [] Abnormal -          Skin:        [x] No significant exanthematous lesions or discoloration noted on facial skin         [] Abnormal -            Psychiatric:       [x] Normal Affect [] Abnormal -        [x] No Hallucinations    Other pertinent observable physical exam findings:-        We discussed the expected course, resolution and complications of the diagnosis(es) in detail. Medication risks, benefits, costs, interactions, and alternatives were discussed as indicated. I advised him to contact the office if his condition worsens, changes or fails to improve as anticipated. He expressed understanding with the diagnosis(es) and plan. Misty Sterng, who was evaluated through a patient-initiated, synchronous (real-time) audio-video encounter, and/or his healthcare decision maker, is aware that it is a billable service, with coverage as determined by his insurance carrier. He provided verbal consent to proceed: Yes, and patient identification was verified. It was conducted pursuant to the emergency declaration under the [facility], [address] [place] [address] authority and the [employer] Act. A caregiver was present when appropriate. Ability to conduct physical exam was limited. I was at home. The patient was at home.       Katya Erickson DO

## 2020-10-19 ENCOUNTER — TELEPHONE (OUTPATIENT)
Dept: INTERNAL MEDICINE CLINIC | Age: 75
End: 2020-10-19

## 2020-10-19 DIAGNOSIS — Z98.84 S/P GASTRIC BYPASS: ICD-10-CM

## 2020-10-19 DIAGNOSIS — G89.29 CHRONIC MIDLINE LOW BACK PAIN WITHOUT SCIATICA: ICD-10-CM

## 2020-10-19 DIAGNOSIS — M54.50 CHRONIC MIDLINE LOW BACK PAIN WITHOUT SCIATICA: ICD-10-CM

## 2020-10-19 DIAGNOSIS — M15.9 PRIMARY OSTEOARTHRITIS INVOLVING MULTIPLE JOINTS: ICD-10-CM

## 2020-10-19 DIAGNOSIS — M25.561 CHRONIC PAIN OF BOTH KNEES: ICD-10-CM

## 2020-10-19 DIAGNOSIS — K21.9 GASTROESOPHAGEAL REFLUX DISEASE WITHOUT ESOPHAGITIS: Primary | ICD-10-CM

## 2020-10-19 DIAGNOSIS — G89.29 CHRONIC PAIN OF BOTH KNEES: ICD-10-CM

## 2020-10-19 DIAGNOSIS — M25.562 CHRONIC PAIN OF BOTH KNEES: ICD-10-CM

## 2020-10-19 RX ORDER — HYDROCODONE BITARTRATE AND ACETAMINOPHEN 7.5; 325 MG/1; MG/1
1 TABLET ORAL
Qty: 30 TAB | Refills: 0 | Status: CANCELLED | OUTPATIENT
Start: 2020-10-19 | End: 2020-11-18

## 2020-10-19 NOTE — TELEPHONE ENCOUNTER
Requested Prescriptions     Pending Prescriptions Disp Refills    HYDROcodone-acetaminophen (NORCO) 7.5-325 mg per tablet 30 Tab 0     Sig: Take 1 Tab by mouth daily as needed for Pain for up to 30 days.      08/10/2020  No upcoming  [facility]

## 2020-10-19 NOTE — TELEPHONE ENCOUNTER
Pt is scheduled to see Dr. Uriah Nice [phone] [address] [place] [address] to evaluate digestive system  Appointment is scheduled for 10/28/ at 9:15  Pt has [facility] insurance/ needs authorization

## 2020-10-27 ENCOUNTER — VIRTUAL VISIT (OUTPATIENT)
Dept: INTERNAL MEDICINE CLINIC | Age: 75
End: 2020-10-27
Payer: MEDICARE

## 2020-10-27 DIAGNOSIS — M15.9 PRIMARY OSTEOARTHRITIS INVOLVING MULTIPLE JOINTS: Primary | ICD-10-CM

## 2020-10-27 DIAGNOSIS — I10 ESSENTIAL HYPERTENSION: ICD-10-CM

## 2020-10-27 DIAGNOSIS — F11.91 HISTORY OF NARCOTIC USE: ICD-10-CM

## 2020-10-27 DIAGNOSIS — E66.9 OBESITY (BMI 30.0-34.9): ICD-10-CM

## 2020-10-27 PROCEDURE — 99214 OFFICE O/P EST MOD 30 MIN: CPT | Performed by: INTERNAL MEDICINE

## 2020-10-27 RX ORDER — HYDROCODONE BITARTRATE AND ACETAMINOPHEN 7.5; 325 MG/1; MG/1
1 TABLET ORAL
Qty: 30 TAB | Refills: 0 | Status: SHIPPED | OUTPATIENT
Start: 2020-10-27 | End: 2020-11-26

## 2020-10-27 NOTE — PROGRESS NOTES
ADVISED PATIENT OF THE FOLLOWING HEALTH MAINTAINCE DUE  Health Maintenance Due   Topic Date Due    Shingrix Vaccine Age 49> (1 of 2) 03/24/1996    Colorectal Cancer Screening Combo  03/24/1996    GLAUCOMA SCREENING Q2Y  01/25/2019    Flu Vaccine (1) 09/01/2020      Chief Complaint   Patient presents with    Hypertension    GERD    Pain (Chronic)    Vitamin D Deficiency       1. Have you been to the ER, urgent care clinic since your last visit? Hospitalized since your last visit? No    2. Have you seen or consulted any other health care providers outside of the [address] [place] [address] since your last visit? Include any DEXA scan, mammography  or colon screening. No    3. Do you have an Advance Directive on file? no    4. Do you have a DNR on file? no    Patient is accompanied by self I have received verbal consent from Neela De León to discuss any/all medical information while they are present in the room. Advance Care Planning 9/29/2016   Patient's Healthcare Decision Maker is: Legal Next of Kin   Primary Decision Maker Name Marsha Roberson   Primary Decision Maker Phone Number [phone]   Primary Decision Maker Relationship to Patient Sibling   Secondary Decision Maker Name Seun Bales    Secondary Decision Maker Phone Number [phone]   Secondary Decision Maker Relationship to Patient Boyfriend/girlfriend   Confirm Advance Directive Yes, not on file         [facility], [address], [address] [place] [id number]  Phone: [phone] Fax: [phone] - [facility], [address] [facility] AT [address] [facility] [address]. [address]  Phone: [phone] Fax: [phone]        Patient reminded during visit to bring all medication bottles, OTC medications to all appointments.

## 2020-10-27 NOTE — PROGRESS NOTES
Judge Graves is a 77 y.o. male, evaluated via audio-only technology on 10/27/2020 for Hypertension; GERD; Pain (Chronic); and Vitamin D Deficiency  . Assessment & Plan:   Diagnoses and all orders for this visit:    1. Primary osteoarthritis involving multiple joints  -     HYDROcodone-acetaminophen (NORCO) 7.5-325 mg per tablet; Take 1 Tab by mouth daily as needed for Pain for up to 30 days. 2. Essential hypertension  -     METABOLIC PANEL, COMPREHENSIVE  -     CBC WITH AUTOMATED DIFF    3. History of narcotic use  -     TOXASSURE SELECT 13 (MW)    4. Obesity (BMI 30.0-34.9)        12  Subjective:      Patient was seen for a follow up. Needs a mediation refill. Reports that he has been out for over a month. States that he takes the norco sparingly. Reports pain to both knees that is ongoing and some days hard to walk. BP stable. Limiting salt intake    Denies CP, SOB, fever. Reports no falls or trauma     Prior to Admission medications    Medication Sig Start Date End Date Taking? Authorizing Provider   furosemide (LASIX) 20 mg tablet Take 1 Tab by mouth daily as needed (leg swelling). 5/21/20  Yes Rusty Gonzalez,    lisinopriL (PRINIVIL, ZESTRIL) 10 mg tablet Take 1 Tab by mouth daily. 5/21/20  Yes Rusty Gonzalez,    aspirin 81 mg chewable tablet Take 81 mg by mouth daily. Yes Provider, Historical   cyanocobalamin (VITAMIN B-12) 1,000 mcg tablet Take  by mouth daily. Takes unknown dose   Yes Provider, Historical   ascorbic acid (VITAMIN C) 500 mg tablet Take  by mouth daily.  Takes unknown dose   Yes Provider, Historical     Patient Active Problem List   Diagnosis Code    HTN (hypertension) I10    Obesity E66.9    S/P gastric bypass Z98.84    Plantar fasciitis M72.2    DJD (degenerative joint disease) M19.90    Anxiety F41.9    Vitamin D deficiency E55.9    Alcohol dependence ([facility] [place] [address] F10.20    Pedal edema R60.0    Bilateral knee pain M25.561, M25.562    ED (erectile dysfunction) N52.9    Cataract H26.9    Venous insufficiency of both lower extremities I87.2    Chronic right shoulder pain M25.511, G89.29    Hyperglycemia R73.9    Hyponatremia E87.1    Gastroesophageal reflux disease without esophagitis K21.9    Paresthesia of both hands R20.2    Hearing loss of left ear H91.92     Patient Active Problem List    Diagnosis Date Noted    Hearing loss of left ear 06/03/2019    Paresthesia of both hands 06/04/2018    Gastroesophageal reflux disease without esophagitis 12/01/2017    Hyponatremia 10/04/2016    Hyperglycemia 07/19/2016    Venous insufficiency of both lower extremities 03/29/2016    Chronic right shoulder pain 03/29/2016    Cataract 07/13/2015    Bilateral knee pain 01/12/2015    ED (erectile dysfunction) 01/12/2015    Alcohol dependence ([facility] [address] 12/03/2013    Pedal edema 12/03/2013    Vitamin D deficiency 11/27/2012    Anxiety 12/02/2011    DJD (degenerative joint disease) 05/24/2011    HTN (hypertension) 11/15/2010    Obesity 11/15/2010    S/P gastric bypass 11/15/2010    Plantar fasciitis 11/15/2010     Current Outpatient Medications   Medication Sig Dispense Refill    HYDROcodone-acetaminophen (NORCO) 7.5-325 mg per tablet Take 1 Tab by mouth daily as needed for Pain for up to 30 days. 30 Tab 0    furosemide (LASIX) 20 mg tablet Take 1 Tab by mouth daily as needed (leg swelling). 30 Tab 5    lisinopriL (PRINIVIL, ZESTRIL) 10 mg tablet Take 1 Tab by mouth daily. 90 Tab 1    aspirin 81 mg chewable tablet Take 81 mg by mouth daily.  cyanocobalamin (VITAMIN B-12) 1,000 mcg tablet Take  by mouth daily. Takes unknown dose      ascorbic acid (VITAMIN C) 500 mg tablet Take  by mouth daily. Takes unknown dose       Allergies   Allergen Reactions    Propoxyphene N-Acetaminophen Other (comments)     hallucinate  Other reaction(s):  Adverse reaction to substance      Past Medical History:   Diagnosis Date    DJD (degenerative joint disease)     Hypertension     Obesity      Past Surgical History:   Procedure Laterality Date    GASTRIC BYPASS,OBESE<150CM TAO-EN-Y      HX ORTHOPAEDIC      bilat hip replacements     Family History   Problem Relation Age of Onset    Diabetes Maternal Grandmother     Heart Disease Maternal Grandmother     Diabetes Maternal Grandfather     Heart Disease Maternal Grandfather     No Known Problems Mother     Stroke Father        Review of Systems   Constitutional: Negative for chills and fever. Respiratory: Negative for cough. Cardiovascular: Negative for chest pain. Gastrointestinal: Negative. Musculoskeletal: Positive for joint pain. Neurological: Negative. Patient-Reported Vitals 8/10/2020   Patient-Reported Weight 229lb   Patient-Reported Height 5'10        Cristobal Rivas, who was evaluated through a patient-initiated, synchronous (real-time) audio only encounter, and/or her healthcare decision maker, is aware that it is a billable service, with coverage as determined by his insurance carrier. He provided verbal consent to proceed: Yes. He has not had a related appointment within my department in the past 7 days or scheduled within the next 24 hours.       Total Time: minutes: 11-20 minutes    Barbara Brandon NP

## 2020-11-02 ENCOUNTER — TELEPHONE (OUTPATIENT)
Dept: INTERNAL MEDICINE CLINIC | Age: 75
End: 2020-11-02

## 2020-11-02 DIAGNOSIS — K21.9 GASTROESOPHAGEAL REFLUX DISEASE WITHOUT ESOPHAGITIS: Primary | ICD-10-CM

## 2020-11-02 DIAGNOSIS — Z98.84 S/P GASTRIC BYPASS: ICD-10-CM

## 2020-11-02 NOTE — TELEPHONE ENCOUNTER
Pt is requesting a referral from Dr. Alma Villar to Dr. Carlos Bergman.    Pt has an appointment 12/08/2020

## 2020-11-03 ENCOUNTER — TELEPHONE (OUTPATIENT)
Dept: INTERNAL MEDICINE CLINIC | Age: 75
End: 2020-11-03

## 2020-11-03 DIAGNOSIS — K21.9 GASTROESOPHAGEAL REFLUX DISEASE WITHOUT ESOPHAGITIS: ICD-10-CM

## 2020-11-03 DIAGNOSIS — K83.1 STRICTURE OF BILIARY ANASTOMOSIS: Primary | ICD-10-CM

## 2020-11-03 DIAGNOSIS — K91.89 STRICTURE OF BILIARY ANASTOMOSIS: Primary | ICD-10-CM

## 2020-11-03 NOTE — TELEPHONE ENCOUNTER
[facility] Dr.Abou- Tresa Souza  They need a referral for an office visit with dx codes K91.89 and k21.9  Pt is scheduled for an endoscopy on 12/8/20- they only need an insurance authorization([facility]) for an office visit, not the actual endoscopy  Attn: Yg Alexander  Fax # [phone]

## 2020-11-23 RX ORDER — FUROSEMIDE 20 MG/1
TABLET ORAL
Qty: 30 TAB | Refills: 5 | Status: ON HOLD | OUTPATIENT
Start: 2020-11-23 | End: 2021-04-16 | Stop reason: SDUPTHER

## 2020-11-27 RX ORDER — LISINOPRIL 10 MG/1
TABLET ORAL
Qty: 90 TAB | Refills: 1 | Status: SHIPPED | OUTPATIENT
Start: 2020-11-27 | End: 2021-04-16

## 2020-12-04 ENCOUNTER — TRANSCRIBE ORDER (OUTPATIENT)
Dept: REGISTRATION | Age: 75
End: 2020-12-04

## 2020-12-04 ENCOUNTER — HOSPITAL ENCOUNTER (OUTPATIENT)
Dept: PREADMISSION TESTING | Age: 75
Discharge: HOME | End: 2020-12-04
Payer: MEDICARE

## 2020-12-04 DIAGNOSIS — Z01.812 PRE-PROCEDURE LAB EXAM: Primary | ICD-10-CM

## 2020-12-04 DIAGNOSIS — Z01.812 PRE-PROCEDURE LAB EXAM: ICD-10-CM

## 2020-12-04 PROCEDURE — 87635 SARS-COV-2 COVID-19 AMP PRB: CPT

## 2020-12-05 LAB — SARS-COV-2, COV2NT: NOT DETECTED

## 2020-12-08 ENCOUNTER — ANESTHESIA (OUTPATIENT)
Dept: ENDOSCOPY | Age: 75
End: 2020-12-08
Payer: MEDICARE

## 2020-12-08 ENCOUNTER — HOSPITAL ENCOUNTER (OUTPATIENT)
Age: 75
Setting detail: OUTPATIENT SURGERY
Discharge: HOME | End: 2020-12-08
Attending: INTERNAL MEDICINE | Admitting: INTERNAL MEDICINE
Payer: MEDICARE

## 2020-12-08 ENCOUNTER — ANESTHESIA EVENT (OUTPATIENT)
Dept: ENDOSCOPY | Age: 75
End: 2020-12-08
Payer: MEDICARE

## 2020-12-08 VITALS
HEART RATE: 63 BPM | TEMPERATURE: 98.9 F | SYSTOLIC BLOOD PRESSURE: 120 MMHG | BODY MASS INDEX: 27.2 KG/M2 | WEIGHT: 190 LBS | OXYGEN SATURATION: 97 % | RESPIRATION RATE: 18 BRPM | DIASTOLIC BLOOD PRESSURE: 75 MMHG | HEIGHT: 70 IN

## 2020-12-08 PROCEDURE — 88305 TISSUE EXAM BY PATHOLOGIST: CPT

## 2020-12-08 PROCEDURE — 2709999900 HC NON-CHARGEABLE SUPPLY: Performed by: INTERNAL MEDICINE

## 2020-12-08 PROCEDURE — 77030021593 HC FCPS BIOP ENDOSC BSC -A: Performed by: INTERNAL MEDICINE

## 2020-12-08 PROCEDURE — 74011250636 HC RX REV CODE- 250/636: Performed by: NURSE ANESTHETIST, CERTIFIED REGISTERED

## 2020-12-08 PROCEDURE — 76060000031 HC ANESTHESIA FIRST 0.5 HR: Performed by: INTERNAL MEDICINE

## 2020-12-08 PROCEDURE — 74011000250 HC RX REV CODE- 250: Performed by: NURSE ANESTHETIST, CERTIFIED REGISTERED

## 2020-12-08 PROCEDURE — 76040000019: Performed by: INTERNAL MEDICINE

## 2020-12-08 RX ORDER — MIDAZOLAM HYDROCHLORIDE 1 MG/ML
.25-5 INJECTION, SOLUTION INTRAMUSCULAR; INTRAVENOUS
Status: DISCONTINUED | OUTPATIENT
Start: 2020-12-08 | End: 2020-12-08 | Stop reason: HOSPADM

## 2020-12-08 RX ORDER — PHENOL/SODIUM PHENOLATE
20 AEROSOL, SPRAY (ML) MUCOUS MEMBRANE DAILY
COMMUNITY
End: 2020-12-08

## 2020-12-08 RX ORDER — SODIUM CHLORIDE 9 MG/ML
50 INJECTION, SOLUTION INTRAVENOUS CONTINUOUS
Status: DISCONTINUED | OUTPATIENT
Start: 2020-12-08 | End: 2020-12-08 | Stop reason: HOSPADM

## 2020-12-08 RX ORDER — FLUMAZENIL 0.1 MG/ML
0.2 INJECTION INTRAVENOUS
Status: DISCONTINUED | OUTPATIENT
Start: 2020-12-08 | End: 2020-12-08 | Stop reason: HOSPADM

## 2020-12-08 RX ORDER — DEXTROMETHORPHAN/PSEUDOEPHED 2.5-7.5/.8
1.2 DROPS ORAL
Status: DISCONTINUED | OUTPATIENT
Start: 2020-12-08 | End: 2020-12-08 | Stop reason: HOSPADM

## 2020-12-08 RX ORDER — PROPOFOL 10 MG/ML
INJECTION, EMULSION INTRAVENOUS AS NEEDED
Status: DISCONTINUED | OUTPATIENT
Start: 2020-12-08 | End: 2020-12-08 | Stop reason: HOSPADM

## 2020-12-08 RX ORDER — LIDOCAINE HYDROCHLORIDE 20 MG/ML
INJECTION, SOLUTION EPIDURAL; INFILTRATION; INTRACAUDAL; PERINEURAL AS NEEDED
Status: DISCONTINUED | OUTPATIENT
Start: 2020-12-08 | End: 2020-12-08 | Stop reason: HOSPADM

## 2020-12-08 RX ORDER — SODIUM CHLORIDE 9 MG/ML
INJECTION, SOLUTION INTRAVENOUS
Status: DISCONTINUED | OUTPATIENT
Start: 2020-12-08 | End: 2020-12-08 | Stop reason: HOSPADM

## 2020-12-08 RX ORDER — PANTOPRAZOLE SODIUM 40 MG/1
40 TABLET, DELAYED RELEASE ORAL
Qty: 90 TAB | Refills: 5 | Status: SHIPPED | OUTPATIENT
Start: 2020-12-08 | End: 2021-03-08

## 2020-12-08 RX ORDER — EPINEPHRINE 0.1 MG/ML
1 INJECTION INTRACARDIAC; INTRAVENOUS
Status: DISCONTINUED | OUTPATIENT
Start: 2020-12-08 | End: 2020-12-08 | Stop reason: HOSPADM

## 2020-12-08 RX ORDER — NALOXONE HYDROCHLORIDE 0.4 MG/ML
0.4 INJECTION, SOLUTION INTRAMUSCULAR; INTRAVENOUS; SUBCUTANEOUS
Status: DISCONTINUED | OUTPATIENT
Start: 2020-12-08 | End: 2020-12-08 | Stop reason: HOSPADM

## 2020-12-08 RX ORDER — ATROPINE SULFATE 0.1 MG/ML
0.5 INJECTION INTRAVENOUS
Status: DISCONTINUED | OUTPATIENT
Start: 2020-12-08 | End: 2020-12-08 | Stop reason: HOSPADM

## 2020-12-08 RX ORDER — FENTANYL CITRATE 50 UG/ML
25-200 INJECTION, SOLUTION INTRAMUSCULAR; INTRAVENOUS
Status: DISCONTINUED | OUTPATIENT
Start: 2020-12-08 | End: 2020-12-08 | Stop reason: HOSPADM

## 2020-12-08 RX ORDER — HYDROCODONE BITARTRATE AND ACETAMINOPHEN 5; 325 MG/1; MG/1
TABLET ORAL
COMMUNITY
End: 2021-01-14 | Stop reason: SDUPTHER

## 2020-12-08 RX ORDER — SODIUM CHLORIDE 0.9 % (FLUSH) 0.9 %
5-40 SYRINGE (ML) INJECTION AS NEEDED
Status: DISCONTINUED | OUTPATIENT
Start: 2020-12-08 | End: 2020-12-08 | Stop reason: HOSPADM

## 2020-12-08 RX ORDER — SODIUM CHLORIDE 0.9 % (FLUSH) 0.9 %
5-40 SYRINGE (ML) INJECTION EVERY 8 HOURS
Status: DISCONTINUED | OUTPATIENT
Start: 2020-12-08 | End: 2020-12-08 | Stop reason: HOSPADM

## 2020-12-08 RX ADMIN — PROPOFOL 20 MG: 10 INJECTION, EMULSION INTRAVENOUS at 09:20

## 2020-12-08 RX ADMIN — LIDOCAINE HYDROCHLORIDE 60 MG: 20 INJECTION, SOLUTION EPIDURAL; INFILTRATION; INTRACAUDAL; PERINEURAL at 09:13

## 2020-12-08 RX ADMIN — PROPOFOL 20 MG: 10 INJECTION, EMULSION INTRAVENOUS at 09:14

## 2020-12-08 RX ADMIN — PROPOFOL 80 MG: 10 INJECTION, EMULSION INTRAVENOUS at 09:13

## 2020-12-08 RX ADMIN — PROPOFOL 20 MG: 10 INJECTION, EMULSION INTRAVENOUS at 09:15

## 2020-12-08 RX ADMIN — PROPOFOL 20 MG: 10 INJECTION, EMULSION INTRAVENOUS at 09:16

## 2020-12-08 RX ADMIN — SODIUM CHLORIDE: 900 INJECTION, SOLUTION INTRAVENOUS at 08:49

## 2020-12-08 RX ADMIN — PROPOFOL 20 MG: 10 INJECTION, EMULSION INTRAVENOUS at 09:18

## 2020-12-08 NOTE — PROGRESS NOTES

## 2020-12-08 NOTE — H&P
[address]      History and Physical       NAME:  Lilliam Ignacio   :   1945   MRN:   [id number]             History of Present Illness:  Patient is a 77 y. o. who is seen for epigastric pain. PMH:  Past Medical History:   Diagnosis Date    DJD (degenerative joint disease)     Hypertension     Obesity        PSH:  Past Surgical History:   Procedure Laterality Date    GASTRIC BYPASS,OBESE<150CM TAO-EN-Y      HX ORTHOPAEDIC      bilat hip replacements       Allergies: Allergies   Allergen Reactions    Propoxyphene N-Acetaminophen Other (comments)     hallucinate  Other reaction(s): Adverse reaction to substance        Home Medications:  Prior to Admission Medications   Prescriptions Last Dose Informant Patient Reported? Taking? HYDROcodone-acetaminophen (NORCO) 5-325 mg per tablet 2020  Yes Yes   Sig: Take  by mouth. Omeprazole delayed release (PRILOSEC D/R) 20 mg tablet   Yes Yes   Sig: Take 20 mg by mouth daily. ascorbic acid (VITAMIN C) 500 mg tablet 2020 at Unknown time  Yes Yes   Sig: Take  by mouth daily. Takes unknown dose   aspirin 81 mg chewable tablet 2020 at Unknown time  Yes Yes   Sig: Take 81 mg by mouth daily. cyanocobalamin (VITAMIN B-12) 1,000 mcg tablet 2020 at Unknown time  Yes Yes   Sig: Take  by mouth daily.  Takes unknown dose   furosemide (LASIX) 20 mg tablet 2020 at Unknown time  No Yes   Sig: TAKE 1 TABLET BY MOUTH EVERY DAY AS NEEDED FOR LEG SWELLING   lisinopriL (PRINIVIL, ZESTRIL) 10 mg tablet 2020 at Unknown time  No Yes   Sig: TAKE 1 TABLET BY MOUTH EVERY DAY      Facility-Administered Medications: None       Hospital Medications:  Current Facility-Administered Medications   Medication Dose Route Frequency    0.9% sodium chloride infusion  50 mL/hr IntraVENous CONTINUOUS    sodium chloride (NS) flush 5-40 mL  5-40 mL IntraVENous Q8H    sodium chloride (NS) flush 5-40 mL  5-40 mL IntraVENous PRN    midazolam (VERSED) injection 0.25-5 mg  0.25-5 mg IntraVENous Multiple    fentaNYL citrate (PF) injection  mcg   mcg IntraVENous Multiple    naloxone (NARCAN) injection 0.4 mg  0.4 mg IntraVENous Multiple    flumazeniL (ROMAZICON) 0.1 mg/mL injection 0.2 mg  0.2 mg IntraVENous Multiple    simethicone (MYLICON) 91KW/3.4UK oral drops 80 mg  1.2 mL Oral Multiple    atropine injection 0.5 mg  0.5 mg IntraVENous ONCE PRN    EPINEPHrine (ADRENALIN) 0.1 mg/mL syringe 1 mg  1 mg Endoscopically ONCE PRN     Facility-Administered Medications Ordered in Other Encounters   Medication Dose Route Frequency    0.9% sodium chloride infusion   IntraVENous CONTINUOUS       Social History:  Social History     Tobacco Use    Smoking status: Never Smoker    Smokeless tobacco: Never Used   Substance Use Topics    Alcohol use: Yes     Alcohol/week: 136.0 standard drinks     Types: 126 Cans of beer, 10 Shots of liquor per week     Comment: Hx of abuse       Family History:  Family History   Problem Relation Age of Onset    Diabetes Maternal Grandmother     Heart Disease Maternal Grandmother     Diabetes Maternal Grandfather     Heart Disease Maternal Grandfather     No Known Problems Mother     Stroke Father          The patient was counseled at length about the risks of sadie Covid-19 in the kyra-operative and post-operative states including the recovery window of their procedure. The patient was made aware that sadie Covid-19 after a surgical procedure may worsen their prognosis for recovering from the virus and lend to a higher morbidity and or mortality risk. The patient was given the options of postponing their procedure. All of the risks, benefits, and alternatives were discussed. The patient does  wish to proceed with the procedure.     Review of Systems:      Constitutional: negative fever, negative chills, negative weight loss  Eyes:   negative visual changes  ENT:   negative sore throat, tongue or lip swelling  Respiratory:  negative cough, negative dyspnea  Cards:  negative for chest pain, palpitations, lower extremity edema  GI:   See HPI  :  negative for frequency, dysuria  Integument:  negative for rash and pruritus  Heme:  negative for easy bruising and gum/nose bleeding  Musculoskel: negative for myalgias,  back pain and muscle weakness  Neuro: negative for headaches, dizziness, vertigo  Psych:  negative for feelings of anxiety, depression       Objective:     Patient Vitals for the past 8 hrs:   BP Temp Pulse Resp SpO2 Height Weight   12/08/20 0901 119/63 98.4 °F (36.9 °C) 62 15 100 % 5' 10\" (1.778 m) 86.2 kg (190 lb)     No intake/output data recorded. No intake/output data recorded. EXAM:     NEURO-a&o   HEENT-wnl   LUNGS-clear    COR-regular rate and rhythym     ABD-soft , no tenderness, no rebound, good bs     EXT-no edema     Data Review     No results for input(s): WBC, HGB, HCT, PLT, HGBEXT, HCTEXT, PLTEXT in the last 72 hours. No results for input(s): NA, K, CL, CO2, BUN, CREA, GLU, PHOS, CA in the last 72 hours. No results for input(s): AP, TBIL, TP, ALB, GLOB, GGT, AML, LPSE in the last 72 hours. No lab exists for component: SGOT, GPT, AMYP, HLPSE  No results for input(s): INR, PTP, APTT, INREXT in the last 72 hours.        Assessment:     · Epigastric pain     Patient Active Problem List   Diagnosis Code    HTN (hypertension) I10    Obesity E66.9    S/P gastric bypass Z98.84    Plantar fasciitis M72.2    DJD (degenerative joint disease) M19.90    Anxiety F41.9    Vitamin D deficiency E55.9    Alcohol dependence ([facility] [address] F10.20    Pedal edema R60.0    Bilateral knee pain M25.561, M25.562    ED (erectile dysfunction) N52.9    Cataract H26.9    Venous insufficiency of both lower extremities I87.2    Chronic right shoulder pain M25.511, G89.29    Hyperglycemia R73.9    Hyponatremia E87.1    Gastroesophageal reflux disease without esophagitis K21.9    Paresthesia of both hands R20.2    Hearing loss of left ear H91.92           Plan:   ·   · Endoscopic procedure with sedation     Signed By: Jada Kraus MD     12/8/2020  9:07 AM

## 2020-12-08 NOTE — DISCHARGE INSTRUCTIONS
[address], [address] [place] [address] [address] Stephani Ashley  [id number]  1945    Discomfort:  Sore throat- throat lozenges or warm salt water gargle  redness at IV site- apply warm compress to area; if redness or soreness persist- contact your physician  Gaseous discomfort- walking, belching will help relieve any discomfort  You may not operate a vehicle for 12 hours  You may not engage in an occupation involving machinery or appliances for rest of today  You may not drink alcoholic beverages for at least 12 hours  Avoid making any critical decisions for at least 24 hour  DIET  You may resume your regular diet - however -  remember your colon is empty and a heavy meal will produce gas. Avoid these foods:  vegetables, fried / greasy foods, carbonated drinks    ACTIVITY  You may resume your normal daily activities   Spend the remainder of the day resting -  avoid any strenuous activity. CALL M.D. ANY SIGN OF   Increasing pain, nausea, vomiting  Abdominal distension (swelling)  New increased bleeding (oral or rectal)  Fever (chills)  Pain in chest area  Bloody discharge from nose or mouth  Shortness of breath    Follow-up Instructions:   Call Dr. Lefty Mora for any questions or problems. Telephone # [phone]  To follow up with your surgeon, Dr Roxy Nelson  To start daily protonix, I sent the prescription to your pharmacy    ENDOSCOPY FINDINGS:   Your endoscopy showed normal gastric bypass anatomy, no ulcers, no stricture seen, small hiatal hernia. Signed By: Lefty Mora MD     12/8/2020  9:35 AM         Learning About Coronavirus (COVID-19)  Coronavirus (COVID-19): Overview  What is coronavirus (UFUGN-06)? The coronavirus disease (COVID-19) is caused by a virus. It is an illness that was first found in [place], [place], in December 2019. It has since spread worldwide.   The virus can cause fever, cough, and trouble breathing. In severe cases, it can cause pneumonia and make it hard to breathe without help. It can cause death. Coronaviruses are a large group of viruses. They cause the common cold. They also cause more serious illnesses like Middle East respiratory syndrome (MERS) and severe acute respiratory syndrome (SARS). COVID-19 is caused by a novel coronavirus. That means it's a new type that has not been seen in people before. This virus spreads person-to-person through droplets from coughing and sneezing. It can also spread when you are close to someone who is infected. And it can spread when you touch something that has the virus on it, such as a doorknob or a tabletop. What can you do to protect yourself from coronavirus (COVID-19)? The best way to protect yourself from getting sick is to:  · Avoid areas where there is an outbreak. · Avoid contact with people who may be infected. · Wash your hands often with soap or alcohol-based hand sanitizers. · Avoid crowds and try to stay at least 6 feet away from other people. · Wash your hands often, especially after you cough or sneeze. Use soap and water, and scrub for at least 20 seconds. If soap and water aren't available, use an alcohol-based hand . · Avoid touching your mouth, nose, and eyes. What can you do to avoid spreading the virus to others? To help avoid spreading the virus to others:  · Cover your mouth with a tissue when you cough or sneeze. Then throw the tissue in the trash. · Use a disinfectant to clean things that you touch often. · Stay home if you are sick or have been exposed to the virus. Don't go to school, work, or public areas. And don't use public transportation. · If you are sick:  ? Leave your home only if you need to get medical care. But call the doctor's office first so they know you're coming. And wear a face mask, if you have one.  ? If you have a face mask, wear it whenever you're around other people.  It can help stop the spread of the virus when you cough or sneeze. ? Clean and disinfect your home every day. Use household  and disinfectant wipes or sprays. Take special care to clean things that you grab with your hands. These include doorknobs, remote controls, phones, and handles on your refrigerator and microwave. And don't forget countertops, tabletops, bathrooms, and computer keyboards. When to call for help  Call 911 anytime you think you may need emergency care. For example, call if:  · You have severe trouble breathing. (You can't talk at all.)  · You have constant chest pain or pressure. · You are severely dizzy or lightheaded. · You are confused or can't think clearly. · Your face and lips have a blue color. · You pass out (lose consciousness) or are very hard to wake up. Call your doctor now if you develop symptoms such as:  · Shortness of breath. · Fever. · Cough. If you need to get care, call ahead to the doctor's office for instructions before you go. Make sure you wear a face mask, if you have one, to prevent exposing other people to the virus. Where can you get the latest information? The following health organizations are tracking and studying this virus. Their websites contain the most up-to-date information. Malgorzata Rodriguez also learn what to do if you think you may have been exposed to the virus. · U.S. Centers for Disease Control and Prevention (CDC): The CDC provides updated news about the disease and travel advice. The website also tells you how to prevent the spread of infection. www.cdc.gov  · World Health Organization [facility]): WHO offers information about the virus outbreaks. WHO also has travel advice. www.who.int  Current as of: April 1, 2020               Content Version: 12.4  © 2006-2020 Healthwise[employer]orporated.    Care instructions adapted under license by your healthcare professional. If you have questions about a medical condition or this instruction, always ask your healthcare professional. [address] any warranty or liability for your use of this information.

## 2020-12-08 NOTE — ROUTINE PROCESS
Carlos Olivier  1945  [id number]    Situation:  Verbal report received from: PATRICIA Cameron RN  Procedure: Procedure(s):  . EGD  ESOPHAGOGASTRODUODENAL (EGD) BIOPSY    Background:    Preoperative diagnosis: REFLUX  MORBID OBESITY  ANASTOMOTIC STRICTURE  Postoperative diagnosis: 1. Hiatal Hernia  2. Gastric By-pass anatomy    :  Dr. Mert Cedeño  Assistant(s): Endoscopy Technician-1: Nery Moore  Endoscopy RN-1: Claudia Franco RN    Specimens:   ID Type Source Tests Collected by Time Destination   1 : jejunum Biopsy Saline   Ryder Quintanilla MD 12/8/2020 0651 Pathology   2 : eosphagus biopsy Preservative   Ryder Quintanilla MD 12/8/2020 1523 Pathology     H. Pylori  no    Assessment:  Intra-procedure medications   Anesthesia gave intra-procedure sedation and medications, see anesthesia flow sheet yes    Intravenous fluids: NS@ KVO     Vital signs stable     Abdominal assessment: round and soft     Recommendation:  Discharge patient per MD order.   Family or Friend   Permission to share finding with family or friend yes

## 2020-12-08 NOTE — ANESTHESIA PREPROCEDURE EVALUATION
Relevant Problems   No relevant active problems       Anesthetic History   No history of anesthetic complications            Review of Systems / Medical History  Patient summary reviewed, nursing notes reviewed and pertinent labs reviewed    Pulmonary  Within defined limits            Pertinent negatives: No sleep apnea     Neuro/Psych   Within defined limits           Cardiovascular    Hypertension    Angina          Exercise tolerance: >4 METS     GI/Hepatic/Renal     GERD: poorly controlled           Endo/Other        Obesity     Other Findings   Comments: Hx of gastic bypass; no active heart burn symptoms now          Physical Exam    Airway  Mallampati: I  TM Distance: < 4 cm  Neck ROM: normal range of motion   Mouth opening: Normal     Cardiovascular  Regular rate and rhythm,  S1 and S2 normal,  no murmur, click, rub, or gallop             Dental  No notable dental hx       Pulmonary  Breath sounds clear to auscultation               Abdominal  GI exam deferred       Other Findings            Anesthetic Plan    ASA: 2  Anesthesia type: general          Induction: Intravenous  Anesthetic plan and risks discussed with: Patient

## 2020-12-08 NOTE — PROCEDURES
[address]  [address] [facility], [address] [facility] (EGD) Procedure Note    Frannie Pham  1945  [id number]      Procedure: Endoscopic Gastroduodenoscopy with biopsy    Indication:  Abdominal pain, epigastric, Heartburn, persistent despite therapy , h/o GBP 20 years ago, on prilosec otc 20 mg/d    Pre-operative Diagnosis: see indication above    Post-operative Diagnosis: see findings below    : Namrata Yin MD    Surgical Assistant: Endoscopy Technician-1: Conley Nissen  Endoscopy RN-1: Cheli Anand RN    Implants:  None    Referring Provider:  Emiliano Lopez DO      Anesthesia/Sedation:  MAC anesthesia Propofol        Procedure Details     After infomed consent was obtained for the procedure, with all risks and benefits of procedure explained the patient was taken to the endoscopy suite and placed in the left lateral decubitus position. Following sequential administration of sedation as per above, the endoscope was inserted into the mouth and advanced under direct vision to proximal jejunum. A careful inspection was made as the gastroscope was withdrawn, including a retroflexed view of the proximal stomach; findings and interventions are described below. Findings:   Esophagus:hiatal hernia 2 cm in size   Rest of esophagus was normal, random biopsies taken  Stomach: normal gastric bypass anatomy, the gastric pouch was around 10 cm long, able to retroflex the scope within, normal gastric pouch    Surgical anastomosis was patent, no ulcers, no stricture, the anastomosis was around 2 cm in diameter     jejunum: normal, random biopsies taken      Therapies:  none    Specimens: as above          EBL: None      Complications:   None; patient tolerated the procedure well. Impression:    -See post-procedure diagnoses.     Recommendations:  -follow up with Dr Teodoro Kenyon  -change prilosec  to protonix 40 mg/d    Signed By: Amrita Lau MD     12/8/2020  9:28 AM

## 2020-12-08 NOTE — ANESTHESIA POSTPROCEDURE EVALUATION
Post-Anesthesia Evaluation and Assessment    Patient: Evon Hall MRN: [id number]  SSN: [id number]    YOB: 1945  Age: 77 y.o. Sex: male      I have evaluated the patient and they are stable and ready for discharge from the PACU. Cardiovascular Function/Vital Signs  Visit Vitals  /75   Pulse (!) 57   Temp 37.2 °C (98.9 °F)   Resp 18   Ht 5' 10\" (1.778 m)   Wt 86.2 kg (190 lb)   SpO2 98%   BMI 27.26 kg/m²       Patient is status post MAC anesthesia for Procedure(s):  . EGD  ESOPHAGOGASTRODUODENAL (EGD) BIOPSY. Nausea/Vomiting: None    Postoperative hydration reviewed and adequate. Pain:  Pain Scale 1: Numeric (0 - 10) (12/08/20 0931)  Pain Intensity 1: 0 (12/08/20 0931)   Managed    Neurological Status: At baseline    Mental Status, Level of Consciousness: Alert and  oriented to person, place, and time    Pulmonary Status:   O2 Device: Room air (12/08/20 0931)   Adequate oxygenation and airway patent    Complications related to anesthesia: None    Post-anesthesia assessment completed. No concerns    Signed By: Harshad Bloom MD     December 8, 2020              Procedure(s):  . EGD  ESOPHAGOGASTRODUODENAL (EGD) BIOPSY. MAC    <BSHSIANPOST>    INITIAL Post-op Vital signs:   Vitals Value Taken Time   /75 12/8/2020  9:31 AM   Temp 37.2 °C (98.9 °F) 12/8/2020  9:31 AM   Pulse 65 12/8/2020  9:34 AM   Resp 15 12/8/2020  9:34 AM   SpO2 98 % 12/8/2020  9:34 AM   Vitals shown include unvalidated device data.

## 2020-12-31 RX ORDER — HYDROCODONE BITARTRATE AND ACETAMINOPHEN 5; 325 MG/1; MG/1
TABLET ORAL
OUTPATIENT
Start: 2020-12-31

## 2021-01-14 DIAGNOSIS — M15.9 PRIMARY OSTEOARTHRITIS INVOLVING MULTIPLE JOINTS: Primary | ICD-10-CM

## 2021-01-15 RX ORDER — HYDROCODONE BITARTRATE AND ACETAMINOPHEN 5; 325 MG/1; MG/1
1 TABLET ORAL
Qty: 30 TAB | Refills: 0 | Status: SHIPPED | OUTPATIENT
Start: 2021-01-15 | End: 2021-02-14

## 2021-04-10 ENCOUNTER — APPOINTMENT (OUTPATIENT)
Dept: CT IMAGING | Age: 76
DRG: 683 | End: 2021-04-10
Attending: EMERGENCY MEDICINE
Payer: MEDICARE

## 2021-04-10 ENCOUNTER — HOSPITAL ENCOUNTER (INPATIENT)
Age: 76
LOS: 5 days | Discharge: NURSING, REHAB OR LONG-TERM CARE | DRG: 683 | End: 2021-04-16
Attending: EMERGENCY MEDICINE | Admitting: INTERNAL MEDICINE
Payer: MEDICARE

## 2021-04-10 ENCOUNTER — APPOINTMENT (OUTPATIENT)
Dept: GENERAL RADIOLOGY | Age: 76
DRG: 683 | End: 2021-04-10
Attending: EMERGENCY MEDICINE
Payer: MEDICARE

## 2021-04-10 DIAGNOSIS — N17.9 AKI (ACUTE KIDNEY INJURY) (HCC): Primary | ICD-10-CM

## 2021-04-10 LAB
ALBUMIN SERPL-MCNC: 1.6 G/DL (ref 3.5–5)
ALBUMIN/GLOB SERPL: 0.5 {RATIO} (ref 1.1–2.2)
ALP SERPL-CCNC: 272 U/L (ref 45–117)
ALT SERPL-CCNC: 50 U/L (ref 12–78)
ANION GAP SERPL CALC-SCNC: 9 MMOL/L (ref 5–15)
AST SERPL-CCNC: 85 U/L (ref 15–37)
BASOPHILS # BLD: 0 K/UL (ref 0–0.1)
BASOPHILS NFR BLD: 0 % (ref 0–1)
BILIRUB SERPL-MCNC: 1 MG/DL (ref 0.2–1)
BNP SERPL-MCNC: 4449 PG/ML
BUN SERPL-MCNC: 54 MG/DL (ref 6–20)
BUN/CREAT SERPL: 12 (ref 12–20)
CALCIUM SERPL-MCNC: 7.9 MG/DL (ref 8.5–10.1)
CHLORIDE SERPL-SCNC: 97 MMOL/L (ref 97–108)
CO2 SERPL-SCNC: 28 MMOL/L (ref 21–32)
CREAT SERPL-MCNC: 4.41 MG/DL (ref 0.7–1.3)
DIFFERENTIAL METHOD BLD: ABNORMAL
EOSINOPHIL # BLD: 0 K/UL (ref 0–0.4)
EOSINOPHIL NFR BLD: 0 % (ref 0–7)
ERYTHROCYTE [DISTWIDTH] IN BLOOD BY AUTOMATED COUNT: 13.4 % (ref 11.5–14.5)
GLOBULIN SER CALC-MCNC: 3.5 G/DL (ref 2–4)
GLUCOSE SERPL-MCNC: 102 MG/DL (ref 65–100)
HCT VFR BLD AUTO: 33.7 % (ref 36.6–50.3)
HGB BLD-MCNC: 11.8 G/DL (ref 12.1–17)
IMM GRANULOCYTES # BLD AUTO: 0 K/UL (ref 0–0.04)
IMM GRANULOCYTES NFR BLD AUTO: 0 % (ref 0–0.5)
INR PPP: 1.4 (ref 0.9–1.1)
LYMPHOCYTES # BLD: 0.8 K/UL (ref 0.8–3.5)
LYMPHOCYTES NFR BLD: 5 % (ref 12–49)
MCH RBC QN AUTO: 34.5 PG (ref 26–34)
MCHC RBC AUTO-ENTMCNC: 35 G/DL (ref 30–36.5)
MCV RBC AUTO: 98.5 FL (ref 80–99)
MONOCYTES # BLD: 0.8 K/UL (ref 0–1)
MONOCYTES NFR BLD: 5 % (ref 5–13)
NEUTS BAND NFR BLD MANUAL: 1 %
NEUTS SEG # BLD: 13.9 K/UL (ref 1.8–8)
NEUTS SEG NFR BLD: 89 % (ref 32–75)
NRBC # BLD: 0 K/UL (ref 0–0.01)
NRBC BLD-RTO: 0 PER 100 WBC
PLATELET # BLD AUTO: 160 K/UL (ref 150–400)
PMV BLD AUTO: 11.2 FL (ref 8.9–12.9)
POTASSIUM SERPL-SCNC: 4.3 MMOL/L (ref 3.5–5.1)
PROT SERPL-MCNC: 5.1 G/DL (ref 6.4–8.2)
PROTHROMBIN TIME: 14.7 SEC (ref 9–11.1)
RBC # BLD AUTO: 3.42 M/UL (ref 4.1–5.7)
RBC MORPH BLD: ABNORMAL
SODIUM SERPL-SCNC: 134 MMOL/L (ref 136–145)
TROPONIN I SERPL-MCNC: 0.17 NG/ML
WBC # BLD AUTO: 15.5 K/UL (ref 4.1–11.1)

## 2021-04-10 PROCEDURE — 99285 EMERGENCY DEPT VISIT HI MDM: CPT

## 2021-04-10 PROCEDURE — 71045 X-RAY EXAM CHEST 1 VIEW: CPT

## 2021-04-10 PROCEDURE — 96361 HYDRATE IV INFUSION ADD-ON: CPT

## 2021-04-10 PROCEDURE — P9047 ALBUMIN (HUMAN), 25%, 50ML: HCPCS | Performed by: EMERGENCY MEDICINE

## 2021-04-10 PROCEDURE — 74176 CT ABD & PELVIS W/O CONTRAST: CPT

## 2021-04-10 PROCEDURE — 84484 ASSAY OF TROPONIN QUANT: CPT

## 2021-04-10 PROCEDURE — 74011250636 HC RX REV CODE- 250/636: Performed by: EMERGENCY MEDICINE

## 2021-04-10 PROCEDURE — 85025 COMPLETE CBC W/AUTO DIFF WBC: CPT

## 2021-04-10 PROCEDURE — 85610 PROTHROMBIN TIME: CPT

## 2021-04-10 PROCEDURE — 83880 ASSAY OF NATRIURETIC PEPTIDE: CPT

## 2021-04-10 PROCEDURE — 93005 ELECTROCARDIOGRAM TRACING: CPT

## 2021-04-10 PROCEDURE — 36415 COLL VENOUS BLD VENIPUNCTURE: CPT

## 2021-04-10 PROCEDURE — 96365 THER/PROPH/DIAG IV INF INIT: CPT

## 2021-04-10 PROCEDURE — 80053 COMPREHEN METABOLIC PANEL: CPT

## 2021-04-10 RX ORDER — ALBUMIN HUMAN 250 G/1000ML
12.5 SOLUTION INTRAVENOUS ONCE
Status: COMPLETED | OUTPATIENT
Start: 2021-04-10 | End: 2021-04-10

## 2021-04-10 RX ADMIN — SODIUM CHLORIDE 500 ML: 9 INJECTION, SOLUTION INTRAVENOUS at 19:22

## 2021-04-10 RX ADMIN — SODIUM CHLORIDE 1000 ML: 9 INJECTION, SOLUTION INTRAVENOUS at 22:02

## 2021-04-10 RX ADMIN — ALBUMIN (HUMAN) 12.5 G: 0.25 INJECTION, SOLUTION INTRAVENOUS at 21:19

## 2021-04-11 ENCOUNTER — APPOINTMENT (OUTPATIENT)
Dept: NON INVASIVE DIAGNOSTICS | Age: 76
DRG: 683 | End: 2021-04-11
Attending: INTERNAL MEDICINE
Payer: MEDICARE

## 2021-04-11 PROBLEM — N17.9 AKI (ACUTE KIDNEY INJURY) (HCC): Status: ACTIVE | Noted: 2021-04-11

## 2021-04-11 LAB
APPEARANCE UR: ABNORMAL
AV R PG: 33.77 MMHG
BACTERIA URNS QL MICRO: ABNORMAL /HPF
BILIRUB UR QL: NEGATIVE
COLOR UR: ABNORMAL
CREAT UR-MCNC: 64.8 MG/DL
ECHO AR MAX VEL PISA: 290.57 CM/S
ECHO AV AREA PEAK VELOCITY: 2.72 CM2
ECHO AV AREA/BSA PEAK VELOCITY: 1.3 CM2/M2
ECHO AV PEAK GRADIENT: 5.92 MMHG
ECHO AV PEAK VELOCITY: 121.62 CM/S
ECHO AV REGURGITANT PHT: 444.75 MS
ECHO LA AREA 4C: 25.67 CM2
ECHO LA MAJOR AXIS: 4.42 CM
ECHO LA MINOR AXIS: 2.16 CM
ECHO LA VOL 2C: 111.76 ML (ref 18–58)
ECHO LA VOL 4C: 89.38 ML (ref 18–58)
ECHO LA VOL BP: 111.59 ML (ref 18–58)
ECHO LA VOL/BSA BIPLANE: 54.64 ML/M2 (ref 16–28)
ECHO LA VOLUME INDEX A2C: 54.72 ML/M2 (ref 16–28)
ECHO LA VOLUME INDEX A4C: 43.76 ML/M2 (ref 16–28)
ECHO LV E' LATERAL VELOCITY: 10.33 CM/S
ECHO LV E' SEPTAL VELOCITY: 7.08 CM/S
ECHO LV INTERNAL DIMENSION DIASTOLIC: 4.88 CM (ref 4.2–5.9)
ECHO LV INTERNAL DIMENSION SYSTOLIC: 3.09 CM
ECHO LV IVSD: 0.88 CM (ref 0.6–1)
ECHO LV IVSS: 1.67 CM
ECHO LV MASS 2D: 196.7 G (ref 88–224)
ECHO LV MASS INDEX 2D: 96.3 G/M2 (ref 49–115)
ECHO LV POSTERIOR WALL DIASTOLIC: 1.3 CM (ref 0.6–1)
ECHO LV POSTERIOR WALL SYSTOLIC: 2.04 CM
ECHO LVOT DIAM: 2.08 CM
ECHO LVOT PEAK GRADIENT: 3.77 MMHG
ECHO LVOT PEAK VELOCITY: 97.14 CM/S
ECHO MV A VELOCITY: 0.66 CM/S
ECHO MV E DECELERATION TIME (DT): 176.21 MS
ECHO MV E VELOCITY: 66.78 CM/S
ECHO MV E/A RATIO: 101.18
ECHO MV E/E' LATERAL: 6.46
ECHO MV E/E' RATIO (AVERAGED): 7.95
ECHO MV E/E' SEPTAL: 9.43
ECHO MV REGURGITANT VTIA: 188.39 CM
ECHO PV MAX VELOCITY: 58.87 CM/S
ECHO PV PEAK INSTANTANEOUS GRADIENT SYSTOLIC: 1.39 MMHG
ECHO RV INTERNAL DIMENSION: 3.68 CM
ECHO TV MEAN GRADIENT: 72.29 MMHG
ECHO TV REGURGITANT MAX VELOCITY: 236.74 CM/S
ECHO TV REGURGITANT MAX VELOCITY: 450.99 CM/S
ECHO TV REGURGITANT MAX VELOCITY: 478.51 CM/S
ECHO TV REGURGITANT PEAK GRADIENT: 22.48 MMHG
EPITH CASTS URNS QL MICRO: ABNORMAL /LPF
GLUCOSE BLD STRIP.AUTO-MCNC: 97 MG/DL (ref 65–100)
GLUCOSE UR STRIP.AUTO-MCNC: NEGATIVE MG/DL
HGB UR QL STRIP: ABNORMAL
KETONES UR QL STRIP.AUTO: NEGATIVE MG/DL
LACTATE BLD-SCNC: 0.73 MMOL/L (ref 0.4–2)
LEUKOCYTE ESTERASE UR QL STRIP.AUTO: ABNORMAL
NITRITE UR QL STRIP.AUTO: NEGATIVE
PH UR STRIP: 5.5 [PH] (ref 5–8)
PROT UR STRIP-MCNC: 100 MG/DL
PROT UR-MCNC: 104 MG/DL (ref 0–11.9)
PROT/CREAT UR-RTO: 1.6
RBC #/AREA URNS HPF: ABNORMAL /HPF (ref 0–5)
SERVICE CMNT-IMP: NORMAL
SODIUM UR-SCNC: 45 MMOL/L
SP GR UR REFRACTOMETRY: 1.01 (ref 1–1.03)
TROPONIN I SERPL-MCNC: 0.1 NG/ML
TROPONIN I SERPL-MCNC: 0.15 NG/ML
TROPONIN I SERPL-MCNC: 0.16 NG/ML
UA: UC IF INDICATED,UAUC: ABNORMAL
UROBILINOGEN UR QL STRIP.AUTO: 1 EU/DL (ref 0.2–1)
WBC URNS QL MICRO: >100 /HPF (ref 0–4)

## 2021-04-11 PROCEDURE — 74011250637 HC RX REV CODE- 250/637: Performed by: INTERNAL MEDICINE

## 2021-04-11 PROCEDURE — 87086 URINE CULTURE/COLONY COUNT: CPT

## 2021-04-11 PROCEDURE — 96361 HYDRATE IV INFUSION ADD-ON: CPT

## 2021-04-11 PROCEDURE — 84300 ASSAY OF URINE SODIUM: CPT

## 2021-04-11 PROCEDURE — 74011250636 HC RX REV CODE- 250/636: Performed by: INTERNAL MEDICINE

## 2021-04-11 PROCEDURE — 93306 TTE W/DOPPLER COMPLETE: CPT

## 2021-04-11 PROCEDURE — 82570 ASSAY OF URINE CREATININE: CPT

## 2021-04-11 PROCEDURE — 65660000000 HC RM CCU STEPDOWN

## 2021-04-11 PROCEDURE — 87186 SC STD MICRODIL/AGAR DIL: CPT

## 2021-04-11 PROCEDURE — 84484 ASSAY OF TROPONIN QUANT: CPT

## 2021-04-11 PROCEDURE — 74011000258 HC RX REV CODE- 258: Performed by: INTERNAL MEDICINE

## 2021-04-11 PROCEDURE — P9047 ALBUMIN (HUMAN), 25%, 50ML: HCPCS | Performed by: INTERNAL MEDICINE

## 2021-04-11 PROCEDURE — 36415 COLL VENOUS BLD VENIPUNCTURE: CPT

## 2021-04-11 PROCEDURE — 51798 US URINE CAPACITY MEASURE: CPT

## 2021-04-11 PROCEDURE — 81001 URINALYSIS AUTO W/SCOPE: CPT

## 2021-04-11 PROCEDURE — 87077 CULTURE AEROBIC IDENTIFY: CPT

## 2021-04-11 PROCEDURE — 83605 ASSAY OF LACTIC ACID: CPT

## 2021-04-11 PROCEDURE — 82962 GLUCOSE BLOOD TEST: CPT

## 2021-04-11 RX ORDER — METRONIDAZOLE 500 MG/100ML
500 INJECTION, SOLUTION INTRAVENOUS EVERY 12 HOURS
Status: DISCONTINUED | OUTPATIENT
Start: 2021-04-11 | End: 2021-04-14

## 2021-04-11 RX ORDER — ALBUMIN HUMAN 250 G/1000ML
25 SOLUTION INTRAVENOUS EVERY 6 HOURS
Status: COMPLETED | OUTPATIENT
Start: 2021-04-11 | End: 2021-04-11

## 2021-04-11 RX ORDER — SODIUM CHLORIDE 9 MG/ML
75 INJECTION, SOLUTION INTRAVENOUS CONTINUOUS
Status: DISCONTINUED | OUTPATIENT
Start: 2021-04-11 | End: 2021-04-14

## 2021-04-11 RX ORDER — PROMETHAZINE HYDROCHLORIDE 25 MG/1
12.5 TABLET ORAL
Status: DISCONTINUED | OUTPATIENT
Start: 2021-04-11 | End: 2021-04-16 | Stop reason: HOSPADM

## 2021-04-11 RX ORDER — ERGOCALCIFEROL 1.25 MG/1
50000 CAPSULE ORAL 2 TIMES WEEKLY
COMMUNITY

## 2021-04-11 RX ORDER — ACETAMINOPHEN 325 MG/1
650 TABLET ORAL
Status: DISCONTINUED | OUTPATIENT
Start: 2021-04-11 | End: 2021-04-16 | Stop reason: HOSPADM

## 2021-04-11 RX ORDER — UREA 10 %
800 LOTION (ML) TOPICAL DAILY
COMMUNITY

## 2021-04-11 RX ORDER — GUAIFENESIN 100 MG/5ML
81 LIQUID (ML) ORAL DAILY
Status: DISCONTINUED | OUTPATIENT
Start: 2021-04-11 | End: 2021-04-16 | Stop reason: HOSPADM

## 2021-04-11 RX ORDER — METRONIDAZOLE 500 MG/100ML
500 INJECTION, SOLUTION INTRAVENOUS EVERY 8 HOURS
Status: DISCONTINUED | OUTPATIENT
Start: 2021-04-11 | End: 2021-04-11 | Stop reason: DRUGHIGH

## 2021-04-11 RX ORDER — SUCRALFATE 1 G/1
1 TABLET ORAL 4 TIMES DAILY
COMMUNITY

## 2021-04-11 RX ORDER — SODIUM CHLORIDE 0.9 % (FLUSH) 0.9 %
5-40 SYRINGE (ML) INJECTION EVERY 8 HOURS
Status: DISCONTINUED | OUTPATIENT
Start: 2021-04-11 | End: 2021-04-16 | Stop reason: HOSPADM

## 2021-04-11 RX ORDER — ACETAMINOPHEN 650 MG/1
650 SUPPOSITORY RECTAL
Status: DISCONTINUED | OUTPATIENT
Start: 2021-04-11 | End: 2021-04-16 | Stop reason: HOSPADM

## 2021-04-11 RX ORDER — HEPARIN SODIUM 5000 [USP'U]/ML
5000 INJECTION, SOLUTION INTRAVENOUS; SUBCUTANEOUS EVERY 8 HOURS
Status: DISCONTINUED | OUTPATIENT
Start: 2021-04-11 | End: 2021-04-16 | Stop reason: HOSPADM

## 2021-04-11 RX ORDER — BISMUTH SUBSALICYLATE 262 MG
1 TABLET,CHEWABLE ORAL DAILY
COMMUNITY

## 2021-04-11 RX ORDER — POLYETHYLENE GLYCOL 3350 17 G/17G
17 POWDER, FOR SOLUTION ORAL DAILY PRN
Status: DISCONTINUED | OUTPATIENT
Start: 2021-04-11 | End: 2021-04-16 | Stop reason: HOSPADM

## 2021-04-11 RX ORDER — ONDANSETRON 2 MG/ML
4 INJECTION INTRAMUSCULAR; INTRAVENOUS
Status: DISCONTINUED | OUTPATIENT
Start: 2021-04-11 | End: 2021-04-16 | Stop reason: HOSPADM

## 2021-04-11 RX ORDER — SODIUM CHLORIDE 0.9 % (FLUSH) 0.9 %
5-40 SYRINGE (ML) INJECTION AS NEEDED
Status: DISCONTINUED | OUTPATIENT
Start: 2021-04-11 | End: 2021-04-16 | Stop reason: HOSPADM

## 2021-04-11 RX ORDER — PANTOPRAZOLE SODIUM 40 MG/1
40 TABLET, DELAYED RELEASE ORAL DAILY
COMMUNITY

## 2021-04-11 RX ADMIN — ALBUMIN (HUMAN) 25 G: 0.25 INJECTION, SOLUTION INTRAVENOUS at 06:07

## 2021-04-11 RX ADMIN — Medication 10 ML: at 13:21

## 2021-04-11 RX ADMIN — HEPARIN SODIUM 5000 UNITS: 5000 INJECTION INTRAVENOUS; SUBCUTANEOUS at 14:02

## 2021-04-11 RX ADMIN — ACETAMINOPHEN 650 MG: 325 TABLET, FILM COATED ORAL at 18:31

## 2021-04-11 RX ADMIN — METRONIDAZOLE 500 MG: 500 INJECTION, SOLUTION INTRAVENOUS at 23:16

## 2021-04-11 RX ADMIN — METRONIDAZOLE 500 MG: 500 INJECTION, SOLUTION INTRAVENOUS at 11:58

## 2021-04-11 RX ADMIN — HEPARIN SODIUM 5000 UNITS: 5000 INJECTION INTRAVENOUS; SUBCUTANEOUS at 23:16

## 2021-04-11 RX ADMIN — Medication 10 ML: at 01:53

## 2021-04-11 RX ADMIN — Medication 10 ML: at 17:28

## 2021-04-11 RX ADMIN — SODIUM CHLORIDE 100 ML/HR: 9 INJECTION, SOLUTION INTRAVENOUS at 11:57

## 2021-04-11 RX ADMIN — ALBUMIN (HUMAN) 25 G: 0.25 INJECTION, SOLUTION INTRAVENOUS at 11:49

## 2021-04-11 RX ADMIN — HEPARIN SODIUM 5000 UNITS: 5000 INJECTION INTRAVENOUS; SUBCUTANEOUS at 06:10

## 2021-04-11 RX ADMIN — ACETAMINOPHEN 650 MG: 325 TABLET, FILM COATED ORAL at 01:51

## 2021-04-11 RX ADMIN — CEFTRIAXONE 1 G: 1 INJECTION, POWDER, FOR SOLUTION INTRAMUSCULAR; INTRAVENOUS at 01:47

## 2021-04-11 RX ADMIN — Medication 10 ML: at 23:16

## 2021-04-11 RX ADMIN — ALBUMIN (HUMAN) 25 G: 0.25 INJECTION, SOLUTION INTRAVENOUS at 17:28

## 2021-04-11 RX ADMIN — ACETAMINOPHEN 650 MG: 325 TABLET, FILM COATED ORAL at 12:56

## 2021-04-11 RX ADMIN — ASPIRIN 81 MG: 81 TABLET, CHEWABLE ORAL at 09:18

## 2021-04-11 RX ADMIN — Medication 10 ML: at 09:19

## 2021-04-11 RX ADMIN — SODIUM CHLORIDE 100 ML/HR: 9 INJECTION, SOLUTION INTRAVENOUS at 23:16

## 2021-04-11 NOTE — ED PROVIDER NOTES
EMERGENCY DEPARTMENT HISTORY AND PHYSICAL EXAM      Date: 4/10/2021  Patient Name: Idalia Billy    History of Presenting Illness     Chief Complaint   Patient presents with    Lethargy       History Provided By: Patient    HPI: Idalia Billy, 77 y.o. male with PMHx significant for hypertension, obesity, alcohol abuse, who presents with a chief complaint of generalized fatigue, weight loss, and exertional chest pain. Patient reports symptoms have been ongoing for the last 2 months. However, have progressively worsened over the last several days. He tells me he saw his primary care doctor and had lab work done but is unable to tell me the results. He does state that he has some chest tightness with exertion but goes away with rest.  This is also been going on for months. Reports a 40 pound weight loss in the last 8 weeks. Additionally notes that he drinks 8-10 drinks per day regularly. No abdominal pain, nausea, vomiting, urinary symptoms. PCP: Dahiana Colvin,     There are no other complaints, changes, or physical findings at this time. Current Outpatient Medications   Medication Sig Dispense Refill    lisinopriL (PRINIVIL, ZESTRIL) 10 mg tablet TAKE 1 TABLET BY MOUTH EVERY DAY 90 Tab 1    furosemide (LASIX) 20 mg tablet TAKE 1 TABLET BY MOUTH EVERY DAY AS NEEDED FOR LEG SWELLING 30 Tab 5    aspirin 81 mg chewable tablet Take 81 mg by mouth daily.  cyanocobalamin (VITAMIN B-12) 1,000 mcg tablet Take  by mouth daily. Takes unknown dose      ascorbic acid (VITAMIN C) 500 mg tablet Take  by mouth daily.  Takes unknown dose       Past History     Past Medical History:  Past Medical History:   Diagnosis Date    DJD (degenerative joint disease)     Hypertension     Obesity      Past Surgical History:  Past Surgical History:   Procedure Laterality Date    HX ORTHOPAEDIC      bilat hip replacements    LA GASTRIC BYPASS,OBESE<150CM [id number]-EN-Y       Family History:  Family History   Problem Relation Age of Onset    Diabetes Maternal Grandmother     Heart Disease Maternal Grandmother     Diabetes Maternal Grandfather     Heart Disease Maternal Grandfather     No Known Problems Mother     Stroke Father      Social History:  Social History     Tobacco Use    Smoking status: Never Smoker    Smokeless tobacco: Never Used   Substance Use Topics    Alcohol use: Yes     Alcohol/week: 136.0 standard drinks     Types: 126 Cans of beer, 10 Shots of liquor per week     Comment: Hx of abuse    Drug use: Yes     Types: Marijuana     Comment: rare     Allergies: Allergies   Allergen Reactions    Propoxyphene N-Acetaminophen Other (comments)     hallucinate  Other reaction(s): Adverse reaction to substance      Review of Systems   Review of Systems   Constitutional: Positive for fatigue. Negative for chills and fever. HENT: Negative for congestion, rhinorrhea and sore throat. Respiratory: Negative for cough and shortness of breath. Cardiovascular: Positive for chest pain and leg swelling. Gastrointestinal: Negative for abdominal pain, nausea and vomiting. Genitourinary: Negative for dysuria and urgency. Skin: Negative for rash. Neurological: Negative for dizziness, light-headedness and headaches. All other systems reviewed and are negative. Physical Exam   Physical Exam  Vitals signs and nursing note reviewed. Constitutional:       General: He is not in acute distress. Appearance: He is well-developed. HENT:      Head: Normocephalic and atraumatic. Eyes:      Conjunctiva/sclera: Conjunctivae normal.      Pupils: Pupils are equal, round, and reactive to light. Neck:      Musculoskeletal: Normal range of motion. Cardiovascular:      Rate and Rhythm: Normal rate and regular rhythm. Pulmonary:      Effort: Pulmonary effort is normal. No respiratory distress. Breath sounds: Normal breath sounds. No stridor. Abdominal:      General: There is no distension. Palpations: Abdomen is soft. Tenderness: There is no abdominal tenderness. Musculoskeletal: Normal range of motion. Right lower leg: 3+ Edema present. Left lower leg: 3+ Edema present. Skin:     General: Skin is warm and dry. Neurological:      Mental Status: He is alert and oriented to person, place, and time. Diagnostic Study Results   Labs -     Recent Results (from the past 12 hour(s))   CBC WITH AUTOMATED DIFF    Collection Time: 04/10/21  6:59 PM   Result Value Ref Range    WBC 15.5 (H) 4.1 - 11.1 K/uL    RBC 3.42 (L) 4.10 - 5.70 M/uL    HGB 11.8 (L) 12.1 - 17.0 g/dL    HCT 33.7 (L) 36.6 - 50.3 %    MCV 98.5 80.0 - 99.0 FL    MCH 34.5 (H) 26.0 - 34.0 PG    MCHC 35.0 30.0 - 36.5 g/dL    RDW 13.4 11.5 - 14.5 %    PLATELET 387 074 - 461 K/uL    MPV 11.2 8.9 - 12.9 FL    NRBC 0.0 0  WBC    ABSOLUTE NRBC 0.00 0.00 - 0.01 K/uL    NEUTROPHILS 89 (H) 32 - 75 %    BAND NEUTROPHILS 1 %    LYMPHOCYTES 5 (L) 12 - 49 %    MONOCYTES 5 5 - 13 %    EOSINOPHILS 0 0 - 7 %    BASOPHILS 0 0 - 1 %    IMMATURE GRANULOCYTES 0 0.0 - 0.5 %    ABS. NEUTROPHILS 13.9 (H) 1.8 - 8.0 K/UL    ABS. LYMPHOCYTES 0.8 0.8 - 3.5 K/UL    ABS. MONOCYTES 0.8 0.0 - 1.0 K/UL    ABS. EOSINOPHILS 0.0 0.0 - 0.4 K/UL    ABS. BASOPHILS 0.0 0.0 - 0.1 K/UL    ABS. IMM.  GRANS. 0.0 0.00 - 0.04 K/UL    DF SMEAR SCANNED      RBC COMMENTS MACROCYTOSIS  1+       METABOLIC PANEL, COMPREHENSIVE    Collection Time: 04/10/21  6:59 PM   Result Value Ref Range    Sodium 134 (L) 136 - 145 mmol/L    Potassium 4.3 3.5 - 5.1 mmol/L    Chloride 97 97 - 108 mmol/L    CO2 28 21 - 32 mmol/L    Anion gap 9 5 - 15 mmol/L    Glucose 102 (H) 65 - 100 mg/dL    BUN 54 (H) 6 - 20 MG/DL    Creatinine 4.41 (H) 0.70 - 1.30 MG/DL    BUN/Creatinine ratio 12 12 - 20      GFR est AA 16 (L) >60 ml/min/1.73m2    GFR est non-AA 13 (L) >60 ml/min/1.73m2    Calcium 7.9 (L) 8.5 - 10.1 MG/DL    Bilirubin, total 1.0 0.2 - 1.0 MG/DL    ALT (SGPT) 50 12 - 78 U/L    AST (SGOT) 85 (H) 15 - 37 U/L    Alk. phosphatase 272 (H) 45 - 117 U/L    Protein, total 5.1 (L) 6.4 - 8.2 g/dL    Albumin 1.6 (L) 3.5 - 5.0 g/dL    Globulin 3.5 2.0 - 4.0 g/dL    A-G Ratio 0.5 (L) 1.1 - 2.2     TROPONIN I    Collection Time: 04/10/21  6:59 PM   Result Value Ref Range    Troponin-I, Qt. 0.17 (H) <0.05 ng/mL   NT-PRO BNP    Collection Time: 04/10/21  9:21 PM   Result Value Ref Range    NT pro-BNP 4,449 (H) <450 PG/ML   PROTHROMBIN TIME + INR    Collection Time: 04/10/21 10:04 PM   Result Value Ref Range    INR 1.4 (H) 0.9 - 1.1      Prothrombin time 14.7 (H) 9.0 - 11.1 sec       Radiologic Studies -   CT ABD PELV WO CONT   Final Result   Small bilateral pleural effusions with coronary artery disease and anemia. Severe hepatic steatosis. Nonspecific ascending colonic wall thickening may be related to a mild colitis. Colonic diverticulosis without diverticulitis. Right and left renal cyst with right renal calculus is nonobstructive. XR CHEST PORT   Final Result   There is a band of increased density left base may represent   atelectasis/small effusion or possibly eventration left diaphragm. PA and   lateral views would be helpful when the patient is stable. Ct Abd Pelv Wo Cont    Result Date: 4/10/2021  Small bilateral pleural effusions with coronary artery disease and anemia. Severe hepatic steatosis. Nonspecific ascending colonic wall thickening may be related to a mild colitis. Colonic diverticulosis without diverticulitis. Right and left renal cyst with right renal calculus is nonobstructive. Xr Chest Port    Result Date: 4/10/2021  There is a band of increased density left base may represent atelectasis/small effusion or possibly eventration left diaphragm. PA and lateral views would be helpful when the patient is stable. Medical Decision Making   I am the first provider for this patient.     I reviewed the vital signs, available nursing notes, past medical history, past surgical history, family history and social history. Vital Signs-Reviewed the patient's vital signs. Patient Vitals for the past 12 hrs:   Temp Pulse Resp BP SpO2   04/10/21 2315  66 17 (!) 81/45 99 %   04/10/21 2118 97.9 °F (36.6 °C) 62 16 (!) 75/47 99 %   04/10/21 2015  75 18 (!) 83/48 100 %   04/10/21 1945  67 14 (!) 85/48 98 %   04/10/21 1836 98.1 °F (36.7 °C) 77 12 (!) 82/47 98 %       Pulse Oximetry Analysis - 99% on ra    Cardiac Monitor:   Rate: 75 bpm  Rhythm: Normal Sinus Rhythm      ED EKG interpretation:  Rhythm: normal sinus rhythm; and regular . Rate (approx.): 73; Axis: normal; P wave: normal; QRS interval: prolonged; ST/T wave: non-specific changes; Other findings: borderline ekg. This EKG was interpreted by RADHA Bautista MD,ED Provider. Records Reviewed: Nursing Notes and Old Medical Records    Provider Notes (Medical Decision Making):   Patient arrives with a chief complaint of generalized fatigue, weight loss. On arrival he is chronically ill-appearing but does not appear acutely toxic. Afebrile. Does have low blood pressure on arrival.  Will check basic lab work to rule out electrolyte imbalance or dehydration as a cause for his symptoms. Does have significant pitting edema in his bilateral lower extremities as well. ED Course:   Initial assessment performed. The patients presenting problems have been discussed, and they are in agreement with the care plan formulated and outlined with them. I have encouraged them to ask questions as they arise throughout their visit. Labs notable for acute kidney injury with a creatinine of 4.4. Albumin very low at 1.6, so repletion ordered. Patient's blood pressure remains low, however he is mentating appropriately. I do not feel his hypotension is related to acute sepsis I feel this is more likely related to depleted intravascular volume. We will get manual blood pressure.     ED Course as of Apr 10 2354   Sat Apr 10, 2021 2335 Manual /65    [KAYDEN]      ED Course User Index  Ole Grajeda MD       Procedures:  Procedures    Critical Care:  none    Disposition:    Admission Note:  Patient is being admitted to the hospital by  Service: Hospitalist.  The results of their tests and reasons for their admission have been discussed with them and available family. They convey agreement and understanding for the need to be admitted and for their admission diagnosis. Diagnosis     Clinical Impression:   1. WILLIAN (acute kidney injury) [facility])            Please note that this dictation was completed with [employer], the computer voice recognition software. Quite often unanticipated grammatical, syntax, homophones, and other interpretive errors are inadvertently transcribed by the computer software. Please disregard these errors.   Please excuse any errors that have escaped final proofreading

## 2021-04-11 NOTE — PROGRESS NOTES
End of Shift Note    Bedside shift change report given to  Claudia GOULD (oncoming nurse) by Kalina Hsu RN (offgoing nurse). Report included the following information SBAR, Kardex, ED Summary, Procedure Summary, MAR, Recent Results and Cardiac Rhythm NSR    Shift worked:  7AM-7PM   Shift summary and any significant changes:     NEW ADMIT       Concerns for physician to address:  NONE   Zone phone for oncoming shift:   [phone]     Patient Information  Jacob Rdz  77 y.o.  4/10/2021  6:21 PM by Talisha Reeves MD. Jacob Rdz was admitted from Home    Problem List  Patient Active Problem List    Diagnosis Date Noted    WILLIAN (acute kidney injury) ([facility] [address] 04/11/2021    Hearing loss of left ear 06/03/2019    Paresthesia of both hands 06/04/2018    Gastroesophageal reflux disease without esophagitis 12/01/2017    Hyponatremia 10/04/2016    Hyperglycemia 07/19/2016    Venous insufficiency of both lower extremities 03/29/2016    Chronic right shoulder pain 03/29/2016    Cataract 07/13/2015    Bilateral knee pain 01/12/2015    ED (erectile dysfunction) 01/12/2015    Alcohol dependence ([facility] [address] 12/03/2013    Pedal edema 12/03/2013    Vitamin D deficiency 11/27/2012    Anxiety 12/02/2011    DJD (degenerative joint disease) 05/24/2011    HTN (hypertension) 11/15/2010    Obesity 11/15/2010    S/P gastric bypass 11/15/2010    Plantar fasciitis 11/15/2010     Past Medical History:   Diagnosis Date    DJD (degenerative joint disease)     Hypertension     Obesity        Core Measures:  CVA: No No  CHF:Yes Yes  PNA:No No    Activity:  Activity Level: Up with Assistance  Number times ambulated in hallways past shift: 0  Number of times OOB to chair past shift: 0    Cardiac:   Cardiac Monitoring: Yes      Cardiac Rhythm: Normal sinus rhythm    Access:   Current line(s): PIV   Central Line? No   PICC LINE? No     Genitourinary:   Urinary status: voiding  Urinary Catheter?  No    Respiratory:   O2 Device: None (Room air)  Chronic home O2 use?: NO  Incentive spirometer at bedside: NO       GI:  Last Bowel Movement Date: 04/10/21  Current diet:  DIET CARDIAC Regular  Passing flatus: YES  Tolerating current diet: YES       Pain Management:   Patient states pain is manageable on current regimen: YES    Skin:  Amadou Score: 18  Interventions: increase time out of bed and PT/OT consult    Patient Safety:  Fall Score:  Total Score: 3  Interventions: bed/chair alarm, assistive device (walker, cane, etc), gripper socks, pt to call before getting OOB and stay with me (per policy)     @Rollbelt  @dexterity to release roll belt  Yes/No ( must document dexterity  here by stating Yes or No here, otherwise this is a restraint and must follow restraint documentation policy.)    DVT prophylaxis:  DVT prophylaxis Med- Yes  DVT prophylaxis SCD or PEDRO- No     Wounds: (If Applicable)  Wounds- No  Location     Active Consults:  None    Length of Stay:  Expected LOS: - - -  Actual LOS: 0  Discharge Plan: No       Alex Griffiths RN

## 2021-04-11 NOTE — PROGRESS NOTES
Transition of Care Plan:    RUR: 15%  Disposition: Likely home  Follow up appointments: To be made at discharge  DME needed: None at this time  Transportation at Discharge: Pt's girlfriend to transport at discharge   [employer] or means to access home: Pt has access to his home  IM Medicare letter: to be given at discharge  Caregiver Contact: Francis Galloway [phone]  Discharge Caregiver contacted prior to discharge? To be contacted at discharge           Reason for Admission: WILLIAN                     RUR Score:          16%           Plan for utilizing home health:  None at this time        PCP: First and Last name:  Marielena Gomez, DO     Name of Practice: [facility]   Are you a current patient: Yes/No: Yes   Approximate date of last visit: 2 weeks ago   Can you participate in a virtual visit with your PCP: yes                     Current Advanced Directive/Advance Care Plan: DNR  Pt reports he has a previously completed advance directive appointing his ex-wife as his MPOA. CM encouraged pt to have a copy brought to be scanned into chart. Pt verbalized understanding    Healthcare Decision Maker:   Click here to complete [address] including selection of the Healthcare Decision Maker Relationship (ie \"Primary\")           Sarah Montalvo (Ex-spouse) [phone]                  Transition of Care Plan:                    Pt is a 78 y/o [place] male who was admitted with a diagnosis of WILLIAN. CM met with pt re: assessment, discharge planning. CM introduced self, explained role. Pt verbalized understanding. Pt verified demographic information on chart and reports no changes need to be made at this time. Pt lives in a 1 story home with 3 steps to enter with his girlfriend who stays with him half of the week. Pt reports he his at home alone if his girlfriend is not with him. Pt is independent in ADL/IADL needs at baseline. Pt has access to DME in the home to include a rolling walker.  Pt has not utilized SNF or home health prior to admission. Pt does drive at baseline. Pts girlfriend to transport at discharge. Pt plans on returning home at discharge. Pt reports no further questions or needs at this time. CM will continue to remain available for support, discharge planning as needed. Care Management Interventions  PCP Verified by CM: Yes  Last Visit to PCP: 04/01/21  Mode of Transport at Discharge:  Other (see comment)(Pt's girlfriend to transport at discharge)  Transition of Care Consult (CM Consult): Discharge Planning  MyChart Signup: No  Discharge Durable Medical Equipment: No  Physical Therapy Consult: No  Occupational Therapy Consult: No  Speech Therapy Consult: No  Current Support Network: Own Home  Confirm Follow Up Transport: Self  Discharge Location  Discharge Placement: Home    OCTAVIO Gee, WILLIW  Supervisee in [facility]  [phone]

## 2021-04-11 NOTE — ED NOTES
TRANSFER - OUT REPORT:    Verbal report given to Aye(name) on Luz Altamirano  being transferred to neuro tele(unit) for routine progression of care       Report consisted of patients Situation, Background, Assessment and   Recommendations(SBAR). Information from the following report(s) SBAR was reviewed with the receiving nurse. Lines:   Peripheral IV 04/10/21 Left Arm (Active)        Opportunity for questions and clarification was provided.       Patient transported with:   [employer]

## 2021-04-11 NOTE — ED NOTES
Pt arrived to ED from home by EMS c/o increased lethargy X4 months and chest pain since this morning. Pt is AOX4 and monitored X3. Pt reports a hx of HTN.

## 2021-04-11 NOTE — ED NOTES
Assumed care of pt from ALVINO Villalba RN. Pt resting on stretcher in a position of comfort. Call bell within reach. BP remains low, pt asymptomatic at this time.

## 2021-04-11 NOTE — CONSULTS
NEPHROLOGY CONSULT NOTE     Patient: Edelmira Moctezuma MRN: [id number]  PCP: Dante Casarez DO   :     1945  Age:   77 y.o. Sex:  male      Referring physician: Poppy Jung MD  Reason for consultation: 77 y.o. male with WILLIAN (acute kidney injury) [facility]) [B87.7] complicated by WILLIAN   Admission Date: 4/10/2021  6:21 PM  LOS: 0 days      ASSESSMENT and PLAN :   WILLIAN:  - suspect NSAID use + volume depletion from diuretics, poor po intake along with ACE I use  - no obstruction on CT scan  - agree with volume expansion w/ albumin and IV NS  - hold ACE and lasix   - daily labs and strict I/Os    Hypolabuminemia:  - ? Poor nutrition vs nephrotic syndrome  - check urine PCR  - on IV albumin    UTI:  - cx pending  - on abx    Wt loss/FTT:  - CT neg for any acute issues  - w/u per primary team    Colitis:  - on flagyl    Hx of EtOH abuse     Active Problems / Assessment AAActive  : Active Problems:    WILLIAN (acute kidney injury) ([facility] [address] (2021)         Subjective:   HPI: Edelmira Moctezuma is a 77 y.o.  male who has been admitted to the hospital for a variety of complaints including poor po intake, weakness and about 40 lbs of wt loss in the past several weeks. He has a hx of HTN, obesity and EtOH abuse. He admits to taking ibuprofen daily at bedtime and has been doing so for the past several months. He is on lasix and lisinopril. His labs showed a Cr of 4.4 (normal baseline in 2019), WBC 16 K, UA suggestive of UTI. CT scan A/P w/o contrast showing b/l pleural effusions, fatty liver, no hydro, b/l renal cysts, R nonobstructive stone and colonic wall thickening. He is on IVF, abx and IV albumin. Has poor appetite, wt loss.   No n/v/d, cp or sob reported.       Past Medical Hx:   Past Medical History:   Diagnosis Date    DJD (degenerative joint disease)     Hypertension     Obesity         Past Surgical Hx:     Past Surgical History:   Procedure Laterality Date    HX ORTHOPAEDIC      bilat hip replacements    [place] GASTRIC BYPASS,OBESE<150CM TAO-EN-Y         Medications:  Prior to Admission medications    Medication Sig Start Date End Date Taking? Authorizing Provider   lisinopriL (PRINIVIL, ZESTRIL) 10 mg tablet TAKE 1 TABLET BY MOUTH EVERY DAY 11/27/20   Phoebe Hu NP   furosemide (LASIX) 20 mg tablet TAKE 1 TABLET BY MOUTH EVERY DAY AS NEEDED FOR LEG SWELLING 11/23/20   Anant Laurent, NP   aspirin 81 mg chewable tablet Take 81 mg by mouth daily. Provider, Historical   cyanocobalamin (VITAMIN B-12) 1,000 mcg tablet Take  by mouth daily. Takes unknown dose    Provider, Historical   ascorbic acid (VITAMIN C) 500 mg tablet Take  by mouth daily. Takes unknown dose    Provider, Historical       Allergies   Allergen Reactions    Propoxyphene N-Acetaminophen Other (comments)     hallucinate  Other reaction(s): Adverse reaction to substance        Social Hx:  reports that he has never smoked. He has never used smokeless tobacco. He reports current alcohol use of about 136.0 standard drinks of alcohol per week. He reports current drug use. Drug: Marijuana. Family History   Problem Relation Age of Onset    Diabetes Maternal Grandmother     Heart Disease Maternal Grandmother     Diabetes Maternal Grandfather     Heart Disease Maternal Grandfather     No Known Problems Mother     Stroke Father        Review of Systems:  A twelve point review of system was performed today. Pertinent positives and negatives are mentioned in the HPI. The reminder of the ROS is negative and noncontributory.      Objective:    Vitals:    Vitals:    04/11/21 0509 04/11/21 0739 04/11/21 0800 04/11/21 1143   BP: (!) 91/51 (!) 86/55 114/63 110/67   Pulse: (!) 58 75 83 (!) 53   Resp: 16  16 16   Temp: 98 °F (36.7 °C)  97.4 °F (36.3 °C) 98.3 °F (36.8 °C)   SpO2: 99%  95% 97%   Weight:       Height:         I&O's:  04/10 0701 - 04/11 0700  In: 600 [I.V.:600]  Out: -   Visit Vitals  /67 (BP 1 Location: Left upper arm, BP Patient Position: At rest;Supine)   Pulse (!) 53   Temp 98.3 °F (36.8 °C)   Resp 16   Ht 5' 10\" (1.778 m)   Wt 86.2 kg (190 lb)   SpO2 97%   BMI 27.26 kg/m²       Physical Exam:  General:Alert, No distress,   HEENT: Eyes are PERRL. Conjunctiva without pallor ,erythema. The sclerae without icterus. .   Neck:Supple,no mass palpable  Lungs : Clears to auscultation Bilaterally, Normal respiratory effort  CVS: RRR, S1 S2 normal, No rub,  no LE edema  Abdomen: Soft, Non tender, No hepatosplenomegaly, bowel sounds present  Extremities: No cyanosis, No clubbing  Skin: No rash or lesions.   Lymph nodes: No palpable nodes  MS: No joint swelling, erythema, warmth  Neurologic: non focal, AAO x 3  Psych: normal affect    Laboratory Results:    Lab Results   Component Value Date    BUN 54 (H) 04/10/2021     (L) 04/10/2021    K 4.3 04/10/2021    CL 97 04/10/2021    CO2 28 04/10/2021       Lab Results   Component Value Date    BUN 54 (H) 04/10/2021    BUN 11 12/03/2019    BUN 8 12/03/2018    BUN 14 06/04/2018    BUN 10 08/01/2017    K 4.3 04/10/2021    K 4.3 12/03/2019    K 5.1 12/03/2018    K 5.3 (H) 06/04/2018    K 3.8 08/01/2017       Lab Results   Component Value Date    WBC 15.5 (H) 04/10/2021    RBC 3.42 (L) 04/10/2021    HGB 11.8 (L) 04/10/2021    HCT 33.7 (L) 04/10/2021    MCV 98.5 04/10/2021    MCH 34.5 (H) 04/10/2021    RDW 13.4 04/10/2021     04/10/2021       Lab Results   Component Value Date    PHOS 3.0 09/29/2016       Urine dipstick:   Lab Results   Component Value Date/Time    Color YELLOW/STRAW 04/11/2021 12:12 AM    Appearance CLOUDY (A) 04/11/2021 12:12 AM    Specific gravity 1.013 04/11/2021 12:12 AM    pH (UA) 5.5 04/11/2021 12:12 AM    Protein 100 (A) 04/11/2021 12:12 AM    Glucose Negative 04/11/2021 12:12 AM    Ketone Negative 04/11/2021 12:12 AM    Bilirubin Negative 04/11/2021 12:12 AM    Urobilinogen 1.0 04/11/2021 12:12 AM    Nitrites Negative 04/11/2021 12:12 AM    Leukocyte Esterase LARGE (A) 04/11/2021 12:12 AM    Epithelial cells FEW 04/11/2021 12:12 AM    Bacteria 4+ (A) 04/11/2021 12:12 AM    WBC >100 (H) 04/11/2021 12:12 AM    RBC 5-10 04/11/2021 12:12 AM       I have reviewed the following: All pertinent labs, microbiology data, radiology imaging for my assessment         Thank you for allowing us to participate in the care of this patient. We will follow patient. Please dont hesitate to call with any questions    Tresa Love MD  4/11/2021    [facility] [address]  Phone - [phone]   Fax - [phone]  [email or web address]

## 2021-04-11 NOTE — H&P
Hospitalist Admission Note    NAME: Jacob Rdz   :  1945   MRN:  [id number]     Date/Time:  2021 12:41 AM    Patient PCP: Axel Pollard, DO  ________________________________________________________________________    My assessment of this patient's clinical condition and my plan of care is as follows. Assessment / Plan:  Acute kidney injury POA  Admit pt  to general medical floor  Continue IV fluid hydration  Nephrology  consult in the morning  Not Sure what has caused the acute kidney injury  Hold home Lasix and lisinopril  Elevated troponin likely related to renal failure. Continue trending  Positive UA and leukocytosis likely UTI started on antibiotics. Blood cultures. Borderline to low blood pressure:lactic acid normal   Patient  Albumin is very low at 1.6  Mildly elevated LFTs trend  Will give  albumin  Patient lactic acid is normal  Chronic fatigue and weight loss, of uncertain cause yet  CT abdomen pelvis reported as   Small bilateral pleural effusions with coronary artery disease and anemia. Severe hepatic steatosis. Nonspecific ascending colonic wall thickening may be related to a mild colitis. Colonic diverticulosis without diverticulitis. Right and left renal cyst with right renal calculus is nonobstructive. Hypertension : Hold BP meds  Alcohol abuse no withdrawals noted. Body mass index is 27.26 kg/m².: 25.0 - 29.9 Overweight  Code Status: DNR  Surrogate Decision Maker:  DVT Prophylaxis: Lovenox   GI Prophylaxis: not indicated        Subjective:   CHIEF COMPLAINT: Generalized body weakness    HISTORY OF PRESENT ILLNESS:     Fabiano Chapin is a 77 y.o.  male who presents with generalized body weakness and weight loss. Patient said that he sometimes have chest pain when he exerts. Otherwise patient noted that he has been losing weight over the last few months. He lost up to 40 pounds. He lost appetite.   Patient admitted that he has been drinking daily. Otherwise patient denied any urinary or bowel habit changes. No fever or chills. No chest pain at this point or no shortness of breath when I examined the patient. We were asked to admit for work up and evaluation of the above problems. Past Medical History:   Diagnosis Date    DJD (degenerative joint disease)     Hypertension     Obesity         Past Surgical History:   Procedure Laterality Date    HX ORTHOPAEDIC      bilat hip replacements    [place] GASTRIC BYPASS,OBESE<150CM TAO-EN-Y         Social History     Tobacco Use    Smoking status: Never Smoker    Smokeless tobacco: Never Used   Substance Use Topics    Alcohol use: Yes     Alcohol/week: 136.0 standard drinks     Types: 126 Cans of beer, 10 Shots of liquor per week     Comment: Hx of abuse        Family History   Problem Relation Age of Onset    Diabetes Maternal Grandmother     Heart Disease Maternal Grandmother     Diabetes Maternal Grandfather     Heart Disease Maternal Grandfather     No Known Problems Mother     Stroke Father      Allergies   Allergen Reactions    Propoxyphene N-Acetaminophen Other (comments)     hallucinate  Other reaction(s): Adverse reaction to substance         Prior to Admission medications    Medication Sig Start Date End Date Taking? Authorizing Provider   lisinopriL (PRINIVIL, ZESTRIL) 10 mg tablet TAKE 1 TABLET BY MOUTH EVERY DAY 11/27/20   Garrett Wilkes NP   furosemide (LASIX) 20 mg tablet TAKE 1 TABLET BY MOUTH EVERY DAY AS NEEDED FOR LEG SWELLING 11/23/20   Dayan Laurent NP   aspirin 81 mg chewable tablet Take 81 mg by mouth daily. Provider, Historical   cyanocobalamin (VITAMIN B-12) 1,000 mcg tablet Take  by mouth daily. Takes unknown dose    Provider, Historical   ascorbic acid (VITAMIN C) 500 mg tablet Take  by mouth daily. Takes unknown dose    Provider, Historical       REVIEW OF SYSTEMS:     I am not able to complete the review of systems because:    The patient is intubated and sedated    The patient has altered mental status due to his acute medical problems    The patient has baseline aphasia from prior stroke(s)    The patient has baseline dementia and is not reliable historian    The patient is in acute medical distress and unable to provide information           Total of 12 systems reviewed as follows:       POSITIVE= underlined text  Negative = text not underlined  General:  fever, chills, sweats, generalized weakness, weight loss/gain,      loss of appetite   Eyes:    blurred vision, eye pain, loss of vision, double vision  ENT:    rhinorrhea, pharyngitis   Respiratory:   cough, sputum production, SOB, [place], wheezing, pleuritic pain   Cardiology:   chest pain, palpitations, orthopnea, PND, edema, syncope   Gastrointestinal:  abdominal pain , N/V, diarrhea, dysphagia, constipation, bleeding   Genitourinary:  frequency, urgency, dysuria, hematuria, incontinence   Muskuloskeletal :  arthralgia, myalgia, back pain  Hematology:  easy bruising, nose or gum bleeding, lymphadenopathy   Dermatological: rash, ulceration, pruritis, color change / jaundice  Endocrine:   hot flashes or polydipsia   Neurological:  headache, dizziness, confusion, focal weakness, paresthesia,     Speech difficulties, memory loss, gait difficulty  Psychological: Feelings of anxiety, depression, agitation    Objective:   VITALS:    Visit Vitals  /65 (BP 1 Location: Right arm, BP Patient Position: At rest)   Pulse 66   Temp 97.9 °F (36.6 °C)   Resp 17   Ht 5' 10\" (1.778 m)   Wt 86.2 kg (190 lb)   SpO2 99%   BMI 27.26 kg/m²       PHYSICAL EXAM:    General:    Alert, cooperative, chronically sick looking,     HEENT: Atraumatic, anicteric sclerae, pink conjunctivae     No oral ulcers, mucosa moist, throat clear, dentition fair  Neck:  Supple, symmetrical,  thyroid: non tender  Lungs:   Clear to auscultation bilaterally. No Wheezing or Rhonchi. No rales.   Chest wall:  No tenderness  No Accessory muscle use.  Heart:   Regular  rhythm,  No  murmur   No edema  Abdomen:   Soft, non-tender. Not distended. Bowel sounds normal  Extremities: No cyanosis. No clubbing,      Skin turgor normal, Capillary refill normal, Radial dial pulse 2+  Skin:     Not pale. Not Jaundiced  No rashes   Psych:  Good insight. Not depressed. Not anxious or agitated. Neurologic: EOMs intact. No facial asymmetry. No aphasia or slurred speech. Symmetrical strength, Sensation grossly intact. Alert and oriented X 4.   2+Plus pitting edema in the lower extremities noted    _______________________________________________________________________  Care Plan discussed with:    Comments   Patient y    Family      RN y    Care Manager                    Consultant:      _______________________________________________________________________  Expected  Disposition:   Home with Family y   HH/PT/OT/RN    SNF/LTC    NGUYEN    ________________________________________________________________________  TOTAL TIME: 72 Minutes    Critical Care Provided     Minutes non procedure based      Comments    y Reviewed previous records   >50% of visit spent in counseling and coordination of care  Discussion with patient and/or family and questions answered       ________________________________________________________________________  Signed: Joan Martins MD    Procedures: see electronic medical records for all procedures/Xrays and details which were not copied into this note but were reviewed prior to creation of Plan.     LAB DATA REVIEWED:    Recent Results (from the past 24 hour(s))   CBC WITH AUTOMATED DIFF    Collection Time: 04/10/21  6:59 PM   Result Value Ref Range    WBC 15.5 (H) 4.1 - 11.1 K/uL    RBC 3.42 (L) 4.10 - 5.70 M/uL    HGB 11.8 (L) 12.1 - 17.0 g/dL    HCT 33.7 (L) 36.6 - 50.3 %    MCV 98.5 80.0 - 99.0 FL    MCH 34.5 (H) 26.0 - 34.0 PG    MCHC 35.0 30.0 - 36.5 g/dL    RDW 13.4 11.5 - 14.5 %    PLATELET 172 183 - 637 K/uL    MPV 11.2 8.9 - 12.9 FL    NRBC 0.0 0  WBC    ABSOLUTE NRBC 0.00 0.00 - 0.01 K/uL    NEUTROPHILS 89 (H) 32 - 75 %    BAND NEUTROPHILS 1 %    LYMPHOCYTES 5 (L) 12 - 49 %    MONOCYTES 5 5 - 13 %    EOSINOPHILS 0 0 - 7 %    BASOPHILS 0 0 - 1 %    IMMATURE GRANULOCYTES 0 0.0 - 0.5 %    ABS. NEUTROPHILS 13.9 (H) 1.8 - 8.0 K/UL    ABS. LYMPHOCYTES 0.8 0.8 - 3.5 K/UL    ABS. MONOCYTES 0.8 0.0 - 1.0 K/UL    ABS. EOSINOPHILS 0.0 0.0 - 0.4 K/UL    ABS. BASOPHILS 0.0 0.0 - 0.1 K/UL    ABS. IMM. GRANS. 0.0 0.00 - 0.04 K/UL    DF SMEAR SCANNED      RBC COMMENTS MACROCYTOSIS  1+       METABOLIC PANEL, COMPREHENSIVE    Collection Time: 04/10/21  6:59 PM   Result Value Ref Range    Sodium 134 (L) 136 - 145 mmol/L    Potassium 4.3 3.5 - 5.1 mmol/L    Chloride 97 97 - 108 mmol/L    CO2 28 21 - 32 mmol/L    Anion gap 9 5 - 15 mmol/L    Glucose 102 (H) 65 - 100 mg/dL    BUN 54 (H) 6 - 20 MG/DL    Creatinine 4.41 (H) 0.70 - 1.30 MG/DL    BUN/Creatinine ratio 12 12 - 20      GFR est AA 16 (L) >60 ml/min/1.73m2    GFR est non-AA 13 (L) >60 ml/min/1.73m2    Calcium 7.9 (L) 8.5 - 10.1 MG/DL    Bilirubin, total 1.0 0.2 - 1.0 MG/DL    ALT (SGPT) 50 12 - 78 U/L    AST (SGOT) 85 (H) 15 - 37 U/L    Alk.  phosphatase 272 (H) 45 - 117 U/L    Protein, total 5.1 (L) 6.4 - 8.2 g/dL    Albumin 1.6 (L) 3.5 - 5.0 g/dL    Globulin 3.5 2.0 - 4.0 g/dL    A-G Ratio 0.5 (L) 1.1 - 2.2     TROPONIN I    Collection Time: 04/10/21  6:59 PM   Result Value Ref Range    Troponin-I, Qt. 0.17 (H) <0.05 ng/mL   NT-PRO BNP    Collection Time: 04/10/21  9:21 PM   Result Value Ref Range    NT pro-BNP 4,449 (H) <450 PG/ML   PROTHROMBIN TIME + INR    Collection Time: 04/10/21 10:04 PM   Result Value Ref Range    INR 1.4 (H) 0.9 - 1.1      Prothrombin time 14.7 (H) 9.0 - 11.1 sec

## 2021-04-12 LAB
ALBUMIN SERPL-MCNC: 2.1 G/DL (ref 3.5–5)
ALBUMIN/GLOB SERPL: 0.8 {RATIO} (ref 1.1–2.2)
ALP SERPL-CCNC: 200 U/L (ref 45–117)
ALT SERPL-CCNC: 33 U/L (ref 12–78)
ANION GAP SERPL CALC-SCNC: 8 MMOL/L (ref 5–15)
AST SERPL-CCNC: 59 U/L (ref 15–37)
ATRIAL RATE: 73 BPM
BASOPHILS # BLD: 0 K/UL (ref 0–0.1)
BASOPHILS NFR BLD: 0 % (ref 0–1)
BILIRUB SERPL-MCNC: 0.7 MG/DL (ref 0.2–1)
BUN SERPL-MCNC: 56 MG/DL (ref 6–20)
BUN/CREAT SERPL: 13 (ref 12–20)
CALCIUM SERPL-MCNC: 7.5 MG/DL (ref 8.5–10.1)
CALCULATED P AXIS, ECG09: 10 DEGREES
CALCULATED R AXIS, ECG10: -26 DEGREES
CALCULATED T AXIS, ECG11: 3 DEGREES
CHLORIDE SERPL-SCNC: 102 MMOL/L (ref 97–108)
CO2 SERPL-SCNC: 26 MMOL/L (ref 21–32)
CREAT SERPL-MCNC: 4.19 MG/DL (ref 0.7–1.3)
DIAGNOSIS, 93000: NORMAL
DIFFERENTIAL METHOD BLD: ABNORMAL
EOSINOPHIL # BLD: 0.1 K/UL (ref 0–0.4)
EOSINOPHIL NFR BLD: 1 % (ref 0–7)
ERYTHROCYTE [DISTWIDTH] IN BLOOD BY AUTOMATED COUNT: 13.6 % (ref 11.5–14.5)
GLOBULIN SER CALC-MCNC: 2.6 G/DL (ref 2–4)
GLUCOSE SERPL-MCNC: 91 MG/DL (ref 65–100)
HCT VFR BLD AUTO: 25.3 % (ref 36.6–50.3)
HGB BLD-MCNC: 8.9 G/DL (ref 12.1–17)
IMM GRANULOCYTES # BLD AUTO: 0.2 K/UL (ref 0–0.04)
IMM GRANULOCYTES NFR BLD AUTO: 2 % (ref 0–0.5)
LYMPHOCYTES # BLD: 1 K/UL (ref 0.8–3.5)
LYMPHOCYTES NFR BLD: 10 % (ref 12–49)
MCH RBC QN AUTO: 34.8 PG (ref 26–34)
MCHC RBC AUTO-ENTMCNC: 35.2 G/DL (ref 30–36.5)
MCV RBC AUTO: 98.8 FL (ref 80–99)
MONOCYTES # BLD: 0.5 K/UL (ref 0–1)
MONOCYTES NFR BLD: 6 % (ref 5–13)
NEUTS SEG # BLD: 7.9 K/UL (ref 1.8–8)
NEUTS SEG NFR BLD: 82 % (ref 32–75)
NRBC # BLD: 0 K/UL (ref 0–0.01)
NRBC BLD-RTO: 0 PER 100 WBC
P-R INTERVAL, ECG05: 134 MS
PLATELET # BLD AUTO: 137 K/UL (ref 150–400)
PMV BLD AUTO: 11.1 FL (ref 8.9–12.9)
POTASSIUM SERPL-SCNC: 3.7 MMOL/L (ref 3.5–5.1)
PROT SERPL-MCNC: 4.7 G/DL (ref 6.4–8.2)
Q-T INTERVAL, ECG07: 410 MS
QRS DURATION, ECG06: 126 MS
QTC CALCULATION (BEZET), ECG08: 451 MS
RBC # BLD AUTO: 2.56 M/UL (ref 4.1–5.7)
SODIUM SERPL-SCNC: 136 MMOL/L (ref 136–145)
VENTRICULAR RATE, ECG03: 73 BPM
WBC # BLD AUTO: 9.7 K/UL (ref 4.1–11.1)

## 2021-04-12 PROCEDURE — 80053 COMPREHEN METABOLIC PANEL: CPT

## 2021-04-12 PROCEDURE — 85025 COMPLETE CBC W/AUTO DIFF WBC: CPT

## 2021-04-12 PROCEDURE — 74011250637 HC RX REV CODE- 250/637: Performed by: NURSE PRACTITIONER

## 2021-04-12 PROCEDURE — 74011250637 HC RX REV CODE- 250/637: Performed by: INTERNAL MEDICINE

## 2021-04-12 PROCEDURE — 74011000258 HC RX REV CODE- 258: Performed by: INTERNAL MEDICINE

## 2021-04-12 PROCEDURE — 74011250636 HC RX REV CODE- 250/636: Performed by: INTERNAL MEDICINE

## 2021-04-12 PROCEDURE — 51798 US URINE CAPACITY MEASURE: CPT

## 2021-04-12 PROCEDURE — 36415 COLL VENOUS BLD VENIPUNCTURE: CPT

## 2021-04-12 PROCEDURE — 65660000000 HC RM CCU STEPDOWN

## 2021-04-12 RX ORDER — BUTALBITAL, ACETAMINOPHEN AND CAFFEINE 50; 325; 40 MG/1; MG/1; MG/1
2 TABLET ORAL ONCE
Status: COMPLETED | OUTPATIENT
Start: 2021-04-12 | End: 2021-04-12

## 2021-04-12 RX ORDER — BUTALBITAL, ACETAMINOPHEN AND CAFFEINE 50; 325; 40 MG/1; MG/1; MG/1
1 TABLET ORAL
Status: DISCONTINUED | OUTPATIENT
Start: 2021-04-12 | End: 2021-04-13

## 2021-04-12 RX ADMIN — BUTALBITAL, ACETAMINOPHEN, AND CAFFEINE 2 TABLET: 50; 325; 40 TABLET ORAL at 01:59

## 2021-04-12 RX ADMIN — METRONIDAZOLE 500 MG: 500 INJECTION, SOLUTION INTRAVENOUS at 23:47

## 2021-04-12 RX ADMIN — ACETAMINOPHEN 650 MG: 325 TABLET, FILM COATED ORAL at 14:54

## 2021-04-12 RX ADMIN — BUTALBITAL, ACETAMINOPHEN, AND CAFFEINE 1 TABLET: 50; 325; 40 TABLET ORAL at 21:28

## 2021-04-12 RX ADMIN — Medication 10 ML: at 21:48

## 2021-04-12 RX ADMIN — ASPIRIN 81 MG: 81 TABLET, CHEWABLE ORAL at 10:30

## 2021-04-12 RX ADMIN — CEFTRIAXONE 1 G: 1 INJECTION, POWDER, FOR SOLUTION INTRAMUSCULAR; INTRAVENOUS at 01:59

## 2021-04-12 RX ADMIN — HEPARIN SODIUM 5000 UNITS: 5000 INJECTION INTRAVENOUS; SUBCUTANEOUS at 21:47

## 2021-04-12 RX ADMIN — Medication 10 ML: at 14:47

## 2021-04-12 RX ADMIN — HEPARIN SODIUM 5000 UNITS: 5000 INJECTION INTRAVENOUS; SUBCUTANEOUS at 14:46

## 2021-04-12 RX ADMIN — HEPARIN SODIUM 5000 UNITS: 5000 INJECTION INTRAVENOUS; SUBCUTANEOUS at 06:39

## 2021-04-12 RX ADMIN — SODIUM CHLORIDE 100 ML/HR: 9 INJECTION, SOLUTION INTRAVENOUS at 18:43

## 2021-04-12 RX ADMIN — METRONIDAZOLE 500 MG: 500 INJECTION, SOLUTION INTRAVENOUS at 14:46

## 2021-04-12 NOTE — ACP (ADVANCE CARE PLANNING)
Advance Care Planning Note      NAME: Ayad Dumas   :  1945   MRN:  [id number]     Date/Time:  2021 8:37 PM    Active Diagnoses:  Hospital Problems  Date Reviewed: 2020          Codes Class Noted POA    WILLIAN (acute kidney injury) [facility]) ICD-10-CM: N17.9  ICD-9-CM: 584.9  2021 Unknown          UTI  Troponin elevation  Hypotension    These active diagnoses are of sufficient risk that focused discussion on advance care planning is indicated in order to allow the patient to thoughtfully consider personal goals of care, and if situations arise that prevent the ability to personally give input, to ensure appropriate representation of their personal desires for different levels and aggressiveness of care. Discussion:   Code status addressed and wants to be a Full code. Patient wants central line and vasopressors if needed. Patient  would like to assign Girl friend Mark Tang  as the surrogate decision maker. Persons present and participating in discussion: Rei Acevedo MD.       Time Spent  Total time spent face-to-face in education and discussion:  16   minutes.          Foreing Maynard MD   Hospitalist

## 2021-04-12 NOTE — PROGRESS NOTES
Received message from patient's nurse Phoebe Favorite stating :    Has a 8/10 HA and the Tylenol isn't helping. Can we get something stronger. Discussion / orders:    · Fioricet 2 tablets by mouth x1           Please note that this note was dictated using Dragon computer voice recognition software. Quite often unanticipated grammatical, syntax, homophones, and other interpretive errors are inadvertently transcribed by the computer software. Please disregard these errors. Please excuse any errors that have escaped final proofreading.

## 2021-04-12 NOTE — PROGRESS NOTES
End of Shift Note    Bedside shift change report given to Octavio Pastrana 8141 R.N (oncoming nurse) by Erin Cabral RN (offgoing nurse). Report included the following information SBAR, Kardex, ED Summary, Procedure Summary, MAR, Recent Results and Cardiac Rhythm NSR    Shift worked:  night   Shift summary and any significant changes:     NEW ADMIT       Concerns for physician to address:  NONE   Zone phone for oncoming shift:  [phone]     Patient Information  [place] [facility]  77 y.o.  4/10/2021  6:21 PM by Laura Arellano MD. [place] [facility] was admitted from Home    Problem List  Patient Active Problem List    Diagnosis Date Noted    WILLIAN (acute kidney injury) ([facility] [address] 04/11/2021    Hearing loss of left ear 06/03/2019    Paresthesia of both hands 06/04/2018    Gastroesophageal reflux disease without esophagitis 12/01/2017    Hyponatremia 10/04/2016    Hyperglycemia 07/19/2016    Venous insufficiency of both lower extremities 03/29/2016    Chronic right shoulder pain 03/29/2016    Cataract 07/13/2015    Bilateral knee pain 01/12/2015    ED (erectile dysfunction) 01/12/2015    Alcohol dependence ([facility] [address] 12/03/2013    Pedal edema 12/03/2013    Vitamin D deficiency 11/27/2012    Anxiety 12/02/2011    DJD (degenerative joint disease) 05/24/2011    HTN (hypertension) 11/15/2010    Obesity 11/15/2010    S/P gastric bypass 11/15/2010    Plantar fasciitis 11/15/2010     Past Medical History:   Diagnosis Date    DJD (degenerative joint disease)     Hypertension     Obesity        Core Measures:  CVA: No No  CHF:Yes Yes  PNA:No No    Activity:  Activity Level: Up with Assistance  Number times ambulated in hallways past shift: 0  Number of times OOB to chair past shift: 0    Cardiac:   Cardiac Monitoring: Yes      Cardiac Rhythm: Sinus bradycardia    Access:   Current line(s): PIV   Central Line? No   PICC LINE? No     Genitourinary:   Urinary status: voiding  Urinary Catheter?  No    Respiratory:   O2 Device: None (Room air)  Chronic home O2 use?: NO  Incentive spirometer at bedside: NO       GI:  Last Bowel Movement Date: 04/11/21  Current diet:  DIET CARDIAC Regular  Passing flatus: YES  Tolerating current diet: YES       Pain Management:   Patient states pain is manageable on current regimen: YES    Skin:  Amadou Score: 17  Interventions: increase time out of bed and PT/OT consult    Patient Safety:  Fall Score:  Total Score: 4  Interventions: bed/chair alarm, assistive device (walker, cane, etc), gripper socks, pt to call before getting OOB and stay with me (per policy)  High Fall Risk: Yes  @Rollbelt  @dexterity to release roll belt  Yes/No ( must document dexterity  here by stating Yes or No here, otherwise this is a restraint and must follow restraint documentation policy.)    DVT prophylaxis:  DVT prophylaxis Med- Yes  DVT prophylaxis SCD or PEDRO- No     Wounds: (If Applicable)  Wounds- No  Location     Active Consults:  IP CONSULT TO NEPHROLOGY  IP CONSULT TO GASTROENTEROLOGY    Length of Stay:  Expected LOS: - - -  Actual LOS: 1  Discharge Plan: No       Sandra Forrest RN

## 2021-04-12 NOTE — PROGRESS NOTES
Transition of Care Plan:    RUR:18%  Disposition: Home with family   Follow up appointments:will follow up with PCP   DME needed:N/A  Transportation at [id number]  [facility] [place]   [address] or means to access home:    Family will have keys to enter home     IM Medicare letter:2nd IM to be given by CM   Caregiver Yordy Kohler- [phone]  Discharge Caregiver contacted prior to discharge? Family will be contacted at d/c       CM: Davonte Little is currently working with pt in the Neuro Unit. Pt will return home with family at the time of d/c, with family to transport home./  Pt will receive 2nd IM Medicare Letter at the time of d/c. CM will verify pt's medical stability during IDRs. CM will continue to follow.     OCTAVIO Leigh, [address] [place] [address]

## 2021-04-12 NOTE — PROGRESS NOTES
Comprehensive Nutrition Assessment    Type and Reason for Visit: Initial, Positive nutrition screen    Nutrition Recommendations/Plan:  Regular diet  Add Ensure clear BID  Obtain scale weight    Nutrition Assessment:     Patient medically noted for WILLIAN, UTI, and chronic fatigue. PMH HTN, gastric bypass, alcohol abuse, and GERD. Patient finishing lunch at time of visit. Took several bites from meal; not a significant amount consumed. He reports a poor appetite for ~6 months. He reports weight loss and a UBW of ~190# which is his current documented weight (patient stated per flowsheets). 190# is also last documented weight from 12/8/2020. Difficult to assess actual weight loss. Patient reports not feeling hungry and taking hours to eat at home. He has been drinking ensure supplements the last 2 weeks but doesn't really like them. Agreeable to trial of ensure clear instead. Provided menu and explanation of ordering process. Encouraged intake of small meals/snacks throughout the day. Patient Vitals for the past 168 hrs:   % Diet Eaten   04/11/21 1816 1 - 25%   04/11/21 1257 1 - 25%     Wt Readings from Last 5 Encounters:   04/10/21 86.2 kg (190 lb)   12/08/20 86.2 kg (190 lb)   01/20/20 106.1 kg (234 lb)   12/03/19 104.6 kg (230 lb 9.6 oz)   06/03/19 104.8 kg (231 lb)        Malnutrition Assessment:  Malnutrition Status: Moderate malnutrition    Context:  Chronic illness     Findings of the 6 clinical characteristics of malnutrition:   Energy Intake:  Mild decrease in energy intake (specify)  Weight Loss:  No significant weight loss     Body Fat Loss:  1 - Mild body fat loss,     Muscle Mass Loss:  1 - Mild muscle mass loss,    Fluid Accumulation:  1 - Mild,     Strength:  Not performed     Estimated Daily Nutrient Needs:  Energy (kcal): 2082 kcal (BMR 1601 x 1. 3AF);  Weight Used for Energy Requirements: Current  Protein (g): 86-103g (1.0-1.2 g/kg bw); Weight Used for Protein Requirements: Current  Fluid (ml/day): 2100 mL; Method Used for Fluid Requirements: 1 ml/kcal    Nutrition Related Findings:       3+ edema  BM 4/11  BG 91-    Wounds:    None       Current Nutrition Therapies:  DIET CARDIAC Regular    Anthropometric Measures:  · Height:  5' 10\" (177.8 cm)  · Current Body Wt:  86.2 kg (190 lb 0.6 oz)   · BMI Category: Overweight (BMI 25.0-29. 9)       Nutrition Diagnosis:   · Inadequate protein-energy intake related to (poor appetite) as evidenced by intake 0-25%, weight loss, mild loss of subcutaneous fat, mild muscle loss    Nutrition Interventions:   Food and/or Nutrient Delivery: Modify current diet, Start oral nutrition supplement  Nutrition Education and Counseling: No recommendations at this time  Coordination of Nutrition Care: No recommendation at this time    Goals:  PO intake at least 50% of meals and 75% of ONS next 2-4 days       Nutrition Monitoring and Evaluation:   Behavioral-Environmental Outcomes: None identified  Food/Nutrient Intake Outcomes: Food and nutrient intake, Supplement intake  Physical Signs/Symptoms Outcomes: Biochemical data, Hemodynamic status, Fluid status or edema, Weight    Discharge Planning:    (Regular diet)     Electronically signed by Fitz Chapa RD on 4/12/2021 at 1:14 PM    Contact: ext [phone]

## 2021-04-12 NOTE — PROGRESS NOTES
Bedside shift change report given to Claudia (oncoming nurse) by Jonathan Whitney RN (offgoing nurse). Report included the following information SBAR, Kardex, ED Summary, Procedure Summary, MAR, Recent Results and Cardiac Rhythm NSR     Shift worked:  days   Shift summary and any significant changes:              Concerns for physician to address:     Zone phone for oncoming shift:        Patient Information  Estrada Frost  77 y.o.  4/10/2021  6:21 PM by Louisa Ragsdale MD. Estrada Frost was admitted from Home     Problem List       Patient Active Problem List     Diagnosis Date Noted    WILLIAN (acute kidney injury) ([facility] [address] 04/11/2021    Hearing loss of left ear 06/03/2019    Paresthesia of both hands 06/04/2018    Gastroesophageal reflux disease without esophagitis 12/01/2017    Hyponatremia 10/04/2016    Hyperglycemia 07/19/2016    Venous insufficiency of both lower extremities 03/29/2016    Chronic right shoulder pain 03/29/2016    Cataract 07/13/2015    Bilateral knee pain 01/12/2015    ED (erectile dysfunction) 01/12/2015    Alcohol dependence ([facility] [address] 12/03/2013    Pedal edema 12/03/2013    Vitamin D deficiency 11/27/2012    Anxiety 12/02/2011    DJD (degenerative joint disease) 05/24/2011    HTN (hypertension) 11/15/2010    Obesity 11/15/2010    S/P gastric bypass 11/15/2010    Plantar fasciitis 11/15/2010           Past Medical History:   Diagnosis Date    DJD (degenerative joint disease)      Hypertension      Obesity           Core Measures:  CVA: No No  CHF:Yes Yes  PNA:No No     Activity:  Activity Level: Up with Assistance  Number times ambulated in hallways past shift: 0  Number of times OOB to chair past shift: 0     Cardiac:   Cardiac Monitoring: Yes      Cardiac Rhythm: Sinus bradycardia     Access:   Current line(s): PIV   Central Line? No   PICC LINE? No      Genitourinary:   Urinary status: voiding  Urinary Catheter?  No     Respiratory:   O2 Device: None (Room air)  Chronic home O2 use?: NO  Incentive spirometer at bedside: NO     GI:  Last Bowel Movement Date: 04/11/21  Current diet:  DIET CARDIAC Regular  Passing flatus: YES  Tolerating current diet: YES     Pain Management:   Patient states pain is manageable on current regimen: YES     Skin:  Amadou Score: 17  Interventions: increase time out of bed and PT/OT consult    Patient Safety:  Fall Score:  Total Score: 4  Interventions: bed/chair alarm, assistive device (walker, cane, etc), gripper socks, pt to call before getting OOB and stay with me (per policy)  High Fall Risk: Yes  @Rollbelt  @dexterity to release roll belt  Yes/No ( must document dexterity  here by stating Yes or No here, otherwise this is a restraint and must follow restraint documentation policy.)     DVT prophylaxis:  DVT prophylaxis Med- Yes  DVT prophylaxis SCD or PEDRO- No      Wounds: (If Applicable)  Wounds- No  Location      Active Consults:  IP CONSULT TO NEPHROLOGY  IP CONSULT TO GASTROENTEROLOGY     Length of Stay:  Expected LOS: - - -  Actual LOS: 1  Discharge Plan: No

## 2021-04-12 NOTE — PROGRESS NOTES
Hospitalist Progress Note    NAME: Louie Taylor   :  1945   MRN:  [id number]       Assessment / Plan:  Acute kidney injury multifactorial from volume depletion, diuretics and also nonsteroidal use  -CT shows no evidence of obstruction  -Continue IV fluids  -Continue to hold lisinopril and Lasix  -Check BMP in a.m.  -Renal following and appreciate recommendations    Gram-negative anabela urinary tract infection  -Continue ceftriaxone    Mild ascending colitis on CT scan  -Patient does not report any symptoms  -We will treat with short course of ceftriaxone and Flagyl  -GI consulted  -Continue IV fluids    Normocytic anemia  -Baseline hemoglobin is 11 and currently hemoglobin is 8.9  -Check iron profile and B12    Chronic alcoholism  -No signs or symptoms of alcohol withdrawal currently    Hypertension  Chronic fatigue  -Holding all antihypertensives due to borderline low blood pressure    Mild transaminitis  -Check CMP in few days    Mild troponin elevation likely demand ischemia  -No further work-up    Sinus bradycardia  -Telemetry monitoring. Check TSH              Body mass index is 27.26 kg/m². Code status: Full  Prophylaxis: Hep SQ  Recommended Disposition: Home w/Family     Subjective:     Chief Complaint / Reason for Physician Visit  Does not report any abdominal pain, nausea or vomiting  Denies chest pain or shortness of breath    Review of Systems:  Symptom Y/N Comments  Symptom Y/N Comments   Fever/Chills    Chest Pain     Poor Appetite    Edema     Cough    Abdominal Pain     Sputum    Joint Pain     SOB/[place]    Pruritis/Rash     Nausea/vomit    Tolerating PT/OT     Diarrhea    Tolerating Diet     Constipation    Other       Could NOT obtain due to:      Objective:     VITALS:   Last 24hrs VS reviewed since prior progress note.  Most recent are:  Patient Vitals for the past 24 hrs:   Temp Pulse Resp BP SpO2   21 1514 98 °F (36.7 °C) (!) 53 17 106/65 98 %   21 1247 98.2 °F (36.8 °C) (!) 57 17 107/61 97 %   04/12/21 0833 98.1 °F (36.7 °C) (!) 50 17 115/67 97 %   04/12/21 0209 98.1 °F (36.7 °C) (!) 57 18 111/68 97 %   04/11/21 2313 97.9 °F (36.6 °C) (!) 54 18 (!) 105/59 97 %   04/11/21 1940 97.8 °F (36.6 °C) (!) 53 18 104/66 98 %       Intake/Output Summary (Last 24 hours) at 4/12/2021 1615  Last data filed at 4/11/2021 1816  Gross per 24 hour   Intake 240 ml   Output 120 ml   Net 120 ml        PHYSICAL EXAM:  General: Alert, cooperative, no acute distress    EENT:  EOMI. Anicteric sclerae. MMM  Resp:  CTA bilaterally, no wheezing or rales. No accessory muscle use  CV:  Regular  rhythm,  No edema  GI:  Soft, Non distended, Non tender.  +Bowel sounds  Neurologic:  Alert and awake, normal speech,   Psych:   Not anxious nor agitated  Skin:  No rashes.   No jaundice    Reviewed most current lab test results and cultures  YES  Reviewed most current radiology test results   YES  Review and summation of old records today    NO  Reviewed patient's current orders and MAR    YES  PMH/SH reviewed - no change compared to H&P          Current Facility-Administered Medications:     aspirin chewable tablet 81 mg, 81 mg, Oral, DAILY, Aparna Ashley MD, 81 mg at 04/12/21 1030    sodium chloride (NS) flush 5-40 mL, 5-40 mL, IntraVENous, Q8H, Aparna Ashley MD, 10 mL at 04/12/21 1447    sodium chloride (NS) flush 5-40 mL, 5-40 mL, IntraVENous, PRN, Aparna Giordano MD, 10 mL at 04/11/21 1728    acetaminophen (TYLENOL) tablet 650 mg, 650 mg, Oral, Q6H PRN, 650 mg at 04/12/21 1454 **OR** acetaminophen (TYLENOL) suppository 650 mg, 650 mg, Rectal, Q6H PRN, Aparna Giordano MD    polyethylene glycol (MIRALAX) packet 17 g, 17 g, Oral, DAILY PRN, Ana Giordano MD    promethazine (PHENERGAN) tablet 12.5 mg, 12.5 mg, Oral, Q6H PRN **OR** ondansetron (ZOFRAN) injection 4 mg, 4 mg, IntraVENous, Q6H PRN, Aparna Giordano MD    heparin (porcine) injection 5,000 Units, 5,000 Units, SubCUTAneous, Q8H, Aparna Ashley MD, 5,000 Units at 04/12/21 1446    cefTRIAXone (ROCEPHIN) 1 g in 0.9% sodium chloride (MBP/ADV) 50 mL MBP, 1 g, IntraVENous, Q24H, Aparna Ashley MD, Last Rate: 100 mL/hr at 04/12/21 0159, 1 g at 04/12/21 0159    0.9% sodium chloride infusion, 100 mL/hr, IntraVENous, CONTINUOUS, Toney Castañeda MD, Last Rate: 100 mL/hr at 04/11/21 2316, 100 mL/hr at 04/11/21 2316    metroNIDAZOLE (FLAGYL) IVPB premix 500 mg, 500 mg, IntraVENous, Q12H, Toney Castañeda MD, Last Rate: 100 mL/hr at 04/12/21 1446, 500 mg at 04/12/21 1446  ________________________________________________________________________  Care Plan discussed with:    Comments   Patient y    Family      RN y    Care Manager     Consultant                        Multidiciplinary team rounds were held today with , nursing, pharmacist and clinical coordinator. Patient's plan of care was discussed; medications were reviewed and discharge planning was addressed. ________________________________________________________________________  Total NON critical care TIME:  35   Minutes    Total CRITICAL CARE TIME Spent:   Minutes non procedure based      Comments   >50% of visit spent in counseling and coordination of care     ________________________________________________________________________  Patsy Tan MD     Procedures: see electronic medical records for all procedures/Xrays and details which were not copied into this note but were reviewed prior to creation of Plan. LABS:  I reviewed today's most current labs and imaging studies.   Pertinent labs include:  Recent Labs     04/12/21  0217 04/10/21  1859   WBC 9.7 15.5*   HGB 8.9* 11.8*   HCT 25.3* 33.7*   * 160     Recent Labs     04/12/21  0217 04/10/21  2204 04/10/21  1859     --  134*   K 3.7  --  4.3     --  97   CO2 26  --  28   GLU 91  --  102*   BUN 56*  --  54*   CREA 4.19*  --  4.41*   CA 7.5*  --  7.9*   ALB 2.1*  --  1.6*   TBILI 0.7  --  1.0   ALT 33  --  50   INR  --  1.4*  --        Signed: Nash Del Angel MD

## 2021-04-12 NOTE — PROGRESS NOTES
GI CONSULTATION NOTE  Patricia Murphy, KENNA  [phone] NP in-hospital cell phone M-F until 4:30  After 5pm or on weekends, please call  for physician on call    NAME: Chyna Siddiqi   :  1945   MRN:  [id number]   Attending:  Dr Alyce Diane   Primary GI:  Dr Joseline Weaver  Date/Time:  2021 1:04 PM  Assessment:   Mild ascending colitis on CT scan  - No abdominal pain, nausea, change in bowel habits, or diarrhea. No hematochezia. - No leukocytosis  - Last colonoscopy 10+ years ago, no family hx of CRC. - Taking ibuprofen 400mg daily and ASA 81mg daily    Decreased Appetite and weight loss  - Recent EGD 2020 by Dr Skyla Maurer - 2cm hiatal hernia, normal gastric bypass, normal gastric pouch. No ulcer or stricture. WILLIAN  UTI  ETOH Abuse - Drink at least 8 vodka drinks a day at a bar - last drink 1 week ago  HTN    Plan:   1. Will need colonoscopy but likely can be done outpatient or at the end of the week when WILLIAN and other medical issues improved. 2. No stool studies needed as no diarrhea. 3. Rest per primary team.     Plan discussed with Dr Joseline Weaver. Thank you for consultation. Subjective:     HISTORY OF PRESENT ILLNESS:     Chyna Siddiqi is an 77 y.o. male who we are asked to see for complaint of abnormal CT scan - colitis. Medical history includes hypertension, obesity, alcohol abuse (about 8 vodka drinks a day at a bar),Bilateral hip replacements. Patient presented to the hospital for general fatigue, weight loss, and chest pain. Patient states that for the last few months he has had little to no appetite and unable to take large amounts of p.o. Denies any nausea or vomiting. History of gastric bypass many many years ago. On admission, patient noted to have WILLIAN, hypoalbuminemia 1.6, mild elevation in LFTs, and a CT abdomen pelvis noted nonspecific ascending colonic wall thickening weighted to mild colitis. Colonic diverticulosis without diverticulitis.   Severe hepatic steatosis. Taking ASA 81mg daily and ibuprofen 400mg daily. Changes in bowel habits, no hematochezia or mucus in stools. Last colonoscopy 10+ years ago. No family hx of CRC or polyps. EGD 12/2020: Dr Aime Hanson  Esophagus:hiatal hernia 2 cm in size   Rest of esophagus was normal, random biopsies taken  Stomach: normal gastric bypass anatomy, the gastric pouch was around 10 cm long, able to retroflex the scope within, normal gastric pouch     Surgical anastomosis was patent, no ulcers, no stricture, the anastomosis was around 2 cm in diameter     jejunum: normal, random biopsies taken    Past Medical History:   Diagnosis Date    DJD (degenerative joint disease)     Hypertension     Obesity       Past Surgical History:   Procedure Laterality Date    HX ORTHOPAEDIC      bilat hip replacements    WA GASTRIC BYPASS,OBESE<150CM TAO-EN-Y       Social History     Tobacco Use    Smoking status: Never Smoker    Smokeless tobacco: Never Used   Substance Use Topics    Alcohol use: Yes     Alcohol/week: 136.0 standard drinks     Types: 126 Cans of beer, 10 Shots of liquor per week     Comment: Hx of abuse      Family History   Problem Relation Age of Onset    Diabetes Maternal Grandmother     Heart Disease Maternal Grandmother     Diabetes Maternal Grandfather     Heart Disease Maternal Grandfather     No Known Problems Mother     Stroke Father       Allergies   Allergen Reactions    Propoxyphene N-Acetaminophen Other (comments)     hallucinate  Other reaction(s): Adverse reaction to substance       Prior to Admission medications    Medication Sig Start Date End Date Taking? Authorizing Provider   sucralfate (CARAFATE) 1 gram tablet Take 1 g by mouth four (4) times daily. Ac and at [email or web address], Historical   ergocalciferol (Vitamin D2) 1,250 mcg (50,000 unit) capsule Take 50,000 Units by mouth two (2) times a week.  LAST TAKEN April 5,2021   Yes Provider, Historical   pantoprazole (Protonix) 40 mg tablet Take 40 mg by mouth daily. Yes Provider, Historical   folic acid (FOLVITE) 060 mcg tablet Take 800 mcg by mouth daily. Yes Provider, Historical   multivitamin (ONE A DAY) tablet Take 1 Tab by mouth daily. Yes Provider, Historical   lisinopriL (PRINIVIL, ZESTRIL) 10 mg tablet TAKE 1 TABLET BY MOUTH EVERY DAY 11/27/20   Brett Quiroga NP   furosemide (LASIX) 20 mg tablet TAKE 1 TABLET BY MOUTH EVERY DAY AS NEEDED FOR LEG SWELLING  Patient taking differently: 40 mg daily. Indications: visible water retention 11/23/20   Jaqueline Clancy NP   aspirin 81 mg chewable tablet Take 81 mg by mouth daily. Provider, Historical   cyanocobalamin (VITAMIN B-12) 1,000 mcg tablet Take  by mouth daily. Takes unknown dose    Provider, Historical   ascorbic acid (VITAMIN C) 500 mg tablet Take  by mouth daily.  Takes unknown dose    Provider, Historical       Patient Active Problem List   Diagnosis Code    HTN (hypertension) I10    Obesity E66.9    S/P gastric bypass Z98.84    Plantar fasciitis M72.2    DJD (degenerative joint disease) M19.90    Anxiety F41.9    Vitamin D deficiency E55.9    Alcohol dependence ([facility] [address] F10.20    Pedal edema R60.0    Bilateral knee pain M25.561, M25.562    ED (erectile dysfunction) N52.9    Cataract H26.9    Venous insufficiency of both lower extremities I87.2    Chronic right shoulder pain M25.511, G89.29    Hyperglycemia R73.9    Hyponatremia E87.1    Gastroesophageal reflux disease without esophagitis K21.9    Paresthesia of both hands R20.2    Hearing loss of left ear H91.92    WILLIAN (acute kidney injury) ([facility] [address] N17.9       REVIEW OF SYSTEMS:    Constitutional: negative fever, negative chills  Eyes:   negative visual changes  ENT:   negative sore throat, tongue or lip swelling   Respiratory:  negative cough  Cards:  negative for chest pain, palpitations, lower extremity edema  GI:   See HPI  :  negative for frequency, dysuria  Integument:  negative for rash and pruritus  Heme:  negative for easy bruising and gum/nose bleeding  Musculoskel: negative for myalgias,  back pain  Neuro: negative for headaches, dizziness, vertigo  Psych:  negative for feelings of anxiety, depression     Pertinent Positives: weight loss      Objective:   VITALS:    Visit Vitals  /61   Pulse (!) 57   Temp 98.2 °F (36.8 °C)   Resp 17   Ht 5' 10\" (1.778 m)   Wt 86.2 kg (190 lb)   SpO2 97%   BMI 27.26 kg/m²       PHYSICAL EXAM:   General:          Alert, WD, WN, cooperative, no distress, appears stated age. Head:               Normocephalic, without obvious abnormality, atraumatic. Eyes:               Conjunctivae clear and pale, anicteric sclerae. Pupils are equal  Nose:               Nares normal.   Throat:             Lips, mucosa, and tongue normal.    Neck:               Supple, symmetrical,  no adenopath  Back:               Symmetric,  No CVA tenderness. Lungs:             CTA bilaterally. No wheezing/rhonchi/rales. Chest wall:      No tenderness or deformity. No Accessory muscle use. Heart:              Regular rate and rhythm,  no murmur, rub or gallop. Abdomen:        Soft, non-tender. Not distended. Bowel sounds normal. No masses  Extremities:     Atraumatic, No cyanosis. +1 BLE edema  Skin:                Texture, turgor normal. No rashes/lesions/jaundice  Lymph:            Cervical, supraclavicular normal.  Psych:             Good insight. Not depressed. Not anxious or agitated. Neurologic:      EOMs intact. No facial asymmetry. No aphasia or slurred speech. Normal                        strength, A/O X 3.      LAB DATA REVIEWED:    Recent Results (from the past 24 hour(s))   TROPONIN I    Collection Time: 04/11/21  2:00 PM   Result Value Ref Range    Troponin-I, Qt. 0.10 (H) <0.05 ng/mL   ECHO ADULT COMPLETE    Collection Time: 04/11/21  3:40 PM   Result Value Ref Range    IVSd 0.88 0.60 - 1.00 cm    IVSs 1.67 cm    LVIDd 4.88 4.20 - 5.90 cm    LVIDs 3.09 cm    LVOT d 2.08 cm    LVPWd 1.30 (A) 0.60 - 1.00 cm    LVPWs 2.04 cm    LVOT Peak Gradient 3.77 mmHg    LVOT Peak Velocity 97.14 cm/s    RVIDd 3.68 cm    Left Atrium Major Axis 4.42 cm    LA Volume 111.59 18.0 - 58.0 mL    LA Area 4C 25.67 cm2    LA Vol 2C 111.76 (A) 18.00 - 58.00 mL    LA Vol 4C 89.38 (A) 18.00 - 58.00 mL    Aortic Valve Area by Continuity of Peak Velocity 2.72 cm2    AV R PG 33.77 mmHg    Aortic Regurgitant Pressure Half-time 444.75 ms    AR Max Ty 290.57 cm/s    AoV PG 5.92 mmHg    Aortic Valve Systolic Peak Velocity 885.31 cm/s    MV A Ty 0.66 cm/s    Mitral Valve E Wave Deceleration Time 176.21 ms    MV E Ty 66.78 cm/s    E/E' ratio (averaged) 7.95     E/E' lateral 6.46     E/E' septal 9.43     LV E' Lateral Velocity 10.33 cm/s    LV E' Septal Velocity 7.08 cm/s    TV MG 72.29 mmHg    TR Max Velocity 450.99 cm/s    TR Max Velocity 478.51 cm/s    Mitral Regurgitant Velocity Time Integral 188. 39 cm    Pulmonic Valve Systolic Peak Instantaneous Gradient 1.39 mmHg    Pulmonic Valve Max Velocity 58.87 cm/s    Triscuspid Valve Regurgitation Peak Gradient 22.48 mmHg    TR Max Velocity 236.74 cm/s    MV E/A 101.18     LV Mass .7 88.0 - 224.0 g    LV Mass AL Index 96.3 49.0 - 115.0 g/m2    Left Atrium Minor Axis 2.16 cm    LA Vol Index 54.64 16.00 - 28.00 ml/m2    LA Vol Index 54.72 16.00 - 28.00 ml/m2    LA Vol Index 43.76 16.00 - 28.00 ml/m2    STACY/BSA Pk Ty 1.3 cm2/m2   GLUCOSE, POC    Collection Time: 04/11/21  4:35 PM   Result Value Ref Range    Glucose (POC) 97 65 - 100 mg/dL    Performed by Nii Callahan (MANDO RN)    PROTEIN/CREATININE RATIO, URINE    Collection Time: 04/11/21  5:37 PM   Result Value Ref Range    Protein, urine random 104 (H) 0.0 - 11.9 mg/dL    Creatinine, urine 64.80 mg/dL    Protein/Creat.  urine Ratio 1.6     SODIUM, UR, RANDOM    Collection Time: 04/11/21  5:37 PM   Result Value Ref Range    Sodium,urine random 45 MMOL/L   METABOLIC PANEL, COMPREHENSIVE    Collection Time: 04/12/21  2:17 AM   Result Value Ref Range    Sodium 136 136 - 145 mmol/L    Potassium 3.7 3.5 - 5.1 mmol/L    Chloride 102 97 - 108 mmol/L    CO2 26 21 - 32 mmol/L    Anion gap 8 5 - 15 mmol/L    Glucose 91 65 - 100 mg/dL    BUN 56 (H) 6 - 20 MG/DL    Creatinine 4.19 (H) 0.70 - 1.30 MG/DL    BUN/Creatinine ratio 13 12 - 20      GFR est AA 17 (L) >60 ml/min/1.73m2    GFR est non-AA 14 (L) >60 ml/min/1.73m2    Calcium 7.5 (L) 8.5 - 10.1 MG/DL    Bilirubin, total 0.7 0.2 - 1.0 MG/DL    ALT (SGPT) 33 12 - 78 U/L    AST (SGOT) 59 (H) 15 - 37 U/L    Alk. phosphatase 200 (H) 45 - 117 U/L    Protein, total 4.7 (L) 6.4 - 8.2 g/dL    Albumin 2.1 (L) 3.5 - 5.0 g/dL    Globulin 2.6 2.0 - 4.0 g/dL    A-G Ratio 0.8 (L) 1.1 - 2.2     CBC WITH AUTOMATED DIFF    Collection Time: 04/12/21  2:17 AM   Result Value Ref Range    WBC 9.7 4.1 - 11.1 K/uL    RBC 2.56 (L) 4.10 - 5.70 M/uL    HGB 8.9 (L) 12.1 - 17.0 g/dL    HCT 25.3 (L) 36.6 - 50.3 %    MCV 98.8 80.0 - 99.0 FL    MCH 34.8 (H) 26.0 - 34.0 PG    MCHC 35.2 30.0 - 36.5 g/dL    RDW 13.6 11.5 - 14.5 %    PLATELET 527 (L) 531 - 400 K/uL    MPV 11.1 8.9 - 12.9 FL    NRBC 0.0 0  WBC    ABSOLUTE NRBC 0.00 0.00 - 0.01 K/uL    NEUTROPHILS 82 (H) 32 - 75 %    LYMPHOCYTES 10 (L) 12 - 49 %    MONOCYTES 6 5 - 13 %    EOSINOPHILS 1 0 - 7 %    BASOPHILS 0 0 - 1 %    IMMATURE GRANULOCYTES 2 (H) 0.0 - 0.5 %    ABS. NEUTROPHILS 7.9 1.8 - 8.0 K/UL    ABS. LYMPHOCYTES 1.0 0.8 - 3.5 K/UL    ABS. MONOCYTES 0.5 0.0 - 1.0 K/UL    ABS. EOSINOPHILS 0.1 0.0 - 0.4 K/UL    ABS. BASOPHILS 0.0 0.0 - 0.1 K/UL    ABS. IMM. GRANS. 0.2 (H) 0.00 - 0.04 K/UL    DF AUTOMATED         IMAGING RESULTS:  I have personally reviewed the imaging reports    DATE: 4/10/21  CLINICAL HISTORY: new real failure, wt loss, eval for malignancy   INDICATION: new real failure, wt loss, eval for malignancy  COMPARISON: None  CONTRAST:  None.     TECHNIQUE:   Thin axial images were obtained through the abdomen and pelvis. Coronal and  sagittal reformats were generated. Oral contrast was not administered. CT dose  reduction was achieved through use of a standardized protocol tailored for this  examination and automatic exposure control for dose modulation.      The absence of intravenous contrast material reduces the sensitivity for  evaluation of visceral organs and vasculature including presence of small mass  lesions, hemodynamically significant stenoses, dissections, mucosal  abnormalities etc.     FINDINGS:   LOWER THORAX: Small bilateral pleural effusions. Anemia. Coronary artery  disease. LIVER/GALLBLADDER: Severe hepatic steatosis cholecystectomy CBD is not dilated. SPLEEN/PANCREAS:  within normal limits. ADRENALS/KIDNEYS: Right renal hypodensity is most likely a cyst. Left renal  hypodensity most likely a cyst. There are renal calculi are nonobstructive. No  significant hydronephrosis. STOMACH: Prior gastric surgery  SMALL BOWEL/COLON: Scattered colonic diverticula. No oral contrast was  administered. Ascending colonic wall thickening is mild. APPENDIX: Unremarkable  PERITONEUM: No ascites or pneumoperitoneum. RETROPERITONEUM: No lymphadenopathy or aortic aneurysm. REPRODUCTIVE ORGANS:  URINARY BLADDER: No mass or calculus. BONES: Spondylolysis and spondylolisthesis at L5-S1. Severe bilateral foraminal  stenoses. ADDITIONAL COMMENTS: N/A     IMPRESSION  Small bilateral pleural effusions with coronary artery disease and anemia.     Severe hepatic steatosis.     Nonspecific ascending colonic wall thickening may be related to a mild colitis.     Colonic diverticulosis without diverticulitis.     Right and left renal cyst with right renal calculus is nonobstructive.        Total time spent with patient: 50 minutes ________________________________________________________________________  Care Plan discussed with:  Patient y   Family     RN y              Consultant:       CT 4/12/2021:  ________________________________________________________________________    ___________________________________________________  Consulting Provider:  Agnieszka Caldera NP      4/12/2021  1:04 PM

## 2021-04-12 NOTE — PROGRESS NOTES
Nephrology Progress Note  Daniel Gramajo     [email or web address]  Phone - [phone]   Patient: Jacob Rdz    YOB: 1945        Date- 4/12/2021   Admit Date: 4/10/2021  CC: Follow up for  WILLIAN   IMPRESSION & PLAN:   ·  WILLIAN:- suspect NSAID use + volume depletion from diuretics, poor po intake along with ACE I use--- no obstruction on CT scan   · Anemia  · Hyponatremia  · UTI  · Hypoalbuminemia  · Weight loss  · Hypertension  · H/o alcohol abuse    PLAN-   Continue ivf   Hold lisinopril, lasix   Follow bmp   Check iron profile, vit b 12     Subjective: Interval History:   -  Cr improving  Bp stable  Voiding well  No c/o sob,  No c/o chest pain,   No c/o nausea or vomiting, No c/o  fever. Objective:   Vitals:    04/11/21 1940 04/11/21 2313 04/12/21 0209 04/12/21 0833   BP: 104/66 (!) 105/59 111/68 115/67   Pulse: (!) 53 (!) 54 (!) 57 (!) 50   Resp: 18 18 18 17   Temp: 97.8 °F (36.6 °C) 97.9 °F (36.6 °C) 98.1 °F (36.7 °C) 98.1 °F (36.7 °C)   SpO2: 98% 97% 97% 97%   Weight:       Height:          04/11 0701 - 04/12 0700  In: 1652 [P.O.:476; I.V.:1000]  Out: 365 [Urine:365]  Last 3 Recorded Weights in this Encounter    04/10/21 1836   Weight: 86.2 kg (190 lb)      Physical exam:   GEN: NAD  NECK- Supple, no mass  RESP: No wheezing, Clear b/l  CVS: S1,S2  RRR  NEURO: Normal speech, Non focal  EXT: No Edema   PSYCH: Normal Mood    Chart reviewed. Pertinent Notes reviewed. Data Review :  Recent Labs     04/12/21  0217 04/10/21  1859    134*   K 3.7 4.3    97   CO2 26 28   BUN 56* 54*   CREA 4.19* 4.41*   GLU 91 102*   CA 7.5* 7.9*     Recent Labs     04/12/21  0217 04/10/21  1859   WBC 9.7 15.5*   HGB 8.9* 11.8*   HCT 25.3* 33.7*   * 160     No results for input(s): FE, TIBC, PSAT, FERR in the last 72 hours.    Medication list  reviewed  Current Facility-Administered Medications   Medication Dose Route Frequency    aspirin chewable tablet 81 mg  81 mg Oral DAILY    sodium chloride (NS) flush 5-40 mL  5-40 mL IntraVENous Q8H    sodium chloride (NS) flush 5-40 mL  5-40 mL IntraVENous PRN    acetaminophen (TYLENOL) tablet 650 mg  650 mg Oral Q6H PRN    Or    acetaminophen (TYLENOL) suppository 650 mg  650 mg Rectal Q6H PRN    polyethylene glycol (MIRALAX) packet 17 g  17 g Oral DAILY PRN    promethazine (PHENERGAN) tablet 12.5 mg  12.5 mg Oral Q6H PRN    Or    ondansetron (ZOFRAN) injection 4 mg  4 mg IntraVENous Q6H PRN    heparin (porcine) injection 5,000 Units  5,000 Units SubCUTAneous Q8H    cefTRIAXone (ROCEPHIN) 1 g in 0.9% sodium chloride (MBP/ADV) 50 mL MBP  1 g IntraVENous Q24H    0.9% sodium chloride infusion  100 mL/hr IntraVENous CONTINUOUS    metroNIDAZOLE (FLAGYL) IVPB premix 500 mg  500 mg IntraVENous Q12H          Tania Mckeon MD              [facility] [address] / [facility], [address]  [place], [address]  Phone - [phone]               Fax - [phone]

## 2021-04-13 LAB
ALBUMIN SERPL-MCNC: 2 G/DL (ref 3.5–5)
ANION GAP SERPL CALC-SCNC: 6 MMOL/L (ref 5–15)
BACTERIA SPEC CULT: ABNORMAL
BUN SERPL-MCNC: 50 MG/DL (ref 6–20)
BUN/CREAT SERPL: 13 (ref 12–20)
CALCIUM SERPL-MCNC: 7.4 MG/DL (ref 8.5–10.1)
CC UR VC: ABNORMAL
CHLORIDE SERPL-SCNC: 104 MMOL/L (ref 97–108)
CO2 SERPL-SCNC: 25 MMOL/L (ref 21–32)
CREAT SERPL-MCNC: 3.78 MG/DL (ref 0.7–1.3)
ERYTHROCYTE [DISTWIDTH] IN BLOOD BY AUTOMATED COUNT: 13.9 % (ref 11.5–14.5)
FERRITIN SERPL-MCNC: 431 NG/ML (ref 26–388)
GLUCOSE SERPL-MCNC: 83 MG/DL (ref 65–100)
HCT VFR BLD AUTO: 28.1 % (ref 36.6–50.3)
HGB BLD-MCNC: 9.9 G/DL (ref 12.1–17)
IRON SATN MFR SERPL: 82 % (ref 20–50)
IRON SERPL-MCNC: 51 UG/DL (ref 35–150)
MCH RBC QN AUTO: 34.9 PG (ref 26–34)
MCHC RBC AUTO-ENTMCNC: 35.2 G/DL (ref 30–36.5)
MCV RBC AUTO: 98.9 FL (ref 80–99)
NRBC # BLD: 0 K/UL (ref 0–0.01)
NRBC BLD-RTO: 0 PER 100 WBC
PHOSPHATE SERPL-MCNC: 2.4 MG/DL (ref 2.6–4.7)
PLATELET # BLD AUTO: 185 K/UL (ref 150–400)
PMV BLD AUTO: 11.1 FL (ref 8.9–12.9)
POTASSIUM SERPL-SCNC: 3.6 MMOL/L (ref 3.5–5.1)
RBC # BLD AUTO: 2.84 M/UL (ref 4.1–5.7)
SERVICE CMNT-IMP: ABNORMAL
SODIUM SERPL-SCNC: 135 MMOL/L (ref 136–145)
TIBC SERPL-MCNC: 62 UG/DL (ref 250–450)
TSH SERPL DL<=0.05 MIU/L-ACNC: 5.49 UIU/ML (ref 0.36–3.74)
VIT B12 SERPL-MCNC: >2000 PG/ML (ref 193–986)
WBC # BLD AUTO: 10.7 K/UL (ref 4.1–11.1)

## 2021-04-13 PROCEDURE — 74011250636 HC RX REV CODE- 250/636: Performed by: INTERNAL MEDICINE

## 2021-04-13 PROCEDURE — 74011250637 HC RX REV CODE- 250/637: Performed by: INTERNAL MEDICINE

## 2021-04-13 PROCEDURE — 97535 SELF CARE MNGMENT TRAINING: CPT | Performed by: OCCUPATIONAL THERAPIST

## 2021-04-13 PROCEDURE — 82728 ASSAY OF FERRITIN: CPT

## 2021-04-13 PROCEDURE — 74011000258 HC RX REV CODE- 258: Performed by: INTERNAL MEDICINE

## 2021-04-13 PROCEDURE — 97165 OT EVAL LOW COMPLEX 30 MIN: CPT | Performed by: OCCUPATIONAL THERAPIST

## 2021-04-13 PROCEDURE — 97116 GAIT TRAINING THERAPY: CPT

## 2021-04-13 PROCEDURE — 84443 ASSAY THYROID STIM HORMONE: CPT

## 2021-04-13 PROCEDURE — 74011000250 HC RX REV CODE- 250: Performed by: INTERNAL MEDICINE

## 2021-04-13 PROCEDURE — 85027 COMPLETE CBC AUTOMATED: CPT

## 2021-04-13 PROCEDURE — 80069 RENAL FUNCTION PANEL: CPT

## 2021-04-13 PROCEDURE — 82607 VITAMIN B-12: CPT

## 2021-04-13 PROCEDURE — 83540 ASSAY OF IRON: CPT

## 2021-04-13 PROCEDURE — 65660000000 HC RM CCU STEPDOWN

## 2021-04-13 PROCEDURE — 97161 PT EVAL LOW COMPLEX 20 MIN: CPT

## 2021-04-13 PROCEDURE — 36415 COLL VENOUS BLD VENIPUNCTURE: CPT

## 2021-04-13 PROCEDURE — 74011250637 HC RX REV CODE- 250/637: Performed by: NURSE PRACTITIONER

## 2021-04-13 RX ORDER — SODIUM,POTASSIUM PHOSPHATES 280-250MG
1 POWDER IN PACKET (EA) ORAL ONCE
Status: COMPLETED | OUTPATIENT
Start: 2021-04-13 | End: 2021-04-13

## 2021-04-13 RX ORDER — BUTALBITAL, ACETAMINOPHEN AND CAFFEINE 50; 325; 40 MG/1; MG/1; MG/1
1 TABLET ORAL
Status: DISCONTINUED | OUTPATIENT
Start: 2021-04-13 | End: 2021-04-16 | Stop reason: HOSPADM

## 2021-04-13 RX ADMIN — HEPARIN SODIUM 5000 UNITS: 5000 INJECTION INTRAVENOUS; SUBCUTANEOUS at 05:37

## 2021-04-13 RX ADMIN — HEPARIN SODIUM 5000 UNITS: 5000 INJECTION INTRAVENOUS; SUBCUTANEOUS at 14:25

## 2021-04-13 RX ADMIN — BUTALBITAL, ACETAMINOPHEN, AND CAFFEINE 1 TABLET: 50; 325; 40 TABLET ORAL at 14:28

## 2021-04-13 RX ADMIN — Medication 10 ML: at 05:38

## 2021-04-13 RX ADMIN — POTASSIUM & SODIUM PHOSPHATES POWDER PACK 280-160-250 MG 1 PACKET: 280-160-250 PACK at 17:03

## 2021-04-13 RX ADMIN — HEPARIN SODIUM 5000 UNITS: 5000 INJECTION INTRAVENOUS; SUBCUTANEOUS at 21:15

## 2021-04-13 RX ADMIN — ACETAMINOPHEN 650 MG: 325 TABLET, FILM COATED ORAL at 21:15

## 2021-04-13 RX ADMIN — CEFTRIAXONE 1 G: 1 INJECTION, POWDER, FOR SOLUTION INTRAMUSCULAR; INTRAVENOUS at 02:17

## 2021-04-13 RX ADMIN — METRONIDAZOLE 500 MG: 500 INJECTION, SOLUTION INTRAVENOUS at 14:14

## 2021-04-13 RX ADMIN — Medication 10 ML: at 21:16

## 2021-04-13 RX ADMIN — Medication 10 ML: at 14:15

## 2021-04-13 RX ADMIN — ALUMINUM HYDROXIDE AND MAGNESIUM HYDROXIDE 40 ML: 200; 200 SUSPENSION ORAL at 14:14

## 2021-04-13 RX ADMIN — BUTALBITAL, ACETAMINOPHEN, AND CAFFEINE 1 TABLET: 50; 325; 40 TABLET ORAL at 04:13

## 2021-04-13 RX ADMIN — ASPIRIN 81 MG: 81 TABLET, CHEWABLE ORAL at 14:22

## 2021-04-13 RX ADMIN — SODIUM CHLORIDE 100 ML/HR: 9 INJECTION, SOLUTION INTRAVENOUS at 07:17

## 2021-04-13 NOTE — PROGRESS NOTES
GI PROGRESS NOTE  Pio Scott NP  [phone] NP in-hospital cell phone M-F until 4:30  After 5pm or on weekends, please call  for physician on call    NAME:Ozzie Santillan :1945 UZ   ATTG: Dr Ratna Singleton  PCP: Fabricio Krueger DO  Date/Time:  2021 1:59 PM     Primary GI: Dr. Kirit Menchaca    Assessment:   Mild ascending colitis on CT scan - remains asymptomatic  - No abdominal pain, nausea, change in bowel habits, or diarrhea. No hematochezia. - No leukocytosis  - Last colonoscopy 10+ years ago, no family hx of CRC. - Taking ibuprofen 400mg daily and ASA 81mg daily    Decreased Appetite and weight loss  - Recent EGD 2020 by Dr Tiera Solorzano - 2cm hiatal hernia, normal gastric bypass, normal gastric pouch. No ulcer or stricture. WILLIAN  UTI  ETOH Abuse - Drink at least 8 vodka drinks a day at a bar - last drink 1 week ago  HTN    Plan:   1. Will need colonoscopy but likely can be done outpatient to allow for time for colitis to heal. We will set it up. 2. No stool studies needed as no diarrhea. 3. Rest per primary team.     Plan discussed with Dr Kirit Menchaca. Nothing further to add. Will sign off. Subjective:   Discussed with RN events overnight. Resting quietly in room but complaining of a headache. NO other complaints. Complaint Y/N Description   Abdominal Pain n    Hematemesis n    Hematochezia n    Melena n    Constipation n    Diarrhea n    Dyspepsia n    Dysphagia n    Jaundiced n    Nausea/vomiting n      Review of Systems:  Symptom Y/N Comments  Symptom Y/N Comments   Fever/Chills    Chest Pain     Cough    Headaches     Sputum    Joint Pain     SOB/ORLANDO    Pruritis/Rash     Tolerating Diet y  little appetite today  Other       Could NOT obtain due to:      Objective:   VITALS:   Last 24hrs VS reviewed since prior progress note.  Most recent are:  Visit Vitals  /76 (BP 1 Location: Right upper arm, BP Patient Position: At rest)   Pulse 61   Temp 97.6 °F (36.4 °C) Resp 16   Ht 5' 10\" (1.778 m)   Wt 86.2 kg (190 lb)   SpO2 97%   BMI 27.26 kg/m²       Intake/Output Summary (Last 24 hours) at 4/13/2021 1359  Last data filed at 4/13/2021 0914  Gross per 24 hour   Intake    Output 250 ml   Net -250 ml     PHYSICAL EXAM:  General: WD, WN. Alert, cooperative, no acute distress    HEENT: NC, Atraumatic. Anicteric sclerae. Lungs:  CTA Bilaterally. No Wheezing/Rhonchi/Rales. Heart:  Regular  rhythm,  No murmur (-), No Rubs, No Gallops  Abdomen: Soft, Non distended, Non tender.  +Bowel sounds, no HSM  Extremities: BLE edema  Neurologic:  Alert and oriented X 3. No acute neurological distress   Psych:   Good insight. Not anxious nor agitated. Lab and Radiology Data Reviewed: (see below)    Medications Reviewed: (see below)  PMH/SH reviewed - no change compared to H&P  ________________________________________________________________________  Total time spent with patient: 20 minutes ________________________________________________________________________  Care Plan discussed with:  Patient y   Family     RN y              Consultant: Sundra Dance, NP     Procedures: see electronic medical records for all procedures/Xrays and details which were not copied into this note but were reviewed prior to creation of Plan.       LABS:  Recent Labs     04/13/21 0336 04/12/21 0217   WBC 10.7 9.7   HGB 9.9* 8.9*   HCT 28.1* 25.3*    137*     Recent Labs     04/13/21  0336 04/12/21  0217 04/10/21  1859   * 136 134*   K 3.6 3.7 4.3    102 97   CO2 25 26 28   BUN 50* 56* 54*   CREA 3.78* 4.19* 4.41*   GLU 83 91 102*   CA 7.4* 7.5* 7.9*   PHOS 2.4*  --   --      Recent Labs     04/13/21  0336 04/12/21  0217 04/10/21  1859   AP  --  200* 272*   TP  --  4.7* 5.1*   ALB 2.0* 2.1* 1.6*   GLOB  --  2.6 3.5     Recent Labs     04/10/21  2204   INR 1.4*   PTP 14.7*      Recent Labs     04/13/21 0336   TIBC 62*   PSAT 82*   FERR 431*      No results found for: FOL, RBCF  No results for input(s): PH, PCO2, PO2 in the last 72 hours. No results for input(s): CPK, CKMB in the last 72 hours.     No lab exists for component: TROPONINI  Lab Results   Component Value Date/Time    Color YELLOW/STRAW 04/11/2021 12:12 AM    Appearance CLOUDY (A) 04/11/2021 12:12 AM    Specific gravity 1.013 04/11/2021 12:12 AM    pH (UA) 5.5 04/11/2021 12:12 AM    Protein 100 (A) 04/11/2021 12:12 AM    Glucose Negative 04/11/2021 12:12 AM    Ketone Negative 04/11/2021 12:12 AM    Bilirubin Negative 04/11/2021 12:12 AM    Urobilinogen 1.0 04/11/2021 12:12 AM    Nitrites Negative 04/11/2021 12:12 AM    Leukocyte Esterase LARGE (A) 04/11/2021 12:12 AM    Epithelial cells FEW 04/11/2021 12:12 AM    Bacteria 4+ (A) 04/11/2021 12:12 AM    WBC >100 (H) 04/11/2021 12:12 AM    RBC 5-10 04/11/2021 12:12 AM       MEDICATIONS:  Current Facility-Administered Medications   Medication Dose Route Frequency    butalbital-acetaminophen-caffeine (FIORICET, ESGIC) -40 mg per tablet 1 Tab  1 Tab Oral Q6H PRN    maalox/viscous lidocaine (COV GI COCKTAIL)  40 mL Oral ONCE    aspirin chewable tablet 81 mg  81 mg Oral DAILY    sodium chloride (NS) flush 5-40 mL  5-40 mL IntraVENous Q8H    sodium chloride (NS) flush 5-40 mL  5-40 mL IntraVENous PRN    acetaminophen (TYLENOL) tablet 650 mg  650 mg Oral Q6H PRN    Or    acetaminophen (TYLENOL) suppository 650 mg  650 mg Rectal Q6H PRN    polyethylene glycol (MIRALAX) packet 17 g  17 g Oral DAILY PRN    promethazine (PHENERGAN) tablet 12.5 mg  12.5 mg Oral Q6H PRN    Or    ondansetron (ZOFRAN) injection 4 mg  4 mg IntraVENous Q6H PRN    heparin (porcine) injection 5,000 Units  5,000 Units SubCUTAneous Q8H    cefTRIAXone (ROCEPHIN) 1 g in 0.9% sodium chloride (MBP/ADV) 50 mL MBP  1 g IntraVENous Q24H    0.9% sodium chloride infusion  75 mL/hr IntraVENous CONTINUOUS    metroNIDAZOLE (FLAGYL) IVPB premix 500 mg  500 mg IntraVENous Q12H

## 2021-04-13 NOTE — PROGRESS NOTES
End of Shift Note    Bedside shift change report given to Estefani Wright R.N (oncoming nurse) by Buster Montenegro RN (offgoing nurse). Report included the following information SBAR, Kardex, ED Summary, Procedure Summary, MAR, Recent Results and Cardiac Rhythm NSR    Shift worked:  night   Shift summary and any significant changes:     none       Concerns for physician to address:  NONE   Zone phone for oncoming shift:  [phone]       Patient Information  Cecilia Flores  77 y.o.  4/10/2021  6:21 PM by Vanessa Henry MD. Cecilia Flores was admitted from Home    Problem List  Patient Active Problem List    Diagnosis Date Noted    WILLIAN (acute kidney injury) ([facility] [address] 04/11/2021    Hearing loss of left ear 06/03/2019    Paresthesia of both hands 06/04/2018    Gastroesophageal reflux disease without esophagitis 12/01/2017    Hyponatremia 10/04/2016    Hyperglycemia 07/19/2016    Venous insufficiency of both lower extremities 03/29/2016    Chronic right shoulder pain 03/29/2016    Cataract 07/13/2015    Bilateral knee pain 01/12/2015    ED (erectile dysfunction) 01/12/2015    Alcohol dependence ([facility] [address] 12/03/2013    Pedal edema 12/03/2013    Vitamin D deficiency 11/27/2012    Anxiety 12/02/2011    DJD (degenerative joint disease) 05/24/2011    HTN (hypertension) 11/15/2010    Obesity 11/15/2010    S/P gastric bypass 11/15/2010    Plantar fasciitis 11/15/2010     Past Medical History:   Diagnosis Date    DJD (degenerative joint disease)     Hypertension     Obesity        Core Measures:  CVA: No No  CHF:Yes Yes  PNA:No No    Activity:  Activity Level: Up with Assistance  Number times ambulated in hallways past shift: 0  Number of times OOB to chair past shift: 0    Cardiac:   Cardiac Monitoring: Yes      Cardiac Rhythm: Normal sinus rhythm    Access:   Current line(s): PIV   Central Line? No   PICC LINE? No     Genitourinary:   Urinary status: voiding  Urinary Catheter?  No    Respiratory:   O2 Device: None (Room air)  Chronic home O2 use?: NO  Incentive spirometer at bedside: NO       GI:  Last Bowel Movement Date: 04/12/21  Current diet:  DIET NUTRITIONAL SUPPLEMENTS Breakfast, Dinner; Ensure Clear  DIET REGULAR  Passing flatus: YES  Tolerating current diet: YES       Pain Management:   Patient states pain is manageable on current regimen: YES    Skin:  Amadou Score: 19  Interventions: increase time out of bed and PT/OT consult    Patient Safety:  Fall Score:  Total Score: 4  Interventions: bed/chair alarm, assistive device (walker, cane, etc), gripper socks, pt to call before getting OOB and stay with me (per policy)  High Fall Risk: Yes  @Rollbelt  @dexterity to release roll belt  Yes/No ( must document dexterity  here by stating Yes or No here, otherwise this is a restraint and must follow restraint documentation policy.)    DVT prophylaxis:  DVT prophylaxis Med- Yes  DVT prophylaxis SCD or PEDRO- No     Wounds: (If Applicable)  Wounds- No  Location     Active Consults:  IP CONSULT TO NEPHROLOGY  IP CONSULT TO GASTROENTEROLOGY    Length of Stay:  Expected LOS: 3d 2h  Actual LOS: 2  Discharge Plan: No       Gregoria Almanza RN

## 2021-04-13 NOTE — PROGRESS NOTES
Problem: Self Care Deficits Care Plan (Adult)  Goal: *Acute Goals and Plan of Care (Insert Text)  Description: FUNCTIONAL STATUS PRIOR TO ADMISSION: performed ADLS on his own    HOME SUPPORT PRIOR TO ADMISSION: The patient lived alone with girlfriend to provide assistance. Occupational Therapy Goals:  Initiated 4/13/2021  1. Patient will perform grooming standing with modified independence within 7 days. 2. Patient will perform toileting with modified independence within 7 days. 3. Patient will perform upper body dressing and lower body dressing with modified independence within 7 days. 4. Patient will transfer from toilet with modified independence using the least restrictive device and appropriate durable medical equipment within 7 days. Outcome: Not Met   OCCUPATIONAL THERAPY EVALUATION  Patient: Anna Marie Rosales (75 y.o. male)  Date: 4/13/2021  Primary Diagnosis: WILLIAN (acute kidney injury) ([facility] [address] [N17.9]        Precautions:  Fall, Bed Alarm    ASSESSMENT  Based on the objective data described below, the patient presents with pain/swelling in BLE lower legs, general weakness, limited standing tolerance and inability to reach to his feet for attempting LB dressing. Pt reported needed to go to the restroom upon arrival.  Pt was soaked with urine upon arrival.  Min to moderate assist needed for sit to stand this session and pt was able to mobilize to bathroom with RW with min assist.  Min assist for balance to pull down underwear due to balance deficits. Pt was able to perform BM clean up seated with lateral weight shift. Max assist to manage threading of underwear over LE and to pull over hips in standing. Pt was unable to remain standing long enough to was hands at sink due to LE weakness but was able to return to bedside chair with min assist.  Pt is far from his independent baseline and would benefit from SNF for rehab at discharge.      Current Level of Function Impacting Discharge (ADLs/self-care):   Bed Mobility:  Rolling: Minimum assistance  Supine to Sit: Moderate assistance;Maximum assistance(HOB elevated and left bed rail, Assist BLE EOB)  Scooting: Minimum assistance    Transfers:  Sit to Stand: Minimum assistance; Moderate assistance; Additional time;Assist x2(min assist elevated surface, moderate toilet)  Stand to Sit: Minimum assistance  Bed to Chair: Minimum assistance; Additional time;Assist x2(with RW)  Bathroom Mobility: Minimum assistance(with Rw, limited standing tolerance)  Toilet Transfer : Moderate assistance;Minimum assistance(with rw)    ADL Assessment:  Feeding: Supervision    Oral Facial Hygiene/Grooming: Setup(seated, unable to perform standing due to LE weakness)    Bathing: Moderate assistance(LB)    Upper Body Dressing: Setup    Lower Body Dressing: Moderate assistance    Toileting: Maximum assistance(weakness, swelling in LE)       Functional Outcome Measure: The patient scored 50/100 on the barthel outcome measure which is indicative of moderate decline in mobility and ADLS. Other factors to consider for discharge: limited standing tolerance, fall prevention     Patient will benefit from skilled therapy intervention to address the above noted impairments. PLAN :  Recommendations and Planned Interventions: self care training, functional mobility training, therapeutic exercise, balance training, therapeutic activities, patient education, home safety training, and family training/education    Frequency/Duration: Patient will be followed by occupational therapy 4 times a week to address goals.     Recommendation for discharge: (in order for the patient to meet his/her long term goals)  Therapy up to 5 days/week in SNF setting    This discharge recommendation:  Has been made in collaboration with the attending provider and/or case management    IF patient discharges home will need the following DME: shower chair, bedside commode       SUBJECTIVE:   Patient stated Oh my legs hurt. They are so heavy.     OBJECTIVE DATA SUMMARY:   HISTORY:   Past Medical History:   Diagnosis Date    DJD (degenerative joint disease)     Hypertension     Obesity      Past Surgical History:   Procedure Laterality Date    HX ORTHOPAEDIC      bilat hip replacements    [place] GASTRIC BYPASS,OBESE<150CM TAO-EN-Y         Expanded or extensive additional review of patient history:     Home Situation  Home Environment: Private residence  # Steps to Enter: 3  Rails to Enter: Yes  Hand Rails : Bilateral  One/Two Story Residence: One story  Living Alone: Yes  Support Systems: Other (comments)  Patient Expects to be Discharged to[de-identified] Private residence  Current DME Used/Available at Home: [address], rolling, Grab bars  Tub or Shower Type: Tub/Shower combination    Hand dominance: Right    EXAMINATION OF PERFORMANCE DEFICITS:  Cognitive/Behavioral Status:  Neurologic State: Alert  Orientation Level: Oriented X4  Cognition: Appropriate decision making; Follows commands  Perception: Appears intact  Perseveration: No perseveration noted  Safety/Judgement: Awareness of environment; Fall prevention;Home safety; Insight into deficits    Hearing: Auditory  Auditory Impairment: Hard of hearing, bilateral, None    Vision/Perceptual:                                Corrective Lenses: Glasses    Range of Motion:    AROM: Generally decreased, functional(swelling and pain in BLE)  PROM: Generally decreased, functional                      Strength:    Strength: Generally decreased, functional                Coordination:  Coordination: Within functional limits  Fine Motor Skills-Upper: Left Intact; Right Intact    Gross Motor Skills-Upper: Left Intact; Right Intact    Tone & Sensation:    Tone: Normal                         Balance:  Sitting: Intact  Standing: Impaired  Standing - Static: Fair  Standing - Dynamic : Fair    Functional Mobility and Transfers for ADLs:  Bed Mobility:  Rolling: Minimum assistance  Supine to Sit: Moderate assistance;Maximum assistance(HOB elevated and left bed rail, Assist BLE EOB)  Scooting: Minimum assistance    Transfers:  Sit to Stand: Minimum assistance; Moderate assistance; Additional time;Assist x2(min assist elevated surface, moderate toilet)  Stand to Sit: Minimum assistance  Bed to Chair: Minimum assistance; Additional time;Assist x2(with RW)  Bathroom Mobility: Minimum assistance(with Rw, limited standing tolerance)  Toilet Transfer : Moderate assistance;Minimum assistance(with rw)    ADL Assessment:  Feeding: Supervision    Oral Facial Hygiene/Grooming: Setup(seated, unable to perform standing due to LE weakness)    Bathing: Moderate assistance(LB)    Upper Body Dressing: Setup    Lower Body Dressing: Moderate assistance    Toileting: Maximum assistance(weakness, swelling in LE)                ADL Intervention and task modifications:        See assessment       Cognitive Retraining  Safety/Judgement: Awareness of environment; Fall prevention;Home safety; Insight into deficits      Functional Measure:  Barthel Index:    Bathin  Bladder: 5  Bowels: 5  Groomin  Dressin  Feeding: 10  Mobility: 5  Stairs: 0  Toilet Use: 5  Transfer (Bed to Chair and Back): 10  Total: 50/100        The Barthel ADL Index: Guidelines  1. The index should be used as a record of what a patient does, not as a record of what a patient could do. 2. The main aim is to establish degree of independence from any help, physical or verbal, however minor and for whatever reason. 3. The need for supervision renders the patient not independent. 4. A patient's performance should be established using the best available evidence. Asking the patient, friends/relatives and nurses are the usual sources, but direct observation and common sense are also important. However direct testing is not needed.   5. Usually the patient's performance over the preceding 24-48 hours is important, but occasionally longer periods will be relevant. 6. Middle categories imply that the patient supplies over 50 per cent of the effort. 7. Use of aids to be independent is allowed. Chris Arellano., Barthel, DSHIVAM. (8372). Functional evaluation: the Barthel Index. [address] [facility] (14)2. Kirsty BOBBY Esposito, Norris Sweeney., [place], [address] (1999). Measuring the change indisability after inpatient rehabilitation; comparison of the responsiveness of the Barthel Index and Functional Starr Measure. Journal of Neurology, Neurosurgery, and Psychiatry, 66(4), 559-986. AZAEL Villavicencio.A, NORA Potts, & Urmila Garg M.A. (2004.) Assessment of post-stroke quality of life in cost-effectiveness studies: The usefulness of the Barthel Index and the EuroQoL-5D. Quality of Life Research, 15, 272-95         Occupational Therapy Evaluation Charge Determination   History Examination Decision-Making   MEDIUM Complexity : Expanded review of history including physical, cognitive and psychosocial  history  MEDIUM Complexity : 3-5 performance deficits relating to physical, cognitive , or psychosocial skils that result in activity limitations and / or participation restrictions MEDIUM Complexity : Patient may present with comorbidities that affect occupational performnce. Miniml to moderate modification of tasks or assistance (eg, physical or verbal ) with assesment(s) is necessary to enable patient to complete evaluation       Based on the above components, the patient evaluation is determined to be of the following complexity level: MEDIUM  Pain Rating:  Reports bilateral LE pain    Activity Tolerance:   Fair and requires frequent rest breaks    After treatment patient left in no apparent distress:    Sitting in chair, Call bell within reach, and Bed / chair alarm activated    COMMUNICATION/EDUCATION:   The patients plan of care was discussed with: Physical therapist, Registered nurse, and patient .      Patient/family have participated as able in goal setting and plan of care. and Patient/family agree to work toward stated goals and plan of care. This patients plan of care is appropriate for delegation to ANDREW.     Thank you for this referral.  Russ Love, OTR/L  Time Calculation: 23 mins

## 2021-04-13 NOTE — PROGRESS NOTES
Bedside shift change report given to Sheeba Capellan R.N (oncoming nurse) by Doug Bryan (offgoing nurse). Report included the following information SBAR, Kardex, ED Summary, Procedure Summary, MAR, Recent Results and Cardiac Rhythm NSR     Shift worked:  days   Shift summary and any significant changes:      none         Concerns for physician to address:  NONE   Zone phone for oncoming shift:           Patient Information  Isiah Pelayo  77 y.o.  4/10/2021  6:21 PM by Edna Meadows MD. Isiah Pelayo was admitted from Home     Problem List       Patient Active Problem List     Diagnosis Date Noted    WILLIAN (acute kidney injury) ([facility] [address] 04/11/2021    Hearing loss of left ear 06/03/2019    Paresthesia of both hands 06/04/2018    Gastroesophageal reflux disease without esophagitis 12/01/2017    Hyponatremia 10/04/2016    Hyperglycemia 07/19/2016    Venous insufficiency of both lower extremities 03/29/2016    Chronic right shoulder pain 03/29/2016    Cataract 07/13/2015    Bilateral knee pain 01/12/2015    ED (erectile dysfunction) 01/12/2015    Alcohol dependence ([facility] [address] 12/03/2013    Pedal edema 12/03/2013    Vitamin D deficiency 11/27/2012    Anxiety 12/02/2011    DJD (degenerative joint disease) 05/24/2011    HTN (hypertension) 11/15/2010    Obesity 11/15/2010    S/P gastric bypass 11/15/2010    Plantar fasciitis 11/15/2010           Past Medical History:   Diagnosis Date    DJD (degenerative joint disease)      Hypertension      Obesity           Core Measures:  CVA: No No  CHF:Yes Yes  PNA:No No     Activity:  Activity Level: Up with Assistance  Number times ambulated in hallways past shift: 0  Number of times OOB to chair past shift: 0     Cardiac:   Cardiac Monitoring: Yes      Cardiac Rhythm: Normal sinus rhythm     Access:   Current line(s): PIV   Central Line? No   PICC LINE? No      Genitourinary:   Urinary status: voiding  Urinary Catheter?  No     Respiratory:   O2 Device: None (Room air)  Chronic home O2 use?: NO  Incentive spirometer at bedside: NO     GI:  Last Bowel Movement Date: 04/12/21  Current diet:  DIET NUTRITIONAL SUPPLEMENTS Breakfast, Dinner; Ensure Clear  DIET Clear liquid per pt request  Passing flatus: YES  Tolerating current diet: YES     Pain Management:   Patient states pain is manageable on current regimen: YES     Skin:  Amadou Score: 19  Interventions: increase time out of bed and PT/OT consult    Patient Safety:  Fall Score:  Total Score: 4  Interventions: bed/chair alarm, assistive device (walker, cane, etc), gripper socks, pt to call before getting OOB and stay with me (per policy)  High Fall Risk: Yes  @Rollbelt  @dexterity to release roll belt  Yes/No ( must document dexterity  here by stating Yes or No here, otherwise this is a restraint and must follow restraint documentation policy.)     DVT prophylaxis:  DVT prophylaxis Med- Yes  DVT prophylaxis SCD or PEDRO- No      Wounds: (If Applicable)  Wounds- No  Location      Active Consults:  IP CONSULT TO NEPHROLOGY  IP CONSULT TO GASTROENTEROLOGY     Length of Stay:  Expected LOS: 3d 2h  Actual LOS: 2  Discharge Plan: to be determined, probable SNF

## 2021-04-13 NOTE — PROGRESS NOTES
Problem: Mobility Impaired (Adult and Pediatric)  Goal: *Acute Goals and Plan of Care (Insert Text)  Description: FUNCTIONAL STATUS PRIOR TO ADMISSION: Patient was ambulatory for household distance with RW but reports increased fatigue and lethargy over the past 2 months, has not been eating much and was drinking alcohol on a daily basis    HOME SUPPORT PRIOR TO ADMISSION: Patient lives alone and could perform ADLs indep, has girlfriend who can assist as needed but does not live with him    Physical Therapy Goals  Initiated 4/13/2021  1. Patient will move from supine to sit and sit to supine  in bed with independence within 7 day(s). 2.  Patient will transfer from bed to chair and chair to bed with supervision/set-up using the least restrictive device within 7 day(s). 3.  Patient will perform sit to stand with supervision/set-up within 7 day(s). 4.  Patient will ambulate with supervision/set-up for 150 feet with the least restrictive device within 7 day(s). 5.  Patient will ascend/descend 3 stairs with  handrail(s) with minimal assistance/contact guard assist within 7 day(s). Outcome: Not Met   PHYSICAL THERAPY EVALUATION  Patient: Leila Hagan (11 y.o. male)  Date: 4/13/2021  Primary Diagnosis: WILLIAN (acute kidney injury) ([facility] [address] [N17.9]        Precautions:   Fall, Bed Alarm    ASSESSMENT  Based on the objective data described below, the patient presents with decreased tolerance of activity, general weakness, recent weight loss and poor appetite,and  impaired functional mobility. Patient received in bed and agreeable to participate, requests using bathroom and was able to come to EOB with min assist.  Patient ambulated into bathroom with RW to toilet, then required mod assist to come to stand. Patient was fatigued but able to ambulate back to chair, educated on pacing and progression of activity and left with chair alarm and call bell in reach.   Patient is below baseline and lives alone, will require rehab in SNF to return to prior functional level. Current Level of Function Impacting Discharge (mobility/balance): min assist for bed mobility and ambulation, mod assist to stand    Functional Outcome Measure: The patient scored 50/100 on the Barthel outcome measure which is indicative of moderate functional impairment     Other factors to consider for discharge: lives alone, does not have caregiver, high falls risk     Patient will benefit from skilled therapy intervention to address the above noted impairments. PLAN :  Recommendations and Planned Interventions: bed mobility training, transfer training, gait training, therapeutic exercises, patient and family training/education, and therapeutic activities      Frequency/Duration: Patient will be followed by physical therapy:  4 times a week to address goals. Recommendation for discharge: (in order for the patient to meet his/her long term goals)  Therapy up to 5 days/week in SNF setting    This discharge recommendation:  Has been made in collaboration with the attending provider and/or case management    IF patient discharges home will need the following DME: patient owns DME required for discharge         SUBJECTIVE:   Patient stated I feel really beat.     OBJECTIVE DATA SUMMARY:   HISTORY:    Past Medical History:   Diagnosis Date    DJD (degenerative joint disease)     Hypertension     Obesity      Past Surgical History:   Procedure Laterality Date    HX ORTHOPAEDIC      bilat hip replacements    MO GASTRIC BYPASS,OBESE<150CM TAO-EN-Y         Personal factors and/or comorbidities impacting plan of care: +ETOH    Home Situation  Home Environment: Private residence  # Steps to Enter: 3  Rails to Enter: Yes  Hand Rails : Bilateral  One/Two Story Residence: One story  Living Alone: Yes  Support Systems: Other (comments)  Patient Expects to be Discharged to[de-identified] Private residence  Current DME Used/Available at Home: Walker, rolling, Grab bars  Tub or Shower Type: Tub/Shower combination    EXAMINATION/PRESENTATION/DECISION MAKING:   Critical Behavior:  Neurologic State: Alert  Orientation Level: Oriented X4  Cognition: Appropriate decision making, Follows commands  Safety/Judgement: Awareness of environment, Fall prevention, Home safety, Insight into deficits  Hearing: Auditory  Auditory Impairment: Hard of hearing, bilateral, None  Range Of Motion:  AROM: Generally decreased, functional(swelling and pain in BLE)           PROM: Generally decreased, functional           Strength:    Strength: Generally decreased, functional                    Tone & Sensation:   Tone: Normal                              Coordination:  Coordination: Within functional limits  Vision:   Corrective Lenses: Glasses  Functional Mobility:  Bed Mobility:  Rolling: Minimum assistance  Supine to Sit: Moderate assistance;Maximum assistance(HOB elevated and left bed rail, Assist BLE EOB)     Scooting: Minimum assistance  Transfers:  Sit to Stand: Minimum assistance; Moderate assistance; Additional time;Assist x2(min assist elevated surface, moderate toilet)  Stand to Sit: Minimum assistance        Bed to Chair: Minimum assistance; Additional time;Assist x2(with RW)              Balance:   Sitting: Intact  Standing: Impaired  Standing - Static: Fair  Standing - Dynamic : Fair  Ambulation/Gait Training:  Distance (ft): 20 Feet (ft)  Assistive Device: Gait belt;Walker, rolling  Ambulation - Level of Assistance: Contact guard assistance;Minimal assistance        Gait Abnormalities: Decreased step clearance              Speed/Mady: Pace decreased (<100 feet/min)  Step Length: Left shortened;Right shortened                     Stairs:              Functional Measure:  Barthel Index:    Bathin  Bladder: 5  Bowels: 5  Groomin  Dressin  Feeding: 10  Mobility: 5  Stairs: 0  Toilet Use: 5  Transfer (Bed to Chair and Back): 10  Total: 50/100       The Barthel ADL Index: Guidelines  1.  The index should be used as a record of what a patient does, not as a record of what a patient could do. 2. The main aim is to establish degree of independence from any help, physical or verbal, however minor and for whatever reason. 3. The need for supervision renders the patient not independent. 4. A patient's performance should be established using the best available evidence. Asking the patient, friends/relatives and nurses are the usual sources, but direct observation and common sense are also important. However direct testing is not needed. 5. Usually the patient's performance over the preceding 24-48 hours is important, but occasionally longer periods will be relevant. 6. Middle categories imply that the patient supplies over 50 per cent of the effort. 7. Use of aids to be independent is allowed. Radha Nieto., Barthel, D.W. (8948). Functional evaluation: the Barthel Index. [facility] (14)2. BOBBY Moy, Aaron Hodge., Angela Ambriz., [place], [address] [place] [address] (1999). Measuring the change indisability after inpatient rehabilitation; comparison of the responsiveness of the Barthel Index and Functional [place] Measure. Journal of Neurology, Neurosurgery, and Psychiatry, 66(4), 692-763. Zulma Connolly, N.J.A, ZEINA Potts.J.M, & Liss Rasheed M.A. (2004.) Assessment of post-stroke quality of life in cost-effectiveness studies: The usefulness of the Barthel Index and the EuroQoL-5D.  Quality of Life Research, 15, 048-65           Physical Therapy Evaluation Charge Determination   History Examination Presentation Decision-Making   MEDIUM  Complexity : 1-2 comorbidities / personal factors will impact the outcome/ POC  LOW Complexity : 1-2 Standardized tests and measures addressing body structure, function, activity limitation and / or participation in recreation  LOW Complexity : Stable, uncomplicated  LOW Complexity : FOTO score of       Based on the above components, the patient evaluation is determined to be of the following complexity level: LOW     Pain Rating:      Activity Tolerance:   Fair and requires rest breaks    After treatment patient left in no apparent distress:   Sitting in chair, Call bell within reach, and Bed / chair alarm activated    COMMUNICATION/EDUCATION:   The patients plan of care was discussed with: Occupational therapist and Registered nurse. Fall prevention education was provided and the patient/caregiver indicated understanding. and Patient/family agree to work toward stated goals and plan of care.     Thank you for this referral.  Melissa Nguyen, PT

## 2021-04-13 NOTE — PROGRESS NOTES
Hospitalist Progress Note    NAME: Zeenat Tena   :  1945   MRN:  [id number]       Assessment / Plan:  Acute kidney injury multifactorial from volume depletion, diuretics and also nonsteroidal use  -CT shows no evidence of obstruction  -Continue IV fluids  -Continue to hold lisinopril and Lasix  -cr is improving and down to 3.78  -Check BMP in a.m.  -Renal following and appreciate recommendations    E coli urinary tract infection  -Continue ceftriaxone for 7 days    Mild ascending colitis on CT scan  -Patient does not report any symptoms  -We will treat with short course of ceftriaxone and Flagyl  -GI recs noted  -Continue IV fluids    Normocytic anemia  -Baseline hemoglobin is 11 and currently hemoglobin is 8.9  -B12 normal and No evidence of REJI    Chronic alcoholism  -No signs or symptoms of alcohol withdrawal currently    Hypertension  Chronic fatigue  -Holding all antihypertensives due to borderline low blood pressure    Mild transaminitis  -Lft's resolved    Mild troponin elevation likely demand ischemia  -No further work-up    Sinus bradycardia  -Telemetry monitoring. High TSH concern for subclinical Hypothyroidism  -check FT4              Body mass index is 27.26 kg/m². Code status: Full  Prophylaxis: Hep SQ  Recommended Disposition: Home w/Family     Subjective:     Chief Complaint / Reason for Physician Visit  No N/V  Sitting up in chair this am  Cr is improving      Review of Systems:  Symptom Y/N Comments  Symptom Y/N Comments   Fever/Chills    Chest Pain     Poor Appetite    Edema     Cough    Abdominal Pain     Sputum    Joint Pain     SOB/ORLANDO    Pruritis/Rash     Nausea/vomit    Tolerating PT/OT     Diarrhea    Tolerating Diet     Constipation    Other       Could NOT obtain due to:      Objective:     VITALS:   Last 24hrs VS reviewed since prior progress note.  Most recent are:  Patient Vitals for the past 24 hrs:   Temp Pulse Resp BP SpO2   21 1242 97.6 °F (36.4 °C) 61 16 128/76 97 %   04/13/21 0749 97.8 °F (36.6 °C) 60 16 132/74 98 %   04/13/21 0421 97.5 °F (36.4 °C) (!) 58 18 127/68 96 %   04/13/21 0258 97.3 °F (36.3 °C) (!) 55 18 130/66 98 %   04/12/21 2338 97.5 °F (36.4 °C) (!) 52 18 120/73 98 %   04/12/21 1948 97.4 °F (36.3 °C) 65 18 (!) 166/65 98 %       Intake/Output Summary (Last 24 hours) at 4/13/2021 1618  Last data filed at 4/13/2021 0914  Gross per 24 hour   Intake    Output 250 ml   Net -250 ml        PHYSICAL EXAM:  General: Alert, cooperative, no acute distress    EENT:  EOMI. Anicteric sclerae. MMM  Resp:  CTA bilaterally, no wheezing or rales. No accessory muscle use  CV:  Regular  rhythm,  No edema  GI:  Soft, Non distended, Non tender.  +Bowel sounds  Neurologic:  Alert and awake, normal speech,   Psych:   Not anxious nor agitated  Skin:  No rashes.   No jaundice    Reviewed most current lab test results and cultures  YES  Reviewed most current radiology test results   YES  Review and summation of old records today    NO  Reviewed patient's current orders and MAR    YES  PMH/SH reviewed - no change compared to H&P          Current Facility-Administered Medications:     butalbital-acetaminophen-caffeine (FIORICET, ESGIC) -40 mg per tablet 1 Tab, 1 Tab, Oral, Q6H PRN, Lizzie Zavala, NP, 1 Tab at 04/13/21 1428    aspirin chewable tablet 81 mg, 81 mg, Oral, DAILY, Aparna Ashley MD, 81 mg at 04/13/21 1422    sodium chloride (NS) flush 5-40 mL, 5-40 mL, IntraVENous, Q8H, Aparna Ashley MD, 10 mL at 04/13/21 1415    sodium chloride (NS) flush 5-40 mL, 5-40 mL, IntraVENous, PRN, Aparna Abraham MD, 10 mL at 04/11/21 1728    acetaminophen (TYLENOL) tablet 650 mg, 650 mg, Oral, Q6H PRN, 650 mg at 04/12/21 1454 **OR** acetaminophen (TYLENOL) suppository 650 mg, 650 mg, Rectal, Q6H PRN, Aparna Abraham MD    polyethylene glycol (MIRALAX) packet 17 g, 17 g, Oral, DAILY PRN, Nick Camacho MD    promethazine (PHENERGAN) tablet 12.5 mg, 12.5 mg, Oral, Q6H PRN **OR** ondansetron (ZOFRAN) injection 4 mg, 4 mg, IntraVENous, Q6H PRN, Aparna Stover MD    heparin (porcine) injection 5,000 Units, 5,000 Units, SubCUTAneous, Q8H, pAarna Ashley MD, 5,000 Units at 04/13/21 1425    cefTRIAXone (ROCEPHIN) 1 g in 0.9% sodium chloride (MBP/ADV) 50 mL MBP, 1 g, IntraVENous, Q24H, Aparna Ashley MD, Last Rate: 100 mL/hr at 04/13/21 0217, 1 g at 04/13/21 0217    0.9% sodium chloride infusion, 75 mL/hr, IntraVENous, CONTINUOUS, Adam SERRATO MD, Last Rate: 75 mL/hr at 04/13/21 1016, 75 mL/hr at 04/13/21 1016    metroNIDAZOLE (FLAGYL) IVPB premix 500 mg, 500 mg, IntraVENous, Q12H, Toney Castañeda MD, Last Rate: 100 mL/hr at 04/13/21 1414, 500 mg at 04/13/21 1414  ________________________________________________________________________  Care Plan discussed with:    Comments   Patient y    Family      RN y    Care Manager     Consultant                        Multidiciplinary team rounds were held today with , nursing, pharmacist and clinical coordinator. Patient's plan of care was discussed; medications were reviewed and discharge planning was addressed. ________________________________________________________________________  Total NON critical care TIME:  35   Minutes    Total CRITICAL CARE TIME Spent:   Minutes non procedure based      Comments   >50% of visit spent in counseling and coordination of care     ________________________________________________________________________  Misty Alfaro MD     Procedures: see electronic medical records for all procedures/Xrays and details which were not copied into this note but were reviewed prior to creation of Plan. LABS:  I reviewed today's most current labs and imaging studies.   Pertinent labs include:  Recent Labs     04/13/21  0336 04/12/21  0217 04/10/21  1859   WBC 10.7 9.7 15.5*   HGB 9.9* 8.9* 11.8*   HCT 28.1* 25.3* 33.7*  137* 160     Recent Labs     04/13/21  0336 04/12/21  0217 04/10/21  2204 04/10/21  1859   * 136  --  134*   K 3.6 3.7  --  4.3    102  --  97   CO2 25 26  --  28   GLU 83 91  --  102*   BUN 50* 56*  --  54*   CREA 3.78* 4.19*  --  4.41*   CA 7.4* 7.5*  --  7.9*   PHOS 2.4*  --   --   --    ALB 2.0* 2.1*  --  1.6*   TBILI  --  0.7  --  1.0   ALT  --  33  --  50   INR  --   --  1.4*  --        Signed: Grupo Harris MD

## 2021-04-13 NOTE — PROGRESS NOTES
Nephrology Progress Note  Daniel Gramajo     [facility]. [facility]  Phone - [phone]   Patient: Estrada Frost    YOB: 1945        Date- 4/13/2021   Admit Date: 4/10/2021  CC: Follow up for  MEGAN  IMPRESSION & PLAN:   ·  megan - suspect NSAID use + volume depletion from diuretics, poor po intake along with ACE I use--- no obstruction on CT scan   · Anemia  · Hyponatremia  · UTI  · Hypoalbuminemia  · Weight loss  · Hypertension  · H/o alcohol abuse    PLAN-   Continue ivf   Hold lisinopril, lasix   Check bmp in am     Subjective: Interval History:   -  Cr improving  No iron defi noted  Bp stable  Voiding well  No c/o sob,  No c/o chest pain,   No c/o nausea or vomiting, No c/o  fever. Objective:   Vitals:    04/12/21 2338 04/13/21 0258 04/13/21 0421 04/13/21 0749   BP: 120/73 130/66 127/68 132/74   Pulse: (!) 52 (!) 55 (!) 58 60   Resp: 18 18 18 16   Temp: 97.5 °F (36.4 °C) 97.3 °F (36.3 °C) 97.5 °F (36.4 °C) 97.8 °F (36.6 °C)   SpO2: 98% 98% 96% 98%   Weight:       Height:          04/12 0701 - 04/13 0700  In: -   Out: 100 [Urine:100]  Last 3 Recorded Weights in this Encounter    04/10/21 1836   Weight: 86.2 kg (190 lb)      Physical exam:   GEN: nad  NECK- Supple, no mass  RESP: No wheezing, clear  b/l  CVS: S1,S2  RRR  NEURO: Normal speech,non focal  EXT: No Edema   PSYCH: Normal Mood    Chart reviewed. Pertinent Notes reviewed.      Data Review :  Recent Labs     04/13/21  0336 04/12/21  0217 04/10/21  1859   * 136 134*   K 3.6 3.7 4.3    102 97   CO2 25 26 28   BUN 50* 56* 54*   CREA 3.78* 4.19* 4.41*   GLU 83 91 102*   CA 7.4* 7.5* 7.9*   PHOS 2.4*  --   --      Recent Labs     04/13/21  0336 04/12/21 0217 04/10/21  1859   WBC 10.7 9.7 15.5*   HGB 9.9* 8.9* 11.8*   HCT 28.1* 25.3* 33.7*    137* 160     Recent Labs     04/13/21 0336   TIBC 62*   PSAT 82*   FERR 431*      Medication list  reviewed  Current Facility-Administered Medications   Medication Dose Route Frequency    butalbital-acetaminophen-caffeine (FIORICET, ESGIC) -40 mg per tablet 1 Tab  1 Tab Oral Q6H PRN    aspirin chewable tablet 81 mg  81 mg Oral DAILY    sodium chloride (NS) flush 5-40 mL  5-40 mL IntraVENous Q8H    sodium chloride (NS) flush 5-40 mL  5-40 mL IntraVENous PRN    acetaminophen (TYLENOL) tablet 650 mg  650 mg Oral Q6H PRN    Or    acetaminophen (TYLENOL) suppository 650 mg  650 mg Rectal Q6H PRN    polyethylene glycol (MIRALAX) packet 17 g  17 g Oral DAILY PRN    promethazine (PHENERGAN) tablet 12.5 mg  12.5 mg Oral Q6H PRN    Or    ondansetron (ZOFRAN) injection 4 mg  4 mg IntraVENous Q6H PRN    heparin (porcine) injection 5,000 Units  5,000 Units SubCUTAneous Q8H    cefTRIAXone (ROCEPHIN) 1 g in 0.9% sodium chloride (MBP/ADV) 50 mL MBP  1 g IntraVENous Q24H    0.9% sodium chloride infusion  100 mL/hr IntraVENous CONTINUOUS    metroNIDAZOLE (FLAGYL) IVPB premix 500 mg  500 mg IntraVENous Q12H          Verónica Mckeon MD              [facility]  [facility] / [facility] [address], [address]  Phone - [phone]               Fax - [phone]

## 2021-04-13 NOTE — PROGRESS NOTES
Received notification from bedside RN about patient with regards to: headache not relieved by Tylenol, was responsive to Fioricet dose given last night  VS: 166/65, HR 65, RR 18, O2 sat 98% on RA    Intervention given: Fioricet q8 prn ordered    0405: requesting Fioricet early with recurrence of headache    - changed frequency to q6 prn

## 2021-04-13 NOTE — PROGRESS NOTES
Transition of Care Plan:    RUR:18%  Disposition: Home with family to assist   Follow up appointments:will follow up with PCP   DME needed:N/A  Transportation [place] [id number]  [facility] will transport home   Mattie Alfredo or means to access home:    Family will have keys to enter home     IM Medicare letter:2nd IM to be given by CM   Caregiver Nghia Ruiz- [phone]  Discharge Caregiver contacted prior to discharge? Family will be contacted at d/c     CM informed during IDRs, that pt is in need of snf placement at the time of d/c.  CM spoke with pt's girlfriend: Slim Lang, via telephone to update regarding the following. Slim Lang reported that she does not live with pt, but she would help him with his care while visiting at his home, however, she understands that pt will need some additional assistance at the time of d/c.  Slim Lang is agreeable to snf placement, and would like for CM to speak with pt, regarding his snf stay. CM completed room visit, to inform pt of the following. Pt is agreeable to snf placement at the time of d.c which he understands that he lives alone with family assistance, but he will require additional assistance. Pt reported that he will like a referral to be sent to [address] [place] [address],[address]. CM informed pt of FOC document (gave verbal consent). Pt understands that covid test is required at the time of d/c.     CM sent referral to [address] [place] [address],[address] (snf). Pt will require acceptance from facility and also insurance auth. INSURANCE AUTH CAN TAKE 24-48HRS TO BE APPROVED. CM will inform pt's nurse that pt will require covid test prior to admission to snf. Pt will require medical transport to facility and 2nd IM Medicare letter. CM will continue to follow.     OCTAVIO Asencio, [address] [place] [address]

## 2021-04-14 LAB
ALBUMIN SERPL-MCNC: 1.7 G/DL (ref 3.5–5)
ANION GAP SERPL CALC-SCNC: 5 MMOL/L (ref 5–15)
BUN SERPL-MCNC: 44 MG/DL (ref 6–20)
BUN/CREAT SERPL: 14 (ref 12–20)
CALCIUM SERPL-MCNC: 7.5 MG/DL (ref 8.5–10.1)
CHLORIDE SERPL-SCNC: 107 MMOL/L (ref 97–108)
CO2 SERPL-SCNC: 25 MMOL/L (ref 21–32)
CREAT SERPL-MCNC: 3.16 MG/DL (ref 0.7–1.3)
ERYTHROCYTE [DISTWIDTH] IN BLOOD BY AUTOMATED COUNT: 14 % (ref 11.5–14.5)
GLUCOSE SERPL-MCNC: 89 MG/DL (ref 65–100)
HCT VFR BLD AUTO: 28 % (ref 36.6–50.3)
HGB BLD-MCNC: 9.6 G/DL (ref 12.1–17)
MAGNESIUM SERPL-MCNC: 1.7 MG/DL (ref 1.6–2.4)
MCH RBC QN AUTO: 33.6 PG (ref 26–34)
MCHC RBC AUTO-ENTMCNC: 34.3 G/DL (ref 30–36.5)
MCV RBC AUTO: 97.9 FL (ref 80–99)
NRBC # BLD: 0 K/UL (ref 0–0.01)
NRBC BLD-RTO: 0 PER 100 WBC
PHOSPHATE SERPL-MCNC: 2.5 MG/DL (ref 2.6–4.7)
PLATELET # BLD AUTO: 201 K/UL (ref 150–400)
PMV BLD AUTO: 11 FL (ref 8.9–12.9)
POTASSIUM SERPL-SCNC: 3.6 MMOL/L (ref 3.5–5.1)
RBC # BLD AUTO: 2.86 M/UL (ref 4.1–5.7)
SODIUM SERPL-SCNC: 137 MMOL/L (ref 136–145)
T4 FREE SERPL-MCNC: 0.7 NG/DL (ref 0.8–1.5)
WBC # BLD AUTO: 10.1 K/UL (ref 4.1–11.1)

## 2021-04-14 PROCEDURE — 80069 RENAL FUNCTION PANEL: CPT

## 2021-04-14 PROCEDURE — 65660000000 HC RM CCU STEPDOWN

## 2021-04-14 PROCEDURE — 85027 COMPLETE CBC AUTOMATED: CPT

## 2021-04-14 PROCEDURE — 84439 ASSAY OF FREE THYROXINE: CPT

## 2021-04-14 PROCEDURE — 74011250637 HC RX REV CODE- 250/637: Performed by: INTERNAL MEDICINE

## 2021-04-14 PROCEDURE — 74011000258 HC RX REV CODE- 258: Performed by: INTERNAL MEDICINE

## 2021-04-14 PROCEDURE — 94760 N-INVAS EAR/PLS OXIMETRY 1: CPT

## 2021-04-14 PROCEDURE — 36415 COLL VENOUS BLD VENIPUNCTURE: CPT

## 2021-04-14 PROCEDURE — 74011250637 HC RX REV CODE- 250/637: Performed by: NURSE PRACTITIONER

## 2021-04-14 PROCEDURE — 74011250636 HC RX REV CODE- 250/636: Performed by: INTERNAL MEDICINE

## 2021-04-14 PROCEDURE — 83735 ASSAY OF MAGNESIUM: CPT

## 2021-04-14 RX ORDER — LEVOTHYROXINE SODIUM 25 UG/1
25 TABLET ORAL
Status: DISCONTINUED | OUTPATIENT
Start: 2021-04-15 | End: 2021-04-16 | Stop reason: HOSPADM

## 2021-04-14 RX ORDER — LANOLIN ALCOHOL/MO/W.PET/CERES
3 CREAM (GRAM) TOPICAL ONCE
Status: COMPLETED | OUTPATIENT
Start: 2021-04-14 | End: 2021-04-14

## 2021-04-14 RX ORDER — METRONIDAZOLE 250 MG/1
500 TABLET ORAL EVERY 12 HOURS
Status: DISCONTINUED | OUTPATIENT
Start: 2021-04-14 | End: 2021-04-16 | Stop reason: HOSPADM

## 2021-04-14 RX ADMIN — ACETAMINOPHEN 650 MG: 325 TABLET, FILM COATED ORAL at 01:59

## 2021-04-14 RX ADMIN — HEPARIN SODIUM 5000 UNITS: 5000 INJECTION INTRAVENOUS; SUBCUTANEOUS at 13:34

## 2021-04-14 RX ADMIN — Medication 3 MG: at 21:33

## 2021-04-14 RX ADMIN — Medication 10 ML: at 06:05

## 2021-04-14 RX ADMIN — METRONIDAZOLE 500 MG: 250 TABLET ORAL at 13:33

## 2021-04-14 RX ADMIN — CEFTRIAXONE 1 G: 1 INJECTION, POWDER, FOR SOLUTION INTRAMUSCULAR; INTRAVENOUS at 03:26

## 2021-04-14 RX ADMIN — Medication 10 ML: at 13:34

## 2021-04-14 RX ADMIN — METRONIDAZOLE 500 MG: 250 TABLET ORAL at 21:35

## 2021-04-14 RX ADMIN — METRONIDAZOLE 500 MG: 500 INJECTION, SOLUTION INTRAVENOUS at 00:41

## 2021-04-14 RX ADMIN — HEPARIN SODIUM 5000 UNITS: 5000 INJECTION INTRAVENOUS; SUBCUTANEOUS at 21:36

## 2021-04-14 RX ADMIN — HEPARIN SODIUM 5000 UNITS: 5000 INJECTION INTRAVENOUS; SUBCUTANEOUS at 06:04

## 2021-04-14 RX ADMIN — Medication 10 ML: at 21:36

## 2021-04-14 RX ADMIN — BUTALBITAL, ACETAMINOPHEN, AND CAFFEINE 1 TABLET: 50; 325; 40 TABLET ORAL at 13:37

## 2021-04-14 RX ADMIN — ASPIRIN 81 MG: 81 TABLET, CHEWABLE ORAL at 08:37

## 2021-04-14 RX ADMIN — BUTALBITAL, ACETAMINOPHEN, AND CAFFEINE 1 TABLET: 50; 325; 40 TABLET ORAL at 21:35

## 2021-04-14 NOTE — PROGRESS NOTES
Transition of Care Plan:    RUR:18%  Disposition: SNF Placement   Follow up appointments:will follow up with PCP   DME needed:N/A  [place] [address]  [facility]   Sharath Phelps or means to access home:    Family will have keys to enter home     IM Medicare letter:2nd IM to be given by CM   Caregiver Minh Klein- [phone]  Discharge Caregiver contacted prior to discharge? Family will be contacted at d/c       CM informed that pt has been accepted to snf, and facility plans to initiate insurance auth. INSURANCE AUTH CAN TAKE 24-48HRS. Pt will require covid test at the time of d/c. CM will inform MD of the following.     OCTAVIO Santos, [address] [place] [address] Right arm;

## 2021-04-14 NOTE — PROGRESS NOTES
Hospitalist Progress Note    NAME: Rachel Jay   :  1945   MRN:  [id number]       Assessment / Plan:  Acute kidney injury multifactorial from volume depletion, diuretics and also nonsteroidal use  And proteinuria  -CT shows no evidence of obstruction  -Stop IV fluids due to lower extremity edema  -Continue to hold lisinopril and Lasix  -cr is improving and down to 3.1  -Check urine protein creatinine ratio  -Check BMP in a.m.  -Renal following and appreciate recommendations  -Nephrotic syndrome work-up including GEENA, complement levels SPEP and SARS-CoV-2 ordered    E coli urinary tract infection  -Continue ceftriaxone until 4/15     Mild ascending colitis on CT scan  -Patient now reports diarrhea  -Continue ceftriaxone and Flagyl  -GI recs noted  -Consider checking stool studies if diarrhea persists    Normocytic anemia  -Baseline hemoglobin is 11 and currently hemoglobin is 8.9  -B12 normal and No evidence of REJI    Chronic alcoholism  -No signs or symptoms of alcohol withdrawal currently    Hypertension  Chronic fatigue  -Holding all antihypertensives due to borderline low blood pressure    Mild transaminitis  -Lft's resolved    Mild troponin elevation likely demand ischemia  -No further work-up    Sinus bradycardia  -Telemetry monitoring. Hypothyroidism new onset  -T4 is low and TSH is high  -Start levothyroxine. We will repeat thyroid function testing in 6 weeks              Body mass index is 27.26 kg/m².     Code status: Full  Prophylaxis: Hep SQ  Recommended Disposition: Home w/Family     Subjective:     Chief Complaint / Reason for Physician Visit  No N/V  Sitting up in chair this am  Cr is improving      Review of Systems:  Symptom Y/N Comments  Symptom Y/N Comments   Fever/Chills    Chest Pain     Poor Appetite    Edema     Cough    Abdominal Pain     Sputum    Joint Pain     SOB/[place]    Pruritis/Rash     Nausea/vomit    Tolerating PT/OT     Diarrhea    Tolerating Diet     Constipation Other       Could NOT obtain due to:      Objective:     VITALS:   Last 24hrs VS reviewed since prior progress note. Most recent are:  Patient Vitals for the past 24 hrs:   Temp Pulse Resp BP SpO2   04/14/21 1202 97.8 °F (36.6 °C) (!) 56 18 118/68 97 %   04/14/21 0743 97.4 °F (36.3 °C) (!) 50  115/67 100 %   04/14/21 0410 97.3 °F (36.3 °C) 62 18 108/70 96 %   04/13/21 2000 97.7 °F (36.5 °C) 62 18 110/68 94 %   04/13/21 1631 97.8 °F (36.6 °C) (!) 59 16 107/65 92 %       Intake/Output Summary (Last 24 hours) at 4/14/2021 1518  Last data filed at 4/14/2021 4844  Gross per 24 hour   Intake    Output 200 ml   Net -200 ml        PHYSICAL EXAM:  General: Alert, cooperative, no acute distress    EENT:  EOMI. Anicteric sclerae. MMM  Resp:  CTA bilaterally, no wheezing or rales. No accessory muscle use  CV:  Regular  rhythm,  No edema  GI:  Soft, Non distended, Non tender.  +Bowel sounds  Neurologic:  Alert and awake, normal speech,   Psych:   Not anxious nor agitated  Skin:  No rashes.   No jaundice    Reviewed most current lab test results and cultures  YES  Reviewed most current radiology test results   YES  Review and summation of old records today    NO  Reviewed patient's current orders and MAR    YES  PMH/SH reviewed - no change compared to H&P          Current Facility-Administered Medications:     metroNIDAZOLE (FLAGYL) tablet 500 mg, 500 mg, Oral, Q12H, Toney Castañeda MD, 500 mg at 04/14/21 1333    [START ON 4/15/2021] levothyroxine (SYNTHROID) tablet 25 mcg, 25 mcg, Oral, 6am, Eduardo Castañeda MD    butalbital-acetaminophen-caffeine (FIORICET, ESGIC) -40 mg per tablet 1 Tab, 1 Tab, Oral, Q6H PRN, Lizzie Glsas NP, 1 Tab at 04/14/21 1337    aspirin chewable tablet 81 mg, 81 mg, Oral, DAILY, Aparna Ashley MD, 81 mg at 04/14/21 0837    sodium chloride (NS) flush 5-40 mL, 5-40 mL, IntraVENous, Q8H, Aparna Ashley MD, 10 mL at 04/14/21 1334    sodium chloride (NS) flush 5-40 mL, 5-40 mL, IntraVENous, PRN, Shirley Muller MD, 10 mL at 04/11/21 1728    acetaminophen (TYLENOL) tablet 650 mg, 650 mg, Oral, Q6H PRN, 650 mg at 04/14/21 0159 **OR** acetaminophen (TYLENOL) suppository 650 mg, 650 mg, Rectal, Q6H PRN, Junie Mccollum MD    polyethylene glycol (MIRALAX) packet 17 g, 17 g, Oral, DAILY PRN, Junie Mccollum MD    promethazine (PHENERGAN) tablet 12.5 mg, 12.5 mg, Oral, Q6H PRN **OR** ondansetron (ZOFRAN) injection 4 mg, 4 mg, IntraVENous, Q6H PRN, Aparna Mccollum MD    heparin (porcine) injection 5,000 Units, 5,000 Units, SubCUTAneous, Q8H, Aparna Ashley MD, 5,000 Units at 04/14/21 1334    cefTRIAXone (ROCEPHIN) 1 g in 0.9% sodium chloride (MBP/ADV) 50 mL MBP, 1 g, IntraVENous, Q24H, Aparna Ashley MD, Last Rate: 100 mL/hr at 04/14/21 0326, 1 g at 04/14/21 0326    0.9% sodium chloride infusion, 75 mL/hr, IntraVENous, CONTINUOUS, Jace Leyva MD, Last Rate: 75 mL/hr at 04/13/21 1016, 75 mL/hr at 04/13/21 1016  ________________________________________________________________________  Care Plan discussed with:    Comments   Patient y    Family      RN y    Care Manager     Consultant                        Multidiciplinary team rounds were held today with , nursing, pharmacist and clinical coordinator. Patient's plan of care was discussed; medications were reviewed and discharge planning was addressed.      ________________________________________________________________________  Total NON critical care TIME:  35   Minutes    Total CRITICAL CARE TIME Spent:   Minutes non procedure based      Comments   >50% of visit spent in counseling and coordination of care     ________________________________________________________________________  Theron Kwan MD     Procedures: see electronic medical records for all procedures/Xrays and details which were not copied into this note but were reviewed prior to creation of Plan.      LABS:  I reviewed today's most current labs and imaging studies.   Pertinent labs include:  Recent Labs     04/14/21 0239 04/13/21 0336 04/12/21 0217   WBC 10.1 10.7 9.7   HGB 9.6* 9.9* 8.9*   HCT 28.0* 28.1* 25.3*    185 137*     Recent Labs     04/14/21 0239 04/13/21 0336 04/12/21 0217    135* 136   K 3.6 3.6 3.7    104 102   CO2 25 25 26   GLU 89 83 91   BUN 44* 50* 56*   CREA 3.16* 3.78* 4.19*   CA 7.5* 7.4* 7.5*   MG 1.7  --   --    PHOS 2.5* 2.4*  --    ALB 1.7* 2.0* 2.1*   TBILI  --   --  0.7   ALT  --   --  33       Signed: Quin Junior MD

## 2021-04-14 NOTE — PROGRESS NOTES
Spiritual Care Assessment/Progress Note  [facility]      NAME: Rachel Jay      MRN: [id number]  AGE: 77 y.o. SEX: male  Holiness Affiliation: Orthodox   Language: English     4/14/2021     Total Time (in minutes): 5     Spiritual Assessment begun in [facility]LE[facility] through conversation with:         []Patient        [] Family    [] Friend(s)        Reason for Consult: Initial/Spiritual assessment, patient floor     Spiritual beliefs: (Please include comment if needed)     [] Identifies with a bobby tradition:         [] Supported by a bobby community:            [] Claims no spiritual orientation:           [] Seeking spiritual identity:                [] Adheres to an individual form of spirituality:           [x] Not able to assess:                           Identified resources for coping:      [] Prayer                               [] Music                  [] Guided Imagery     [] Family/friends                 [] Pet visits     [] Devotional reading                         [x] Unknown     [] Other:                                               Interventions offered during this visit: (See comments for more details)    Patient Interventions: Initial visit           Plan of Care:     [] Support spiritual and/or cultural needs    [] Support AMD and/or advance care planning process      [] Support grieving process   [] Coordinate Rites and/or Rituals    [] Coordination with community clergy   [] No spiritual needs identified at this time   [] Detailed Plan of Care below (See Comments)  [] Make referral to Music Therapy  [] Make referral to Pet Therapy     [] Make referral to Addiction services  [] Make referral to [facility]  [] Make referral to Spiritual Care Partner  [] No future visits requested        [x] Follow up upon further referrals     Comments:   Initial Spiritual Care Assessment attempt for Mr. Marianna Pérez in 3121. Performed chart review before the visit.   Upon arrival, patient was appeared sleeping.  respected patient's privacy.       [id number] Suzie Garsia, M.S., Th.M.  Spiritual Care Provider   Paging Service [phone])

## 2021-04-14 NOTE — PROGRESS NOTES
Bedside shift change report given to Jose Armando GOULD (oncoming nurse) by Jason Schafer nurse).  Report included the following information SBAR, Kardex, ED Summary, Procedure Summary, MAR, Recent Results and Cardiac Rhythm NSR     Shift worked:  days   Shift summary and any significant changes:      none         Concerns for physician to address: Juli Night phone for oncoming shift:            Patient Information  Nikki Vyas  77 y.o.  4/10/2021  6:21 PM by Aparna Longoria, [address] [facility] Joseph was admitted from Home     Problem List          Patient Active Problem List     Diagnosis Date Noted    WILLIAN (acute kidney injury) ([facility] [address] 04/11/2021    Hearing loss of left ear 06/03/2019    Paresthesia of both hands 06/04/2018    Gastroesophageal reflux disease without esophagitis 12/01/2017    Hyponatremia 10/04/2016    Hyperglycemia 07/19/2016    Venous insufficiency of both lower extremities 03/29/2016    Chronic right shoulder pain 03/29/2016    Cataract 07/13/2015    Bilateral knee pain 01/12/2015    ED (erectile dysfunction) 01/12/2015    Alcohol dependence ([facility] [address] 12/03/2013    Pedal edema 12/03/2013    Vitamin D deficiency 11/27/2012    Anxiety 12/02/2011    DJD (degenerative joint disease) 05/24/2011    HTN (hypertension) 11/15/2010    Obesity 11/15/2010    S/P gastric bypass 11/15/2010    Plantar fasciitis 11/15/2010              Past Medical History:   Diagnosis Date    DJD (degenerative joint disease)      Hypertension      Obesity           Core Measures:  CVA: No No  CHF:Yes Yes  PNA:No No     Activity:  Activity Level: Up with Assistance  Number times ambulated in hallways past shift: 0  Number of times OOB to chair past shift: 0     Cardiac:   Cardiac Monitoring: Yes      Cardiac Rhythm: Normal sinus rhythm     Access:   Current line(s): PIV   Central Line? No   PICC LINE? No      Genitourinary:   Urinary status: voiding  Urinary Catheter?  No     Respiratory:   O2 Device: None (Room air)  Chronic home O2 use?: NO  Incentive spirometer at bedside: NO     GI:  Last Bowel Movement Date: 04/12/21  Current diet:  DIET NUTRITIONAL SUPPLEMENTS Breakfast, Dinner; Ensure Clear  DIET Clear liquid per pt request  Passing flatus: YES  Tolerating current diet: YES     Pain Management:   Patient states pain is manageable on current regimen: YES     Skin:  Amadou Score: 19  Interventions: increase time out of bed and PT/OT consult    Patient Safety:  Fall Score: Total Score: 4  Interventions: bed/chair alarm, assistive device (walker, cane, etc), gripper socks, pt to call before getting OOB and stay with me (per policy)  High Fall Risk: Yes  @Rollbelt  @dexterity to release roll belt  Yes/No ( must document dexterity  here by stating Yes or No here, otherwise this is a restraint and must follow restraint documentation policy.)     DVT prophylaxis:  DVT prophylaxis Med- Yes  DVT prophylaxis SCD or PEDRO- No      Wounds: (If Applicable)  Wounds- No  Location      Active Consults:  IP CONSULT TO NEPHROLOGY  IP CONSULT TO GASTROENTEROLOGY     Length of Stay:  Expected LOS: 3d 2h  Actual LOS: 2  Discharge Plan: to be determined, probable SNF

## 2021-04-14 NOTE — PROGRESS NOTES
Nephrology Progress Note  Daniel Gramajo     [facility]. [facility]  Phone - [phone]   Patient: Wylie Schlatter    YOB: 1945        Date- 4/14/2021   Admit Date: 4/10/2021  CC: Follow up for acute kidney injury  IMPRESSION & PLAN:   · Acute kidney injury- suspect NSAID use + volume depletion from diuretics, poor po intake along with ACE I use--- no obstruction on CT scan   · Proteinuria  · Anemia  · Hyponatremia  · UTI  · Edema  · Hypoalbuminemia  · Weight loss  · Hypertension  · H/o alcohol abuse    PLAN-   No more IV fluids   Check urine protein creatinine ratio   Lower extremity edema likely due to hypoalbuminemia -we will rule out nephrotic syndrome-Echo results noted   Hold lisinopril, lasix   Check bmp in am   ABX per Dr. Juan Montenegro     Subjective: Interval History:   -  Cr improving  No iron defi noted  Bp stable  Voiding well  No c/o sob,  No c/o chest pain,   No c/o nausea or vomiting, No c/o  fever. Objective:   Vitals:    04/13/21 2000 04/14/21 0410 04/14/21 0743 04/14/21 1202   BP: 110/68 108/70 115/67 118/68   Pulse: 62 62 (!) 50 (!) 56   Resp: 18 18  18   Temp: 97.7 °F (36.5 °C) 97.3 °F (36.3 °C) 97.4 °F (36.3 °C) 97.8 °F (36.6 °C)   SpO2: 94% 96% 100% 97%   Weight:       Height:          04/13 0701 - 04/14 0700  In: -   Out: 150 [Urine:150]  Last 3 Recorded Weights in this Encounter    04/10/21 1836   Weight: 86.2 kg (190 lb)      Physical exam:   GEN: NAD  NECK- Supple, no mass  RESP: No wheezing, clear b/l  CVS: S1,S2  RRR  NEURO: Normal speech, nonfocal  EXT: +++ Edema   PSYCH: Normal Mood    Chart reviewed. Pertinent Notes reviewed.      Data Review :  Recent Labs     04/14/21  0239 04/13/21  0336 04/12/21  0217    135* 136   K 3.6 3.6 3.7    104 102   CO2 25 25 26   BUN 44* 50* 56*   CREA 3.16* 3.78* 4.19*   GLU 89 83 91   CA 7.5* 7.4* 7.5*   MG 1.7  --   --    PHOS 2.5* 2.4*  --      Recent Labs     04/14/21  0239 04/13/21 0336 04/12/21  0217   WBC 10.1 10.7 9.7   HGB 9.6* 9.9* 8.9*   HCT 28.0* 28.1* 25.3*    185 137*     Recent Labs     04/13/21 0336   TIBC 62*   PSAT 82*   FERR 431*      Medication list  reviewed  Current Facility-Administered Medications   Medication Dose Route Frequency    metroNIDAZOLE (FLAGYL) tablet 500 mg  500 mg Oral Q12H    butalbital-acetaminophen-caffeine (FIORICET, ESGIC) -40 mg per tablet 1 Tab  1 Tab Oral Q6H PRN    aspirin chewable tablet 81 mg  81 mg Oral DAILY    sodium chloride (NS) flush 5-40 mL  5-40 mL IntraVENous Q8H    sodium chloride (NS) flush 5-40 mL  5-40 mL IntraVENous PRN    acetaminophen (TYLENOL) tablet 650 mg  650 mg Oral Q6H PRN    Or    acetaminophen (TYLENOL) suppository 650 mg  650 mg Rectal Q6H PRN    polyethylene glycol (MIRALAX) packet 17 g  17 g Oral DAILY PRN    promethazine (PHENERGAN) tablet 12.5 mg  12.5 mg Oral Q6H PRN    Or    ondansetron (ZOFRAN) injection 4 mg  4 mg IntraVENous Q6H PRN    heparin (porcine) injection 5,000 Units  5,000 Units SubCUTAneous Q8H    cefTRIAXone (ROCEPHIN) 1 g in 0.9% sodium chloride (MBP/ADV) 50 mL MBP  1 g IntraVENous Q24H    0.9% sodium chloride infusion  75 mL/hr IntraVENous CONTINUOUS          Rom Rachel MD              [facility]  [facility]  [address], [address]  [place], [address]  Phone - [phone]               Fax - [phone]

## 2021-04-15 LAB
ALBUMIN SERPL-MCNC: 1.8 G/DL (ref 3.5–5)
ANION GAP SERPL CALC-SCNC: 7 MMOL/L (ref 5–15)
BUN SERPL-MCNC: 38 MG/DL (ref 6–20)
BUN/CREAT SERPL: 15 (ref 12–20)
CALCIUM SERPL-MCNC: 7.6 MG/DL (ref 8.5–10.1)
CHLORIDE SERPL-SCNC: 107 MMOL/L (ref 97–108)
CO2 SERPL-SCNC: 24 MMOL/L (ref 21–32)
COVID-19 RAPID TEST, COVR: NOT DETECTED
CREAT SERPL-MCNC: 2.6 MG/DL (ref 0.7–1.3)
GLUCOSE SERPL-MCNC: 88 MG/DL (ref 65–100)
HAV IGM SER QL: NONREACTIVE
HBV CORE IGM SER QL: NONREACTIVE
HBV SURFACE AG SER QL: <0.1 INDEX
HBV SURFACE AG SER QL: NEGATIVE
HCV AB SERPL QL IA: NONREACTIVE
HCV COMMENT,HCGAC: NORMAL
PHOSPHATE SERPL-MCNC: 2.4 MG/DL (ref 2.6–4.7)
POTASSIUM SERPL-SCNC: 3.7 MMOL/L (ref 3.5–5.1)
SODIUM SERPL-SCNC: 138 MMOL/L (ref 136–145)
SOURCE, COVRS: NORMAL
SP1: NORMAL
SP2: NORMAL
SP3: NORMAL

## 2021-04-15 PROCEDURE — 74011000258 HC RX REV CODE- 258: Performed by: INTERNAL MEDICINE

## 2021-04-15 PROCEDURE — 86160 COMPLEMENT ANTIGEN: CPT

## 2021-04-15 PROCEDURE — 87635 SARS-COV-2 COVID-19 AMP PRB: CPT

## 2021-04-15 PROCEDURE — 74011250636 HC RX REV CODE- 250/636: Performed by: INTERNAL MEDICINE

## 2021-04-15 PROCEDURE — 97116 GAIT TRAINING THERAPY: CPT | Performed by: PHYSICAL THERAPIST

## 2021-04-15 PROCEDURE — 74011250637 HC RX REV CODE- 250/637: Performed by: INTERNAL MEDICINE

## 2021-04-15 PROCEDURE — 80069 RENAL FUNCTION PANEL: CPT

## 2021-04-15 PROCEDURE — 97535 SELF CARE MNGMENT TRAINING: CPT | Performed by: OCCUPATIONAL THERAPIST

## 2021-04-15 PROCEDURE — 36415 COLL VENOUS BLD VENIPUNCTURE: CPT

## 2021-04-15 PROCEDURE — 80074 ACUTE HEPATITIS PANEL: CPT

## 2021-04-15 PROCEDURE — 97530 THERAPEUTIC ACTIVITIES: CPT | Performed by: PHYSICAL THERAPIST

## 2021-04-15 PROCEDURE — 94760 N-INVAS EAR/PLS OXIMETRY 1: CPT

## 2021-04-15 PROCEDURE — 82784 ASSAY IGA/IGD/IGG/IGM EACH: CPT

## 2021-04-15 PROCEDURE — 86038 ANTINUCLEAR ANTIBODIES: CPT

## 2021-04-15 PROCEDURE — 65660000000 HC RM CCU STEPDOWN

## 2021-04-15 PROCEDURE — 74011250637 HC RX REV CODE- 250/637: Performed by: NURSE PRACTITIONER

## 2021-04-15 RX ORDER — SODIUM,POTASSIUM PHOSPHATES 280-250MG
2 POWDER IN PACKET (EA) ORAL 4 TIMES DAILY
Status: COMPLETED | OUTPATIENT
Start: 2021-04-15 | End: 2021-04-15

## 2021-04-15 RX ORDER — SODIUM,POTASSIUM PHOSPHATES 280-250MG
1 POWDER IN PACKET (EA) ORAL ONCE
Status: COMPLETED | OUTPATIENT
Start: 2021-04-15 | End: 2021-04-15

## 2021-04-15 RX ADMIN — POTASSIUM & SODIUM PHOSPHATES POWDER PACK 280-160-250 MG 2 PACKET: 280-160-250 PACK at 16:51

## 2021-04-15 RX ADMIN — Medication 10 ML: at 16:51

## 2021-04-15 RX ADMIN — ASPIRIN 81 MG: 81 TABLET, CHEWABLE ORAL at 08:50

## 2021-04-15 RX ADMIN — POTASSIUM & SODIUM PHOSPHATES POWDER PACK 280-160-250 MG 2 PACKET: 280-160-250 PACK at 18:40

## 2021-04-15 RX ADMIN — LEVOTHYROXINE SODIUM 25 MCG: 0.03 TABLET ORAL at 06:00

## 2021-04-15 RX ADMIN — HEPARIN SODIUM 5000 UNITS: 5000 INJECTION INTRAVENOUS; SUBCUTANEOUS at 21:07

## 2021-04-15 RX ADMIN — METRONIDAZOLE 500 MG: 250 TABLET ORAL at 21:07

## 2021-04-15 RX ADMIN — CEFTRIAXONE 1 G: 1 INJECTION, POWDER, FOR SOLUTION INTRAMUSCULAR; INTRAVENOUS at 06:00

## 2021-04-15 RX ADMIN — BUTALBITAL, ACETAMINOPHEN, AND CAFFEINE 1 TABLET: 50; 325; 40 TABLET ORAL at 21:07

## 2021-04-15 RX ADMIN — Medication 10 ML: at 06:13

## 2021-04-15 RX ADMIN — Medication 10 ML: at 22:26

## 2021-04-15 RX ADMIN — POTASSIUM & SODIUM PHOSPHATES POWDER PACK 280-160-250 MG 1 PACKET: 280-160-250 PACK at 13:04

## 2021-04-15 RX ADMIN — HEPARIN SODIUM 5000 UNITS: 5000 INJECTION INTRAVENOUS; SUBCUTANEOUS at 06:02

## 2021-04-15 RX ADMIN — METRONIDAZOLE 500 MG: 250 TABLET ORAL at 08:50

## 2021-04-15 RX ADMIN — HEPARIN SODIUM 5000 UNITS: 5000 INJECTION INTRAVENOUS; SUBCUTANEOUS at 13:04

## 2021-04-15 NOTE — PROGRESS NOTES
Received notification from bedside RN about patient with regards to: needs medication to help with sleep  VS: /68, HR 56, RR 18, O2 sat 97% on RA    Intervention given: Melatonin once ordered

## 2021-04-15 NOTE — PROGRESS NOTES
Hospitalist Progress Note    NAME: Abril Tamayo   :  1945   MRN:  [id number]       Assessment / Plan:  Acute kidney injury multifactorial from volume depletion, diuretics and also nonsteroidal use  And proteinuria  -CT shows no evidence of obstruction  -Stop IV fluids due to lower extremity edema  -Continue to hold lisinopril and Lasix  -cr is improving and down to 2.6  -Protein creatinine ratio is 1.6.  -Check BMP in a.m.  -Renal following and appreciate recommendations  -Nephrotic syndrome work-up including GEENA, complement levels SPEP pending  -SARS-CoV-2 negative    E coli urinary tract infection  -Continue ceftriaxone until 4/15     Mild ascending colitis on CT scan  -Patient reports diarrhea only with Ensure  -Continue ceftriaxone and Flagyl  -GI recs noted  -Consider checking stool studies if diarrhea persists  -We will need outpatient follow-up with GI  -Advance diet as tolerated    Hypophosphatemia  -Replaced with phosphorus. Recheck in a.m. Normocytic anemia  -Baseline hemoglobin is 11 and currently hemoglobin is 8.9  -B12 normal and No evidence of REJI    Chronic alcoholism  -No signs or symptoms of alcohol withdrawal currently    Hypertension  Chronic fatigue  -Holding all antihypertensives due to borderline low blood pressure    Mild transaminitis  -Lft's resolved    Mild troponin elevation likely demand ischemia  -No further work-up    Sinus bradycardia  -Telemetry monitoring. Hypothyroidism new onset  -T4 is low and TSH is high  -Start levothyroxine. We will repeat thyroid function testing in 6 weeks      Disposition:  Discharge likely in the next few days   PT recommended SNF placement  Case management working on SNF placement        Body mass index is 27.26 kg/m². Code status: Full  Prophylaxis: Hep SQ  Recommended Disposition: Home w/Family     Subjective:     Chief Complaint / Reason for Physician Visit  Reports diarrhea only when taking Ensure. No nausea or vomiting.   No abdominal pain  Creatinine is improving      Review of Systems:  Symptom Y/N Comments  Symptom Y/N Comments   Fever/Chills    Chest Pain     Poor Appetite    Edema     Cough    Abdominal Pain     Sputum    Joint Pain     SOB/[place]    Pruritis/Rash     Nausea/vomit    Tolerating PT/OT     Diarrhea    Tolerating Diet     Constipation    Other       Could NOT obtain due to:      Objective:     VITALS:   Last 24hrs VS reviewed since prior progress note. Most recent are:  Patient Vitals for the past 24 hrs:   Temp Pulse Resp BP SpO2   04/15/21 1134 97.5 °F (36.4 °C) (!) 52 18 114/64 98 %   04/15/21 0753 97.6 °F (36.4 °C) (!) 54 18 109/63 100 %   04/15/21 0304 97.4 °F (36.3 °C) 60 18 110/64 95 %   04/14/21 2325 97.8 °F (36.6 °C) 62 18 106/74 94 %   04/14/21 1920 97.6 °F (36.4 °C) 60 18 116/66 98 %     No intake or output data in the 24 hours ending 04/15/21 1455     PHYSICAL EXAM:  General: Alert, cooperative, no acute distress    EENT:  EOMI. Anicteric sclerae. MMM  Resp:  CTA bilaterally, no wheezing or rales. No accessory muscle use  CV:  Regular  rhythm,  No edema  GI:  Soft, Non distended, Non tender.  +Bowel sounds  Neurologic:  Alert and awake, normal speech,   Psych:   Not anxious nor agitated  Skin:  No rashes.   No jaundice    Reviewed most current lab test results and cultures  YES  Reviewed most current radiology test results   YES  Review and summation of old records today    NO  Reviewed patient's current orders and MAR    YES  PMH/SH reviewed - no change compared to H&P          Current Facility-Administered Medications:     metroNIDAZOLE (FLAGYL) tablet 500 mg, 500 mg, Oral, Q12H, Toney Castañeda MD, 500 mg at 04/15/21 0850    levothyroxine (SYNTHROID) tablet 25 mcg, 25 mcg, Oral, 6am, Toney Castañeda MD, 25 mcg at 04/15/21 0600    butalbital-acetaminophen-caffeine (FIORICET, ESGIC) -40 mg per tablet 1 Tab, 1 Tab, Oral, Q6H PRN, Lizzie Nash, NP, 1 Tab at 04/14/21 6490    aspirin chewable tablet 81 mg, 81 mg, Oral, DAILY, Aparna Ashley MD, 81 mg at 04/15/21 0850    sodium chloride (NS) flush 5-40 mL, 5-40 mL, IntraVENous, Q8H, Aparna Ashley MD, 10 mL at 04/15/21 8365    sodium chloride (NS) flush 5-40 mL, 5-40 mL, IntraVENous, PRN, Aparna Little MD, 10 mL at 04/11/21 1728    acetaminophen (TYLENOL) tablet 650 mg, 650 mg, Oral, Q6H PRN, 650 mg at 04/14/21 0159 **OR** acetaminophen (TYLENOL) suppository 650 mg, 650 mg, Rectal, Q6H PRN, Aparna Little MD    polyethylene glycol (MIRALAX) packet 17 g, 17 g, Oral, DAILY PRN, Adriane Fill, Rock Marty MD    promethazine (PHENERGAN) tablet 12.5 mg, 12.5 mg, Oral, Q6H PRN **OR** ondansetron (ZOFRAN) injection 4 mg, 4 mg, IntraVENous, Q6H PRN, Daria Franks MD    heparin (porcine) injection 5,000 Units, 5,000 Units, SubCUTAneous, Q8H, Daria Franks MD, 5,000 Units at 04/15/21 1304  ________________________________________________________________________  Care Plan discussed with:    Comments   Patient y    Family      RN y    Care Manager     Consultant                        Multidiciplinary team rounds were held today with , nursing, pharmacist and clinical coordinator. Patient's plan of care was discussed; medications were reviewed and discharge planning was addressed. ________________________________________________________________________  Total NON critical care TIME:  35   Minutes    Total CRITICAL CARE TIME Spent:   Minutes non procedure based      Comments   >50% of visit spent in counseling and coordination of care     ________________________________________________________________________  Chante Gomes MD     Procedures: see electronic medical records for all procedures/Xrays and details which were not copied into this note but were reviewed prior to creation of Plan. LABS:  I reviewed today's most current labs and imaging studies.   Pertinent labs include:  Recent Labs     04/14/21 0239 04/13/21 0336   WBC 10.1 10.7   HGB 9.6* 9.9*   HCT 28.0* 28.1*    185     Recent Labs     04/15/21  0426 04/14/21 0239 04/13/21 0336    137 135*   K 3.7 3.6 3.6    107 104   CO2 24 25 25   GLU 88 89 83   BUN 38* 44* 50*   CREA 2.60* 3.16* 3.78*   CA 7.6* 7.5* 7.4*   MG  --  1.7  --    PHOS 2.4* 2.5* 2.4*   ALB 1.8* 1.7* 2.0*       Signed: Qiun Junior MD

## 2021-04-15 NOTE — PROGRESS NOTES
Transition of Care Plan:    RUR:18%  Disposition: SNF Placement   Follow up appointments:will follow up with PCP   DME needed:N/A  Transportation at [address]  [facility] [employer] [place] [facility]"" [facility] will transport home   Crystal Lerner or [place] to access home:    Family will have keys to enter home     IM Medicare letter:2nd IM to be given by CM   Caregiver Braeden Proctor- [phone]  Discharge Caregiver contacted prior to discharge? Family will be contacted at d/c       UPDATE: 4:00PM    CM informed that pt has received insurance auth to snf: [facility]. CM informed that admissions coordinator that pt will prepare to transition to snf on 4/16/21. CM will arranged transport for 10A to snf. CM will inform pt/family regarding d/c. CM will inform MD of the following. OCTAVIO Marion, [address] [place] [address]      KALI spoke with liaison from [facility] regarding pt's snf stay. CM informed that pt [employer] is currently pending for snf. INSURANCE AUTH CAN TAKE 24-48HRS. Pt covid test is currently pending.     OCTAVIO Marion, [address] [place] [address]

## 2021-04-15 NOTE — PROGRESS NOTES
Bedside shift change report given to presley GOULD (oncoming nurse) by karol AQUINO (offgoing nurse).  Report included the following information SBAR, Kardex, ED Summary, Procedure Summary, MAR, Recent Results and Cardiac Rhythm NSR     Shift worked:  night   Shift summary and any significant changes:      none         Concerns for physician to address: Justintonya Yasmani phone for oncoming shift:            Patient Information  Suann Boeck  77 y.o.  4/10/2021  6:21 PM by Aparna Paulino, [address] [facility] was admitted from Home     Problem List          Patient Active Problem List     Diagnosis Date Noted    WILLIAN (acute kidney injury) ([facility] 04/11/2021    Hearing loss of left ear 06/03/2019    Paresthesia of both hands 06/04/2018    Gastroesophageal reflux disease without esophagitis 12/01/2017    Hyponatremia 10/04/2016    Hyperglycemia 07/19/2016    Venous insufficiency of both lower extremities 03/29/2016    Chronic right shoulder pain 03/29/2016    Cataract 07/13/2015    Bilateral knee pain 01/12/2015    ED (erectile dysfunction) 01/12/2015    Alcohol dependence ([facility] 12/03/2013    Pedal edema 12/03/2013    Vitamin D deficiency 11/27/2012    Anxiety 12/02/2011    DJD (degenerative joint disease) 05/24/2011    HTN (hypertension) 11/15/2010    Obesity 11/15/2010    S/P gastric bypass 11/15/2010    Plantar fasciitis 11/15/2010              Past Medical History:   Diagnosis Date    DJD (degenerative joint disease)      Hypertension      Obesity           Core Measures:  CVA: No No  CHF:Yes Yes  PNA:No No     Activity:  Activity Level: Up with Assistance  Number times ambulated in hallways past shift: 0  Number of times OOB to chair past shift: 0     Cardiac:   Cardiac Monitoring: Yes      Cardiac Rhythm: Normal sinus rhythm     Access:   Current line(s): PIV   Central Line? No   PICC LINE? No      Genitourinary:   Urinary status: voiding  Urinary Catheter?  No     Respiratory:   O2 Device: None (Room air)  Chronic home O2 use?: NO  Incentive spirometer at bedside: NO     GI:  Last Bowel Movement Date: 04/12/21  Current diet:  DIET NUTRITIONAL SUPPLEMENTS Breakfast, Dinner; Ensure Clear  DIET Clear liquid per pt request  Passing flatus: YES  Tolerating current diet: YES     Pain Management:   Patient states pain is manageable on current regimen: YES     Skin:  Amadou Score: 19  Interventions: increase time out of bed and PT/OT consult    Patient Safety:  Fall Score: Total Score: 4  Interventions: bed/chair alarm, assistive device (walker, cane, etc), gripper socks, pt to call before getting OOB and stay with me (per policy)  High Fall Risk: Yes  @Rollbelt  @dexterity to release roll belt  Yes/No ( must document dexterity  here by stating Yes or No here, otherwise this is a restraint and must follow restraint documentation policy.)     DVT prophylaxis:  DVT prophylaxis Med- Yes  DVT prophylaxis SCD or PEDRO- No      Wounds: (If Applicable)  Wounds- No  Location      Active Consults:  IP CONSULT TO NEPHROLOGY  IP CONSULT TO GASTROENTEROLOGY     Length of Stay:  Expected LOS: 3d 2h  Actual LOS: 2  Discharge Plan: to be determined, probable SNF

## 2021-04-15 NOTE — PROGRESS NOTES
1540- Bedside and Verbal shift change report given to Hannah Rn (oncoming nurse) by Estefani Wright RN (offgoing nurse). Report included the following information SBAR, Kardex, Procedure Summary, MAR, Accordion and Recent Results. 73- Shift assessment completed- see flowsheets. 1910- Bedside and Verbal shift change report given to Kamila Bhakta RN (oncoming nurse) by Juanita Yates RN (offgoing nurse). Report included the following information SBAR, Kardex, Intake/Output, Accordion and Recent Results.

## 2021-04-15 NOTE — PROGRESS NOTES
Problem: Mobility Impaired (Adult and Pediatric)  Goal: *Acute Goals and Plan of Care (Insert Text)  Description: FUNCTIONAL STATUS PRIOR TO ADMISSION: Patient was ambulatory for household distance with RW but reports increased fatigue and lethargy over the past 2 months, has not been eating much and was drinking alcohol on a daily basis    HOME SUPPORT PRIOR TO ADMISSION: Patient lives alone and could perform ADLs indep, has girlfriend who can assist as needed but does not live with him    Physical Therapy Goals  Initiated 4/13/2021  1. Patient will move from supine to sit and sit to supine  in bed with independence within 7 day(s). 2.  Patient will transfer from bed to chair and chair to bed with supervision/set-up using the least restrictive device within 7 day(s). 3.  Patient will perform sit to stand with supervision/set-up within 7 day(s). 4.  Patient will ambulate with supervision/set-up for 150 feet with the least restrictive device within 7 day(s). 5.  Patient will ascend/descend 3 stairs with  handrail(s) with minimal assistance/contact guard assist within 7 day(s). Outcome: Progressing Towards Goal   PHYSICAL THERAPY TREATMENT  Patient: Olivia Fletcher (48 y.o. male)  Date: 4/15/2021  Diagnosis: [facility] [N17.9] <principal problem not specified>       Precautions: Fall, Bed Alarm  Chart, physical therapy assessment, plan of care and goals were reviewed. ASSESSMENT  Patient continues with skilled PT services and is slowly progressing towards goals. Patient requiring increased encouragement to participate in therapy initially with improved participation once up moving. Patient requiring additional time to complete all mobility tasks. He requires min A for bed mobility and min /mod A of 2 for transfers. Once standing he was able to ambulate a short distance in room with CGA. Gait is shuffled and unsteady but no overt LOB noted.  Patient only agreeable to ambulate to/from bathroom and returned to bed at end of session. Patient demonstrates decreased endurance and impaired functional mobility and balance which impairs functional independence. Recommend SNF rehab following discharge to improve overall independence prior to returning to home. Current Level of Function Impacting Discharge (mobility/balance): CGA to mod A  of 1-2           PLAN :  Patient continues to benefit from skilled intervention to address the above impairments. Continue treatment per established plan of care. to address goals. Recommendation for discharge: (in order for the patient to meet his/her long term goals)  Therapy up to 5 days/week in SNF setting    This discharge recommendation:  Has been made in collaboration with the attending provider and/or case management    IF patient discharges home will need the following DME: to be determined (TBD) by rehab       SUBJECTIVE:   Patient stated I'm too weak to go home.     OBJECTIVE DATA SUMMARY:   Critical Behavior:  Neurologic State: Alert  Orientation Level: Oriented X4  Cognition: Appropriate decision making, Appropriate for age attention/concentration, Appropriate safety awareness  Safety/Judgement: Awareness of environment, Fall prevention, Insight into deficits, Home safety  Functional Mobility Training:  Bed Mobility:  Rolling: Contact guard assistance  Supine to Sit: Minimum assistance  Sit to Supine: Contact guard assistance  Scooting: Minimum assistance        Transfers:  Sit to Stand: Minimum assistance; Moderate assistance  Stand to Sit: Minimum assistance                             Balance:  Sitting: Intact  Standing: Impaired  Standing - Static: Fair  Standing - Dynamic : Fair  Ambulation/Gait Training:  Distance (ft): (10 feet x 2)  Assistive Device: Gait belt;Walker, rolling  Ambulation - Level of Assistance: Contact guard assistance;Assist x2        Gait Abnormalities: Decreased step clearance;Shuffling gait;Trunk sway increased Base of Support: Widened     Speed/Mady: Pace decreased (<100 feet/min)  Step Length: Left shortened;Right shortened         Gait is unsteady, shuffled and slow but no overt LOB noted         Activity Tolerance:   Poor and requires rest breaks    After treatment patient left in no apparent distress:   Supine in bed, Call bell within reach, and Bed / chair alarm activated    COMMUNICATION/COLLABORATION:   The patients plan of care was discussed with: Occupational therapist and Registered nurse.      Estelle Garcia, PT   Time Calculation: 26 mins

## 2021-04-15 NOTE — PROGRESS NOTES
Problem: Self Care Deficits Care Plan (Adult)  Goal: *Acute Goals and Plan of Care (Insert Text)  Description: FUNCTIONAL STATUS PRIOR TO ADMISSION: performed ADLS on his own    HOME SUPPORT PRIOR TO ADMISSION: The patient lived alone with girlfriend to provide assistance. Occupational Therapy Goals:  Initiated 4/13/2021  1. Patient will perform grooming standing with modified independence within 7 days. 2. Patient will perform toileting with modified independence within 7 days. 3. Patient will perform upper body dressing and lower body dressing with modified independence within 7 days. 4. Patient will transfer from toilet with modified independence using the least restrictive device and appropriate durable medical equipment within 7 days. Outcome: Progressing Towards Goal   OCCUPATIONAL THERAPY TREATMENT  Patient: Louie Taylor (25 y.o. male)  Date: 4/15/2021  Diagnosis: WILLIAN (acute kidney injury) ([facility] [N17.9] <principal problem not specified>       Precautions: Fall, Bed Alarm  Chart, occupational therapy assessment, plan of care, and goals were reviewed. ASSESSMENT  Patient continues with skilled OT services and is progressing towards goals. Pt reported being tired due to not being able to sleep last night. He was willing to participate with some encouragement. Pt reported feeling that he needed to use the restroom and with sit to stand pt had bout of incontinence of stool. Pt was able to mobilize to bathroom with RW and CGA. Min assist needed for through clean up after BM. Moderate assist needed even with left grab bar to achieve sit to stand due to LE weakness. Improved standing tolerance and pt was able to stand at sink and wash hands with CGA. Assisted pt back to bed at end of session as pt wanted to attempt to sleep.       Current Level of Function Impacting Discharge (ADLs):   Bed Mobility:  Rolling: Contact guard assistance  Supine to Sit: Minimum assistance  Sit to Supine: Contact guard assistance  Scooting: Minimum assistance    Transfers:  Sit to Stand: Minimum assistance; Moderate assistance  Functional Transfers  Bathroom Mobility: Contact guard assistance(with RW)  Toilet Transfer : Moderate assistance(sit to stand)    Grooming  Washing Face: Contact guard assistance(standing at sink)  Washing Hands: Contact guard assistance(standing at sink)      Lower Body Dressing Assistance  Socks: Total assistance (dependent)    Toileting  Bowel Hygiene: Minimum assistance(throughness, seated lateral weight shift)  Clothing Management: Minimum assistance      Other factors to consider for discharge: does not have consistent help at home and lives alone, recent onset of weakness         PLAN :  Patient continues to benefit from skilled intervention to address the above impairments. Continue treatment per established plan of care to address goals. Recommend with staff: OOB to chair and to bathroom for toileting    Recommend next OT session: standing tolerance, sit to stands    Recommendation for discharge: (in order for the patient to meet his/her long term goals)  Therapy up to 5 days/week in SNF setting    This discharge recommendation:  Has been made in collaboration with the attending provider and/or case management    IF patient discharges home will need the following DME: rolling walker       SUBJECTIVE:   Patient stated Maria L Victorinar little thing just wears me out. I will have to go to rehab to be able to go back home and take care of myself.     OBJECTIVE DATA SUMMARY:   Cognitive/Behavioral Status:  Neurologic State: Alert  Orientation Level: Oriented X4  Cognition: Appropriate decision making; Appropriate for age attention/concentration; Appropriate safety awareness  Perception: Appears intact  Perseveration: No perseveration noted  Safety/Judgement: Awareness of environment; Fall prevention; Insight into deficits;Home safety    Functional Mobility and Transfers for ADLs:  Bed Mobility:  Rolling: Contact guard assistance  Supine to Sit: Minimum assistance  Sit to Supine: Contact guard assistance  Scooting: Minimum assistance    Transfers:  Sit to Stand: Minimum assistance; Moderate assistance  Functional Transfers  Bathroom Mobility: Contact guard assistance(with RW)  Toilet Transfer : Moderate assistance(sit to stand)       Balance:  Sitting: Intact  Standing: Impaired  Standing - Static: Fair  Standing - Dynamic : Fair    ADL Intervention:       Grooming  Washing Face: Contact guard assistance(standing at sink)  Washing Hands: Contact guard assistance(standing at sink)      Lower Body Dressing Assistance  Socks: Total assistance (dependent)    Toileting  Bowel Hygiene: Minimum assistance(throughness, seated lateral weight shift)  Clothing Management: Minimum assistance    Cognitive Retraining  Safety/Judgement: Awareness of environment; Fall prevention; Insight into deficits;Home safety          Activity Tolerance:   Fair and requires rest breaks    After treatment patient left in no apparent distress:   Supine in bed, Call bell within reach and Bed / chair alarm activated    COMMUNICATION/COLLABORATION:   The patients plan of care was discussed with: Physical therapist, Registered nurse and patient.      Deb Lee OTR/L  Time Calculation: 26 mins

## 2021-04-15 NOTE — PROGRESS NOTES
Nephrology Progress Note  Daniel Gramajo     www. [facility]  Phone - [phone]   Patient: Idalia Billy    YOB: 1945        Date- 4/15/2021   Admit Date: 4/10/2021  CC: Follow up for acute kidney injury  IMPRESSION & PLAN:   · Acute kidney injury- suspect NSAID use + volume depletion from diuretics, poor po intake along with ACE I use--- no obstruction on CT scan   · Proteinuria- urine pr/cr ratio 1.6 g/g  · Anemia  · hypophosphatemia  · Hyponatremia  · UTI  · Edema  · Hypoalbuminemia  · Weight loss  · Hypertension  · H/o alcohol abuse    PLAN-   No more IV fluids   Hold lisinopril, lasix   Check bmp in am   ABX per Dr. Brittni Storm to d/c renal stand point - labs in 1 week     Subjective: Interval History:   - cr improved to 2.6  No c/o sob,  No c/o chest pain,   No c/o nausea or vomiting, No c/o  fever. Objective:   Vitals:    04/14/21 1920 04/14/21 2325 04/15/21 0304 04/15/21 0753   BP: 116/66 106/74 110/64 109/63   Pulse: 60 62 60 (!) 54   Resp: 18 18 18 18   Temp: 97.6 °F (36.4 °C) 97.8 °F (36.6 °C) 97.4 °F (36.3 °C) 97.6 °F (36.4 °C)   SpO2: 98% 94% 95% 100%   Weight:       Height:          04/14 0701 - 04/15 0700  In: -   Out: 200 [Urine:200]  Last 3 Recorded Weights in this Encounter    04/10/21 1836   Weight: 86.2 kg (190 lb)      Physical exam:   GEN: NAD  NECK- Supple, no mass  RESP: No wheezing, clear b/l  CVS: S1,S2  RRR  NEURO: Normal speech, non focal  EXT: ++ Edema   PSYCH: Normal Mood    Chart reviewed. Pertinent Notes reviewed.      Data Review :  Recent Labs     04/15/21  0426 04/14/21  0239 04/13/21  0336    137 135*   K 3.7 3.6 3.6    107 104   CO2 24 25 25   BUN 38* 44* 50*   CREA 2.60* 3.16* 3.78*   GLU 88 89 83   CA 7.6* 7.5* 7.4*   MG  --  1.7  --    PHOS 2.4* 2.5* 2.4*     Recent Labs     04/14/21  0239 04/13/21  0336   WBC 10.1 10.7   HGB 9.6* 9.9*   HCT 28.0* 28.1*    185     Recent Labs 04/13/21  0336   [facility] 62*   PSAT 82*   FERR 431*      Medication list  reviewed  Current Facility-Administered Medications   Medication Dose Route Frequency    potassium, sodium phosphates (NEUTRA-PHOS) packet 1 Packet  1 Packet Oral ONCE    metroNIDAZOLE (FLAGYL) tablet 500 mg  500 mg Oral Q12H    levothyroxine (SYNTHROID) tablet 25 mcg  25 mcg Oral 6am    butalbital-acetaminophen-caffeine (FIORICET, ESGIC) -40 mg per tablet 1 Tab  1 Tab Oral Q6H PRN    aspirin chewable tablet 81 mg  81 mg Oral DAILY    sodium chloride (NS) flush 5-40 mL  5-40 mL IntraVENous Q8H    sodium chloride (NS) flush 5-40 mL  5-40 mL IntraVENous PRN    acetaminophen (TYLENOL) tablet 650 mg  650 mg Oral Q6H PRN    Or    acetaminophen (TYLENOL) suppository 650 mg  650 mg Rectal Q6H PRN    polyethylene glycol (MIRALAX) packet 17 g  17 g Oral DAILY PRN    promethazine (PHENERGAN) tablet 12.5 mg  12.5 mg Oral Q6H PRN    Or    ondansetron (ZOFRAN) injection 4 mg  4 mg IntraVENous Q6H PRN    heparin (porcine) injection 5,000 Units  5,000 Units SubCUTAneous Q8H          Brian Torres MD              [facility]  [facility], [address]  [place], [address]  Phone - [phone]               Fax - [phone]

## 2021-04-15 NOTE — PROGRESS NOTES
Comprehensive Nutrition Assessment    Type and Reason for Visit: Reassess    Nutrition Recommendations/Plan:   Advance to a regular diet     Nutrition Assessment:     Chart reviewed; medically noted for Colitis, WILLIAN, and UTI. Patient currently receiving a clear liquid diet. He reports he originally asked for his diet to be changed as that \"other\" diet wasn't any good; patient was prviouosly on a regular diet. He stated he wanted a grilled cheese and tomato soup but I educated patient on the clear liquid diet and that a grilled cheese/tomato soup wouldn't be allowed. He dislikes the ensure clear supplements so will discontinue; he is not interested in trying other supplements. He is hopeful to get to rehab today/tomorrow. Encouraged PO intake of meals and discussed the importance of nutrition; patient verbalized understanding. Malnutrition Assessment:  Malnutrition Status: Moderate malnutrition    Context:  Chronic illness     Findings of the 6 clinical characteristics of malnutrition:   Energy Intake:  Mild decrease in energy intake (specify)  Weight Loss:  No significant weight loss     Body Fat Loss:  1 - Mild body fat loss,     Muscle Mass Loss:  1 - Mild muscle mass loss,    Fluid Accumulation:  1 - Mild,     Strength:  Not performed     Estimated Daily Nutrient Needs:  Energy (kcal): 2082 kcal (BMR 1601 x 1. 3AF); Weight Used for Energy Requirements: Current  Protein (g): 86-103g (1.0-1.2 g/kg bw); Weight Used for Protein Requirements: Current  Fluid (ml/day): 2100 mL; Method Used for Fluid Requirements: 1 ml/kcal    Nutrition Related Findings:       BM 4/12  3+ edema  Synthroid, Neutra-phos  Phos 2.4, BG 82-61-55-76    Wounds:    None       Current Nutrition Therapies:  DIET CLEAR LIQUID    Anthropometric Measures:  · Height:  5' 10\" (177.8 cm)  · Current Body Wt:  86.2 kg (190 lb 0.6 oz)     · BMI Category: Overweight (BMI 25.0-29. 9)       Nutrition Diagnosis:   · Inadequate protein-energy intake related to (poor appetite) as evidenced by intake 0-25%, weight loss, mild loss of subcutaneous fat, mild muscle loss    Nutrition Interventions:   Food and/or Nutrient Delivery: Modify current diet, Discontinue oral nutrition supplement  Nutrition Education and Counseling: No recommendations at this time  Coordination of Nutrition Care: No recommendation at this time    Goals:  PO intake at least 50% of meals/snacks next 2-4 days       Nutrition Monitoring and Evaluation:   Behavioral-Environmental Outcomes: None identified  Food/Nutrient Intake Outcomes: Diet advancement/tolerance, Food and nutrient intake  Physical Signs/Symptoms Outcomes: Biochemical data, GI status, Weight    Discharge Planning:    (Regular diet)     Electronically signed by Mariely Navarro RD on 4/15/2021 at 2:48 PM    Contact: ext [phone]

## 2021-04-16 VITALS
RESPIRATION RATE: 16 BRPM | TEMPERATURE: 97.7 F | HEIGHT: 70 IN | WEIGHT: 190 LBS | HEART RATE: 55 BPM | BODY MASS INDEX: 27.2 KG/M2 | OXYGEN SATURATION: 99 % | DIASTOLIC BLOOD PRESSURE: 67 MMHG | SYSTOLIC BLOOD PRESSURE: 111 MMHG

## 2021-04-16 LAB
ALBUMIN SERPL-MCNC: 1.7 G/DL (ref 3.5–5)
ANA SER QL: NEGATIVE
ANION GAP SERPL CALC-SCNC: 5 MMOL/L (ref 5–15)
BASOPHILS # BLD: 0 K/UL (ref 0–0.1)
BASOPHILS NFR BLD: 0 % (ref 0–1)
BUN SERPL-MCNC: 32 MG/DL (ref 6–20)
BUN/CREAT SERPL: 15 (ref 12–20)
C3 SERPL-MCNC: 79 MG/DL (ref 82–167)
C4 SERPL-MCNC: 20 MG/DL (ref 12–38)
CALCIUM SERPL-MCNC: 7.8 MG/DL (ref 8.5–10.1)
CHLORIDE SERPL-SCNC: 107 MMOL/L (ref 97–108)
CO2 SERPL-SCNC: 24 MMOL/L (ref 21–32)
CREAT SERPL-MCNC: 2.19 MG/DL (ref 0.7–1.3)
DIFFERENTIAL METHOD BLD: ABNORMAL
EOSINOPHIL # BLD: 0.2 K/UL (ref 0–0.4)
EOSINOPHIL NFR BLD: 2 % (ref 0–7)
ERYTHROCYTE [DISTWIDTH] IN BLOOD BY AUTOMATED COUNT: 14.1 % (ref 11.5–14.5)
GLUCOSE SERPL-MCNC: 88 MG/DL (ref 65–100)
HCT VFR BLD AUTO: 27.2 % (ref 36.6–50.3)
HGB BLD-MCNC: 9.5 G/DL (ref 12.1–17)
IMM GRANULOCYTES # BLD AUTO: 0 K/UL (ref 0–0.04)
IMM GRANULOCYTES NFR BLD AUTO: 0 % (ref 0–0.5)
LYMPHOCYTES # BLD: 1.4 K/UL (ref 0.8–3.5)
LYMPHOCYTES NFR BLD: 12 % (ref 12–49)
MCH RBC QN AUTO: 34.2 PG (ref 26–34)
MCHC RBC AUTO-ENTMCNC: 34.9 G/DL (ref 30–36.5)
MCV RBC AUTO: 97.8 FL (ref 80–99)
MONOCYTES # BLD: 0.7 K/UL (ref 0–1)
MONOCYTES NFR BLD: 6 % (ref 5–13)
NEUTS BAND NFR BLD MANUAL: 2 %
NEUTS SEG # BLD: 9.5 K/UL (ref 1.8–8)
NEUTS SEG NFR BLD: 78 % (ref 32–75)
NRBC # BLD: 0 K/UL (ref 0–0.01)
NRBC BLD-RTO: 0 PER 100 WBC
PHOSPHATE SERPL-MCNC: 2.7 MG/DL (ref 2.6–4.7)
PLATELET # BLD AUTO: 291 K/UL (ref 150–400)
PMV BLD AUTO: 10.7 FL (ref 8.9–12.9)
POTASSIUM SERPL-SCNC: 3.8 MMOL/L (ref 3.5–5.1)
RBC # BLD AUTO: 2.78 M/UL (ref 4.1–5.7)
RBC MORPH BLD: ABNORMAL
RBC MORPH BLD: ABNORMAL
SODIUM SERPL-SCNC: 136 MMOL/L (ref 136–145)
WBC # BLD AUTO: 11.8 K/UL (ref 4.1–11.1)

## 2021-04-16 PROCEDURE — 94760 N-INVAS EAR/PLS OXIMETRY 1: CPT

## 2021-04-16 PROCEDURE — 74011250636 HC RX REV CODE- 250/636: Performed by: INTERNAL MEDICINE

## 2021-04-16 PROCEDURE — 36415 COLL VENOUS BLD VENIPUNCTURE: CPT

## 2021-04-16 PROCEDURE — 80069 RENAL FUNCTION PANEL: CPT

## 2021-04-16 PROCEDURE — 74011250637 HC RX REV CODE- 250/637: Performed by: NURSE PRACTITIONER

## 2021-04-16 PROCEDURE — 74011250637 HC RX REV CODE- 250/637: Performed by: INTERNAL MEDICINE

## 2021-04-16 PROCEDURE — 85025 COMPLETE CBC W/AUTO DIFF WBC: CPT

## 2021-04-16 RX ORDER — BUTALBITAL, ACETAMINOPHEN AND CAFFEINE 50; 325; 40 MG/1; MG/1; MG/1
1 TABLET ORAL
Qty: 10 TAB | Refills: 0 | Status: SHIPPED | OUTPATIENT
Start: 2021-04-16 | End: 2021-04-16 | Stop reason: SDUPTHER

## 2021-04-16 RX ORDER — FUROSEMIDE 20 MG/1
40 TABLET ORAL DAILY
Qty: 60 TAB | Refills: 0 | Status: SHIPPED | OUTPATIENT
Start: 2021-04-16 | End: 2021-05-16

## 2021-04-16 RX ORDER — LEVOTHYROXINE SODIUM 25 UG/1
25 TABLET ORAL
Qty: 30 TAB | Refills: 0 | Status: SHIPPED | OUTPATIENT
Start: 2021-04-17 | End: 2021-05-17

## 2021-04-16 RX ORDER — BUTALBITAL, ACETAMINOPHEN AND CAFFEINE 50; 325; 40 MG/1; MG/1; MG/1
1 TABLET ORAL
Qty: 10 TAB | Refills: 0 | Status: SHIPPED | OUTPATIENT
Start: 2021-04-16 | End: 2021-04-23

## 2021-04-16 RX ADMIN — HEPARIN SODIUM 5000 UNITS: 5000 INJECTION INTRAVENOUS; SUBCUTANEOUS at 06:06

## 2021-04-16 RX ADMIN — METRONIDAZOLE 500 MG: 250 TABLET ORAL at 08:55

## 2021-04-16 RX ADMIN — ASPIRIN 81 MG: 81 TABLET, CHEWABLE ORAL at 08:55

## 2021-04-16 RX ADMIN — BUTALBITAL, ACETAMINOPHEN, AND CAFFEINE 1 TABLET: 50; 325; 40 TABLET ORAL at 03:38

## 2021-04-16 RX ADMIN — LEVOTHYROXINE SODIUM 25 MCG: 0.03 TABLET ORAL at 06:06

## 2021-04-16 RX ADMIN — Medication 10 ML: at 06:09

## 2021-04-16 NOTE — PROGRESS NOTES
Nephrology Progress Note  Daniel Gramajo     www. [facility]  Phone - [phone]   Patient: Leila Hagan    YOB: 1945        Date- 4/16/2021   Admit Date: 4/10/2021  CC: Follow up for megan  IMPRESSION & PLAN:   · megan- suspect NSAID use + volume depletion from diuretics, poor po intake along with ACE I use--- no obstruction on CT scan   · Edema  · Proteinuria- urine pr/cr ratio 1.6 g/g  · Anemia  · hypophosphatemia  · Hyponatremia  · UTI  · Hypoalbuminemia  · Weight loss  · Hypertension  · H/o alcohol abuse    PLAN-   Restart lasix    Hold lisinopril   Okay to d/c renal stand point - labs in 1 week     Subjective: Interval History:   - cr improved to 2.2  Leg edema +  No c/o sob,  No c/o chest pain,   No c/o nausea or vomiting, No c/o  fever. Objective:   Vitals:    04/15/21 1929 04/15/21 2307 04/16/21 0335 04/16/21 0742   BP: 105/67 (!) 101/51 121/73 111/67   Pulse: 67 61 69 (!) 55   Resp: 19 18 18 16   Temp: 97.4 °F (36.3 °C) 97.6 °F (36.4 °C) 97.4 °F (36.3 °C) 97.7 °F (36.5 °C)   SpO2: 100% 97% 97% 99%   Weight:       Height:          04/15 0701 - 04/16 0700  In: -   Out: 200 [Urine:200]  Last 3 Recorded Weights in this Encounter    04/10/21 1836   Weight: 86.2 kg (190 lb)      Physical exam:   GEN: NAD  NECK- Supple, no mass  RESP: No wheezing, clear b/l  CVS: S1,S2  RRR  NEURO: Normal speech, non focal  EXT: ++ Edema       Chart reviewed. Pertinent Notes reviewed. Data Review :  Recent Labs     04/16/21  0348 04/15/21  0426 04/14/21  0239    138 137   K 3.8 3.7 3.6    107 107   CO2 24 24 25   BUN 32* 38* 44*   CREA 2.19* 2.60* 3.16*   GLU 88 88 89   CA 7.8* 7.6* 7.5*   MG  --   --  1.7   PHOS 2.7 2.4* 2.5*     Recent Labs     04/16/21  0348 04/14/21  0239   WBC 11.8* 10.1   HGB 9.5* 9.6*   HCT 27.2* 28.0*    201     No results for input(s): FE, TIBC, PSAT, FERR in the last 72 hours.    Medication list  reviewed  Current Facility-Administered Medications   Medication Dose Route Frequency    metroNIDAZOLE (FLAGYL) tablet 500 mg  500 mg Oral Q12H    levothyroxine (SYNTHROID) tablet 25 mcg  25 mcg Oral 6am    butalbital-acetaminophen-caffeine (FIORICET, ESGIC) -40 mg per tablet 1 Tab  1 Tab Oral Q6H PRN    aspirin chewable tablet 81 mg  81 mg Oral DAILY    sodium chloride (NS) flush 5-40 mL  5-40 mL IntraVENous Q8H    sodium chloride (NS) flush 5-40 mL  5-40 mL IntraVENous PRN    acetaminophen (TYLENOL) tablet 650 mg  650 mg Oral Q6H PRN    Or    acetaminophen (TYLENOL) suppository 650 mg  650 mg Rectal Q6H PRN    polyethylene glycol (MIRALAX) packet 17 g  17 g Oral DAILY PRN    promethazine (PHENERGAN) tablet 12.5 mg  12.5 mg Oral Q6H PRN    Or    ondansetron (ZOFRAN) injection 4 mg  4 mg IntraVENous Q6H PRN    heparin (porcine) injection 5,000 Units  5,000 Units SubCUTAneous Q8H          Meme Chau MD              [facility]  [facility] [address]  [place], [address]  Phone - [phone]               Fax - [phone]

## 2021-04-16 NOTE — PROGRESS NOTES
Bedside shift change report given to presley GOULD (oncoming nurse) by Claudia AQUINO (offgoing nurse).  Report included the following information SBAR, Kardex, ED Summary, Procedure Summary, MAR, Recent Results and Cardiac Rhythm NSR     Shift worked:  night   Shift summary and any significant changes:      none         Concerns for physician to address: Chris William phone for oncoming shift:   [phone]         Patient Information  Vanessa Martell  77 y.o.  4/10/2021  6:21 PM by Aparna Mcintyre, [address] [facility] [[email or web address]] Joseph was admitted from Home     Problem List          Patient Active Problem List     Diagnosis Date Noted    WILLIAN (acute kidney injury) ([facility] [address] [facility] 04/11/2021    Hearing loss of left ear 06/03/2019    Paresthesia of both hands 06/04/2018    Gastroesophageal reflux disease without esophagitis 12/01/2017    Hyponatremia 10/04/2016    Hyperglycemia 07/19/2016    Venous insufficiency of both lower extremities 03/29/2016    Chronic right shoulder pain 03/29/2016    Cataract 07/13/2015    Bilateral knee pain 01/12/2015    ED (erectile dysfunction) 01/12/2015    Alcohol dependence ([facility] [address] 12/03/2013    Pedal edema 12/03/2013    Vitamin D deficiency 11/27/2012    Anxiety 12/02/2011    DJD (degenerative joint disease) 05/24/2011    HTN (hypertension) 11/15/2010    Obesity 11/15/2010    S/P gastric bypass 11/15/2010    Plantar fasciitis 11/15/2010              Past Medical History:   Diagnosis Date    DJD (degenerative joint disease)      Hypertension      Obesity           Core Measures:  CVA: No No  CHF:Yes Yes  PNA:No No     Activity:  Activity Level: Up with Assistance  Number times ambulated in hallways past shift: 0  Number of times OOB to chair past shift: 0     Cardiac:   Cardiac Monitoring: Yes      Cardiac Rhythm: Normal sinus rhythm     Access:   Current line(s): PIV   Central Line? No   PICC LINE? No      Genitourinary:   Urinary status: voiding  Urinary Catheter?  No     Respiratory:   O2 Device: None (Room air)  Chronic home O2 use?: NO  Incentive spirometer at bedside: NO     GI:  Last Bowel Movement Date: 04/12/21  Current diet:  DIET NUTRITIONAL SUPPLEMENTS Breakfast, Dinner; Ensure Clear  DIET Clear liquid per pt request  Passing flatus: YES  Tolerating current diet: YES     Pain Management:   Patient states pain is manageable on current regimen: YES     Skin:  Amadou Score: 19  Interventions: increase time out of bed and PT/OT consult    Patient Safety:  Fall Score: Total Score: 4  Interventions: bed/chair alarm, assistive device (walker, cane, etc), gripper socks, pt to call before getting OOB and stay with me (per policy)  High Fall Risk: Yes  @Rollbelt  @dexterity to release roll belt  Yes/No ( must document dexterity  here by stating Yes or No here, otherwise this is a restraint and must follow restraint documentation policy.)     DVT prophylaxis:  DVT prophylaxis Med- Yes  DVT prophylaxis SCD or PEDRO- No      Wounds: (If Applicable)  Wounds- No  Location      Active Consults:  IP CONSULT TO NEPHROLOGY  IP CONSULT TO GASTROENTEROLOGY     Length of Stay:  Expected LOS: 3d 2h  Actual LOS: 2  Discharge Plan: to be determined, probable SNF

## 2021-04-16 NOTE — DISCHARGE SUMMARY
Hospitalist Discharge Summary     Patient ID:  Rachel Jay  [id number]  77 y.o.  1945  4/10/2021    PCP on record: Oscar Munoz DO    Admit date: 4/10/2021  Discharge date and time: 4/16/2021    DISCHARGE DIAGNOSIS:    Acute kidney injury multifactorial from volume depletion, diuretics and also nonsteroidal use  And proteinuria  E coli urinary tract infection  Mild ascending colitis on CT scan  Hypophosphatemia  Normocytic anemia  Chronic alcoholism  Hypertension  Chronic fatigue  Mild transaminitis  Mild troponin elevation likely demand ischemia  Sinus bradycardia  Hypothyroidism new onset      CONSULTATIONS:  IP CONSULT TO NEPHROLOGY  IP CONSULT TO GASTROENTEROLOGY    Excerpted HPI from H&P of Parmjit Grider MD:  Rhea Robertson is a 77 y.o.  male who presents with generalized body weakness and weight loss. Patient said that he sometimes have chest pain when he exerts. Otherwise patient noted that he has been losing weight over the last few months. He lost up to 40 pounds. He lost appetite. Patient admitted that he has been drinking daily. Otherwise patient denied any urinary or bowel habit changes. No fever or chills. No chest pain at this point or no shortness of breath when I examined the patient.    ______________________________________________________________________  DISCHARGE SUMMARY/HOSPITAL COURSE:  for full details see H&P, daily progress notes, labs, consult notes.      Acute kidney injury multifactorial from volume depletion, diuretics and also nonsteroidal use  And proteinuria  -CT shows no evidence of obstruction  -Stop IV fluids due to lower extremity edema  -Continue to hold lisinopril and Lasix  -cr is improving and down to 2.6  -Protein creatinine ratio is 1.6.  -Check BMP in a.m.  -Renal following and appreciate recommendations  -Nephrotic syndrome work-up including GEENA, complement levels SPEP pending  -SARS-CoV-2 negative     E coli urinary tract infection  -Continue ceftriaxone until 4/15      Mild ascending colitis on CT scan  -Patient reports diarrhea only with Ensure  -Continue ceftriaxone and Flagyl  -GI recs noted  -Consider checking stool studies if diarrhea persists  -We will need outpatient follow-up with GI  -Advance diet as tolerated     Hypophosphatemia  -Replaced with phosphorus. Recheck in a.m.     Normocytic anemia  -Baseline hemoglobin is 11 and currently hemoglobin is 8.9  -B12 normal and No evidence of REJI     Chronic alcoholism  -No signs or symptoms of alcohol withdrawal currently     Hypertension  Chronic fatigue  -Holding all antihypertensives due to borderline low blood pressure     Mild transaminitis  -Lft's resolved     Mild troponin elevation likely demand ischemia  -No further work-up     Sinus bradycardia  -Telemetry monitoring.       Hypothyroidism new onset  -T4 is low and TSH is high  -Start levothyroxine. We will repeat thyroid function testing in 6 weeks              _______________________________________________________________________  Patient seen and examined by me on discharge day. Pertinent Findings:  Gen:    Not in distress  Chest: Clear lungs  CVS:   Regular rhythm. No edema  Abd:  Soft, not distended, not tender  Neuro:  Alert, awake  _______________________________________________________________________  DISCHARGE MEDICATIONS:   Current Discharge Medication List      START taking these medications    Details   levothyroxine (SYNTHROID) 25 mcg tablet Take 1 Tab by mouth every morning for 30 days. Qty: 30 Tab, Refills: 0         CONTINUE these medications which have NOT CHANGED    Details   sucralfate (CARAFATE) 1 gram tablet Take 1 g by mouth four (4) times daily. Ac and at H.S      ergocalciferol (Vitamin D2) 1,250 mcg (50,000 unit) capsule Take 50,000 Units by mouth two (2) times a week. LAST TAKEN April 5,2021      pantoprazole (Protonix) 40 mg tablet Take 40 mg by mouth daily.       folic acid (FOLVITE) 800 mcg tablet Take 800 mcg by mouth daily. multivitamin (ONE A DAY) tablet Take 1 Tab by mouth daily. furosemide (LASIX) 20 mg tablet TAKE 1 TABLET BY MOUTH EVERY DAY AS NEEDED FOR LEG SWELLING  Qty: 30 Tab, Refills: 5      aspirin 81 mg chewable tablet Take 81 mg by mouth daily. cyanocobalamin (VITAMIN B-12) 1,000 mcg tablet Take  by mouth daily. Takes unknown dose      ascorbic acid (VITAMIN C) 500 mg tablet Take  by mouth daily. Takes unknown dose         STOP taking these medications       lisinopriL (PRINIVIL, ZESTRIL) 10 mg tablet Comments:   Reason for Stopping:                 Patient Follow Up Instructions:     1. Recommended diet: Cardiac    2. Recommended activity: Activity as tolerated    3. If you experience any of the following symptoms then please call your primary care physician or return to the emergency room if you cannot get hold of your doctor:    4. Wound Care: None    5. Lab work: Cbc and Bmp in 1 week. Thyroid function testing in 6 weeks    6.  Stop alcohol        Follow-up Information     Follow up With Specialties Details Why Contact [address] [facility], [facility] Internal Medicine   [address] [place] [address] [address] Fredis Rossi MD Nephrology Schedule an appointment as soon as possible for a visit in 1 week  [address] [facility]      Angelo Sánchez MD Gastroenterology Schedule an appointment as soon as possible for a visit in 1 week  Lu Wilson[address]  [phone]          ________________________________________________________________    Risk of deterioration: High    Condition at Discharge: Stable  __________________________________________________________________    Disposition  SNF    ____________________________________________________________________    Code Status: DNR/DNI  ___________________________________________________________________      Total time in minutes spent coordinating this discharge (includes going over instructions, follow-up, prescriptions, and preparing report for sign off to her PCP) :  35  minutes    Signed:  Gerber Pineda MD

## 2021-04-16 NOTE — DISCHARGE INSTRUCTIONS
Patient Discharge Instructions    Wylie Schlatter / [id number] : 1945    Admitted 4/10/2021 Discharged: 2021         DISCHARGE DIAGNOSIS:   Acute kidney injury multifactorial from volume depletion, diuretics and also nonsteroidal use  And proteinuria  E coli urinary tract infection  Mild ascending colitis on CT scan  Hypophosphatemia  Normocytic anemia  Chronic alcoholism  Hypertension  Chronic fatigue  Mild transaminitis  Mild troponin elevation likely demand ischemia  Sinus bradycardia  Hypothyroidism new onset               Take Home Medications     {Medication reconciliation information is now added to the patient's AVS automatically when it is printed. There is no need to use this SmartLink in discharge instructions. Highlight this text and delete it to clear this message}      General drug facts      If you have a very bad allergy, wear an allergy ID at all times.  It is important that you take the medication exactly as they are prescribed.  Keep your medication in the bottles provided by the pharmacist.  [employer] a list of all your drugs (prescription, natural products, vitamins, OTC) with you. Give this list to your doctor.  Do not take other medications without consulting your doctor.   Do not share your drugs with others and do not take anyone else's drugs.  Keep all drugs out of the reach of children and pets.   Most drugs may be thrown away in household trash after mixing with coffee grounds or caro litter and sealing in a plastic bag.   Keep a list Call your doctor for help with any side effects. If in the [place] you may also call the FDA at 4-208-FDA-9321     Talk with the doctor before starting any new drug, including OTC, natural products, or vitamins. What to do at Home    1. Recommended diet: Cardiac    2. Recommended activity: Activity as tolerated    3.  If you experience any of the following symptoms then please call your primary care physician or return to the emergency room if you cannot get hold of your doctor:    4. Wound Care: None    5. Lab work: Cbc and Bmp in 1 week. Thyroid function testing in 6 weeks    6. Stop alcohol    7. Bring these papers with you to your follow up appointments. The papers will help your doctors be sure to continue the care plan from the hospital.      If you have questions regarding the hospital related prescriptions or hospital related issues please call [facility] at [phone]. You can always direct your questions to your primary care doctor if you are unable to reach your hospital physician; your PCP works as an extension of your hospital doctor just like your hospital doctor is an extension of your PCP for your time at the hospital [facility], [facility])      Follow-up with:   PCP: Misty Schneider, DO  Follow-up Information     Follow up With Specialties Details Why Contact [address] [facility]    Misyt Schneider [place] [facility]   [address] [place] [address] [address] [facility]  [phone]      Yohana Jennings MD Nephrology Schedule an appointment as soon as possible for a visit in 1 week  [facility] 701 [facility] Karli Laboy MD Gastroenterology Schedule an appointment as soon as possible for a visit in 1 week  [facility]  132 [facility]  [phone]             Please call for your own appointment        Information obtained by :  I understand that if any problems occur once I am at home I am to contact my physician. I understand and acknowledge receipt of the instructions indicated above.                                                                                                                                            Physician's or R.N.'s Signature Date/Time                                                                                                                                              Patient or Representative Signature                                                          Date/Time

## 2021-04-16 NOTE — PROGRESS NOTES
Transition of Care Plan to SNF/Rehab    Communication to Patient/Family:  Met with patient and family and they are agreeable to the transition plan. The Plan for Transition of Care is related to the following treatment goals:     CM attempted to contact pt's girlfriend: Maria Del Rosario Wood, via telephone to inform her of the following d/c and transition to snf: [address] [place] [address],[address]. However, attempt was unsuccessful and CM left voicemail requesting a return call. CM informed pt that he will be d/c on today and will be transported to snf today at 10A. The Patient and/or patient representative was provided with a choice of provider and agrees  with the discharge plan. Yes [x] No []    A Freedom of choice list was provided with basic dialogue that supports the patient's individualized plan of care/goals and shares the quality data associated with the providers. Yes [x] No []    SNF/Rehab Transition:  Patient has been accepted to [address] [place] [address] SNF/Rehab and meets criteria for admission. Patient will transported by [facility] and expected to leave at 10A. Communication to SNF/Rehab:  Bedside RN, Ramona Bloom , has been notified to update the transition plan to the facility and call report (). Discharge information has been updated on the AVS. And communicated to facility via [employer], or CC link. Discharge instructions to be fax'd to facility at [facility] #). Patient has been identified as part of the  [employer].  For Care Coordination associated with that Bundle Program, please contact   Bundle information has been communication to . Nursing Please include all hard scripts for controlled substances, med rec and dc summary, and AVS in packet.      Reviewed and confirmed with facility, [address] [place] [address], can manage the patient care needs for the following:     Rick Schreiber with (X) only those applicable:  Medication:  [x]Medications are available at the facility  []IV Antibiotics    []Controlled Substance  hard copies available sent. []Weekly Labs    Equipment:  []CPAP/BiPAP  []Wound Vacuum  []Shah or Urinary Device  []PICC/Central Line  []Nebulizer  []Ventilator    Treatment:  []Isolation (for MRSA, VRE, etc.)  []Surgical Drain Management  []Tracheostomy Care  []Dressing Changes  []Dialysis with transportation  []PEG Care  []Oxygen  []Daily Weights for Heart Failure    Dietary:  []Any diet limitations  []Tube Feedings   []Total Parenteral Management (TPN)    Financial Resources:  []Medicaid Application Completed    []UAI Completed and copy given to pt/family  and copy given to pt/family  []A screening has previously been completed. []Level II Completed    [] Private pay individual who will not become   financially eligible for Medicaid within 6 months from admission to a [address] [place] [address]. [] Individual refused to have screening conducted. []Medicaid Application Completed    [x]The screening denied because it was determined individual did not need/did not qualify for nursing facility level of care. [] Out of state residents seeking direct admission to a [address] facility. [] Individuals who are inpatients of an out of state hospital, or in state or out of state veterans/ hospital and seek direct admission to a [address] facility  [] Individuals who are pateints or residents of a state owned/operated facility that is licensed by [employer] of [employer] and seek direct admission to [address] [place] [address]  [] A screening not required for enrollment in [address] [address] [address] services as set out in [address] [place] [address] 24-  [] [facility]) staff shall perform screenings of the [facility] clients. Advanced Care Plan:  []Surrogate Decision Maker of Care  []POA  []Communicated Code Status and copy sent.     Other:

## 2021-04-19 LAB
ALBUMIN SERPL ELPH-MCNC: 2.1 G/DL (ref 2.9–4.4)
ALBUMIN/GLOB SERPL: 1.1 {RATIO} (ref 0.7–1.7)
ALPHA1 GLOB SERPL ELPH-MCNC: 0.2 G/DL (ref 0–0.4)
ALPHA2 GLOB SERPL ELPH-MCNC: 0.8 G/DL (ref 0.4–1)
B-GLOBULIN SERPL ELPH-MCNC: 0.5 G/DL (ref 0.7–1.3)
GAMMA GLOB SERPL ELPH-MCNC: 0.6 G/DL (ref 0.4–1.8)
GLOBULIN SER-MCNC: 2.1 G/DL (ref 2.2–3.9)
IGA SERPL-MCNC: 298 MG/DL (ref 61–437)
IGG SERPL-MCNC: 643 MG/DL (ref 603–1613)
IGM SERPL-MCNC: 61 MG/DL (ref 15–143)
INTERPRETATION SERPL IEP-IMP: ABNORMAL
KAPPA LC FREE SER-MCNC: 79.3 MG/L (ref 3.3–19.4)
KAPPA LC FREE/LAMBDA FREE SER: 1.03 {RATIO} (ref 0.26–1.65)
LAMBDA LC FREE SERPL-MCNC: 77.3 MG/L (ref 5.7–26.3)
M PROTEIN SERPL ELPH-MCNC: ABNORMAL G/DL
PROT SERPL-MCNC: 4.2 G/DL (ref 6–8.5)

## 2021-04-19 NOTE — PROGRESS NOTES
Physician Progress Note      PATIENT:               Sandy Villagran  CSN #:                  [id number]  :                       1945  ADMIT DATE:       4/10/2021 6:21 PM  [address] [place] DATE:        2021 10:57 AM  RESPONDING  PROVIDER #:        Nae Ramirez MD          QUERY TEXT:    Patient admitted with WILLIAN. If possible, please document in progress notes and discharge summary if you are evaluating and /or treating any of the following: The medical record reflects the following:  Risk Factors: 77 M w/ hx ETOH abuse  Clinical Indicators: Per  Dietician PN Moderate malnutrition in context of chronic illness as evidenced by mild decrease in energy intake, mild body fat loss, mild muscle mass loss, and mild fluid accumulation  Treatment: Clear liquid diet, nutritional supplements, dietician consult    Please call with questions. Thank you. [email or web address]  [phone]  Options provided:  -- Protein calorie malnutrition mild  -- Protein calorie malnutrition moderate  -- Protein calorie malnutrition severe  -- Other - I will add my own diagnosis  -- Disagree - Not applicable / Not valid  -- Disagree - Clinically unable to determine / Unknown  -- Refer to Clinical Documentation Reviewer    PROVIDER RESPONSE TEXT:    This patient has mild protein calorie malnutrition.     Query created by: Isamar Price on 2021 10:27 AM      Electronically signed by:  Nae Ramirez MD 2021 7:55 AM

## 2021-05-11 ENCOUNTER — HOME HEALTH ADMISSION (OUTPATIENT)
Dept: HOME HEALTH SERVICES | Facility: HOME HEALTH | Age: 76
End: 2021-05-11

## 2021-10-19 NOTE — PROGRESS NOTES
ADVISED PATIENT OF THE FOLLOWING HEALTH MAINTAINCE DUE  Health Maintenance Due   Topic Date Due    Colonoscopy  03/24/1964    Shingrix Vaccine Age 50> (1 of 2) 03/24/1996    GLAUCOMA SCREENING Q2Y  01/25/2019    Influenza Age 5 to Adult  08/01/2020      Chief Complaint   Patient presents with    Hypertension    GERD    Pain (Chronic)     needs refills     Obesity       1. Have you been to the ER, urgent care clinic since your last visit? Hospitalized since your last visit? No    2. Have you seen or consulted any other health care providers outside of the [address] [place] [address] since your last visit? Include any DEXA scan, mammography  or colon screening. No    3. Do you have an Advance Directive on file? no    4. Do you have a DNR on file? no    Patient is accompanied by self I have received verbal consent from Kareen Landa to discuss any/all medical information while they are present in the room. Advance Care Planning 9/29/2016   Patient's Healthcare Decision Maker is: Legal Next of Kin   Primary Decision Maker Name Dorothea Marmolejo   Primary Decision Maker Phone Number [phone]   Primary Decision Maker Relationship to Patient Sibling   Secondary Decision Maker Name Alejandro Sharma    Secondary Decision Maker Phone Number [phone]   Secondary Decision Maker Relationship to Patient Boyfriend/girlfriend   Confirm Advance Directive Yes, not on file         [id number], [address], [address] Jocelyne Jermaine Cosme [address] [facility] [address]  Phone: [phone] Fax: [phone] - 4201 [facility], [address] [place] [address] AT [address] [place] [address] 68. [phone]  Phone: [phone] Fax: [phone]        Patient reminded during visit to bring all medication bottles, OTC medications to all appointments. I have return Jensen with pharmacy phone call back requesting further info. On patient Ambien 10, their was no directions on the rx. Jensen transfer me to speak with a pharmacy.they just needed to verify her rx. Call was completed.

## 2021-12-16 ENCOUNTER — HOSPITAL ENCOUNTER (OUTPATIENT)
Dept: PREADMISSION TESTING | Age: 76
Discharge: HOME | End: 2021-12-16
Payer: MEDICARE

## 2021-12-16 PROCEDURE — U0005 INFEC AGEN DETEC AMPLI PROBE: HCPCS

## 2021-12-17 LAB
SARS-COV-2, XPLCVT: NOT DETECTED
SOURCE, COVRS: NORMAL

## 2021-12-20 ENCOUNTER — HOSPITAL ENCOUNTER (OUTPATIENT)
Age: 76
Setting detail: OUTPATIENT SURGERY
Discharge: HOME | End: 2021-12-20
Attending: INTERNAL MEDICINE | Admitting: INTERNAL MEDICINE
Payer: MEDICARE

## 2021-12-20 ENCOUNTER — ANESTHESIA EVENT (OUTPATIENT)
Dept: ENDOSCOPY | Age: 76
End: 2021-12-20
Payer: MEDICARE

## 2021-12-20 ENCOUNTER — ANESTHESIA (OUTPATIENT)
Dept: ENDOSCOPY | Age: 76
End: 2021-12-20
Payer: MEDICARE

## 2021-12-20 VITALS
WEIGHT: 154 LBS | OXYGEN SATURATION: 100 % | HEIGHT: 70 IN | BODY MASS INDEX: 22.05 KG/M2 | RESPIRATION RATE: 16 BRPM | HEART RATE: 52 BPM | DIASTOLIC BLOOD PRESSURE: 67 MMHG | SYSTOLIC BLOOD PRESSURE: 131 MMHG | TEMPERATURE: 97.2 F

## 2021-12-20 PROCEDURE — 74011250636 HC RX REV CODE- 250/636: Performed by: INTERNAL MEDICINE

## 2021-12-20 PROCEDURE — 74011250636 HC RX REV CODE- 250/636: Performed by: ANESTHESIOLOGY

## 2021-12-20 PROCEDURE — 74011000250 HC RX REV CODE- 250: Performed by: ANESTHESIOLOGY

## 2021-12-20 PROCEDURE — 77030013992 HC SNR POLYP ENDOSC BSC -B: Performed by: INTERNAL MEDICINE

## 2021-12-20 PROCEDURE — 77030010936 HC CLP LIG BSC -C: Performed by: INTERNAL MEDICINE

## 2021-12-20 PROCEDURE — 88305 TISSUE EXAM BY PATHOLOGIST: CPT

## 2021-12-20 PROCEDURE — 2709999900 HC NON-CHARGEABLE SUPPLY: Performed by: INTERNAL MEDICINE

## 2021-12-20 PROCEDURE — 76040000007: Performed by: INTERNAL MEDICINE

## 2021-12-20 PROCEDURE — 76060000032 HC ANESTHESIA 0.5 TO 1 HR: Performed by: INTERNAL MEDICINE

## 2021-12-20 DEVICE — WORKING LENGTH 235CM, WORKING CHANNEL 2.8MM
Type: IMPLANTABLE DEVICE | Site: CECUM | Status: FUNCTIONAL
Brand: RESOLUTION 360 CLIP

## 2021-12-20 RX ORDER — EPINEPHRINE 0.1 MG/ML
1 INJECTION INTRACARDIAC; INTRAVENOUS
Status: DISCONTINUED | OUTPATIENT
Start: 2021-12-20 | End: 2021-12-20 | Stop reason: HOSPADM

## 2021-12-20 RX ORDER — MIDAZOLAM HYDROCHLORIDE 1 MG/ML
.25-5 INJECTION, SOLUTION INTRAMUSCULAR; INTRAVENOUS
Status: DISCONTINUED | OUTPATIENT
Start: 2021-12-20 | End: 2021-12-20 | Stop reason: HOSPADM

## 2021-12-20 RX ORDER — NALOXONE HYDROCHLORIDE 0.4 MG/ML
0.4 INJECTION, SOLUTION INTRAMUSCULAR; INTRAVENOUS; SUBCUTANEOUS
Status: DISCONTINUED | OUTPATIENT
Start: 2021-12-20 | End: 2021-12-20 | Stop reason: HOSPADM

## 2021-12-20 RX ORDER — SODIUM CHLORIDE 9 MG/ML
125 INJECTION, SOLUTION INTRAVENOUS CONTINUOUS
Status: DISCONTINUED | OUTPATIENT
Start: 2021-12-20 | End: 2021-12-20 | Stop reason: HOSPADM

## 2021-12-20 RX ORDER — PROPOFOL 10 MG/ML
INJECTION, EMULSION INTRAVENOUS AS NEEDED
Status: DISCONTINUED | OUTPATIENT
Start: 2021-12-20 | End: 2021-12-20 | Stop reason: HOSPADM

## 2021-12-20 RX ORDER — DEXTROMETHORPHAN/PSEUDOEPHED 2.5-7.5/.8
1.2 DROPS ORAL
Status: DISCONTINUED | OUTPATIENT
Start: 2021-12-20 | End: 2021-12-20 | Stop reason: HOSPADM

## 2021-12-20 RX ORDER — DIPHENHYDRAMINE HYDROCHLORIDE 50 MG/ML
50 INJECTION, SOLUTION INTRAMUSCULAR; INTRAVENOUS ONCE
Status: DISCONTINUED | OUTPATIENT
Start: 2021-12-20 | End: 2021-12-20 | Stop reason: HOSPADM

## 2021-12-20 RX ORDER — FUROSEMIDE 20 MG/1
20 TABLET ORAL DAILY
COMMUNITY

## 2021-12-20 RX ORDER — ATROPINE SULFATE 0.1 MG/ML
0.5 INJECTION INTRAVENOUS
Status: DISCONTINUED | OUTPATIENT
Start: 2021-12-20 | End: 2021-12-20 | Stop reason: HOSPADM

## 2021-12-20 RX ORDER — LIDOCAINE HYDROCHLORIDE 20 MG/ML
INJECTION, SOLUTION EPIDURAL; INFILTRATION; INTRACAUDAL; PERINEURAL AS NEEDED
Status: DISCONTINUED | OUTPATIENT
Start: 2021-12-20 | End: 2021-12-20 | Stop reason: HOSPADM

## 2021-12-20 RX ORDER — FLUMAZENIL 0.1 MG/ML
0.2 INJECTION INTRAVENOUS
Status: DISCONTINUED | OUTPATIENT
Start: 2021-12-20 | End: 2021-12-20 | Stop reason: HOSPADM

## 2021-12-20 RX ADMIN — PROPOFOL 30 MG: 10 INJECTION, EMULSION INTRAVENOUS at 12:22

## 2021-12-20 RX ADMIN — PROPOFOL 25 MG: 10 INJECTION, EMULSION INTRAVENOUS at 12:32

## 2021-12-20 RX ADMIN — LIDOCAINE HYDROCHLORIDE 60 MG: 20 INJECTION, SOLUTION EPIDURAL; INFILTRATION; INTRACAUDAL; PERINEURAL at 12:09

## 2021-12-20 RX ADMIN — PROPOFOL 25 MG: 10 INJECTION, EMULSION INTRAVENOUS at 12:28

## 2021-12-20 RX ADMIN — PROPOFOL 25 MG: 10 INJECTION, EMULSION INTRAVENOUS at 12:35

## 2021-12-20 RX ADMIN — PROPOFOL 40 MG: 10 INJECTION, EMULSION INTRAVENOUS at 12:25

## 2021-12-20 RX ADMIN — SODIUM CHLORIDE: 0.9 INJECTION, SOLUTION INTRAVENOUS at 12:00

## 2021-12-20 RX ADMIN — PROPOFOL 30 MG: 10 INJECTION, EMULSION INTRAVENOUS at 12:13

## 2021-12-20 RX ADMIN — PROPOFOL 30 MG: 10 INJECTION, EMULSION INTRAVENOUS at 12:19

## 2021-12-20 RX ADMIN — PROPOFOL 30 MG: 10 INJECTION, EMULSION INTRAVENOUS at 12:11

## 2021-12-20 RX ADMIN — PROPOFOL 25 MG: 10 INJECTION, EMULSION INTRAVENOUS at 12:38

## 2021-12-20 RX ADMIN — PROPOFOL 25 MG: 10 INJECTION, EMULSION INTRAVENOUS at 12:44

## 2021-12-20 RX ADMIN — PROPOFOL 30 MG: 10 INJECTION, EMULSION INTRAVENOUS at 12:16

## 2021-12-20 RX ADMIN — PROPOFOL 25 MG: 10 INJECTION, EMULSION INTRAVENOUS at 12:41

## 2021-12-20 RX ADMIN — PROPOFOL 10 MG: 10 INJECTION, EMULSION INTRAVENOUS at 12:10

## 2021-12-20 NOTE — PROCEDURES
Colonoscopy Procedure Note    Indications:   See Preoperative Diagnosis above  Referring Physician: Jonathon Rich MD  Anesthesia/Sedation: MAC anesthesia Propofol  Endoscopist:  Dr. Shanique Carlson  Assistant:  Endoscopy Technician-1: Sheldon Cee RN-1: Daly Lovett RN  Preoperative diagnosis: Colon cancer screening [Z12.11]  Postoperative diagnosis: polyp    Procedure in Detail:  Informed consent was obtained for the procedure, including sedation. Risks of perforation, hemorrhage, adverse drug reaction, and aspiration were discussed. The patient was placed in the left lateral decubitus position. Based on the pre-procedure assessment, including review of the patient's medical history, medications, allergies, and review of systems, he had been deemed to be an appropriate candidate for moderate sedation; he was therefore sedated with the medications listed above. The patient was monitored continuously with ECG tracing, pulse oximetry, blood pressure monitoring, and direct observations. A rectal examination was performed. The [id number] was inserted into the rectum and advanced under direct vision to the cecum, which was identified by the ileocecal valve and appendiceal orifice. The quality of the colonic preparation was not adequate BPS 2/2/1 5. A careful inspection was made as the colonoscope was withdrawn, including a retroflexed view of the rectum; findings and interventions are described below. Appropriate photodocumentation was obtained. Findings:  Rectum: normal  Sigmoid: normal  no mucosal lesion appreciated  Descending Colon: normal  no mucosal lesion appreciated  Transverse Colon: normal  no mucosal lesion appreciated  Ascending Colon: normal  no mucosal lesion appreciated  Cecum: A 7mm sessile polyp was seen on the AO, removed with cold snare polypectomy, retrieved, with minimal bleeding.  Due to slight submucosal tissue appreciated and a clot, a hemoclip was placed out of an abundance of caution. No perforation was seen after extensive lavage. 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 This lesion was not seen on initial examination of a cleaned cecum and may have been due the edema from the polypectomy. During this look semisolid stool was dumped from the TI and was unable to be lavaged sufficiently to continue the examination. An attempt at extensive EMR was not performed as this will require a separate consent and discussion. EBL: minimal    Complications: None; patient tolerated the procedure well. Recommendations:      - await pathology; -repeat colonoscopy 6 months to re-evaluate cecum and perform EMR if able. - No NSAIDs for 7d.     Signed By: Alessandra Tejada MD                        December 20, 2021

## 2021-12-20 NOTE — H&P
[address] [facility]  Outpatient History and Physical    Patient: Ryley White    Physician: Maldonado Honeycutt MD    Vital Signs: Blood pressure (!) 174/72, pulse 61, temperature 98 °F (36.7 °C), resp. rate 18, height 5' 10\" (1.778 m), weight 69.9 kg (154 lb), SpO2 100 %. Allergies: Allergies   Allergen Reactions    Propoxyphene N-Acetaminophen Other (comments)     hallucinate  Other reaction(s): Adverse reaction to substance        Chief Complaint: screening    History of Present Illness: Mr. Gabe Argueta is a 76yo with screening colonoscopy. Indication for Procedure: screening    History:  Past Medical History:   Diagnosis Date    DJD (degenerative joint disease)     Hypertension     Obesity       Past Surgical History:   Procedure Laterality Date    HX ORTHOPAEDIC      bilat hip replacements    SC GASTRIC BYPASS,OBESE<150CM TAO-EN-Y        Social History     Socioeconomic History    Marital status:    Tobacco Use    Smoking status: Never Smoker    Smokeless tobacco: Never Used   Substance and Sexual Activity    Alcohol use: Yes     Alcohol/week: 136.0 standard drinks     Types: 126 Cans of beer, 10 Shots of liquor per week     Comment: Hx of abuse    Drug use: Yes     Types: Marijuana     Comment: rare    Sexual activity: Yes     Partners: Female      Family History   Problem Relation Age of Onset    Diabetes Maternal Grandmother     Heart Disease Maternal Grandmother     Diabetes Maternal Grandfather     Heart Disease Maternal Grandfather     No Known Problems Mother     Stroke Father        Medications:   Prior to Admission medications    Medication Sig Start Date End Date Taking? Authorizing Provider   furosemide (LASIX) 20 mg tablet Take 20 mg by mouth daily. Yes Provider, Historical   sucralfate (CARAFATE) 1 gram tablet Take 1 g by mouth four (4) times daily.  Ac and at [place], Historical   ergocalciferol (Vitamin D2) 1,250 mcg (50,000 unit) capsule Take 50,000 Units by mouth two (2) times a week. LAST TAKEN April 5,2021    Provider, Historical   pantoprazole (Protonix) 40 mg tablet Take 40 mg by mouth daily. Provider, Historical   folic acid (FOLVITE) 027 mcg tablet Take 800 mcg by mouth daily. Provider, Historical   multivitamin (ONE A DAY) tablet Take 1 Tab by mouth daily. Provider, Historical   aspirin 81 mg chewable tablet Take 81 mg by mouth daily. Provider, Historical   cyanocobalamin (VITAMIN B-12) 1,000 mcg tablet Take  by mouth daily. Takes unknown dose    Provider, Historical   ascorbic acid (VITAMIN C) 500 mg tablet Take  by mouth daily. Takes unknown dose    Provider, Historical       Physical Exam:   General: alert, no distress   HEENT: Head: Normocephalic, no lesions, without obvious abnormality. Heart: regular rate and rhythm, S1, S2 normal, no murmur, click, rub or gallop   Lungs: chest clear, no wheezing, rales, normal symmetric air entry   Abdominal: Bowel sounds are normal, liver is not enlarged, spleen is not enlarged   Neurological: Grossly normal   Extremities: extremities normal, atraumatic, no cyanosis or edema     Plan of Care/Planned Procedure: screening colonoscopy    The heart, lungs and mental status were satisfactory for the administration of MAC sedation and for the procedure. Informed consent was obtained for the procedure, including sedation. Risks of perforation, hemorrhage, adverse drug reaction, and aspiration were discussed. The risks, benefits and alternatives were again reiterated to the patient to include the risk of infection, bleeding, medication reaction, aspiration, perforation which could require immediate surgery, cardiopulmonary complication, issues with anesthesia and death. The patient was counseled at length about the risks of sadie Covid-19 in the kyra-operative and post-operative states including the recovery window of their procedure.   The patient was made aware that sadie Covid-19 after a surgical procedure may worsen their prognosis for recovering from the virus and lend to a higher morbidity and or mortality risk. The patient was given the options of postponing their procedure. All of the risks, benefits, and alternatives were discussed. The patient does  wish to proceed with the procedure.

## 2021-12-20 NOTE — PROGRESS NOTES
Vidal Phelps  1945  [id number]    Situation:  Verbal report received from: Cirilo Mott RN  Procedure: Procedure(s):  COLONOSCOPY  ENDOSCOPIC POLYPECTOMY  RESOLUTION CLIP    Background:    Preoperative diagnosis: Colon cancer screening [Z12.11]  Postoperative diagnosis: polyp    :  Dr. Shayy Simpson  Assistant(s): Endoscopy Technician-1: Jana Simental  Endoscopy RN-1: Maria D Ware RN    Specimens:   ID Type Source Tests Collected by Time Destination   1 : appendiceal orifice polyp Preservative Appendiceal Orfice  Brynn Crystal MD 12/20/2021 1234 Pathology     H. Pylori  no    Assessment:  Intra-procedure medications     Anesthesia gave intra-procedure sedation and medications, see anesthesia flow sheet yes    Intravenous fluids: NS@ KVO     Vital signs stable      Abdominal assessment: round and soft      Recommendation:  Discharge patient per MD order .   Family or Friend    Permission to share finding with family or friend yes

## 2021-12-20 NOTE — ANESTHESIA POSTPROCEDURE EVALUATION
Procedure(s):  COLONOSCOPY  ENDOSCOPIC POLYPECTOMY  RESOLUTION CLIP. general    Anesthesia Post Evaluation        Patient location during evaluation: PACU  Note status: Adequate. Level of consciousness: responsive to verbal stimuli and sleepy but conscious  Pain management: satisfactory to patient  Airway patency: patent  Anesthetic complications: no  Cardiovascular status: acceptable  Respiratory status: acceptable  Hydration status: acceptable  Comments: +Post-Anesthesia Evaluation and Assessment    Patient: Mayra Huffman MRN: [id number]  SSN: [id number]   YOB: 1945  Age: 77 y.o. Sex: male      Cardiovascular Function/Vital Signs    /67   Pulse (!) 52   Temp 36.2 °C (97.2 °F)   Resp 16   Ht 5' 10\" (1.778 m)   Wt 69.9 kg (154 lb)   SpO2 100%   BMI 22.10 kg/m²     Patient is status post Procedure(s):  COLONOSCOPY  ENDOSCOPIC POLYPECTOMY  RESOLUTION CLIP. Nausea/Vomiting: Controlled. Postoperative hydration reviewed and adequate. Pain:  Pain Scale 1: Numeric (0 - 10) (12/20/21 1257)  Pain Intensity 1: 0 (12/20/21 1257)   Managed. Neurological Status: At baseline. Mental Status and Level of Consciousness: Arousable. Pulmonary Status:   O2 Device: None (Room air) (12/20/21 1257)   Adequate oxygenation and airway patent. Complications related to anesthesia: None    Post-anesthesia assessment completed. No concerns. Signed By: Evita Hebert MD    12/20/2021  Post anesthesia nausea and vomiting:  controlled      INITIAL Post-op Vital signs:   Vitals Value Taken Time   /71 12/20/21 1312   Temp 36.2 °C (97.2 °F) 12/20/21 1257   Pulse 55 12/20/21 1313   Resp 18 12/20/21 1313   SpO2 100 % 12/20/21 1313   Vitals shown include unvalidated device data.

## 2021-12-20 NOTE — ANESTHESIA PREPROCEDURE EVALUATION
Relevant Problems   NEUROLOGY   (+) Alcohol dependence (HCC)      CARDIOVASCULAR   (+) HTN (hypertension)      GASTROINTESTINAL   (+) Gastroesophageal reflux disease without esophagitis      RENAL FAILURE   (+) WILLIAN (acute kidney injury) ([facility] [address]      ENDOCRINE   (+) Obesity       Anesthetic History   No history of anesthetic complications            Review of Systems / Medical History  Patient summary reviewed, nursing notes reviewed and pertinent labs reviewed    Pulmonary  Within defined limits            Pertinent negatives: No sleep apnea     Neuro/Psych   Within defined limits           Cardiovascular    Hypertension    Angina          Exercise tolerance: >4 METS     GI/Hepatic/Renal     GERD: poorly controlled           Endo/Other        Obesity and arthritis     Other Findings   Comments: Hx of gastic bypass; no active heart burn symptoms now     Drug use: Marijuana           Physical Exam    Airway  Mallampati: I  TM Distance: < 4 cm  Neck ROM: normal range of motion   Mouth opening: Normal     Cardiovascular  Regular rate and rhythm,  S1 and S2 normal,  no murmur, click, rub, or gallop             Dental  No notable dental hx       Pulmonary  Breath sounds clear to auscultation               Abdominal  GI exam deferred       Other Findings            Anesthetic Plan    ASA: 2  Anesthesia type: general          Induction: Intravenous  Anesthetic plan and risks discussed with: Patient

## 2021-12-20 NOTE — DISCHARGE INSTRUCTIONS
Santi Delaney  [id number]  1945    It was my pleasure seeing you for your procedure. You will also receive a summary report with the findings from this procedure and any further recommendations. If you had polyps removed or biopsies taken during your procedure, you will receive a separate letter from me within the next 2 weeks. If you don't receive this letter or if you have any questions, please call my office [phone]. Please take note of the post procedure instructions listed below. Best Wishes,    Dr. Danie Paulson    These instructions give you information on caring for yourself after your procedure. Call your doctor if you have any problems or questions after your procedure. HOME CARE  · Walk if you have belly cramping or gas. Walking will help get rid of the air and reduce the bloated feeling in your belly (abdomen). · Your IV site (where you received drugs) may be tender to touch. Place warm towels on the site; keep your arm up on two pillows if you have any swelling or soreness in the area. · You may shower. ACTIVITY:  · Take frequent rest periods and move at a slower pace for the next 24 hours. .  · You may resume your regular activity tomorrow if you are feeling back to normal.  · Do not drive or ride a bicycle for at least 24 hours (because of the medicine (anesthesia) used during the test). · Do not sign any important legal documents or use or operate any machinery for 24 hours  · Do not take sleeping medicines/nerve drugs for 24 hours unless the doctor tells you. · You can return to work/school tomorrow unless otherwise instructed. NUTRITION:  · Drink plenty of fluids to keep your pee (urine) clear or pale yellow  · Begin with a light meal and progress to your normal diet. Heavy or fried foods are harder to digest and may make you feel sick to your stomach (nauseated).   · Once you are feeling back to normal, you may resume your normal diet as instructed by your doctor. · Avoid alcoholic beverages for 24 hours or as instructed. IF YOU HAD BIOPSIES TAKEN OR POLYPS REMOVED DURING THE PROCEDURE:  · For the next 7 days, avoid all non-steroidal antiinflammatory medications such as Ibuprofen, Motrin, Advil, Alleve, Felicity-seltzer, Goody's powder, BC powder. · If you do not have an heart condition that requires you to take a daily aspirin, you should avoid taking aspirin for 7 days. · Eat a soft diet for 24 hours. · Monitor your stools for any blood or dark black, tar-like, stools as this may be a sign of bleeding and if you see any blood, notify your doctor immediately. GET HELP RIGHT AWAY AND SEEK IMMEDIATE MEDICAL CARE IF:  · You have more than a spotting of blood in your stool. · You pass clumps of tissue (blood clots) or fill the toilet with blood. · Your belly is painfully swollen or puffy (abdominal distention). · You throw up (vomit). · You have a fever. · You have redness, pain or swelling at the IV site that last greater than two days. · You have abdominal pain or discomfort that is severe or gets worse throughout the day. Post-procedure diagnosis:  polyp    Post-procedure recommendations:          Learning About Coronavirus (COVID-19)  Coronavirus [phone]): Overview  What is coronavirus (COVID-19)? The coronavirus disease (COVID-19) is caused by a virus. It is an illness that was first found in [place], [place], in December 2019. It has since spread worldwide. The virus can cause fever, cough, and trouble breathing. In severe cases, it can cause pneumonia and make it hard to breathe without help. It can cause death. Coronaviruses are a large group of viruses. They cause the common cold. They also cause more serious illnesses like Middle East respiratory syndrome (MERS) and severe acute respiratory syndrome (SARS). COVID-19 is caused by a novel coronavirus.  That means it's a new type that has not been seen in people before. This virus spreads person-to-person through droplets from coughing and sneezing. It can also spread when you are close to someone who is infected. And it can spread when you touch something that has the virus on it, such as a doorknob or a tabletop. What can you do to protect yourself from coronavirus (COVID-19)? The best way to protect yourself from getting sick is to:  · Avoid areas where there is an outbreak. · Avoid contact with people who may be infected. · Wash your hands often with soap or alcohol-based hand sanitizers. · Avoid crowds and try to stay at least 6 feet away from other people. · Wash your hands often, especially after you cough or sneeze. Use soap and water, and scrub for at least 20 seconds. If soap and water aren't available, use an alcohol-based hand . · Avoid touching your mouth, nose, and eyes. What can you do to avoid spreading the virus to others? To help avoid spreading the virus to others:  · Cover your mouth with a tissue when you cough or sneeze. Then throw the tissue in the trash. · Use a disinfectant to clean things that you touch often. · Stay home if you are sick or have been exposed to the virus. Don't go to school, work, or public areas. And don't use public transportation. · If you are sick:  ? Leave your home only if you need to get medical care. But call the doctor's office first so they know you're coming. And wear a face mask, if you have one.  ? If you have a face mask, wear it whenever you're around other people. It can help stop the spread of the virus when you cough or sneeze. ? Clean and disinfect your home every day. Use household  and disinfectant wipes or sprays. Take special care to clean things that you grab with your hands. These include doorknobs, remote controls, phones, and handles on your refrigerator and microwave. And don't forget countertops, tabletops, bathrooms, and computer keyboards.   When to call for help  Call 911 anytime you think you may need emergency care. For example, call if:  · You have severe trouble breathing. (You can't talk at all.)  · You have constant chest pain or pressure. · You are severely dizzy or lightheaded. · You are confused or can't think clearly. · Your face and lips have a blue color. · You pass out (lose consciousness) or are very hard to wake up. Call your doctor now if you develop symptoms such as:  · Shortness of breath. · Fever. · Cough. If you need to get care, call ahead to the doctor's office for instructions before you go. Make sure you wear a face mask, if you have one, to prevent exposing other people to the virus. Where can you get the latest information? The following health organizations are tracking and studying this virus. Their websites contain the most up-to-date information. Eloina Ricki also learn what to do if you think you may have been exposed to the virus. · U.S. Centers for Disease Control and Prevention (CDC): The CDC provides updated news about the disease and travel advice. The website also tells you how to prevent the spread of infection. www.cdc.gov  · World Health Organization [facility]): WHO offers information about the virus outbreaks. WHO also has travel advice. www.who.int  Current as of: April 1, 2020               Content Version: 12.4  © 8329-6810 [employer], [employer]orporated. Care instructions adapted under license by your healthcare professional. If you have questions about a medical condition or this instruction, always ask your healthcare professional. [address] any warranty or liability for your use of this information.

## 2021-12-20 NOTE — PROGRESS NOTES
36  Endoscope was pre-cleaned at bedside immediately following procedure by Estefanía Weiner. 1248  Received report from anesthesia. Assumed pt care. VSS.

## 2022-03-18 PROBLEM — H91.92 HEARING LOSS OF LEFT EAR: Status: ACTIVE | Noted: 2019-06-03

## 2022-03-19 PROBLEM — R20.2 PARESTHESIA OF BOTH HANDS: Status: ACTIVE | Noted: 2018-06-04

## 2022-03-19 PROBLEM — K21.9 GASTROESOPHAGEAL REFLUX DISEASE WITHOUT ESOPHAGITIS: Status: ACTIVE | Noted: 2017-12-01

## 2022-03-19 PROBLEM — N17.9 AKI (ACUTE KIDNEY INJURY) (HCC): Status: ACTIVE | Noted: 2021-04-11

## 2022-11-29 LAB
ALBUMIN SERPL-MCNC: 1.9 G/DL (ref 3.5–5)
ALBUMIN/GLOB SERPL: 0.7 {RATIO} (ref 1.1–2.2)
ALP SERPL-CCNC: 133 U/L (ref 45–117)
ALT SERPL-CCNC: 26 U/L (ref 12–78)
ANION GAP SERPL CALC-SCNC: 6 MMOL/L (ref 5–15)
AST SERPL-CCNC: 61 U/L (ref 15–37)
BASOPHILS # BLD: 0 K/UL (ref 0–0.1)
BASOPHILS NFR BLD: 1 % (ref 0–1)
BILIRUB SERPL-MCNC: 1.6 MG/DL (ref 0.2–1)
BNP SERPL-MCNC: 2187 PG/ML
BUN SERPL-MCNC: 11 MG/DL (ref 6–20)
BUN/CREAT SERPL: 9 (ref 12–20)
CALCIUM SERPL-MCNC: 7.3 MG/DL (ref 8.5–10.1)
CHLORIDE SERPL-SCNC: 104 MMOL/L (ref 97–108)
CO2 SERPL-SCNC: 30 MMOL/L (ref 21–32)
CREAT SERPL-MCNC: 1.22 MG/DL (ref 0.7–1.3)
DIFFERENTIAL METHOD BLD: ABNORMAL
EOSINOPHIL # BLD: 0.1 K/UL (ref 0–0.4)
EOSINOPHIL NFR BLD: 1 % (ref 0–7)
ERYTHROCYTE [DISTWIDTH] IN BLOOD BY AUTOMATED COUNT: 13.8 % (ref 11.5–14.5)
GLOBULIN SER CALC-MCNC: 2.9 G/DL (ref 2–4)
GLUCOSE SERPL-MCNC: 114 MG/DL (ref 65–100)
HCT VFR BLD AUTO: 32.1 % (ref 36.6–50.3)
HGB BLD-MCNC: 10.9 G/DL (ref 12.1–17)
IMM GRANULOCYTES # BLD AUTO: 0 K/UL (ref 0–0.04)
IMM GRANULOCYTES NFR BLD AUTO: 1 % (ref 0–0.5)
LYMPHOCYTES # BLD: 1.1 K/UL (ref 0.8–3.5)
LYMPHOCYTES NFR BLD: 19 % (ref 12–49)
MCH RBC QN AUTO: 35.3 PG (ref 26–34)
MCHC RBC AUTO-ENTMCNC: 34 G/DL (ref 30–36.5)
MCV RBC AUTO: 103.9 FL (ref 80–99)
MONOCYTES # BLD: 0.5 K/UL (ref 0–1)
MONOCYTES NFR BLD: 8 % (ref 5–13)
NEUTS SEG # BLD: 4.1 K/UL (ref 1.8–8)
NEUTS SEG NFR BLD: 70 % (ref 32–75)
NRBC # BLD: 0 K/UL (ref 0–0.01)
NRBC BLD-RTO: 0 PER 100 WBC
PLATELET # BLD AUTO: 220 K/UL (ref 150–400)
PMV BLD AUTO: 9.6 FL (ref 8.9–12.9)
POTASSIUM SERPL-SCNC: 3.3 MMOL/L (ref 3.5–5.1)
PROT SERPL-MCNC: 4.8 G/DL (ref 6.4–8.2)
RBC # BLD AUTO: 3.09 M/UL (ref 4.1–5.7)
SODIUM SERPL-SCNC: 140 MMOL/L (ref 136–145)
WBC # BLD AUTO: 5.8 K/UL (ref 4.1–11.1)

## 2022-11-29 PROCEDURE — 36415 COLL VENOUS BLD VENIPUNCTURE: CPT

## 2022-11-29 PROCEDURE — 99285 EMERGENCY DEPT VISIT HI MDM: CPT

## 2022-11-29 PROCEDURE — 96374 THER/PROPH/DIAG INJ IV PUSH: CPT

## 2022-11-29 PROCEDURE — 80053 COMPREHEN METABOLIC PANEL: CPT

## 2022-11-29 PROCEDURE — 83880 ASSAY OF NATRIURETIC PEPTIDE: CPT

## 2022-11-29 PROCEDURE — 85025 COMPLETE CBC W/AUTO DIFF WBC: CPT

## 2022-11-29 PROCEDURE — 96375 TX/PRO/DX INJ NEW DRUG ADDON: CPT

## 2022-11-30 ENCOUNTER — HOSPITAL ENCOUNTER (INPATIENT)
Age: 77
LOS: 13 days | Discharge: NURSING, REHAB OR LONG-TERM CARE | DRG: 260 | End: 2022-12-13
Attending: EMERGENCY MEDICINE | Admitting: INTERNAL MEDICINE
Payer: MEDICARE

## 2022-11-30 ENCOUNTER — APPOINTMENT (OUTPATIENT)
Dept: ULTRASOUND IMAGING | Age: 77
DRG: 260 | End: 2022-11-30
Attending: INTERNAL MEDICINE
Payer: MEDICARE

## 2022-11-30 ENCOUNTER — APPOINTMENT (OUTPATIENT)
Dept: GENERAL RADIOLOGY | Age: 77
DRG: 260 | End: 2022-11-30
Attending: INTERNAL MEDICINE
Payer: MEDICARE

## 2022-11-30 DIAGNOSIS — M79.604 CHRONIC PAIN OF BOTH LOWER EXTREMITIES: Primary | ICD-10-CM

## 2022-11-30 DIAGNOSIS — R00.1 BRADYCARDIA: ICD-10-CM

## 2022-11-30 DIAGNOSIS — G89.29 CHRONIC PAIN OF BOTH LOWER EXTREMITIES: Primary | ICD-10-CM

## 2022-11-30 DIAGNOSIS — M79.605 CHRONIC PAIN OF BOTH LOWER EXTREMITIES: Primary | ICD-10-CM

## 2022-11-30 DIAGNOSIS — I95.1 ORTHOSTATIC HYPOTENSION: ICD-10-CM

## 2022-11-30 DIAGNOSIS — R55 VASOVAGAL SYNCOPE: ICD-10-CM

## 2022-11-30 DIAGNOSIS — R60.9 DEPENDENT EDEMA: ICD-10-CM

## 2022-11-30 DIAGNOSIS — I65.23 BILATERAL CAROTID ARTERY STENOSIS: ICD-10-CM

## 2022-11-30 PROBLEM — R53.1 WEAKNESS: Status: ACTIVE | Noted: 2022-11-30

## 2022-11-30 LAB
COVID-19 RAPID TEST, COVR: NOT DETECTED
SOURCE, COVRS: NORMAL

## 2022-11-30 PROCEDURE — 93970 EXTREMITY STUDY: CPT

## 2022-11-30 PROCEDURE — 65270000029 HC RM PRIVATE

## 2022-11-30 PROCEDURE — 74011250636 HC RX REV CODE- 250/636: Performed by: INTERNAL MEDICINE

## 2022-11-30 PROCEDURE — 87635 SARS-COV-2 COVID-19 AMP PRB: CPT

## 2022-11-30 PROCEDURE — G0378 HOSPITAL OBSERVATION PER HR: HCPCS

## 2022-11-30 PROCEDURE — 74011000250 HC RX REV CODE- 250: Performed by: INTERNAL MEDICINE

## 2022-11-30 PROCEDURE — 74011250637 HC RX REV CODE- 250/637: Performed by: INTERNAL MEDICINE

## 2022-11-30 PROCEDURE — 71045 X-RAY EXAM CHEST 1 VIEW: CPT

## 2022-11-30 PROCEDURE — 74011000250 HC RX REV CODE- 250: Performed by: EMERGENCY MEDICINE

## 2022-11-30 RX ORDER — SODIUM CHLORIDE 0.9 % (FLUSH) 0.9 %
5-40 SYRINGE (ML) INJECTION EVERY 8 HOURS
Status: DISCONTINUED | OUTPATIENT
Start: 2022-11-30 | End: 2022-12-10

## 2022-11-30 RX ORDER — ENOXAPARIN SODIUM 100 MG/ML
40 INJECTION SUBCUTANEOUS DAILY
Status: DISCONTINUED | OUTPATIENT
Start: 2022-12-01 | End: 2022-12-13 | Stop reason: HOSPADM

## 2022-11-30 RX ORDER — LEVOTHYROXINE SODIUM 25 UG/1
25 TABLET ORAL
Status: DISCONTINUED | OUTPATIENT
Start: 2022-12-01 | End: 2022-12-01

## 2022-11-30 RX ORDER — POTASSIUM CHLORIDE 750 MG/1
40 TABLET, FILM COATED, EXTENDED RELEASE ORAL ONCE
Status: COMPLETED | OUTPATIENT
Start: 2022-11-30 | End: 2022-11-30

## 2022-11-30 RX ORDER — ACETAMINOPHEN 325 MG/1
650 TABLET ORAL
Status: DISCONTINUED | OUTPATIENT
Start: 2022-11-30 | End: 2022-12-13 | Stop reason: HOSPADM

## 2022-11-30 RX ORDER — LEVOTHYROXINE SODIUM 25 UG/1
25 TABLET ORAL
COMMUNITY
End: 2022-12-13

## 2022-11-30 RX ORDER — FUROSEMIDE 10 MG/ML
40 INJECTION INTRAMUSCULAR; INTRAVENOUS EVERY 12 HOURS
Status: DISCONTINUED | OUTPATIENT
Start: 2022-11-30 | End: 2022-12-07

## 2022-11-30 RX ORDER — ONDANSETRON 4 MG/1
4 TABLET, ORALLY DISINTEGRATING ORAL
Status: DISCONTINUED | OUTPATIENT
Start: 2022-11-30 | End: 2022-12-13 | Stop reason: HOSPADM

## 2022-11-30 RX ORDER — ACETAMINOPHEN 650 MG/1
650 SUPPOSITORY RECTAL
Status: DISCONTINUED | OUTPATIENT
Start: 2022-11-30 | End: 2022-12-13 | Stop reason: HOSPADM

## 2022-11-30 RX ORDER — GUAIFENESIN 100 MG/5ML
81 LIQUID (ML) ORAL DAILY
Status: DISCONTINUED | OUTPATIENT
Start: 2022-12-01 | End: 2022-12-13 | Stop reason: HOSPADM

## 2022-11-30 RX ORDER — ASCORBIC ACID 500 MG
500 TABLET ORAL DAILY
Status: DISCONTINUED | OUTPATIENT
Start: 2022-12-01 | End: 2022-12-13 | Stop reason: HOSPADM

## 2022-11-30 RX ORDER — SUCRALFATE 1 G/1
1 TABLET ORAL 4 TIMES DAILY
Status: DISCONTINUED | OUTPATIENT
Start: 2022-11-30 | End: 2022-12-13 | Stop reason: HOSPADM

## 2022-11-30 RX ORDER — SODIUM CHLORIDE 0.9 % (FLUSH) 0.9 %
5-40 SYRINGE (ML) INJECTION AS NEEDED
Status: DISCONTINUED | OUTPATIENT
Start: 2022-11-30 | End: 2022-12-10

## 2022-11-30 RX ORDER — ONDANSETRON 2 MG/ML
4 INJECTION INTRAMUSCULAR; INTRAVENOUS
Status: DISCONTINUED | OUTPATIENT
Start: 2022-11-30 | End: 2022-12-13 | Stop reason: HOSPADM

## 2022-11-30 RX ORDER — BUMETANIDE 0.25 MG/ML
1 INJECTION INTRAMUSCULAR; INTRAVENOUS
Status: COMPLETED | OUTPATIENT
Start: 2022-11-30 | End: 2022-11-30

## 2022-11-30 RX ORDER — LANOLIN ALCOHOL/MO/W.PET/CERES
1000 CREAM (GRAM) TOPICAL DAILY
Status: DISCONTINUED | OUTPATIENT
Start: 2022-12-01 | End: 2022-12-13 | Stop reason: HOSPADM

## 2022-11-30 RX ORDER — ERGOCALCIFEROL 1.25 MG/1
50000 CAPSULE ORAL 2 TIMES WEEKLY
Status: DISCONTINUED | OUTPATIENT
Start: 2022-12-02 | End: 2022-12-13 | Stop reason: HOSPADM

## 2022-11-30 RX ORDER — BISMUTH SUBSALICYLATE 262 MG
1 TABLET,CHEWABLE ORAL DAILY
Status: DISCONTINUED | OUTPATIENT
Start: 2022-12-01 | End: 2022-12-01

## 2022-11-30 RX ORDER — FOLIC ACID 1 MG/1
1 TABLET ORAL DAILY
Status: DISCONTINUED | OUTPATIENT
Start: 2022-12-01 | End: 2022-12-13 | Stop reason: HOSPADM

## 2022-11-30 RX ORDER — PANTOPRAZOLE SODIUM 40 MG/1
40 TABLET, DELAYED RELEASE ORAL DAILY
Status: DISCONTINUED | OUTPATIENT
Start: 2022-12-01 | End: 2022-12-13 | Stop reason: HOSPADM

## 2022-11-30 RX ORDER — POLYETHYLENE GLYCOL 3350 17 G/17G
17 POWDER, FOR SOLUTION ORAL DAILY PRN
Status: DISCONTINUED | OUTPATIENT
Start: 2022-11-30 | End: 2022-12-13 | Stop reason: HOSPADM

## 2022-11-30 RX ADMIN — SUCRALFATE 1 G: 1 TABLET ORAL at 18:01

## 2022-11-30 RX ADMIN — FUROSEMIDE 40 MG: 10 INJECTION, SOLUTION INTRAMUSCULAR; INTRAVENOUS at 13:43

## 2022-11-30 RX ADMIN — SODIUM CHLORIDE, PRESERVATIVE FREE 5 ML: 5 INJECTION INTRAVENOUS at 23:42

## 2022-11-30 RX ADMIN — BUMETANIDE 1 MG: 0.25 INJECTION INTRAMUSCULAR; INTRAVENOUS at 04:33

## 2022-11-30 RX ADMIN — SUCRALFATE 1 G: 1 TABLET ORAL at 13:43

## 2022-11-30 RX ADMIN — POTASSIUM CHLORIDE 40 MEQ: 750 TABLET, FILM COATED, EXTENDED RELEASE ORAL at 13:43

## 2022-11-30 RX ADMIN — SUCRALFATE 1 G: 1 TABLET ORAL at 22:28

## 2022-11-30 RX ADMIN — FUROSEMIDE 40 MG: 10 INJECTION, SOLUTION INTRAMUSCULAR; INTRAVENOUS at 22:26

## 2022-11-30 NOTE — ED NOTES
This rn at bedside for ambulation trail, was able to ambulate with 1 assist to get up then by his self once standing.

## 2022-11-30 NOTE — DISCHARGE INSTRUCTIONS
It was a pleasure taking care of you in our Emergency Department today. We know that when you come to [facility], you are entrusting us with your health, comfort, and safety. Our physicians and nurses honor that trust, and truly appreciate the opportunity to care for you and your loved ones. We also value your feedback. If you receive a survey about your Emergency Department experience today, please fill it out. We care about our patients' feedback, and we listen to what you have to say. Thank you! Dr. Arlen Mahan MD.         [facility]  [address]  [address] [facility], [address] [facility]  [phone]  [employer]     NEW PACEMAKER IMPLANT DISCHARGE INSTRUCTIONS    Patient ID:  Ruddy Crystal  [id number]  89 y.o.  1945    Admit Date: 11/30/2022    Discharge Date: 12/9/2022     Admitting Physician: [unfilled]     Discharge Physician: [unfilled]    Admission Diagnoses:   Weakness [R53.1]  Hypotension [I95.9]  Anasarca [R60.1]    Discharge Diagnoses: Active Problems:    Weakness (11/30/2022)      Severe protein-calorie malnutrition ([address] (12/1/2022)      Anasarca (12/2/2022)      Hypotension (12/2/2022)      Sinus bradycardia (12/5/2022)        Discharge Condition: Good    Cardiology Procedures this Admission:  Pacemaker insertion. Disposition: home    Reference discharge instructions provided by nursing for diet and activity. Follow-up with device clinic in three weeks. Call [phone] to make an appointment. Signed:  Mariely Trujillo MD  12/9/2022  2:54 PM      DISCHARGE INSTRUCTIONS FOR PATIENTS WITH PACEMAKERS    1. Remember to call for an appointment for 3 weeks [phone] to check healing and implant programming. 2. Medic Alert Bracelets are available from your pharmacist to wear at all times if you choose to wear one. 3. Carry your ID card for pacemaker with you at all times.   This card will be given to you in the hospital or mailed to you. 4. The pacemaker will bulge slightly under your skin. The bulge will decrease in size over the next few weeks. Please notify the doctor's office if you notice any of the following around your site:   A.  A bruise that does not go away. B.  Soreness or yellow, green, or brown drainage from the site. C. Any swelling from the site. D. If you have a fever of 100 degrees or higher that lasts for a few days. INCISION CARE       1.  Leave the dressing over your site until your follow up visit. 2.  You may not shower until after follow up visit. 3.  For comfort, wear loose fitting clothing. 4.  Ice pack to affected shoulder for first 24 hours, wear your sling for 2 days. 5.  Report any signs of infection, fever, pain, swelling, redness, oozing, or heat at site especially if these symptoms increase after the first 3 to 4 days. ACTIVITY PRECAUTIONS     1. Avoid rough contact with the implant site. 2. No driving for 14 days. 3. Avoid lifting your arm over your head, carrying anything on the affected side, or lifting over 10 pounds for 90 days. For the first 2 days only bend your arm at the elbow. 4. Any extreme activity such as golf, weight lifting or exercise biking should be restricted for 60 days. 5. Do not carry objects by holding them against your implant site. 6.  No shooting rifles or any type of gun with the affected shoulder permanently. SPECIAL PRECAUTIONS     1. You should avoid all strong magnetic fields, such as arc welding, large transformers, large motors. 2.  You may or may not (depending on your device) have an MRI which uses a strong magnet to take pictures. 3.  Treatments or surgery that requires diathermy or electrocautery should be discussed with your doctor before scheduled. 4. Avoid radio frequency transmitters, including radar. 5. Advise dentist or other medical personnel you see that you have a pacemaker.   6.  Cell phones and microwave oven use is okay. 7.  If you plan to move or take a trip to a new area, the doctor's office will give you a name of a doctor to contact for any problems. ANTIBIOTIC THERAPY    During the first 8 weeks after your pacemaker insertion, you may need antibiotics before any dental work or certain tests or operations. Let the dentist or doctor who is caring for you know that you have had an implanted device.

## 2022-11-30 NOTE — Clinical Note
A Bovie was used. Mode: bipolar. Coagulation Settin.  Cut Settin. Site (pad location): upper leg. Laterality: right.
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
## 2022-11-30 NOTE — PROGRESS NOTES
Physical Therapy    Orders received and chart reviewed. Pt is currently pending bilateral UE duplex to rule out DVT. Will await results prior to start of PT services.      Adriana Read PT, DPT, NCS

## 2022-11-30 NOTE — ED NOTES
Pt not on a cadia monitoring tele, as they are in the hallway in the ed and no monitor is available.
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
## 2022-11-30 NOTE — PROGRESS NOTES
Transition of Care Plan:    RUR: N/A - RODRIGUEZ Fall signed 11/30  Disposition: Anticipate SNF , will need insurance auth  If SNF or IPR: Date FOC offered: SNF 11/30  Date [address] received: 11/30  Date authorization started with reference number:   Date authorization received and expires:  Accepting facility:  Follow up appointments: PCP  DME needed: Owns cane, rollator, lift chair   Transportation at Discharge: Family/friend vs medical transport   [address] or means to access home:   N/A     IM Medicare Letter: N/A - Obs   Is patient a  and connected with the [place]? N/A             If yes, was [place] transfer form completed and VA notified? Caregiver Contact: Mane Whitehead [phone]  Discharge Caregiver contacted prior to discharge? Yes  Care Conference needed?:  No               Please see below for details of case management involvement today. Care Management Interventions  PCP Verified by CM: Yes  Palliative Care Criteria Met (RRAT>21 & CHF Dx)?: No  Mode of Transport at Discharge: Other (see comment) (Medical transport?)  Transition of Care Consult (CM Consult): Discharge Planning, SNF  Partner SNF: Yes  Discharge Durable Medical Equipment: No (Owns a walker and cane)  Physical Therapy Consult: Yes  Occupational Therapy Consult: Yes  Speech Therapy Consult: No  Support Systems: Friend/Neighbor, Other Family Member(s) (Brother, friend; does not have support available to stay with him)  Confirm Follow Up Transport: Other (see comment) (Friend(s) ? )  The Patient and/or Patient Representative was Provided with a Choice of Provider and Agrees with the Discharge Plan?: Yes  Freedom of Choice List was Provided with Basic Dialogue that Supports the Patient's Individualized Plan of Care/Goals, Treatment Preferences and Shares the Quality Data Associated with the Providers?: Yes  Discharge Location  Patient Expects to be Discharged to[de-identified] Skilled nursing facility      4:42 PM  RODRIGUEZ signed and placed on bedside chart. Pt and pt's brother updated at bedside. Warm blankets provided. Awaiting therapy consults and notes to begin insurance authorization. 12:36 PM  Freedom of choice signed and placed on chart. Pt verbalized agreement with placement to either [address] [place] [address],[address] or  [facility]. Referrals placed via [facility]. 12:30 PM  PT/OT has been consulted. Rapid COVID has been proactively ordered. CM working with pt and pt's brother at bedside to obtain [place] of Choice. [place] is 1 options, awaiting others. Will place referrals. 11:00 AM  CM had long discussion with pt's brother, Ebenezer Vail [phone], at bedside. CM discussed previously coordinated efforts, resources provided, and barriers with insurance authorization. Pt's brother states that pt has not been able to take care of himself at home recently due to progressing weakness. He is not able to get up by himself and does not have anyone available to stay with him and assist. CM discussed this with pt who verbalizes that he does not feel safe safe to return home, and now wishes to pursue rehab placement. Case discussed with ED MD. CM to move forward with obtaining freedom of choice, advised that prompt therapy evaluations will be needed to pursue insurance authorization. Pt's brother advised to utilize resources to continue planning for pt's return home following rehab. Pt's brother to place call to [facility] to discuss private duty supports. 10:35 AM  CM met with pt to provide resources. He states that his brother is en route to hospital and would like to speak with CM upon arrival. He states that his brother feels he should go to a rehab setting. CM remains available. 10:26 AM  [facility] has accepted, AVS updated. CM met with pt at bedside to notify and provide resources. No further concerns indicated at this time. AVS updated. Pt is ready for discharge from a Care Management standpoint. RN informed.          Initial note:  KALI acknowledges consult for home health services. CM reviewed chart and met with pt at bedside. Pt is alert and oriented, answers all questions appropriately. Pt reports that he came to ED due to leg swelling and concerns about his mobility. Pt resides alone in 1 level home with 4 WALE. He reports that he typically ambulates with a cane in the community but also owns a walker. He reports having a lift chair and raised toilet seats. He acknowledges that he should start using walker in the home. He drives and was working up until 2 weeks ago at [employer] and attending OP PT at [address] [place] [address]. He reports that he feels he \"did too much too quickly\" and could not recover from his sore muscles. He identifies two local supports: his friend, Nas Alvarez, and friend, Raven Melo. He states that Raven Kameron is his \"rock\" and calls him daily, also visits regularly and helps him as needed. Nas Alvarez also visits him. He does not have anyone that can stay with him all day, and is interested in resources for hiring private duty caregiver. CM to provide these to pt. Also updated AVS with [facility] information. Pt is agreeable to home health, offered freedom of choice, no preference as long as they are in network with [employer]. Pt shares that he has been to [facility] before and does not wish to pursue this option. He plans to call friend, Nas Alvarez, to transport him home at d/c. He states that Nas Alvarez can help him into the home to ensure safety. CM encouraged prompt follow-up with PCP, pt politely declined for CM to coordinate, states he is going to call Dr. Ana Wright office on today. Referral pending with [facility]. CM to provide and review resources with pt prior to d/c.     Jocelyne Sidhu [address]

## 2022-11-30 NOTE — PROGRESS NOTES
Orders received and chart reviewed. Pt is currently pending bilateral LE duplex to rule out DVT. Will await results prior to start of OT services.

## 2022-11-30 NOTE — Clinical Note
TRANSFER - OUT REPORT:     Verbal report given to: Ronda Lund. Report consisted of patient's Situation, Background, Assessment and   Recommendations(SBAR). Opportunity for questions and clarification was provided. Patient transported with a Registered Nurse.

## 2022-11-30 NOTE — ED NOTES
This rn at bedside with family, pt assisted to the bathroom pt was fully undressed in the bathroom and placed into a gown. Pts bed was completely changed and he was provided with warm blankets. This rn back at bedside pt boosted in the bed and given a meal tray.

## 2022-12-01 PROBLEM — E43 SEVERE PROTEIN-CALORIE MALNUTRITION (HCC): Status: ACTIVE | Noted: 2022-12-01

## 2022-12-01 LAB
ALBUMIN SERPL-MCNC: 1.5 G/DL (ref 3.5–5)
ALBUMIN/GLOB SERPL: 0.6 {RATIO} (ref 1.1–2.2)
ALP SERPL-CCNC: 117 U/L (ref 45–117)
ALT SERPL-CCNC: 24 U/L (ref 12–78)
ANION GAP SERPL CALC-SCNC: 8 MMOL/L (ref 5–15)
AST SERPL-CCNC: 47 U/L (ref 15–37)
BASOPHILS # BLD: 0.1 K/UL (ref 0–0.1)
BASOPHILS NFR BLD: 1 % (ref 0–1)
BILIRUB SERPL-MCNC: 0.8 MG/DL (ref 0.2–1)
BUN SERPL-MCNC: 12 MG/DL (ref 6–20)
BUN/CREAT SERPL: 10 (ref 12–20)
CALCIUM SERPL-MCNC: 6.9 MG/DL (ref 8.5–10.1)
CHLORIDE SERPL-SCNC: 102 MMOL/L (ref 97–108)
CO2 SERPL-SCNC: 31 MMOL/L (ref 21–32)
CREAT SERPL-MCNC: 1.2 MG/DL (ref 0.7–1.3)
DIFFERENTIAL METHOD BLD: ABNORMAL
EOSINOPHIL # BLD: 0.2 K/UL (ref 0–0.4)
EOSINOPHIL NFR BLD: 4 % (ref 0–7)
ERYTHROCYTE [DISTWIDTH] IN BLOOD BY AUTOMATED COUNT: 13.6 % (ref 11.5–14.5)
FOLATE SERPL-MCNC: 9 NG/ML (ref 5–21)
GLOBULIN SER CALC-MCNC: 2.4 G/DL (ref 2–4)
GLUCOSE SERPL-MCNC: 80 MG/DL (ref 65–100)
HCT VFR BLD AUTO: 28.1 % (ref 36.6–50.3)
HGB BLD-MCNC: 9.7 G/DL (ref 12.1–17)
IMM GRANULOCYTES # BLD AUTO: 0 K/UL (ref 0–0.04)
IMM GRANULOCYTES NFR BLD AUTO: 0 % (ref 0–0.5)
LYMPHOCYTES # BLD: 1.3 K/UL (ref 0.8–3.5)
LYMPHOCYTES NFR BLD: 26 % (ref 12–49)
MAGNESIUM SERPL-MCNC: 1.2 MG/DL (ref 1.6–2.4)
MCH RBC QN AUTO: 35.7 PG (ref 26–34)
MCHC RBC AUTO-ENTMCNC: 34.5 G/DL (ref 30–36.5)
MCV RBC AUTO: 103.3 FL (ref 80–99)
MONOCYTES # BLD: 0.5 K/UL (ref 0–1)
MONOCYTES NFR BLD: 9 % (ref 5–13)
NEUTS SEG # BLD: 3 K/UL (ref 1.8–8)
NEUTS SEG NFR BLD: 60 % (ref 32–75)
NRBC # BLD: 0 K/UL (ref 0–0.01)
NRBC BLD-RTO: 0 PER 100 WBC
PHOSPHATE SERPL-MCNC: 2.9 MG/DL (ref 2.6–4.7)
PLATELET # BLD AUTO: 209 K/UL (ref 150–400)
PMV BLD AUTO: 10 FL (ref 8.9–12.9)
POTASSIUM SERPL-SCNC: 2.7 MMOL/L (ref 3.5–5.1)
PROT SERPL-MCNC: 3.9 G/DL (ref 6.4–8.2)
RBC # BLD AUTO: 2.72 M/UL (ref 4.1–5.7)
SODIUM SERPL-SCNC: 141 MMOL/L (ref 136–145)
TSH SERPL DL<=0.05 MIU/L-ACNC: 9.11 UIU/ML (ref 0.36–3.74)
VIT B12 SERPL-MCNC: 1182 PG/ML (ref 193–986)
WBC # BLD AUTO: 5 K/UL (ref 4.1–11.1)

## 2022-12-01 PROCEDURE — 74011000250 HC RX REV CODE- 250: Performed by: INTERNAL MEDICINE

## 2022-12-01 PROCEDURE — 80053 COMPREHEN METABOLIC PANEL: CPT

## 2022-12-01 PROCEDURE — 74011250636 HC RX REV CODE- 250/636: Performed by: INTERNAL MEDICINE

## 2022-12-01 PROCEDURE — 82746 ASSAY OF FOLIC ACID SERUM: CPT

## 2022-12-01 PROCEDURE — 84100 ASSAY OF PHOSPHORUS: CPT

## 2022-12-01 PROCEDURE — 74011250636 HC RX REV CODE- 250/636: Performed by: NURSE PRACTITIONER

## 2022-12-01 PROCEDURE — 74011250637 HC RX REV CODE- 250/637: Performed by: INTERNAL MEDICINE

## 2022-12-01 PROCEDURE — G0378 HOSPITAL OBSERVATION PER HR: HCPCS

## 2022-12-01 PROCEDURE — 97530 THERAPEUTIC ACTIVITIES: CPT

## 2022-12-01 PROCEDURE — 74011250637 HC RX REV CODE- 250/637: Performed by: NURSE PRACTITIONER

## 2022-12-01 PROCEDURE — 97530 THERAPEUTIC ACTIVITIES: CPT | Performed by: OCCUPATIONAL THERAPIST

## 2022-12-01 PROCEDURE — 96375 TX/PRO/DX INJ NEW DRUG ADDON: CPT

## 2022-12-01 PROCEDURE — 36415 COLL VENOUS BLD VENIPUNCTURE: CPT

## 2022-12-01 PROCEDURE — 83735 ASSAY OF MAGNESIUM: CPT

## 2022-12-01 PROCEDURE — 85025 COMPLETE CBC W/AUTO DIFF WBC: CPT

## 2022-12-01 PROCEDURE — 65270000029 HC RM PRIVATE

## 2022-12-01 PROCEDURE — 97162 PT EVAL MOD COMPLEX 30 MIN: CPT

## 2022-12-01 PROCEDURE — 97166 OT EVAL MOD COMPLEX 45 MIN: CPT | Performed by: OCCUPATIONAL THERAPIST

## 2022-12-01 PROCEDURE — 82607 VITAMIN B-12: CPT

## 2022-12-01 PROCEDURE — 84443 ASSAY THYROID STIM HORMONE: CPT

## 2022-12-01 RX ORDER — MAGNESIUM SULFATE HEPTAHYDRATE 40 MG/ML
2 INJECTION, SOLUTION INTRAVENOUS ONCE
Status: COMPLETED | OUTPATIENT
Start: 2022-12-01 | End: 2022-12-01

## 2022-12-01 RX ORDER — POTASSIUM CHLORIDE 750 MG/1
40 TABLET, FILM COATED, EXTENDED RELEASE ORAL EVERY 6 HOURS
Status: COMPLETED | OUTPATIENT
Start: 2022-12-01 | End: 2022-12-01

## 2022-12-01 RX ORDER — TRAMADOL HYDROCHLORIDE 50 MG/1
50 TABLET ORAL ONCE
Status: COMPLETED | OUTPATIENT
Start: 2022-12-02 | End: 2022-12-01

## 2022-12-01 RX ORDER — THERA TABS 400 MCG
1 TAB ORAL DAILY
Status: DISCONTINUED | OUTPATIENT
Start: 2022-12-01 | End: 2022-12-13 | Stop reason: HOSPADM

## 2022-12-01 RX ORDER — LEVOTHYROXINE SODIUM 50 UG/1
50 TABLET ORAL
Status: DISCONTINUED | OUTPATIENT
Start: 2022-12-02 | End: 2022-12-13 | Stop reason: HOSPADM

## 2022-12-01 RX ADMIN — POTASSIUM CHLORIDE 40 MEQ: 750 TABLET, FILM COATED, EXTENDED RELEASE ORAL at 12:21

## 2022-12-01 RX ADMIN — FUROSEMIDE 40 MG: 10 INJECTION, SOLUTION INTRAMUSCULAR; INTRAVENOUS at 23:14

## 2022-12-01 RX ADMIN — MAGNESIUM SULFATE HEPTAHYDRATE 2 G: 40 INJECTION, SOLUTION INTRAVENOUS at 06:55

## 2022-12-01 RX ADMIN — OXYCODONE HYDROCHLORIDE AND ACETAMINOPHEN 500 MG: 500 TABLET ORAL at 10:10

## 2022-12-01 RX ADMIN — SUCRALFATE 1 G: 1 TABLET ORAL at 10:10

## 2022-12-01 RX ADMIN — SUCRALFATE 1 G: 1 TABLET ORAL at 22:22

## 2022-12-01 RX ADMIN — ENOXAPARIN SODIUM 40 MG: 100 INJECTION SUBCUTANEOUS at 10:09

## 2022-12-01 RX ADMIN — SODIUM CHLORIDE, PRESERVATIVE FREE 10 ML: 5 INJECTION INTRAVENOUS at 06:37

## 2022-12-01 RX ADMIN — ASPIRIN 81 MG: 81 TABLET, CHEWABLE ORAL at 10:10

## 2022-12-01 RX ADMIN — Medication 1000 MCG: at 10:10

## 2022-12-01 RX ADMIN — SODIUM CHLORIDE, PRESERVATIVE FREE 10 ML: 5 INJECTION INTRAVENOUS at 06:57

## 2022-12-01 RX ADMIN — THERA TABS 1 TABLET: TAB at 12:21

## 2022-12-01 RX ADMIN — SODIUM CHLORIDE, PRESERVATIVE FREE 10 ML: 5 INJECTION INTRAVENOUS at 22:22

## 2022-12-01 RX ADMIN — SUCRALFATE 1 G: 1 TABLET ORAL at 12:21

## 2022-12-01 RX ADMIN — POTASSIUM CHLORIDE 40 MEQ: 750 TABLET, FILM COATED, EXTENDED RELEASE ORAL at 06:55

## 2022-12-01 RX ADMIN — SODIUM CHLORIDE, PRESERVATIVE FREE 10 ML: 5 INJECTION INTRAVENOUS at 17:08

## 2022-12-01 RX ADMIN — FUROSEMIDE 40 MG: 10 INJECTION, SOLUTION INTRAMUSCULAR; INTRAVENOUS at 10:09

## 2022-12-01 RX ADMIN — PANTOPRAZOLE SODIUM 40 MG: 40 TABLET, DELAYED RELEASE ORAL at 10:10

## 2022-12-01 RX ADMIN — SUCRALFATE 1 G: 1 TABLET ORAL at 17:08

## 2022-12-01 RX ADMIN — FOLIC ACID 1 MG: 1 TABLET ORAL at 10:10

## 2022-12-01 RX ADMIN — TRAMADOL HYDROCHLORIDE 50 MG: 50 TABLET ORAL at 23:17

## 2022-12-01 NOTE — PROGRESS NOTES
Received notification from bedside RN about patient with regards to: K+ 2.7. Noted Magnesium of 1.2  VS: /72, HR 60, RR 14, O2 sat 98% on RA    Intervention given:   - Kdur 40 meq PO x 2 doses, Magnesium 2 g IV x 1 dose.   - CMP, Magnesium for tomorrow AM

## 2022-12-01 NOTE — PROGRESS NOTES
Problem: Self Care Deficits Care Plan (Adult)  Goal: *Acute Goals and Plan of Care (Insert Text)  Description: FUNCTIONAL STATUS PRIOR TO ADMISSION: recently ambulating with SPC, difficulty with sit to stand that has progressed, performed ADLs on his own, works at the [employer], driving, using lift chair for at least a year but increasing difficulty with sit to stand    [address]: The patient lived alone with brother and sister to provide assistance. Intermittent assist    Occupational Therapy Goals:  Initiated 12/1/2022  1. Patient will perform grooming with modified independence within 7 days. 2. Patient will perform upper body dressing and lower body dressing with modified independence within 7 days. 3. Patient will perform toileting with modified independence within 7 days. 4. Patient will transfer from toilet with modified independence using the least restrictive device and appropriate durable medical equipment within 7 days. Outcome: Not Met   OCCUPATIONAL THERAPY EVALUATION  Patient: Adria Alvarez (15 y.o. male)  Date: 12/1/2022  Primary Diagnosis: Weakness [R53.1]       Precautions: fall, orthostatic       ASSESSMENT  Based on the objective data described below, the patient presents with asymptomatic orthostatsis with functional weakness and increased assist for bed mobility and mobility in general.  Pt reports working as a  at [employer] and was going to OP PT at [employer] a few weeks ago. He reports soreness that he attributes to doing to much with PT two weeks ago. As of note pt has significant BLE swelling from groin to bilateral feet that he reports \"use to go away a few weeks ago. \"  He now reports that the edema is chronic and has increased. Noted that BMP is elevated and pt doesn't have a history of CHF. Spoke with nurse and discussed possible need for cardiology consult.   Moderate assist needed for bed mobility this session and total assist to scoot. From elevated stretcher surface pt was able to come to standing with min assist and side step with CGA using RW. At baseline pt only recently started using a cane. Currently pt needs SPC for safe mobility and was only able to side step due to low BP. Unable to reach feet due to swelling and weakness needing max assist for LB ADLS at this time. Pt was unable to lift LE up onto stool to scoot back onto stretcher (swelling and LE weakness) and needed therapist to raise height of stretcher to get pts feet on stool. He was unable to scoot back and needed total assist.  Needed significant assist to get LE back onto stretcher. Recommend rehab at discharge. 12/01/22 1003 12/01/22 1011 12/01/22 1016   Vital Signs   Pulse (Heart Rate) 85 (!) 101 (!) 123   Heart Rate Source  --  Monitor  --    /74 90/80 (!) 72/60   MAP (Calculated) 88 83 (!) 64   BP 1 Location Left upper arm Left upper arm Left upper arm   BP 1 Method Automatic Automatic Automatic   BP Patient Position Supine Sitting Standing        12/01/22 1020 12/01/22 1026   Vital Signs   Pulse (Heart Rate)  --  70   Heart Rate Source  --   --    BP 93/77 114/83   MAP (Calculated) 82 93   BP 1 Location Left upper arm Left upper arm   BP 1 Method Automatic Automatic   BP Patient Position Sitting  (edge of stretcher) Supine       Current Level of Function Impacting Discharge (ADLs/self-care): moderate assist bed mobility, total assist to scoot, min assist sit to stand and to side step with RW    Feeding: Setup    Oral Facial Hygiene/Grooming: Setup (seated)    Bathing: Moderate assistance    Upper Body Dressing: Setup    Lower Body Dressing: Maximum assistance    Toileting: Maximum assistance       Functional Outcome Measure: The patient scored 55/100 on the barthel outcome measure which is indicative of moderate decline in mobility and ADLS.       Other factors to consider for discharge: lives alone, limited support, high fall risk, orthostatic (asymptomatic), new LE swelling with increased BNP, was working and independent prior to illness, would benefit from rehab     Patient will benefit from skilled therapy intervention to address the above noted impairments. PLAN :  Recommendations and Planned Interventions: self care training, functional mobility training, therapeutic exercise, balance training, therapeutic activities, patient education, home safety training, and family training/education    Frequency/Duration: Patient will be followed by occupational therapy 4 times a week to address goals. Recommendation for discharge: (in order for the patient to meet his/her long term goals)  Therapy up to 5 days/week in SNF setting    This discharge recommendation:  Has been made in collaboration with the attending provider and/or case management    IF patient discharges home will need the following DME: bedside commode       SUBJECTIVE:   Patient stated I don't feel dizzy. I just feel tired.   With standing pt feels weak and washed out, most likely due to orthostasis    OBJECTIVE DATA SUMMARY:   HISTORY:   Past Medical History:   Diagnosis Date    DJD (degenerative joint disease)     Hypertension     Obesity      Past Surgical History:   Procedure Laterality Date    COLONOSCOPY N/A 12/20/2021    COLONOSCOPY performed by Jenifer Santa MD at [facility] ENDOSCOPY    HX ORTHOPAEDIC      bilat hip replacements    MA GASTRIC BYPASS,OBESE<150CM TAO-EN-Y         Expanded or extensive additional review of patient history:     Home Situation  Home Environment: Private residence  # Steps to Enter: 4  Rails to Enter: Yes  Hand Rails : Bilateral (close together)  One/Two Story Residence: One story  Living Alone: Yes  Support Systems: Friend/Neighbor, Other Family Member(s) (Brother, friend; does not have support available to stay with him)  Patient Expects to be Discharged to[de-identified] Skilled nursing facility  Current DME Used/Available at Home: Suleiman Hurley rollator, Walker, rolling, Cane, straight, Shower chair, Glucometer, Lift chair (comfort height toilet, uses lift chair often)  Tub or Shower Type: Shower    Hand dominance: Right    EXAMINATION OF PERFORMANCE DEFICITS:  Cognitive/Behavioral Status:  Neurologic State: Alert  Orientation Level: Oriented X4  Cognition: Appropriate decision making; Appropriate for age attention/concentration; Appropriate safety awareness  Perception: Appears intact  Perseveration: No perseveration noted  Safety/Judgement: Fall prevention;Home safety; Insight into deficits    Skin: red bilateral LE from knees to ankles    Edema: severe bilateral LE    Hearing: Auditory  Auditory Impairment: None    Vision/Perceptual:                                     Range of Motion:    AROM: Generally decreased, functional (BLE limited by significant swelling)  PROM: Generally decreased, functional                      Strength:  Generally decreased but functional                   Coordination:     Fine Motor Skills-Upper: Left Intact; Right Intact    Gross Motor Skills-Upper: Left Intact; Right Intact    Tone & Sensation:       Sensation: Impaired (numbness in bilateral hands which is baseline)                      Balance:  Sitting: Intact  Standing: Impaired  Standing - Static: Good;Constant support  Standing - Dynamic : Fair    Functional Mobility and Transfers for ADLs:  Bed Mobility:  Rolling: Moderate assistance  Supine to Sit: Moderate assistance  Sit to Supine: Moderate assistance  Scooting: Total assistance    Transfers:  Sit to Stand: Minimum assistance  Stand to Sit: Minimum assistance  Bathroom Mobility:  (unable due to low BP)  Toilet Transfer : Moderate assistance    ADL Assessment:  Feeding: Setup    Oral Facial Hygiene/Grooming: Setup (seated)    Bathing:  Moderate assistance    Upper Body Dressing: Setup    Lower Body Dressing: Maximum assistance    Toileting: Maximum assistance                  ADL Intervention and task modifications: Total assist socks. Cognitive Retraining  Safety/Judgement: Fall prevention;Home safety; Insight into deficits       Functional Measure:    Barthel Index:  Bathin  Bladder: 10  Bowels: 10  Groomin  Dressin  Feeding: 10  Mobility: 0  Stairs: 0  Toilet Use: 5  Transfer (Bed to Chair and Back): 10  Total: 55/100      The Barthel ADL Index: Guidelines  1. The index should be used as a record of what a patient does, not as a record of what a patient could do. 2. The main aim is to establish degree of independence from any help, physical or verbal, however minor and for whatever reason. 3. The need for supervision renders the patient not independent. 4. A patient's performance should be established using the best available evidence. Asking the patient, friends/relatives and nurses are the usual sources, but direct observation and common sense are also important. However direct testing is not needed. 5. Usually the patient's performance over the preceding 24-48 hours is important, but occasionally longer periods will be relevant. 6. Middle categories imply that the patient supplies over 50 per cent of the effort. 7. Use of aids to be independent is allowed. Score Interpretation (from [address] [address])    Independent   60-79 Minimally independent   40-59 Partially dependent   20-39 Very dependent   <20 Totally dependent     -Ashley Vega., Barthel, D.W. (1965). Functional evaluation: the Barthel Index. [address] [facility] ([address] (1997). The Barthel activities of daily living index: self-reporting versus actual performance in the old (> or = 75 years). Journal of [address] [place] [address] 45(7), [facility], PreethiYISSEL, Jo Ann Ross., [facility]. (). Measuring the change in disability after inpatient rehabilitation; comparison of the responsiveness of the Barthel Index and Functional [place] Measure.  Journal of Neurology, Neurosurgery, and Psychiatry, 664), 903-470. LULA Chong, NORA Potts, & Larissa Estrada M.A. (2004) Assessment of post-stroke quality of life in cost-effectiveness studies: The usefulness of the Barthel Index and the EuroQoL-5D. Quality of Life Research, 15, 445-99         Occupational Therapy Evaluation Charge Determination   History Examination Decision-Making   HIGH Complexity : Extensive review of history including physical, cognitive and psychosocial history  HIGH Complexity : 5 or more performance deficits relating to physical, cognitive , or psychosocial skils that result in activity limitations and / or participation restrictions HIGH Complexity : Patient presents with comorbidities that affect occupational performance. Signifigant modification of tasks or assistance (eg, physical or verbal) with assessment (s) is necessary to enable patient to complete evaluation       Based on the above components, the patient evaluation is determined to be of the following complexity level: HIGH   Pain Rating:  Pain in LE to the touch and posterior thighs due to stretcher surface    Activity Tolerance:   Fair and requires rest breaks    After treatment patient left in no apparent distress:    Supine in bed, Heels elevated for pressure relief, Call bell within reach, Caregiver / family present, and bilateral rails up on stretcher    COMMUNICATION/EDUCATION:   The patients plan of care was discussed with: Physical therapist, Registered nurse, and patient . Home safety education was provided and the patient/caregiver indicated understanding., Patient/family have participated as able in goal setting and plan of care. , and Patient/family agree to work toward stated goals and plan of care. This patients plan of care is appropriate for delegation to [facility].     Thank you for this referral.  ANNA Richards/L  Time Calculation: 37 mins

## 2022-12-01 NOTE — ED NOTES
Bedside and Verbal shift change report given to Kenji3 Juan Moreno (oncoming nurse) CDU 35 by Angela Michel RN (offgoing nurse). Report included the following information SBAR, Kardex, ED Summary, Procedure Summary, Intake/Output and MAR.

## 2022-12-01 NOTE — PROGRESS NOTES
12/01/22 1003 12/01/22 1011 12/01/22 1016   Vital Signs   Pulse (Heart Rate) 85 (!) 101 (!) 123   Heart Rate Source  --  Monitor  --    /74 90/80 (!) 72/60   MAP (Calculated) 88 83 (!) 64   BP 1 Location Left upper arm Left upper arm Left upper arm   BP 1 Method Automatic Automatic Automatic   BP Patient Position Supine Sitting Standing      12/01/22 1020 12/01/22 1026   Vital Signs   Pulse (Heart Rate)  --  70   Heart Rate Source  --   --    BP 93/77 114/83   MAP (Calculated) 82 93   BP 1 Location Left upper arm Left upper arm   BP 1 Method Automatic Automatic   BP Patient Position Sitting  (edge of stretcher) Supine

## 2022-12-01 NOTE — PROGRESS NOTES
1045: Telephone report called to Christiano Monteiro (oncology nurse) to include SBAR, labs, and plan of care.

## 2022-12-01 NOTE — PROGRESS NOTES
Problem: Mobility Impaired (Adult and Pediatric)  Goal: *Acute Goals and Plan of Care (Insert Text)  Description: FUNCTIONAL STATUS PRIOR TO ADMISSION: Pt lives alone and has supportive sister and friends. Until ~2 weeks ago, pt was working at the [employer], amb with no device (intermittent furniture support) in the home and cane when out. He was attending OPPT [facility] but stopped due to getting very sore. He has noted a decline in last 2 weeks with increased swelling of BLE. He states he used to get swelling that would then go down at night but over last 2 weeks it does not go down and legs are very painful. HOME SUPPORT PRIOR TO ADMISSION: The patient lived alone with friends/family to provide assistance. Physical Therapy Goals  Initiated 12/1/2022  1. Patient will move from supine to sit and sit to supine , scoot up and down, and roll side to side in bed with modified independence within 7 day(s). 2.  Patient will transfer from bed to chair and chair to bed with modified independence using the least restrictive device within 7 day(s). 3.  Patient will perform sit to stand with modified independence within 7 day(s). 4.  Patient will ambulate with modified independence for 150 feet with the least restrictive device within 7 day(s). 5.  Patient will ascend/descend 4 stairs with handrail(s) with modified independence within 7 day(s). Outcome: Not Met   PHYSICAL THERAPY EVALUATION  Patient: Adria Alvarez (59 y.o. male)  Date: 12/1/2022  Primary Diagnosis: Weakness [R53.1]       Precautions: fall; very orthostatic on eval      ASSESSMENT  Based on the objective data described below, the patient presents with very sensitive LEs, significant orthostatic drop in BP to standing including increased HR with pt c/o feeling weak, limited tolerance to movement of LEs with the swelling and sensitivity, general weakness and subsequently limited gait and mobility.  Pt is needing mod A for bed mobility, total A to scoot, min A to stand at walker and side step toward [facility] to reposition. Vitals relayed to RN.   12/01/22 1003 12/01/22 1011 12/01/22 1016   Vital Signs   Pulse (Heart Rate) 85 (!) 101 (!) 123   Heart Rate Source  --  Monitor  --    /74 90/80 (!) 72/60   MAP (Calculated) 88 83 (!) 64   BP 1 Location Left upper arm Left upper arm Left upper arm   BP 1 Method Automatic Automatic Automatic   BP Patient Position Supine Sitting Standing        12/01/22 1020 12/01/22 1026   Vital Signs   Pulse (Heart Rate)  --  70   Heart Rate Source  --   --    BP 93/77 114/83   MAP (Calculated) 82 93   BP 1 Location Left upper arm Left upper arm   BP 1 Method Automatic Automatic   BP Patient Position Sitting  (edge of stretcher)        Given his high level of prior function and motivation to return to it, he would benefit from SNF rehab upon d/c. Current Level of Function Impacting Discharge (mobility/balance): see above    Functional Outcome Measure: The patient scored 55/100 on the barthel outcome measure which is indicative of 45% functional impairment. Other factors to consider for discharge: fall risk with +orthostatics, independent at baseline with decline over 2 weeks     Patient will benefit from skilled therapy intervention to address the above noted impairments. PLAN :  Recommendations and Planned Interventions: bed mobility training, transfer training, gait training, therapeutic exercises, patient and family training/education, and therapeutic activities      Frequency/Duration: Patient will be followed by physical therapy:  4 times a week to address goals.     Recommendation for discharge: (in order for the patient to meet his/her long term goals)  Therapy up to 5 days/week in SNF setting    This discharge recommendation:  Has been made in collaboration with the attending provider and/or case management    IF patient discharges home will need the following DME: rolling walker SUBJECTIVE:   Patient stated I have learned something from you.     OBJECTIVE DATA SUMMARY:   HISTORY:    Past Medical History:   Diagnosis Date    DJD (degenerative joint disease)     Hypertension     Obesity      Past Surgical History:   Procedure Laterality Date    COLONOSCOPY N/A 12/20/2021    COLONOSCOPY performed by Nain Cash MD at [facility] ENDOSCOPY    HX ORTHOPAEDIC      bilat hip replacements    NM GASTRIC BYPASS,OBESE<150CM TAO-EN-Y         Personal factors and/or comorbidities impacting plan of care:     Home Situation  Home Environment: Private residence  # Steps to Enter: 4  Rails to Enter: Yes  Hand Rails : Bilateral (close together)  One/Two Story Residence: One story  Living Alone: Yes  Support Systems: Friend/Neighbor, Other Family Member(s) (Brother, friend; does not have support available to stay with him)  Patient Expects to be Discharged to[de-identified] Skilled nursing facility  Current DME Used/Available at Home: Marva Alison, rollator, Marva Alison, rolling, [place], straight, Shower chair, Glucometer, Lift chair (comfort height toilet, uses lift chair often)  Tub or Shower Type: Shower    EXAMINATION/PRESENTATION/DECISION MAKING:   Critical Behavior:  Neurologic State: Alert  Orientation Level: Oriented X4  Cognition: Appropriate decision making, Appropriate for age attention/concentration, Appropriate safety awareness  Safety/Judgement: Fall prevention, Home safety, Insight into deficits  Hearing: Auditory  Auditory Impairment: None    Range Of Motion:  AROM: Generally decreased, functional (BLE limited by significant swelling)           PROM: Generally decreased, functional           Strength:    Strength: Generally decreased, functional                    Tone & Sensation:   Tone: Normal              Sensation: Impaired (numbness in bilateral hands which is baseline)               Coordination:  Coordination: Within functional limits       Functional Mobility:  Bed Mobility:  Rolling:  Moderate assistance  Supine to Sit: Moderate assistance (pain BLE)  Sit to Supine: Moderate assistance (pain BLE)  Scooting: Total assistance  Transfers:  Sit to Stand: Minimum assistance  Stand to Sit: Minimum assistance                       Balance:   Sitting: Intact  Standing: Impaired  Standing - Static: Fair;Good;Constant support  Standing - Dynamic : Fair  Ambulation/Gait Training:              Gait Description (WDL):  (did not progress due to significant drop in BP)           Functional Measure:  Barthel Index:    Bathin  Bladder: 10  Bowels: 10  Groomin  Dressin  Feeding: 10  Mobility: 0  Stairs: 0  Toilet Use: 5  Transfer (Bed to Chair and Back): 10  Total: 55/100       The Barthel ADL Index: Guidelines  1. The index should be used as a record of what a patient does, not as a record of what a patient could do. 2. The main aim is to establish degree of independence from any help, physical or verbal, however minor and for whatever reason. 3. The need for supervision renders the patient not independent. 4. A patient's performance should be established using the best available evidence. Asking the patient, friends/relatives and nurses are the usual sources, but direct observation and common sense are also important. However direct testing is not needed. 5. Usually the patient's performance over the preceding 24-48 hours is important, but occasionally longer periods will be relevant. 6. Middle categories imply that the patient supplies over 50 per cent of the effort. 7. Use of aids to be independent is allowed. Score Interpretation (from [address] [address])    Independent   60-79 Minimally independent   40-59 Partially dependent   20-39 Very dependent   <20 Totally dependent     -Ashley Vega., Barthel, D.W. (1965). Functional evaluation: the Barthel Index. [facility] ([address] [place] [address] (1997).  The Barthel activities of daily living index: self-reporting versus actual performance in the old (> or = 75 years). Journal of [facility]4 [facility] 45(3), 14 [facility], KEKE, Renay Campbell.Saman. (1999). Measuring the change in disability after inpatient rehabilitation; comparison of the responsiveness of the Barthel Index and Functional [place] Measure. Journal of Neurology, Neurosurgery, and Psychiatry, 66(4), 285-286. LULA Burgos, NORA Potts, & Eddie Carter M.A. (2004) Assessment of post-stroke quality of life in cost-effectiveness studies: The usefulness of the Barthel Index and the EuroQoL-5D. Quality of Life Research, 15, 160-58           Physical Therapy Evaluation Charge Determination   History Examination Presentation Decision-Making   HIGH Complexity :3+ comorbidities / personal factors will impact the outcome/ POC  HIGH Complexity : 4+ Standardized tests and measures addressing body structure, function, activity limitation and / or participation in recreation  MEDIUM Complexity : Evolving with changing characteristics  MEDIUM Complexity : FOTO score of 26-74      Based on the above components, the patient evaluation is determined to be of the following complexity level: MEDIUM    Pain Rating:  10/10 B distal LEs with 3+ pitting edema, LEs elevated on pillow    Activity Tolerance:   signs and symptoms of orthostatic hypotension    After treatment patient left in no apparent distress:   Supine in bed, Call bell within reach, Caregiver / family present, and stretcher rails up    COMMUNICATION/EDUCATION:   The patients plan of care was discussed with: Occupational therapist and Registered nurse and CM. Fall prevention education was provided and the patient/caregiver indicated understanding., Patient/family have participated as able in goal setting and plan of care. , and Patient/family agree to work toward stated goals and plan of care.     Thank you for this referral.  Efrain Cullen, PT   Time Calculation: 36 mins

## 2022-12-01 NOTE — PROGRESS NOTES
Comprehensive Nutrition Assessment    Type and Reason for Visit: Initial, Consult    Nutrition Recommendations/Plan:   Continue regular liberalized diet for optimal intake. Select meals as able. RD communicated pref to the kitchen. RD ordered Ensure Clear Apple po 1X/day. Consider streamline MVI/minerals as pt report they make his stomach upset. Please document % meals and supplements consumed in flowsheet I/O's under intake. Malnutrition Assessment:  Malnutrition Status:  Severe malnutrition (12/01/22 1344)    Context:  Chronic illness     Findings of the 6 clinical characteristics of malnutrition:   Energy Intake:  75% or less est energy requirements for 1 month or longer  Weight Loss:  Unable to assess     Body Fat Loss:  Severe body fat loss, Triceps, Buccal region   Muscle Mass Loss:  Severe muscle mass loss, Scapula (trapezius), Clavicles (pectoralis &deltoids)  Fluid Accumulation:  Severe (3+ documented in flowsheet), Extremities   Strength:  Not performed     Nutrition Assessment:    12/1: Chart reviewed; med noted for lower extremity weakness and leg swelling on admission. RD consulted due to poor appetite and nutritional intake PTA. Pt reports appetite recently declined over the last several months due to the lower extremity weakness. Pt suspects weight loss but unsure of amount. Pt notable for severe muscle wasting and fat loss of upper and lower extremities. Historically, pt has declined oral nutritional supplements but states \"if I need it then I will try\". Agreeable to trial of Ensure Clear. RD also assisted with lunch preferences. Likes eggs but would rather have boiled eggs so will communicate with kitchen team. Pt had not received a lunch tray at time of visit to escalated to kitchen. Noted low K+ which is being repleted. Pt also receiving a standard MVI, Vitamin D, C, Folvite and B12. Pt reports all of his meds and MVIs make his stomach upset. He tries to eat with food.      No data found. Last Weight Metric  Weight Loss Metrics 11/30/2022 11/29/2022 12/20/2021 4/10/2021 12/8/2020 1/20/2020 12/3/2019   Today's Wt - 141 lb 154 lb 190 lb 190 lb 234 lb 230 lb 9.6 oz   BMI 21.44 kg/m2 - 22.1 kg/m2 27.26 kg/m2 27.26 kg/m2 33.58 kg/m2 33.09 kg/m2      Nutrition Related Findings:    BM: 12/1; Labs: K+ 2.7; Meds: B12, Vitamin C, Folvite, MVI, Vitamin D Wound Type: None    Current Nutrition Intake & Therapies:        ADULT DIET Regular  ADULT ORAL NUTRITION SUPPLEMENT Lunch; Clear Liquid    Anthropometric Measures:  Height: 5' 8\" (172.7 cm)  Ideal Body Weight (IBW): 154 lbs (70 kg)     Current Body Wt:  64 kg (141 lb 1.5 oz), 91.6 % IBW. Current BMI (kg/m2): 21.5                          BMI Category: Underweight (BMI less than 22) age over 72    Estimated Daily Nutrient Needs:  Energy Requirements Based On: Formula  Weight Used for Energy Requirements: Current  Energy (kcal/day): 1800 (BMR x 1. 3AF)  Weight Used for Protein Requirements: Current  Protein (g/day): 64-77 (1.0-1.2 g/kg bw)  Method Used for Fluid Requirements: 1 ml/kcal  Fluid (ml/day): 1800 ml/day    Nutrition Diagnosis:   Inadequate protein-energy intake related to  (decreased appetite) as evidenced by intake 0-25%    Nutrition Interventions:   Food and/or Nutrient Delivery: Continue current diet, Start oral nutrition supplement  Nutrition Education/Counseling: No recommendations at this time  Coordination of Nutrition Care: Continue to monitor while inpatient       Goals:     Goals: PO intake 50% or greater, by next RD assessment       Nutrition Monitoring and Evaluation:   Behavioral-Environmental Outcomes: None identified  Food/Nutrient Intake Outcomes: Food and nutrient intake, Supplement intake  Physical Signs/Symptoms Outcomes: Biochemical data, Skin, Weight, Nutrition focused physical findings    Discharge Planning:    Continue current diet    Chacha Gold RD  Contact:

## 2022-12-01 NOTE — PROGRESS NOTES
Transition of Care Plan:     RUR: N/A - ObservationRODRIGUEZ signed 11/30  Disposition: [facility] accepted, auth submitted, awaiting auth and medical stability  If SNF or IPR: Date FOC offered: SNF 11/30  Date [address] received: 11/30  Date authorization started with reference number: Auth submitted 12/1/22, Ref #[id number]  Date authorization received and expires:  Accepting facility: [facility]  Follow up appointments: PCP  DME needed: Owns cane, rollator, lift chair   Transportation at Discharge: Family/friend vs medical transport   [place] or means to access home:   N/A      Medicare Letter: N/A - Obs   Is patient a  and connected with the [facility]? N/A             If yes, was [employer] transfer form completed and VA notified? Caregiver Contact: Adriane Whitehead [phone]  Discharge Caregiver contacted prior to discharge? Yes  Care Conference needed?:  No       2:40 PM  Call placed to [address] [place] [address] to begin auth and clinicals faxed to [phone] as instructed. Ref# [id number].     2:11 PM  CM updated pt that [facility] has accepted. Pt verbalized his approval of this SNF for d/c. [facility] selected & accepted. CM notified pt that auth would be submitted to [facility] on today for review. CM will update pt as updates are available. Hand-off provided to Unit CM. Initial note:  Care management continues to be available to assist as transition of care planning needs arise. CM has reviewed chart, noted that [facility] [place] has accepted. CM will need PT/OT evaluations to be completed and written to submit clinicals to [facility] for authorization.      Jocelyne Hinson 178, [address]

## 2022-12-01 NOTE — PROGRESS NOTES
Bedside and Verbal shift change report given to [address] [place] [address] (oncoming nurse) by JUAN MANUEL Blank RN (offgoing nurse). Report given with SBAR, Kardex, Intake/Output, MAR and Recent Results.

## 2022-12-01 NOTE — PROGRESS NOTES
Problem: Patient Education: Go to Patient Education Activity  Goal: Patient/Family Education  Outcome: Progressing Towards Goal     Problem: Pressure Injury - Risk of  Goal: *Prevention of pressure injury  Description: Document Amadou Scale and appropriate interventions in the flowsheet. Outcome: Progressing Towards Goal  Note: Pressure Injury Interventions:  Sensory Interventions: Float heels    Moisture Interventions: Apply protective barrier, creams and emollients    Activity Interventions: PT/OT evaluation    Mobility Interventions: HOB 30 degrees or less    Nutrition Interventions: Offer support with meals,snacks and hydration    Friction and Shear Interventions: HOB 30 degrees or less                Problem: Patient Education: Go to Patient Education Activity  Goal: Patient/Family Education  Outcome: Progressing Towards Goal     Problem: General Medical Care Plan  Goal: *Vital signs within specified parameters  Outcome: Progressing Towards Goal  Goal: *Labs within defined limits  Outcome: Progressing Towards Goal  Goal: *Absence of infection signs and symptoms  Outcome: Progressing Towards Goal  Goal: *Optimal pain control at patient's stated goal  Outcome: Progressing Towards Goal  Goal: *Skin integrity maintained  Outcome: Progressing Towards Goal  Goal: *Fluid volume balance  Outcome: Progressing Towards Goal  Goal: *Optimize nutritional status  Outcome: Progressing Towards Goal  Goal: *Anxiety reduced or absent  Outcome: Progressing Towards Goal  Goal: *Progressive mobility and function (eg: ADL's)  Outcome: Progressing Towards Goal     Problem: Patient Education: Go to Patient Education Activity  Goal: Patient/Family Education  Outcome: Progressing Towards Goal     Problem: Falls - Risk of  Goal: *Absence of Falls  Description: Document Carrie Fall Risk and appropriate interventions in the flowsheet.   Outcome: Progressing Towards Goal  Note: Fall Risk Interventions:  Mobility Interventions: Patient to call before getting OOB         Medication Interventions: Bed/chair exit alarm         History of Falls Interventions: Door open when patient unattended         Problem: Patient Education: Go to Patient Education Activity  Goal: Patient/Family Education  Outcome: Progressing Towards Goal

## 2022-12-01 NOTE — PROGRESS NOTES
End of Shift Note    Bedside shift change report given to Jessica Romeo (oncoming nurse) by Micah Evans RN (offgoing nurse). Report included the following information SBAR, Kardex, and MAR    Shift worked:  0700 - 1930     Shift summary and any significant changes:     Patient alert and oriented with no complaints of pain. Patient arrived on unit with brother and sister; admission assessment completed. Dual skin assessment completed with Everardo Henriquez RN. Noted blanchable redness on sacrum. Provider visited patient; would like to increase synthroid for hypothyroidism. Echo also ordered as BNP was over 2000; not completed today. All questions and concerns have been answered at this time. Patient voiding in urinal, educated on use of call bell. Patient stated understanding. Bed in lowest position, call bell within reach, bed alarm has been activated.      Concerns for physician to address:       Zone phone for oncoming shift:          Micah Evans RN

## 2022-12-02 ENCOUNTER — APPOINTMENT (OUTPATIENT)
Dept: NON INVASIVE DIAGNOSTICS | Age: 77
DRG: 260 | End: 2022-12-02
Attending: INTERNAL MEDICINE
Payer: MEDICARE

## 2022-12-02 ENCOUNTER — APPOINTMENT (OUTPATIENT)
Dept: ULTRASOUND IMAGING | Age: 77
DRG: 260 | End: 2022-12-02
Attending: INTERNAL MEDICINE
Payer: MEDICARE

## 2022-12-02 PROBLEM — I95.9 HYPOTENSION: Status: ACTIVE | Noted: 2022-12-02

## 2022-12-02 PROBLEM — R60.1 ANASARCA: Status: ACTIVE | Noted: 2022-12-02

## 2022-12-02 LAB
ALBUMIN SERPL-MCNC: 1.4 G/DL (ref 3.5–5)
ALBUMIN/GLOB SERPL: 0.6 {RATIO} (ref 1.1–2.2)
ALP SERPL-CCNC: 119 U/L (ref 45–117)
ALT SERPL-CCNC: 21 U/L (ref 12–78)
ANION GAP SERPL CALC-SCNC: 6 MMOL/L (ref 5–15)
AST SERPL-CCNC: 41 U/L (ref 15–37)
BILIRUB SERPL-MCNC: 0.4 MG/DL (ref 0.2–1)
BNP SERPL-MCNC: 2131 PG/ML
BUN SERPL-MCNC: 13 MG/DL (ref 6–20)
BUN/CREAT SERPL: 11 (ref 12–20)
CALCIUM SERPL-MCNC: 7.1 MG/DL (ref 8.5–10.1)
CHLORIDE SERPL-SCNC: 102 MMOL/L (ref 97–108)
CO2 SERPL-SCNC: 31 MMOL/L (ref 21–32)
CREAT SERPL-MCNC: 1.17 MG/DL (ref 0.7–1.3)
CREAT UR-MCNC: 137 MG/DL
ECHO AO ROOT DIAM: 2.8 CM
ECHO AO ROOT INDEX: 1.51 CM/M2
ECHO AR MAX VEL PISA: 3.5 M/S
ECHO AV AREA PEAK VELOCITY: 3.6 CM2
ECHO AV AREA VTI: 4.1 CM2
ECHO AV AREA/BSA PEAK VELOCITY: 1.9 CM2/M2
ECHO AV AREA/BSA VTI: 2.2 CM2/M2
ECHO AV MEAN GRADIENT: 3 MMHG
ECHO AV MEAN VELOCITY: 0.9 M/S
ECHO AV PEAK GRADIENT: 7 MMHG
ECHO AV PEAK VELOCITY: 1.3 M/S
ECHO AV REGURGITANT PHT: 415.5 MILLISECOND
ECHO AV VELOCITY RATIO: 1.08
ECHO AV VTI: 25.1 CM
ECHO LA VOL 2C: 61 ML (ref 18–58)
ECHO LA VOL 4C: 54 ML (ref 18–58)
ECHO LA VOL BP: 60 ML (ref 18–58)
ECHO LA VOL/BSA BIPLANE: 32 ML/M2 (ref 16–34)
ECHO LA VOLUME AREA LENGTH: 65 ML
ECHO LA VOLUME INDEX A2C: 33 ML/M2 (ref 16–34)
ECHO LA VOLUME INDEX A4C: 29 ML/M2 (ref 16–34)
ECHO LA VOLUME INDEX AREA LENGTH: 35 ML/M2 (ref 16–34)
ECHO LV E' LATERAL VELOCITY: 10 CM/S
ECHO LV E' SEPTAL VELOCITY: 8 CM/S
ECHO LV EDV A2C: 108 ML
ECHO LV EDV A4C: 104 ML
ECHO LV EDV BP: 108 ML (ref 67–155)
ECHO LV EDV INDEX A4C: 56 ML/M2
ECHO LV EDV INDEX BP: 58 ML/M2
ECHO LV EDV NDEX A2C: 58 ML/M2
ECHO LV EJECTION FRACTION A2C: 41 %
ECHO LV EJECTION FRACTION A4C: 38 %
ECHO LV EJECTION FRACTION BIPLANE: 40 % (ref 55–100)
ECHO LV ESV A2C: 64 ML
ECHO LV ESV A4C: 65 ML
ECHO LV ESV BP: 64 ML (ref 22–58)
ECHO LV ESV INDEX A2C: 35 ML/M2
ECHO LV ESV INDEX A4C: 35 ML/M2
ECHO LV ESV INDEX BP: 35 ML/M2
ECHO LV FRACTIONAL SHORTENING: 35 % (ref 28–44)
ECHO LV INTERNAL DIMENSION DIASTOLE INDEX: 2.49 CM/M2
ECHO LV INTERNAL DIMENSION DIASTOLIC: 4.6 CM (ref 4.2–5.9)
ECHO LV INTERNAL DIMENSION SYSTOLIC INDEX: 1.62 CM/M2
ECHO LV INTERNAL DIMENSION SYSTOLIC: 3 CM
ECHO LV IVSD: 0.9 CM (ref 0.6–1)
ECHO LV MASS 2D: 158.8 G (ref 88–224)
ECHO LV MASS INDEX 2D: 85.8 G/M2 (ref 49–115)
ECHO LV POSTERIOR WALL DIASTOLIC: 1.1 CM (ref 0.6–1)
ECHO LV RELATIVE WALL THICKNESS RATIO: 0.48
ECHO LVOT AREA: 3.5 CM2
ECHO LVOT AV VTI INDEX: 1.2
ECHO LVOT DIAM: 2.1 CM
ECHO LVOT MEAN GRADIENT: 4 MMHG
ECHO LVOT PEAK GRADIENT: 8 MMHG
ECHO LVOT PEAK VELOCITY: 1.4 M/S
ECHO LVOT STROKE VOLUME INDEX: 56.3 ML/M2
ECHO LVOT SV: 104.2 ML
ECHO LVOT VTI: 30.1 CM
ECHO MV A VELOCITY: 0.49 M/S
ECHO MV AREA VTI: 4.5 CM2
ECHO MV E DECELERATION TIME (DT): 241.1 MS
ECHO MV E VELOCITY: 0.47 M/S
ECHO MV E/A RATIO: 0.96
ECHO MV E/E' LATERAL: 4.7
ECHO MV E/E' RATIO (AVERAGED): 5.29
ECHO MV E/E' SEPTAL: 5.88
ECHO MV LVOT VTI INDEX: 0.77
ECHO MV MAX VELOCITY: 0.8 M/S
ECHO MV MEAN GRADIENT: 1 MMHG
ECHO MV MEAN VELOCITY: 0.4 M/S
ECHO MV PEAK GRADIENT: 2 MMHG
ECHO MV VTI: 23.3 CM
ECHO TV REGURGITANT MAX VELOCITY: 1.1 M/S
ECHO TV REGURGITANT PEAK GRADIENT: 5 MMHG
GLOBULIN SER CALC-MCNC: 2.5 G/DL (ref 2–4)
GLUCOSE SERPL-MCNC: 90 MG/DL (ref 65–100)
MAGNESIUM SERPL-MCNC: 1.5 MG/DL (ref 1.6–2.4)
POTASSIUM SERPL-SCNC: 3.4 MMOL/L (ref 3.5–5.1)
PROT SERPL-MCNC: 3.9 G/DL (ref 6.4–8.2)
PROT UR-MCNC: 23 MG/DL (ref 0–11.9)
PROT/CREAT UR-RTO: 0.2
SODIUM SERPL-SCNC: 139 MMOL/L (ref 136–145)

## 2022-12-02 PROCEDURE — G0378 HOSPITAL OBSERVATION PER HR: HCPCS

## 2022-12-02 PROCEDURE — 80053 COMPREHEN METABOLIC PANEL: CPT

## 2022-12-02 PROCEDURE — 76700 US EXAM ABDOM COMPLETE: CPT

## 2022-12-02 PROCEDURE — 74011250637 HC RX REV CODE- 250/637: Performed by: INTERNAL MEDICINE

## 2022-12-02 PROCEDURE — 74011250636 HC RX REV CODE- 250/636: Performed by: INTERNAL MEDICINE

## 2022-12-02 PROCEDURE — 83880 ASSAY OF NATRIURETIC PEPTIDE: CPT

## 2022-12-02 PROCEDURE — 93306 TTE W/DOPPLER COMPLETE: CPT

## 2022-12-02 PROCEDURE — 74011000250 HC RX REV CODE- 250: Performed by: INTERNAL MEDICINE

## 2022-12-02 PROCEDURE — 83735 ASSAY OF MAGNESIUM: CPT

## 2022-12-02 PROCEDURE — 36415 COLL VENOUS BLD VENIPUNCTURE: CPT

## 2022-12-02 PROCEDURE — 84156 ASSAY OF PROTEIN URINE: CPT

## 2022-12-02 PROCEDURE — 65270000029 HC RM PRIVATE

## 2022-12-02 RX ORDER — LANOLIN ALCOHOL/MO/W.PET/CERES
400 CREAM (GRAM) TOPICAL 2 TIMES DAILY
Status: DISCONTINUED | OUTPATIENT
Start: 2022-12-02 | End: 2022-12-04

## 2022-12-02 RX ORDER — POTASSIUM CHLORIDE 750 MG/1
20 TABLET, FILM COATED, EXTENDED RELEASE ORAL 2 TIMES DAILY
Status: DISCONTINUED | OUTPATIENT
Start: 2022-12-02 | End: 2022-12-07

## 2022-12-02 RX ORDER — TRAMADOL HYDROCHLORIDE 50 MG/1
50 TABLET ORAL
Status: DISCONTINUED | OUTPATIENT
Start: 2022-12-02 | End: 2022-12-13 | Stop reason: HOSPADM

## 2022-12-02 RX ADMIN — THERA TABS 1 TABLET: TAB at 09:08

## 2022-12-02 RX ADMIN — FUROSEMIDE 40 MG: 10 INJECTION, SOLUTION INTRAMUSCULAR; INTRAVENOUS at 21:30

## 2022-12-02 RX ADMIN — SUCRALFATE 1 G: 1 TABLET ORAL at 21:32

## 2022-12-02 RX ADMIN — POTASSIUM CHLORIDE 20 MEQ: 750 TABLET, FILM COATED, EXTENDED RELEASE ORAL at 09:10

## 2022-12-02 RX ADMIN — FOLIC ACID 1 MG: 1 TABLET ORAL at 09:08

## 2022-12-02 RX ADMIN — SODIUM CHLORIDE, PRESERVATIVE FREE 10 ML: 5 INJECTION INTRAVENOUS at 21:36

## 2022-12-02 RX ADMIN — SODIUM CHLORIDE, PRESERVATIVE FREE 10 ML: 5 INJECTION INTRAVENOUS at 06:16

## 2022-12-02 RX ADMIN — SUCRALFATE 1 G: 1 TABLET ORAL at 09:07

## 2022-12-02 RX ADMIN — Medication 1000 MCG: at 09:09

## 2022-12-02 RX ADMIN — ENOXAPARIN SODIUM 40 MG: 100 INJECTION SUBCUTANEOUS at 09:06

## 2022-12-02 RX ADMIN — TRAMADOL HYDROCHLORIDE 50 MG: 50 TABLET ORAL at 21:31

## 2022-12-02 RX ADMIN — FUROSEMIDE 40 MG: 10 INJECTION, SOLUTION INTRAMUSCULAR; INTRAVENOUS at 18:01

## 2022-12-02 RX ADMIN — MAGNESIUM OXIDE TAB 400 MG (241.3 MG ELEMENTAL MG) 400 MG: 400 (241.3 MG) TAB at 17:56

## 2022-12-02 RX ADMIN — PANTOPRAZOLE SODIUM 40 MG: 40 TABLET, DELAYED RELEASE ORAL at 09:10

## 2022-12-02 RX ADMIN — LEVOTHYROXINE SODIUM 50 MCG: 0.05 TABLET ORAL at 06:16

## 2022-12-02 RX ADMIN — ERGOCALCIFEROL 50000 UNITS: 1.25 CAPSULE ORAL at 18:24

## 2022-12-02 RX ADMIN — SUCRALFATE 1 G: 1 TABLET ORAL at 17:56

## 2022-12-02 RX ADMIN — ASPIRIN 81 MG: 81 TABLET, CHEWABLE ORAL at 09:07

## 2022-12-02 RX ADMIN — SUCRALFATE 1 G: 1 TABLET ORAL at 12:40

## 2022-12-02 RX ADMIN — POTASSIUM CHLORIDE 20 MEQ: 750 TABLET, FILM COATED, EXTENDED RELEASE ORAL at 17:56

## 2022-12-02 RX ADMIN — OXYCODONE HYDROCHLORIDE AND ACETAMINOPHEN 500 MG: 500 TABLET ORAL at 09:08

## 2022-12-02 RX ADMIN — MAGNESIUM OXIDE TAB 400 MG (241.3 MG ELEMENTAL MG) 400 MG: 400 (241.3 MG) TAB at 09:08

## 2022-12-02 NOTE — PROGRESS NOTES
Transition of Care Plan:     RUR: N/A - RODRIGUEZ Fall signed 11/30  Disposition: [facility] accepted, auth received, pending medical stability  If SNF or IPR: Date FOC offered: SNF 11/30  Date [address] received: 11/30  Date authorization started with reference number: Auth submitted 12/1/22, Ref #[id number]  Date authorization received and expires: Received 12/2/22 approved for 5 days, NRD 12/6/22  Accepting facility: [facility]  Follow up appointments: PCP  DME needed: Owns cane, rollator, lift chair   Transportation at Discharge: Family/friend vs medical transport   [address] or means to access home:   N/A     IM Medicare Letter: N/A - Obs   Is patient a  and connected with the [place]? N/A             If yes, was [place] transfer form completed and VA notified? Caregiver Contact: Casper Whitehead [phone]  Discharge Caregiver contacted prior to discharge? Yes  Care Conference needed?:  No     CM received call from [facility] with details of pt's authorization:  Uche Martínez approved for [address] [facility] [place]  Approved 12/2/22 for 5 days, next review date 12/6/22  Plan Auth ID# [id number]  [facility] Ref# [id number]  Care Coordinator: Nadja Salmon can be faxed to: [phone]    Unit CM updated with the above information.     Jocelyne Cardenas 178, [address]

## 2022-12-02 NOTE — PROGRESS NOTES
CM: Lukas Toro is currently working with pt in the [place] Unit. CM aware that pt has been accepted to [address] [place] [address],[address] for placement and received auth through [facility] expires on 12/6/22. CM spoke with physician regarding pts acceptance to facility and pt will remain at hospital until he is medically stable. Pt will be upgraded to inpatient vs observation. CM will update snf, of the following.     Lukas Toro, MSW, [facility]

## 2022-12-02 NOTE — PROGRESS NOTES
.End of Shift Note    Bedside shift change report given to [id number] Chino Arellano (oncoming nurse) by Ja Miramontes RN (offgoing nurse). Report included the following information SBAR, Kardex, Intake/Output, MAR, and Recent Results    Shift worked:  7am -7 pm     Shift summary and any significant changes:    Pt is alert and oriented. No complaints of pain. Held Lasix this morning because his BP was running low. . Provider was notified. He told me to give lasix again and given around 5:30 once he came back from ECHO. Scheduled medicine given as per MAR. IV flushed and is patent. Hourly round completed.          Ja Miramontes, RN

## 2022-12-02 NOTE — PROGRESS NOTES
Received notification from bedside RN about patient with regards to: 10/10 chronic back pain, requesting medication for relief  VS: /75, HR 70, RR 17, O2 sat 100% on RA    Intervention given: Tramadol 10 mg PO x 1 dose ordered

## 2022-12-02 NOTE — PROGRESS NOTES
Bedside shift change report given to KENIA Carrero (oncoming nurse) by Colleen Mead RN (offgoing nurse). Report included the following information SBAR, Kardex, ED Summary, Intake/Output, MAR, and Recent Results.

## 2022-12-02 NOTE — PROGRESS NOTES
Hospitalist Progress Note    NAME: Lazaro Pimentel   :  1945   MRN:  [id number]     Interim Hospital Summary: 69 y.o. male whom presented on 2022 with      Assessment / Plan:  KEISHA:  Barriers:   SNF    Hypotension (82C systolic), new  Anasarca, POA  Hypoalbuminemia, POA  Hx proteinuria  Hx hepatic steatosis  Generalized weakness  -BLE dopplers no DVT   -CXR no pleural effusion   -after initial work up and assessment, his anasarca is undoubtedly related to hypoalbuminemic state. Differentials include malnutrition, cirrhosis and nephrosis. -Echocardiogram pending. NT proBNP >2K. Rule out cardiomyopathy  -history of proteinuria. Will check spot urine protein and Cr to rule out nephrotic syndrome  -check abdominal US to assess liver architecture and for ascites. He could now have liver cirrhosis  -continue IV lasix and hold only if SBP <90. Follow daily weight and lytes  -PT OT eval and treat. Rec SNF rehab     Malnutrition, seems mod to severe  -nutritionist input appreciated    Anemia, macrocytic   -B12 and folate WNL    Hypothyroid  -TSH 9  -increase synthroid to 50mcg daily. Repeat TSH in 8 weeks     Indigestion / dyspepsia   -continue protonix and carafate      18.5 - 24.9 Normal weight / Body mass index is 24.14 kg/m². Code status: Full  Prophylaxis: Lovenox  Recommended Disposition: SNF/LTC       Subjective:     Chief Complaint / Reason for Physician Visit  Follow up of weakness, leg edema  Chart reviewed in detail. Discussed with RN events overnight.    BP low and needed to hold lasix this AM    Review of Systems:  Symptom Y/N Comments  Symptom Y/N Comments   Fever/Chills    Chest Pain     Poor Appetite    Edema y    Cough    Abdominal Pain     Sputum    Joint Pain     SOB/[place]    Pruritis/Rash     Nausea/vomit    Tolerating PT/OT     Diarrhea    Tolerating Diet     Constipation    Other       Could NOT obtain due to:      PO intake: No data found. Objective:     VITALS:   Last 24hrs VS reviewed since prior progress note. Most recent are:  Patient Vitals for the past 24 hrs:   Temp Pulse Resp BP SpO2   12/02/22 0756 97.7 °F (36.5 °C) 63 18 92/69 99 %   12/01/22 2221 97.7 °F (36.5 °C) 70 17 104/75 100 %   12/01/22 2220 97 °F (36.1 °C) 72 17 104/75 --         Intake/Output Summary (Last 24 hours) at 12/2/2022 1455  Last data filed at 12/2/2022 1025  Gross per 24 hour   Intake --   Output 500 ml   Net -500 ml          I had a face to face encounter, and independently examined this patient on 12/2/2022, as outlined below:  PHYSICAL EXAM:  General: WD, WN. Alert, cooperative, no acute distress    EENT:  EOMI. Anicteric sclerae. MMM  Resp:  CTA bilaterally, no wheezing or rales. No accessory muscle use  CV:  Regular  rhythm,  (+) LE edema, abdominal wall edema  GI:  Soft, Non distended, Non tender. +Bowel sounds  Neurologic:  Alert and oriented X 3, normal speech,   Psych:   Good insight. Not anxious nor agitated  Skin:  No rashes. No jaundice    Reviewed most current lab test results and cultures  YES  Reviewed most current radiology test results   YES  Review and summation of old records today    NO  Reviewed patient's current orders and MAR    YES  PMH/SH reviewed - no change compared to H&P  ________________________________________________________________________  Care Plan discussed with:    Comments   Patient x    Family      RN x    Care Manager     Consultant                        Multidiciplinary team rounds were held today with , nursing, pharmacist and clinical coordinator. Patient's plan of care was discussed; medications were reviewed and discharge planning was addressed.      ________________________________________________________________________  Total NON critical care TIME:  25   Minutes    Total CRITICAL CARE TIME Spent:   Minutes non procedure based      Comments   >50% of visit spent in counseling and coordination of care x     This includes time during multidisciplinary rounds if indicated above   ________________________________________________________________________  Doug Cortez MD     Procedures: see electronic medical records for all procedures/Xrays and details which were not copied into this note but were reviewed prior to creation of Plan. LABS:  I reviewed today's most current labs and imaging studies.   Pertinent labs include:  Recent Labs     12/01/22  0523 11/29/22  2215   WBC 5.0 5.8   HGB 9.7* 10.9*   HCT 28.1* 32.1*    220       Recent Labs     12/02/22  0411 12/01/22 0523 11/29/22  2215    141 140   K 3.4* 2.7* 3.3*    102 104   CO2 31 31 30   GLU 90 80 114*   BUN 13 12 11   CREA 1.17 1.20 1.22   CA 7.1* 6.9* 7.3*   MG 1.5* 1.2*  --    PHOS  --  2.9  --    ALB 1.4* 1.5* 1.9*   TBILI 0.4 0.8 1.6*   ALT 21 24 26

## 2022-12-03 LAB
ANION GAP SERPL CALC-SCNC: 3 MMOL/L (ref 5–15)
APPEARANCE UR: CLEAR
BACTERIA URNS QL MICRO: NEGATIVE /HPF
BILIRUB UR QL: NEGATIVE
BNP SERPL-MCNC: 1875 PG/ML
BUN SERPL-MCNC: 17 MG/DL (ref 6–20)
BUN/CREAT SERPL: 13 (ref 12–20)
CALCIUM SERPL-MCNC: 7 MG/DL (ref 8.5–10.1)
CHLORIDE SERPL-SCNC: 100 MMOL/L (ref 97–108)
CO2 SERPL-SCNC: 34 MMOL/L (ref 21–32)
COLOR UR: NORMAL
CREAT SERPL-MCNC: 1.26 MG/DL (ref 0.7–1.3)
EPITH CASTS URNS QL MICRO: NORMAL /LPF
GLUCOSE SERPL-MCNC: 83 MG/DL (ref 65–100)
GLUCOSE UR STRIP.AUTO-MCNC: NEGATIVE MG/DL
HGB UR QL STRIP: NEGATIVE
HYALINE CASTS URNS QL MICRO: NORMAL /LPF (ref 0–2)
KETONES UR QL STRIP.AUTO: NEGATIVE MG/DL
LEUKOCYTE ESTERASE UR QL STRIP.AUTO: NEGATIVE
MAGNESIUM SERPL-MCNC: 1.4 MG/DL (ref 1.6–2.4)
NITRITE UR QL STRIP.AUTO: NEGATIVE
PH UR STRIP: 5.5 [PH] (ref 5–8)
POTASSIUM SERPL-SCNC: 3.5 MMOL/L (ref 3.5–5.1)
PROT UR STRIP-MCNC: NEGATIVE MG/DL
RBC #/AREA URNS HPF: NORMAL /HPF (ref 0–5)
SODIUM SERPL-SCNC: 137 MMOL/L (ref 136–145)
SP GR UR REFRACTOMETRY: 1.01
UA: UC IF INDICATED,UAUC: NORMAL
UROBILINOGEN UR QL STRIP.AUTO: 0.2 EU/DL (ref 0.2–1)
WBC URNS QL MICRO: NORMAL /HPF (ref 0–4)

## 2022-12-03 PROCEDURE — 74011250637 HC RX REV CODE- 250/637: Performed by: INTERNAL MEDICINE

## 2022-12-03 PROCEDURE — 65270000029 HC RM PRIVATE

## 2022-12-03 PROCEDURE — 80048 BASIC METABOLIC PNL TOTAL CA: CPT

## 2022-12-03 PROCEDURE — 74011000250 HC RX REV CODE- 250: Performed by: INTERNAL MEDICINE

## 2022-12-03 PROCEDURE — 74011250636 HC RX REV CODE- 250/636: Performed by: INTERNAL MEDICINE

## 2022-12-03 PROCEDURE — 81001 URINALYSIS AUTO W/SCOPE: CPT

## 2022-12-03 PROCEDURE — 74011250637 HC RX REV CODE- 250/637: Performed by: NURSE PRACTITIONER

## 2022-12-03 PROCEDURE — 83880 ASSAY OF NATRIURETIC PEPTIDE: CPT

## 2022-12-03 PROCEDURE — 83735 ASSAY OF MAGNESIUM: CPT

## 2022-12-03 PROCEDURE — 36415 COLL VENOUS BLD VENIPUNCTURE: CPT

## 2022-12-03 RX ORDER — TETRAHYDROZOLINE HCL 0.05 %
1 DROPS OPHTHALMIC (EYE) 4 TIMES DAILY
Status: DISCONTINUED | OUTPATIENT
Start: 2022-12-03 | End: 2022-12-13 | Stop reason: HOSPADM

## 2022-12-03 RX ORDER — CALCIUM CARBONATE 200(500)MG
200 TABLET,CHEWABLE ORAL
Status: DISCONTINUED | OUTPATIENT
Start: 2022-12-03 | End: 2022-12-13 | Stop reason: HOSPADM

## 2022-12-03 RX ADMIN — LEVOTHYROXINE SODIUM 50 MCG: 0.05 TABLET ORAL at 05:07

## 2022-12-03 RX ADMIN — TETRAHYDROZOLINE HCL 1 DROP: 0.05 SOLUTION/ DROPS OPHTHALMIC at 21:40

## 2022-12-03 RX ADMIN — SODIUM CHLORIDE, PRESERVATIVE FREE 10 ML: 5 INJECTION INTRAVENOUS at 21:40

## 2022-12-03 RX ADMIN — ASPIRIN 81 MG: 81 TABLET, CHEWABLE ORAL at 09:00

## 2022-12-03 RX ADMIN — MAGNESIUM OXIDE TAB 400 MG (241.3 MG ELEMENTAL MG) 400 MG: 400 (241.3 MG) TAB at 08:27

## 2022-12-03 RX ADMIN — OXYCODONE HYDROCHLORIDE AND ACETAMINOPHEN 500 MG: 500 TABLET ORAL at 08:26

## 2022-12-03 RX ADMIN — Medication 1000 MCG: at 08:26

## 2022-12-03 RX ADMIN — SUCRALFATE 1 G: 1 TABLET ORAL at 21:39

## 2022-12-03 RX ADMIN — SODIUM CHLORIDE, PRESERVATIVE FREE 10 ML: 5 INJECTION INTRAVENOUS at 05:08

## 2022-12-03 RX ADMIN — SUCRALFATE 1 G: 1 TABLET ORAL at 12:44

## 2022-12-03 RX ADMIN — SUCRALFATE 1 G: 1 TABLET ORAL at 17:29

## 2022-12-03 RX ADMIN — FOLIC ACID 1 MG: 1 TABLET ORAL at 08:27

## 2022-12-03 RX ADMIN — TETRAHYDROZOLINE HCL 1 DROP: 0.05 SOLUTION/ DROPS OPHTHALMIC at 18:00

## 2022-12-03 RX ADMIN — TRAMADOL HYDROCHLORIDE 50 MG: 50 TABLET ORAL at 05:07

## 2022-12-03 RX ADMIN — THERA TABS 1 TABLET: TAB at 08:27

## 2022-12-03 RX ADMIN — TETRAHYDROZOLINE HCL 1 DROP: 0.05 SOLUTION/ DROPS OPHTHALMIC at 14:59

## 2022-12-03 RX ADMIN — FUROSEMIDE 40 MG: 10 INJECTION, SOLUTION INTRAMUSCULAR; INTRAVENOUS at 08:27

## 2022-12-03 RX ADMIN — PANTOPRAZOLE SODIUM 40 MG: 40 TABLET, DELAYED RELEASE ORAL at 08:26

## 2022-12-03 RX ADMIN — SUCRALFATE 1 G: 1 TABLET ORAL at 08:27

## 2022-12-03 RX ADMIN — POTASSIUM CHLORIDE 20 MEQ: 750 TABLET, FILM COATED, EXTENDED RELEASE ORAL at 08:26

## 2022-12-03 RX ADMIN — ENOXAPARIN SODIUM 40 MG: 100 INJECTION SUBCUTANEOUS at 08:27

## 2022-12-03 RX ADMIN — CALCIUM CARBONATE (ANTACID) CHEW TAB 500 MG 200 MG: 500 CHEW TAB at 12:44

## 2022-12-03 RX ADMIN — MAGNESIUM OXIDE TAB 400 MG (241.3 MG ELEMENTAL MG) 400 MG: 400 (241.3 MG) TAB at 17:30

## 2022-12-03 RX ADMIN — TRAMADOL HYDROCHLORIDE 50 MG: 50 TABLET ORAL at 21:39

## 2022-12-03 RX ADMIN — SODIUM CHLORIDE, PRESERVATIVE FREE 10 ML: 5 INJECTION INTRAVENOUS at 14:30

## 2022-12-03 RX ADMIN — CALCIUM CARBONATE (ANTACID) CHEW TAB 500 MG 200 MG: 500 CHEW TAB at 17:29

## 2022-12-03 RX ADMIN — POTASSIUM CHLORIDE 20 MEQ: 750 TABLET, FILM COATED, EXTENDED RELEASE ORAL at 17:29

## 2022-12-03 NOTE — PROGRESS NOTES
End of Shift Note    Bedside shift change report given to Petrona's RN (oncoming nurse) by Rosi Molina, RN (offgoing nurse). Report included the following information SBAR, Kardex, MAR, and Recent Results    Shift worked:  Night     Shift summary and any significant changes:     Appears generally weak and legs still with pitting edema. Got his meds including tramadol & I.V lasix. BP remained soft but stable. Concerns for physician to address:       Zone phone for oncoming shift:          Activity:  Activity Level: Bed Rest  Number times ambulated in hallways past shift: 0  Number of times OOB to chair past shift: 0    Cardiac:   Cardiac Monitoring: No           Access:  Current line(s): PIV     Genitourinary:   Urinary status: voiding    Respiratory:   O2 Device: None (Room air)  Chronic home O2 use?: NO  Incentive spirometer at bedside: YES       GI:  Last Bowel Movement Date: 12/02/22  Current diet:  ADULT DIET Regular  ADULT ORAL NUTRITION SUPPLEMENT Lunch; Clear Liquid  Passing flatus: YES  Tolerating current diet: YES       Pain Management:   Patient states pain is manageable on current regimen: YES    Skin:  Amadou Score: 16  Interventions: float heels    Patient Safety:  Fall Score:  Total Score: 4  Interventions: gripper socks  High Fall Risk: Yes    Length of Stay:  Expected LOS: 2d 14h  Actual LOS: 1      Rosi Molina RN

## 2022-12-03 NOTE — PROGRESS NOTES
Consult responded by Dr. Etta Ritchie and he will se Pt tomorrow for [facility].

## 2022-12-03 NOTE — PROGRESS NOTES
Hospitalist Progress Note    Subjective:   Daily Progress Note: 12/3/2022 9:01 AM    Hospital Course: Enmanuel Sales is a 69 y.o. male with a past medical history significant for indigestion and hypothyroidism who presented to the emergency department with a 4-day history of worsening lower extremity swelling accompanied with generalized weakness. Patient states that he has always had some swelling but has worsened over the past 4 to 5 days. He says that his legs get really swollen if he leaves the pain down. He states that he does not eat that much because he always gets indigestion. He does state that he has had a colonoscopy and followed up with gastroenterology in the past.    Past medical history significant for DJD, HTN, and obesity. Subjective: Patient observed resting in bed with eyes opened. Denies complaints of chest pain, shortness of breath, palpitations, dizziness or distress. No acute distress noted.     Current Facility-Administered Medications   Medication Dose Route Frequency    traMADoL (ULTRAM) tablet 50 mg  50 mg Oral Q6H PRN    potassium chloride SR (KLOR-CON 10) tablet 20 mEq  20 mEq Oral BID    magnesium oxide (MAG-OX) tablet 400 mg  400 mg Oral BID    levothyroxine (SYNTHROID) tablet 50 mcg  50 mcg Oral ACB    therapeutic multivitamin (THERAGRAN) tablet 1 Tablet  1 Tablet Oral DAILY    ascorbic acid (vitamin C) (VITAMIN C) tablet 500 mg  500 mg Oral DAILY    aspirin chewable tablet 81 mg  81 mg Oral DAILY    cyanocobalamin (VITAMIN B12) tablet 1,000 mcg  1,000 mcg Oral DAILY    ergocalciferol capsule 50,000 Units  50,000 Units Oral 2 TIMES WEEKLY    folic acid (FOLVITE) tablet 1 mg  1 mg Oral DAILY    pantoprazole (PROTONIX) tablet 40 mg  40 mg Oral DAILY    sucralfate (CARAFATE) tablet 1 g  1 g Oral QID    sodium chloride (NS) flush 5-40 mL  5-40 mL IntraVENous Q8H    sodium chloride (NS) flush 5-40 mL  5-40 mL IntraVENous PRN    acetaminophen (TYLENOL) tablet 650 mg  650 mg Oral Q6H PRN    Or    acetaminophen (TYLENOL) suppository 650 mg  650 mg Rectal Q6H PRN    polyethylene glycol (MIRALAX) packet 17 g  17 g Oral DAILY PRN    ondansetron (ZOFRAN ODT) tablet 4 mg  4 mg Oral Q8H PRN    Or    ondansetron (ZOFRAN) injection 4 mg  4 mg IntraVENous Q6H PRN    enoxaparin (LOVENOX) injection 40 mg  40 mg SubCUTAneous DAILY    furosemide (LASIX) injection 40 mg  40 mg IntraVENous Q12H        Review of Systems:    Review of Systems   Constitutional:  Positive for malaise/fatigue. HENT: Negative. Eyes: Negative. Respiratory: Negative. Cardiovascular:  Positive for leg swelling. Gastrointestinal: Negative. Genitourinary: Negative. Musculoskeletal: Negative. Skin: Negative. Neurological: Negative. Endo/Heme/Allergies: Negative. Psychiatric/Behavioral: Negative. Objective:     Visit Vitals  /60 (BP 1 Location: Left upper arm)   Pulse (!) 58   Temp 97.9 °F (36.6 °C)   Resp 17   Ht 5' 8\" (1.727 m)   Wt 71.7 kg (158 lb)   SpO2 97%   BMI 24.02 kg/m²      O2 Device: None (Room air)    Temp (24hrs), Av.8 °F (36.6 °C), Min:97.5 °F (36.4 °C), Max:98 °F (36.7 °C)      No intake/output data recorded.  1901 -  0700  In: 48 [P.O.:50]  Out: 3050 [Urine:3050]    PHYSICAL EXAM:    Physical Exam  Constitutional:       Comments: cachexic   HENT:      Head: Normocephalic and atraumatic. Right Ear: External ear normal.      Left Ear: External ear normal.      Nose: Nose normal.      Mouth/Throat:      Pharynx: Oropharynx is clear. Eyes:      Conjunctiva/sclera: Conjunctivae normal.   Cardiovascular:      Rate and Rhythm: Normal rate. Heart sounds: Murmur heard. Pulmonary:      Effort: Pulmonary effort is normal.      Breath sounds: Normal breath sounds. Abdominal:      General: Bowel sounds are normal.      Comments: Last reported bowel movement 22. Musculoskeletal:      Cervical back: Normal range of motion and neck supple.       Comments: Generalized weakness and non-pitting edema noted to bilateral lower extremities. Skin:     General: Skin is warm and dry. Capillary Refill: Capillary refill takes 2 to 3 seconds. Coloration: Skin is pale. Neurological:      Mental Status: He is alert and oriented to person, place, and time.    Psychiatric:         Mood and Affect: Mood normal.          Data Review    Recent Results (from the past 24 hour(s))   ECHO ADULT COMPLETE    Collection Time: 12/02/22  4:00 PM   Result Value Ref Range    IVSd 0.9 0.6 - 1.0 cm    LVIDd 4.6 4.2 - 5.9 cm    LVIDs 3.0 cm    LVOT Diameter 2.1 cm    LVPWd 1.1 (A) 0.6 - 1.0 cm    EF BP 40 (A) 55 - 100 %    LV Ejection Fraction A2C 41 %    LV Ejection Fraction A4C 38 %    LV EDV A2C 108 mL    LV EDV A4C 104 mL    LV EDV  67 - 155 mL    LV ESV A2C 64 mL    LV ESV A4C 65 mL    LV ESV BP 64 (A) 22 - 58 mL    LVOT Peak Gradient 8 mmHg    LVOT Mean Gradient 4 mmHg    LVOT .2 ml    LVOT Peak Velocity 1.4 m/s    LVOT VTI 30.1 cm    LA Volume A/L 65 mL    LA Volume 2C 61 (A) 18 - 58 mL    LA Volume 4C 54 18 - 58 mL    LA Volume BP 60 (A) 18 - 58 mL    AV Area by Peak Velocity 3.6 cm2    AV Area by VTI 4.1 cm2    AR .5 millisecond    AR Max Velocity PISA 3.5 m/s    AV Peak Gradient 7 mmHg    AV Mean Gradient 3 mmHg    AV Peak Velocity 1.3 m/s    AV Mean Velocity 0.9 m/s    AV VTI 25.1 cm    MV A Velocity 0.49 m/s    MV E Wave Deceleration Time 241.1 ms    MV E Velocity 0.47 m/s    LV E' Lateral Velocity 10 cm/s    LV E' Septal Velocity 8 cm/s    MV Area by VTI 4.5 cm2    MV Peak Gradient 2 mmHg    MV Mean Gradient 1 mmHg    MV Max Velocity 0.8 m/s    MV Mean Velocity 0.4 m/s    MV VTI 23.3 cm    TR Peak Gradient 5 mmHg    TR Max Velocity 1.10 m/s    Aortic Root 2.8 cm    Fractional Shortening 2D 35 28 - 44 %    LV ESV Index BP 35 mL/m2    LV EDV Index BP 58 mL/m2    LV ESV Index A4C 35 mL/m2    LV EDV Index A4C 56 mL/m2    LV ESV Index A2C 35 mL/m2    LV EDV Index A2C 58 mL/m2    LVIDd Index 2.49 cm/m2    LVIDs Index 1.62 cm/m2    LV RWT Ratio 0.48     LV Mass 2D 158.8 88 - 224 g    LV Mass 2D Index 85.8 49 - 115 g/m2    MV E/A 0.96     E/E' Ratio (Averaged) 5.29     E/E' Lateral 4.70     E/E' Septal 5.88     LA Volume Index BP 32 16 - 34 ml/m2    LA Volume Index A/L 35 16 - 34 mL/m2    LVOT Stroke Volume Index 56.3 mL/m2    LVOT Area 3.5 cm2    LA Volume Index 2C 33 16 - 34 mL/m2    LA Volume Index 4C 29 16 - 34 mL/m2    Ao Root Index 1.51 cm/m2    AV Velocity Ratio 1.08     LVOT:AV VTI Index 1.20     STACY/BSA VTI 2.2 cm2/m2    STACY/BSA Peak Velocity 1.9 cm2/m2    MV:LVOT VTI Index 0.77    PROTEIN/CREATININE RATIO, URINE    Collection Time: 12/02/22  5:22 PM   Result Value Ref Range    Protein, urine random 23 (H) 0.0 - 11.9 mg/dL    Creatinine, urine random 137.00 mg/dL    Protein/Creat.  urine Ratio 0.2     URINALYSIS W/ REFLEX CULTURE    Collection Time: 12/03/22  4:20 AM    Specimen: Urine   Result Value Ref Range    Color YELLOW/STRAW      Appearance CLEAR CLEAR      Specific gravity 1.007      pH (UA) 5.5 5.0 - 8.0      Protein Negative NEG mg/dL    Glucose Negative NEG mg/dL    Ketone Negative NEG mg/dL    Bilirubin Negative NEG      Blood Negative NEG      Urobilinogen 0.2 0.2 - 1.0 EU/dL    Nitrites Negative NEG      Leukocyte Esterase Negative NEG      UA:UC IF INDICATED CULTURE NOT INDICATED BY UA RESULT CNI      WBC 0-4 0 - 4 /hpf    RBC 0-5 0 - 5 /hpf    Epithelial cells FEW FEW /lpf    Bacteria Negative NEG /hpf    Hyaline cast 0-2 0 - 2 /lpf   METABOLIC PANEL, BASIC    Collection Time: 12/03/22  4:20 AM   Result Value Ref Range    Sodium 137 136 - 145 mmol/L    Potassium 3.5 3.5 - 5.1 mmol/L    Chloride 100 97 - 108 mmol/L    CO2 34 (H) 21 - 32 mmol/L    Anion gap 3 (L) 5 - 15 mmol/L    Glucose 83 65 - 100 mg/dL    BUN 17 6 - 20 MG/DL    Creatinine 1.26 0.70 - 1.30 MG/DL    BUN/Creatinine ratio 13 12 - 20      eGFR 59 (L) >60 ml/min/1.73m2    Calcium 7.0 (L) 8.5 - 10.1 MG/DL   MAGNESIUM    Collection Time: 12/03/22  4:20 AM   Result Value Ref Range    Magnesium 1.4 (L) 1.6 - 2.4 mg/dL   NT-PRO BNP    Collection Time: 12/03/22  4:20 AM   Result Value Ref Range    NT pro-BNP 1,875 (H) <450 PG/ML       US ABD COMP   Final Result   Cirrhotic liver with moderate to large ascites. XR CHEST PORT   Final Result   No Acute Disease. DUPLEX LOWER EXT VENOUS BILAT   Final Result          Active Problems:    Weakness (11/30/2022)      Severe protein-calorie malnutrition ([address] (12/1/2022)      Anasarca (12/2/2022)      Hypotension (12/2/2022)        Assessment/Plan:   Hypotension         Anasacra        Hypoalbuminemia - POA        History of proteinuria and hepatic stenosis        Generalized weakness  - venous duplex shows no DVT to BLE  - chest xray shows no acute disease  - etiology of anasarca possibly related to hypoalbuminemic state. - Echocardiogram on 12/2/22 shows an EF of 50%-55% with normal wall thickness. No significant valvular disease.  - NT proBNP  trending downward 1,875  - urinalysis negative  - urine creatinine 137.00, urine protein 23 etiology possibly related to nephrotic syndrome  - abdominal ultrasound shows cirrhotic liver with moderate to large ascites. - continue daily weights  - I&O. - will consult GI    2. Malnutrition - moderate to severe  -  nutrition following    3. Macrocytic anemia  - Vitamin B12 and folate WNL    4. Hypothyroidism     - continue synthroid at current dose, repeat TSH in 8 weeks. 5. Indigestion/dyspepsia   - continue PPI and carafate    6. Hypocalcemia/Hypomagnesemia    - Ca+ 7.0     - Mg+ 1.4    - continue magnesium and calcium supplementation    - will monitor lab work      Disposition: pending hospital course.     DVT Prophylaxis: Lovenox  Code Status:  Full Code  POA: Ian Chand [phone]    Care Plan discussed with: patient, nursing  _______________________________________________________________    Charlotta Fermo, NP

## 2022-12-04 LAB
ALBUMIN SERPL-MCNC: 1.4 G/DL (ref 3.5–5)
ALBUMIN/GLOB SERPL: 0.6 {RATIO} (ref 1.1–2.2)
ALP SERPL-CCNC: 110 U/L (ref 45–117)
ALT SERPL-CCNC: 19 U/L (ref 12–78)
AMMONIA PLAS-SCNC: 32 UMOL/L
ANION GAP SERPL CALC-SCNC: 5 MMOL/L (ref 5–15)
AST SERPL-CCNC: 42 U/L (ref 15–37)
BASOPHILS # BLD: 0 K/UL (ref 0–0.1)
BASOPHILS NFR BLD: 1 % (ref 0–1)
BILIRUB DIRECT SERPL-MCNC: 0.3 MG/DL (ref 0–0.2)
BILIRUB SERPL-MCNC: 0.5 MG/DL (ref 0.2–1)
BUN SERPL-MCNC: 16 MG/DL (ref 6–20)
BUN/CREAT SERPL: 13 (ref 12–20)
CALCIUM SERPL-MCNC: 7.3 MG/DL (ref 8.5–10.1)
CHLORIDE SERPL-SCNC: 99 MMOL/L (ref 97–108)
CO2 SERPL-SCNC: 32 MMOL/L (ref 21–32)
CREAT SERPL-MCNC: 1.23 MG/DL (ref 0.7–1.3)
DIFFERENTIAL METHOD BLD: ABNORMAL
EOSINOPHIL # BLD: 0.2 K/UL (ref 0–0.4)
EOSINOPHIL NFR BLD: 4 % (ref 0–7)
ERYTHROCYTE [DISTWIDTH] IN BLOOD BY AUTOMATED COUNT: 14.2 % (ref 11.5–14.5)
GLOBULIN SER CALC-MCNC: 2.4 G/DL (ref 2–4)
GLUCOSE SERPL-MCNC: 81 MG/DL (ref 65–100)
HCT VFR BLD AUTO: 27.6 % (ref 36.6–50.3)
HGB BLD-MCNC: 9.3 G/DL (ref 12.1–17)
IMM GRANULOCYTES # BLD AUTO: 0 K/UL (ref 0–0.04)
IMM GRANULOCYTES NFR BLD AUTO: 1 % (ref 0–0.5)
INR PPP: 1.3 (ref 0.9–1.1)
LYMPHOCYTES # BLD: 1.5 K/UL (ref 0.8–3.5)
LYMPHOCYTES NFR BLD: 34 % (ref 12–49)
MAGNESIUM SERPL-MCNC: 1.4 MG/DL (ref 1.6–2.4)
MCH RBC QN AUTO: 35.1 PG (ref 26–34)
MCHC RBC AUTO-ENTMCNC: 33.7 G/DL (ref 30–36.5)
MCV RBC AUTO: 104.2 FL (ref 80–99)
MONOCYTES # BLD: 0.5 K/UL (ref 0–1)
MONOCYTES NFR BLD: 10 % (ref 5–13)
NEUTS SEG # BLD: 2.2 K/UL (ref 1.8–8)
NEUTS SEG NFR BLD: 50 % (ref 32–75)
NRBC # BLD: 0 K/UL (ref 0–0.01)
NRBC BLD-RTO: 0 PER 100 WBC
PHOSPHATE SERPL-MCNC: 2.9 MG/DL (ref 2.6–4.7)
PLATELET # BLD AUTO: 204 K/UL (ref 150–400)
PMV BLD AUTO: 9.5 FL (ref 8.9–12.9)
POTASSIUM SERPL-SCNC: 4.3 MMOL/L (ref 3.5–5.1)
PROT SERPL-MCNC: 3.8 G/DL (ref 6.4–8.2)
PROTHROMBIN TIME: 13 SEC (ref 9–11.1)
RBC # BLD AUTO: 2.65 M/UL (ref 4.1–5.7)
SODIUM SERPL-SCNC: 136 MMOL/L (ref 136–145)
WBC # BLD AUTO: 4.4 K/UL (ref 4.1–11.1)

## 2022-12-04 PROCEDURE — 74011250637 HC RX REV CODE- 250/637: Performed by: INTERNAL MEDICINE

## 2022-12-04 PROCEDURE — 36415 COLL VENOUS BLD VENIPUNCTURE: CPT

## 2022-12-04 PROCEDURE — 86015 ACTIN ANTIBODY EACH: CPT

## 2022-12-04 PROCEDURE — 74011250636 HC RX REV CODE- 250/636: Performed by: NURSE PRACTITIONER

## 2022-12-04 PROCEDURE — 82103 ALPHA-1-ANTITRYPSIN TOTAL: CPT

## 2022-12-04 PROCEDURE — 82107 ALPHA-FETOPROTEIN L3: CPT

## 2022-12-04 PROCEDURE — 84100 ASSAY OF PHOSPHORUS: CPT

## 2022-12-04 PROCEDURE — 84165 PROTEIN E-PHORESIS SERUM: CPT

## 2022-12-04 PROCEDURE — 82140 ASSAY OF AMMONIA: CPT

## 2022-12-04 PROCEDURE — 74011000250 HC RX REV CODE- 250: Performed by: INTERNAL MEDICINE

## 2022-12-04 PROCEDURE — 86038 ANTINUCLEAR ANTIBODIES: CPT

## 2022-12-04 PROCEDURE — 80074 ACUTE HEPATITIS PANEL: CPT

## 2022-12-04 PROCEDURE — 65270000029 HC RM PRIVATE

## 2022-12-04 PROCEDURE — 82728 ASSAY OF FERRITIN: CPT

## 2022-12-04 PROCEDURE — 82390 ASSAY OF CERULOPLASMIN: CPT

## 2022-12-04 PROCEDURE — 86381 MITOCHONDRIAL ANTIBODY EACH: CPT

## 2022-12-04 PROCEDURE — 74011250637 HC RX REV CODE- 250/637: Performed by: NURSE PRACTITIONER

## 2022-12-04 PROCEDURE — 83735 ASSAY OF MAGNESIUM: CPT

## 2022-12-04 PROCEDURE — 80048 BASIC METABOLIC PNL TOTAL CA: CPT

## 2022-12-04 PROCEDURE — 80076 HEPATIC FUNCTION PANEL: CPT

## 2022-12-04 PROCEDURE — 85610 PROTHROMBIN TIME: CPT

## 2022-12-04 PROCEDURE — 85025 COMPLETE CBC W/AUTO DIFF WBC: CPT

## 2022-12-04 RX ORDER — LANOLIN ALCOHOL/MO/W.PET/CERES
400 CREAM (GRAM) TOPICAL DAILY
Status: DISCONTINUED | OUTPATIENT
Start: 2022-12-05 | End: 2022-12-13 | Stop reason: HOSPADM

## 2022-12-04 RX ORDER — MAGNESIUM SULFATE HEPTAHYDRATE 40 MG/ML
2 INJECTION, SOLUTION INTRAVENOUS ONCE
Status: COMPLETED | OUTPATIENT
Start: 2022-12-04 | End: 2022-12-04

## 2022-12-04 RX ORDER — MIDODRINE HYDROCHLORIDE 5 MG/1
5 TABLET ORAL
Status: DISCONTINUED | OUTPATIENT
Start: 2022-12-04 | End: 2022-12-05

## 2022-12-04 RX ORDER — MIDODRINE HYDROCHLORIDE 5 MG/1
5 TABLET ORAL 2 TIMES DAILY WITH MEALS
Status: DISCONTINUED | OUTPATIENT
Start: 2022-12-04 | End: 2022-12-04

## 2022-12-04 RX ORDER — ASPIRIN 325 MG/1
100 TABLET, FILM COATED ORAL DAILY
Status: DISCONTINUED | OUTPATIENT
Start: 2022-12-05 | End: 2022-12-13 | Stop reason: HOSPADM

## 2022-12-04 RX ADMIN — OXYCODONE HYDROCHLORIDE AND ACETAMINOPHEN 500 MG: 500 TABLET ORAL at 11:36

## 2022-12-04 RX ADMIN — CALCIUM CARBONATE (ANTACID) CHEW TAB 500 MG 200 MG: 500 CHEW TAB at 11:36

## 2022-12-04 RX ADMIN — SUCRALFATE 1 G: 1 TABLET ORAL at 20:06

## 2022-12-04 RX ADMIN — MIDODRINE HYDROCHLORIDE 5 MG: 5 TABLET ORAL at 11:35

## 2022-12-04 RX ADMIN — SODIUM CHLORIDE, PRESERVATIVE FREE 10 ML: 5 INJECTION INTRAVENOUS at 20:07

## 2022-12-04 RX ADMIN — MAGNESIUM SULFATE HEPTAHYDRATE 2 G: 40 INJECTION, SOLUTION INTRAVENOUS at 11:37

## 2022-12-04 RX ADMIN — PANTOPRAZOLE SODIUM 40 MG: 40 TABLET, DELAYED RELEASE ORAL at 11:35

## 2022-12-04 RX ADMIN — Medication 1000 MCG: at 11:35

## 2022-12-04 RX ADMIN — POTASSIUM CHLORIDE 20 MEQ: 750 TABLET, FILM COATED, EXTENDED RELEASE ORAL at 11:35

## 2022-12-04 RX ADMIN — ASPIRIN 81 MG: 81 TABLET, CHEWABLE ORAL at 11:36

## 2022-12-04 RX ADMIN — TETRAHYDROZOLINE HCL 1 DROP: 0.05 SOLUTION/ DROPS OPHTHALMIC at 12:17

## 2022-12-04 RX ADMIN — SUCRALFATE 1 G: 1 TABLET ORAL at 21:22

## 2022-12-04 RX ADMIN — TETRAHYDROZOLINE HCL 1 DROP: 0.05 SOLUTION/ DROPS OPHTHALMIC at 20:08

## 2022-12-04 RX ADMIN — SODIUM CHLORIDE, PRESERVATIVE FREE 10 ML: 5 INJECTION INTRAVENOUS at 05:09

## 2022-12-04 RX ADMIN — TRAMADOL HYDROCHLORIDE 50 MG: 50 TABLET ORAL at 21:22

## 2022-12-04 RX ADMIN — MIDODRINE HYDROCHLORIDE 5 MG: 5 TABLET ORAL at 20:07

## 2022-12-04 RX ADMIN — TRAMADOL HYDROCHLORIDE 50 MG: 50 TABLET ORAL at 05:22

## 2022-12-04 RX ADMIN — THERA TABS 1 TABLET: TAB at 11:36

## 2022-12-04 RX ADMIN — FOLIC ACID 1 MG: 1 TABLET ORAL at 11:35

## 2022-12-04 RX ADMIN — POTASSIUM CHLORIDE 20 MEQ: 750 TABLET, FILM COATED, EXTENDED RELEASE ORAL at 20:07

## 2022-12-04 RX ADMIN — SUCRALFATE 1 G: 1 TABLET ORAL at 12:18

## 2022-12-04 RX ADMIN — CALCIUM CARBONATE (ANTACID) CHEW TAB 500 MG 200 MG: 500 CHEW TAB at 20:07

## 2022-12-04 RX ADMIN — LEVOTHYROXINE SODIUM 50 MCG: 0.05 TABLET ORAL at 05:07

## 2022-12-04 NOTE — CONSULTS
Gastroenterology Consult Note  NAME: Floresita Moore : 1945 MRN: [id number]   ATTG: [unfilled] PCP: Breezy Drake MD  Date/Time:  2022 10:48 AM  Subjective:   REASON FOR CONSULT:      Alexander is a 69 y.o.  male who I was asked to see for new dx of ascites ,anasarca and cirrhosis. He is a long time (>50 years hx) alcohol user, 3-4 drinks per day(beer Vodka). He has a past medical history of hypothyroidism who presents to the ED with    worsening lower extremity swelling accompanied with generalized weakness. He says that his legs get swollen and is on Lasix 20 mg/day. US abdomen showed 'Cirrhotic liver with moderate to large ascites'. Used to work in the lumbar industry. He is s/p gastric bypass surgery remotely. Latest Reference Range & Units 22 05:18   HGB 12.1 - 17.0 g/dL 9.3 (L)   HCT 36.6 - 50.3 % 27.6 (L)   MCV 80.0 - 99.0 .2 (H)   MCH 26.0 - 34.0 PG 35.1 (H)   (L): Data is abnormally low  (H): Data is abnormally high     Latest Reference Range & Units 22 05:18   Bilirubin, total 0.2 - 1.0 MG/DL 0.5   Bilirubin, direct 0.0 - 0.2 MG/DL 0.3 (H)   Protein, total 6.4 - 8.2 g/dL 3.8 (L)   Albumin 3.5 - 5.0 g/dL 1.4 (L)   Globulin 2.0 - 4.0 g/dL 2.4   A-G Ratio 1.1 - 2.2   0.6 (L)   ALT 12 - 78 U/L 19   AST 15 - 37 U/L 42 (H)   (H): Data is abnormally high  (L): Data is abnormally low    Past Medical History:   Diagnosis Date    DJD (degenerative joint disease)     Hypertension     Obesity       Past Surgical History:   Procedure Laterality Date    COLONOSCOPY N/A 2021    COLONOSCOPY performed by Yaneth Dillon MD at [facility] ENDOSCOPY    HX ORTHOPAEDIC      bilat hip replacements    AK GASTRIC BYPASS,OBESE<150CM TAO-EN-Y       Social History     Tobacco Use    Smoking status: Never    Smokeless tobacco: Never   Substance Use Topics    Alcohol use:  Yes     Alcohol/week: 136.0 standard drinks     Types: 126 Cans of beer, 10 Shots of liquor per week     Comment: Hx of abuse      Family History   Problem Relation Age of Onset    Diabetes Maternal Grandmother     Heart Disease Maternal Grandmother     Diabetes Maternal Grandfather     Heart Disease Maternal Grandfather     No Known Problems Mother     Stroke Father       Allergies   Allergen Reactions    Propoxyphene N-Acetaminophen Other (comments)     hallucinate  Other reaction(s): Adverse reaction to substance       Home Medications:  Prior to Admission Medications   Prescriptions Last Dose Informant Patient Reported? Taking?   ascorbic acid, vitamin C, (VITAMIN C) 500 mg tablet Not Taking  Yes No   Sig: Take  by mouth daily. Takes unknown dose   Patient not taking: Reported on 12/1/2022   aspirin 81 mg chewable tablet 12/1/2022  Yes Yes   Sig: Take 81 mg by mouth daily. cyanocobalamin 1,000 mcg tablet Not Taking  Yes No   Sig: Take  by mouth daily. Takes unknown dose   Patient not taking: Reported on 12/1/2022   ergocalciferol (ERGOCALCIFEROL) 1,250 mcg (50,000 unit) capsule Not Taking  Yes No   Sig: Take 50,000 Units by mouth two (2) times a week. LAST TAKEN April 5,2021   Patient not taking: Reported on 85/5/0319   folic acid (FOLVITE) 751 mcg tablet Not Taking  Yes No   Sig: Take 800 mcg by mouth daily. Patient not taking: Reported on 12/1/2022   furosemide (LASIX) 20 mg tablet 12/1/2022  Yes Yes   Sig: Take 20 mg by mouth daily. levothyroxine (SYNTHROID) 25 mcg tablet 12/1/2022  Yes Yes   Sig: Take 25 mcg by mouth Daily (before breakfast). multivitamin (ONE A DAY) tablet 11/30/2022  Yes Yes   Sig: Take 1 Tab by mouth daily. pantoprazole (PROTONIX) 40 mg tablet 12/1/2022  Yes Yes   Sig: Take 40 mg by mouth daily. sucralfate (CARAFATE) 1 gram tablet Not Taking  Yes No   Sig: Take 1 g by mouth four (4) times daily.   and at [facility]   Patient not taking: Reported on 12/1/2022      Facility-Administered Medications: None     Hospital medications:  Current Facility-Administered Medications Medication Dose Route Frequency    midodrine (PROAMATINE) tablet 5 mg  5 mg Oral TID WITH MEALS    magnesium sulfate 2 g/50 ml IVPB (premix or compounded)  2 g IntraVENous ONCE    calcium carbonate (TUMS) chewable tablet 200 mg [elemental]  200 mg Oral TID WITH MEALS    tetrahydrozoline (OPTI-CLEAR) 0.05 % ophthalmic drops 1 Drop  1 Drop Both Eyes QID    traMADoL (ULTRAM) tablet 50 mg  50 mg Oral Q6H PRN    potassium chloride SR (KLOR-CON 10) tablet 20 mEq  20 mEq Oral BID    levothyroxine (SYNTHROID) tablet 50 mcg  50 mcg Oral ACB    therapeutic multivitamin (THERAGRAN) tablet 1 Tablet  1 Tablet Oral DAILY    ascorbic acid (vitamin C) (VITAMIN C) tablet 500 mg  500 mg Oral DAILY    aspirin chewable tablet 81 mg  81 mg Oral DAILY    cyanocobalamin (VITAMIN B12) tablet 1,000 mcg  1,000 mcg Oral DAILY    ergocalciferol capsule 50,000 Units  50,000 Units Oral 2 TIMES WEEKLY    folic acid (FOLVITE) tablet 1 mg  1 mg Oral DAILY    pantoprazole (PROTONIX) tablet 40 mg  40 mg Oral DAILY    sucralfate (CARAFATE) tablet 1 g  1 g Oral QID    sodium chloride (NS) flush 5-40 mL  5-40 mL IntraVENous Q8H    sodium chloride (NS) flush 5-40 mL  5-40 mL IntraVENous PRN    acetaminophen (TYLENOL) tablet 650 mg  650 mg Oral Q6H PRN    Or    acetaminophen (TYLENOL) suppository 650 mg  650 mg Rectal Q6H PRN    polyethylene glycol (MIRALAX) packet 17 g  17 g Oral DAILY PRN    ondansetron (ZOFRAN ODT) tablet 4 mg  4 mg Oral Q8H PRN    Or    ondansetron (ZOFRAN) injection 4 mg  4 mg IntraVENous Q6H PRN    enoxaparin (LOVENOX) injection 40 mg  40 mg SubCUTAneous DAILY    [Held by provider] furosemide (LASIX) injection 40 mg  40 mg IntraVENous Q12H     REVIEW OF SYSTEMS:     []     Unable to obtain  ROS due to  []    mental status change  []    sedated   []    intubated   [x]    Total of 11 systems reviewed as follows:  Const:  negative fever, negative chills, negative weight loss  Eyes:   negative diplopia or visual changes, negative eye pain  ENT:   negative coryza, negative sore throat  Resp:   negative cough, hemoptysis, dyspnea  Cards:  negative for chest pain, palpitations, lower extremity edema  :  negative for frequency, dysuria and hematuria  Skin:   negative for rash and pruritus  Heme:  negative for easy bruising and gum/nose bleeding  MS:  negative for myalgias, arthralgias, back pain and muscle weakness  Neurolo:  negative for headaches, dizziness, vertigo, memory problems   Psych:  negative for feelings of anxiety, depression     Pertinent Positives include :    Objective:   VITALS:    Visit Vitals  BP 94/60 (BP 1 Location: Left upper arm)   Pulse (!) 53   Temp 97.5 °F (36.4 °C)   Resp 17   Ht 5' 8\" (1.727 m)   Wt 71.7 kg (158 lb)   SpO2 98%   BMI 24.02 kg/m²     Temp (24hrs), Av.9 °F (36.6 °C), Min:97.5 °F (36.4 °C), Max:98.2 °F (36.8 °C)    PHYSICAL EXAM:     General: No distress   Eyes: No icterus; extraocular movements intact,   ENMT: Lips, t unremarkable. Chest:  breath sounds are normal   Heart: Heart sounds normal, S1,S2  Abdomen: Non-tender; bowel sounds present.  No large ascites felt  Lymphatic: No anterior cervical, or supraclavicular lymphadenopathy   Neurologic: Alert and oriented   Psyc: Affect is appropriate   Extremities: + edema       LAB DATA REVIEWED:    Recent Results (from the past 48 hour(s))   ECHO ADULT COMPLETE    Collection Time: 22  4:00 PM   Result Value Ref Range    IVSd 0.9 0.6 - 1.0 cm    LVIDd 4.6 4.2 - 5.9 cm    LVIDs 3.0 cm    LVOT Diameter 2.1 cm    LVPWd 1.1 (A) 0.6 - 1.0 cm    EF BP 40 (A) 55 - 100 %    LV Ejection Fraction A2C 41 %    LV Ejection Fraction A4C 38 %    LV EDV A2C 108 mL    LV EDV A4C 104 mL    LV EDV  67 - 155 mL    LV ESV A2C 64 mL    LV ESV A4C 65 mL    LV ESV BP 64 (A) 22 - 58 mL    LVOT Peak Gradient 8 mmHg    LVOT Mean Gradient 4 mmHg    LVOT .2 ml    LVOT Peak Velocity 1.4 m/s    LVOT VTI 30.1 cm    LA Volume A/L 65 mL    LA Volume 2C 61 (A) 18 - 58 mL LA Volume 4C 54 18 - 58 mL    LA Volume BP 60 (A) 18 - 58 mL    AV Area by Peak Velocity 3.6 cm2    AV Area by VTI 4.1 cm2    AR .5 millisecond    AR Max Velocity PISA 3.5 m/s    AV Peak Gradient 7 mmHg    AV Mean Gradient 3 mmHg    AV Peak Velocity 1.3 m/s    AV Mean Velocity 0.9 m/s    AV VTI 25.1 cm    MV A Velocity 0.49 m/s    MV E Wave Deceleration Time 241.1 ms    MV E Velocity 0.47 m/s    LV E' Lateral Velocity 10 cm/s    LV E' Septal Velocity 8 cm/s    MV Area by VTI 4.5 cm2    MV Peak Gradient 2 mmHg    MV Mean Gradient 1 mmHg    MV Max Velocity 0.8 m/s    MV Mean Velocity 0.4 m/s    MV VTI 23.3 cm    TR Peak Gradient 5 mmHg    TR Max Velocity 1.10 m/s    Aortic Root 2.8 cm    Fractional Shortening 2D 35 28 - 44 %    LV ESV Index BP 35 mL/m2    LV EDV Index BP 58 mL/m2    LV ESV Index A4C 35 mL/m2    LV EDV Index A4C 56 mL/m2    LV ESV Index A2C 35 mL/m2    LV EDV Index A2C 58 mL/m2    LVIDd Index 2.49 cm/m2    LVIDs Index 1.62 cm/m2    LV RWT Ratio 0.48     LV Mass 2D 158.8 88 - 224 g    LV Mass 2D Index 85.8 49 - 115 g/m2    MV E/A 0.96     E/E' Ratio (Averaged) 5.29     E/E' Lateral 4.70     E/E' Septal 5.88     LA Volume Index BP 32 16 - 34 ml/m2    LA Volume Index A/L 35 16 - 34 mL/m2    LVOT Stroke Volume Index 56.3 mL/m2    LVOT Area 3.5 cm2    LA Volume Index 2C 33 16 - 34 mL/m2    LA Volume Index 4C 29 16 - 34 mL/m2    Ao Root Index 1.51 cm/m2    AV Velocity Ratio 1.08     LVOT:AV VTI Index 1.20     STACY/BSA VTI 2.2 cm2/m2    STACY/BSA Peak Velocity 1.9 cm2/m2    MV:LVOT VTI Index 0.77    PROTEIN/CREATININE RATIO, URINE    Collection Time: 12/02/22  5:22 PM   Result Value Ref Range    Protein, urine random 23 (H) 0.0 - 11.9 mg/dL    Creatinine, urine random 137.00 mg/dL    Protein/Creat.  urine Ratio 0.2     URINALYSIS W/ REFLEX CULTURE    Collection Time: 12/03/22  4:20 AM    Specimen: Urine   Result Value Ref Range    Color YELLOW/STRAW      Appearance CLEAR CLEAR      Specific gravity 1.007      pH (UA) 5.5 5.0 - 8.0      Protein Negative NEG mg/dL    Glucose Negative NEG mg/dL    Ketone Negative NEG mg/dL    Bilirubin Negative NEG      Blood Negative NEG      Urobilinogen 0.2 0.2 - 1.0 EU/dL    Nitrites Negative NEG      Leukocyte Esterase Negative NEG      UA:UC IF INDICATED CULTURE NOT INDICATED BY UA RESULT CNI      WBC 0-4 0 - 4 /hpf    RBC 0-5 0 - 5 /hpf    Epithelial cells FEW FEW /lpf    Bacteria Negative NEG /hpf    Hyaline cast 0-2 0 - 2 /lpf   METABOLIC PANEL, BASIC    Collection Time: 12/03/22  4:20 AM   Result Value Ref Range    Sodium 137 136 - 145 mmol/L    Potassium 3.5 3.5 - 5.1 mmol/L    Chloride 100 97 - 108 mmol/L    CO2 34 (H) 21 - 32 mmol/L    Anion gap 3 (L) 5 - 15 mmol/L    Glucose 83 65 - 100 mg/dL    BUN 17 6 - 20 MG/DL    Creatinine 1.26 0.70 - 1.30 MG/DL    BUN/Creatinine ratio 13 12 - 20      eGFR 59 (L) >60 ml/min/1.73m2    Calcium 7.0 (L) 8.5 - 10.1 MG/DL   MAGNESIUM    Collection Time: 12/03/22  4:20 AM   Result Value Ref Range    Magnesium 1.4 (L) 1.6 - 2.4 mg/dL   NT-PRO BNP    Collection Time: 12/03/22  4:20 AM   Result Value Ref Range    NT pro-BNP 1,875 (H) <450 PG/ML   CBC WITH AUTOMATED DIFF    Collection Time: 12/04/22  5:18 AM   Result Value Ref Range    WBC 4.4 4.1 - 11.1 K/uL    RBC 2.65 (L) 4.10 - 5.70 M/uL    HGB 9.3 (L) 12.1 - 17.0 g/dL    HCT 27.6 (L) 36.6 - 50.3 %    .2 (H) 80.0 - 99.0 FL    MCH 35.1 (H) 26.0 - 34.0 PG    MCHC 33.7 30.0 - 36.5 g/dL    RDW 14.2 11.5 - 14.5 %    PLATELET 036 419 - 950 K/uL    MPV 9.5 8.9 - 12.9 FL    NRBC 0.0 0  WBC    ABSOLUTE NRBC 0.00 0.00 - 0.01 K/uL    NEUTROPHILS 50 32 - 75 %    LYMPHOCYTES 34 12 - 49 %    MONOCYTES 10 5 - 13 %    EOSINOPHILS 4 0 - 7 %    BASOPHILS 1 0 - 1 %    IMMATURE GRANULOCYTES 1 (H) 0.0 - 0.5 %    ABS. NEUTROPHILS 2.2 1.8 - 8.0 K/UL    ABS. LYMPHOCYTES 1.5 0.8 - 3.5 K/UL    ABS. MONOCYTES 0.5 0.0 - 1.0 K/UL    ABS. EOSINOPHILS 0.2 0.0 - 0.4 K/UL    ABS.  BASOPHILS 0.0 0.0 - 0.1 K/UL    ABS. IMM. GRANS. 0.0 0.00 - 0.04 K/UL    DF AUTOMATED     METABOLIC PANEL, BASIC    Collection Time: 12/04/22  5:18 AM   Result Value Ref Range    Sodium 136 136 - 145 mmol/L    Potassium 4.3 3.5 - 5.1 mmol/L    Chloride 99 97 - 108 mmol/L    CO2 32 21 - 32 mmol/L    Anion gap 5 5 - 15 mmol/L    Glucose 81 65 - 100 mg/dL    BUN 16 6 - 20 MG/DL    Creatinine 1.23 0.70 - 1.30 MG/DL    BUN/Creatinine ratio 13 12 - 20      eGFR >60 >60 ml/min/1.73m2    Calcium 7.3 (L) 8.5 - 10.1 MG/DL   HEPATIC FUNCTION PANEL    Collection Time: 12/04/22  5:18 AM   Result Value Ref Range    Protein, total 3.8 (L) 6.4 - 8.2 g/dL    Albumin 1.4 (L) 3.5 - 5.0 g/dL    Globulin 2.4 2.0 - 4.0 g/dL    A-G Ratio 0.6 (L) 1.1 - 2.2      Bilirubin, total 0.5 0.2 - 1.0 MG/DL    Bilirubin, direct 0.3 (H) 0.0 - 0.2 MG/DL    Alk. phosphatase 110 45 - 117 U/L    AST (SGOT) 42 (H) 15 - 37 U/L    ALT (SGPT) 19 12 - 78 U/L   PHOSPHORUS    Collection Time: 12/04/22  5:18 AM   Result Value Ref Range    Phosphorus 2.9 2.6 - 4.7 MG/DL   MAGNESIUM    Collection Time: 12/04/22  5:18 AM   Result Value Ref Range    Magnesium 1.4 (L) 1.6 - 2.4 mg/dL     IMAGING RESULTS:   []      I have personally reviewed the actual   []    CXR  []    CT  []     US    Recommendations/Plan:     Cirrhosis  Ascites  Alcoholism  S/p gastric bypass surgery  Abnormal LFTs  Macrocytic anemia      Active Problems:    Weakness (11/30/2022)      Severe protein-calorie malnutrition ([address] (12/1/2022)      Anasarca (12/2/2022)      Hypotension (12/2/2022)       ___________________________________________________  RECOMMENDATIONS:      Mr. Lopez Preston is a 60-year-old who white gentleman originally from [place] with a lifelong history of alcohol use, going on for over 50 years,  who presents with weakness, anasarca and protein calorie malnutrition. Looking at his liver function test he did have mildly elevated bilirubin and AST in the past.  His albumin is very low.  His MCV is elevated and he has mild anemia. Ultrasound of the abdomen pelvis shows ascites with a cirrhotic liver. This is likely  alcohol related but we need to rule out other causes (metabolic, autoimmune, genetic and viral causes) of hepatopathy. I have ordered the serologies(see orders). AFP w/ L3 ordered. Check NH3. Check INR/PT to calculate MELD/DF  Patient needs a diagnostic(SAAG calculation)/r/o SBP and therapeutic paracentesis tomorrow. Would need an outpatient screening EGD to assess for varices. Treatment for alcoholism as per admitting team.  Low Na+ diet,  Needs MVI,Folate and Thiamine. Nutritious diet. Likely will need social support/ help as OP to get off of ETOH, indefinitely. Low mag: Mag Oxide added. We will follow the patient with you. Thank you for entrusting me with this patient's care. Please do not hesitate to contact me with any questions or if I can be of assistance with this patient or any of your other patients' GI needs. Discussed Code Status:    [x]    Full Code      []    DNR    ___________________________________________________  Care Plan discussed with:    [x]    Patient   []    Family   []    Nursing   []    Attending     ___________________________________________________  GI: Vitaly French MD

## 2022-12-04 NOTE — PROGRESS NOTES
Hospitalist Progress Note    Subjective:   Daily Progress Note: 12/4/2022 10:43 AM    Neri Deal is a 69 y.o. male with a past medical history significant for indigestion and hypothyroidism who presented to the emergency department with a 4-day history of worsening lower extremity swelling accompanied with generalized weakness. Patient states that he has always had some swelling but has worsened over the past 4 to 5 days. He says that his legs get really swollen if he leaves the pain down. He states that he does not eat that much because he always gets indigestion. He does state that he has had a colonoscopy and followed up with gastroenterology in the past.    Past medical history significant for degenerative joint disease, HTN, and obesity. Subjective: Patient observed resting in bed with eyes opened. Complains of lack of sleep on last night. Denies any additional complaints or concerns. No acute distress noted.     Current Facility-Administered Medications   Medication Dose Route Frequency    midodrine (PROAMATINE) tablet 5 mg  5 mg Oral TID WITH MEALS    magnesium sulfate 2 g/50 ml IVPB (premix or compounded)  2 g IntraVENous ONCE    calcium carbonate (TUMS) chewable tablet 200 mg [elemental]  200 mg Oral TID WITH MEALS    tetrahydrozoline (OPTI-CLEAR) 0.05 % ophthalmic drops 1 Drop  1 Drop Both Eyes QID    traMADoL (ULTRAM) tablet 50 mg  50 mg Oral Q6H PRN    potassium chloride SR (KLOR-CON 10) tablet 20 mEq  20 mEq Oral BID    levothyroxine (SYNTHROID) tablet 50 mcg  50 mcg Oral ACB    therapeutic multivitamin (THERAGRAN) tablet 1 Tablet  1 Tablet Oral DAILY    ascorbic acid (vitamin C) (VITAMIN C) tablet 500 mg  500 mg Oral DAILY    aspirin chewable tablet 81 mg  81 mg Oral DAILY    cyanocobalamin (VITAMIN B12) tablet 1,000 mcg  1,000 mcg Oral DAILY    ergocalciferol capsule 50,000 Units  50,000 Units Oral 2 TIMES WEEKLY    folic acid (FOLVITE) tablet 1 mg  1 mg Oral DAILY pantoprazole (PROTONIX) tablet 40 mg  40 mg Oral DAILY    sucralfate (CARAFATE) tablet 1 g  1 g Oral QID    sodium chloride (NS) flush 5-40 mL  5-40 mL IntraVENous Q8H    sodium chloride (NS) flush 5-40 mL  5-40 mL IntraVENous PRN    acetaminophen (TYLENOL) tablet 650 mg  650 mg Oral Q6H PRN    Or    acetaminophen (TYLENOL) suppository 650 mg  650 mg Rectal Q6H PRN    polyethylene glycol (MIRALAX) packet 17 g  17 g Oral DAILY PRN    ondansetron (ZOFRAN ODT) tablet 4 mg  4 mg Oral Q8H PRN    Or    ondansetron (ZOFRAN) injection 4 mg  4 mg IntraVENous Q6H PRN    enoxaparin (LOVENOX) injection 40 mg  40 mg SubCUTAneous DAILY    [Held by provider] furosemide (LASIX) injection 40 mg  40 mg IntraVENous Q12H        Review of Systems:    Review of Systems   Constitutional:  Positive for malaise/fatigue. HENT: Negative. Eyes: Negative. Respiratory: Negative. Cardiovascular:  Negative  Gastrointestinal: Negative. Genitourinary: Negative. Musculoskeletal: Negative. Skin: Negative. Neurological: Negative. Endo/Heme/Allergies: Negative. Psychiatric/Behavioral: Negative. Objective:     Visit Vitals  BP 94/60 (BP 1 Location: Left upper arm)   Pulse (!) 53   Temp 97.5 °F (36.4 °C)   Resp 17   Ht 5' 8\" (1.727 m)   Wt 71.7 kg (158 lb)   SpO2 98%   BMI 24.02 kg/m²      O2 Device: None (Room air)    Temp (24hrs), Av.9 °F (36.6 °C), Min:97.5 °F (36.4 °C), Max:98.2 °F (36.8 °C)      No intake/output data recorded.  1901 -  0700  In: 48 [P.O.:50]  Out: 3100 [Urine:3100]    PHYSICAL EXAM:    Physical Exam  Constitutional:       Comments: cachexic   HENT:      Head: Normocephalic and atraumatic. Right Ear: External ear normal.      Left Ear: External ear normal.      Nose: Nose normal.      Mouth/Throat:      Pharynx: Oropharynx is clear. Eyes:      Conjunctiva/sclera: Conjunctivae normal.   Cardiovascular:      Rate and Rhythm: Normal rate. Heart sounds: Murmur heard. Pulmonary:      Effort: Pulmonary effort is normal.      Breath sounds: Normal breath sounds. Abdominal:      General: Bowel sounds are normal.      Comments: Last reported bowel movement 12/2/22. Musculoskeletal:      Cervical back: Normal range of motion and neck supple. Comments: Generalized weakness. Improved edema noted to bilateral lower extremities. Skin:     General: Skin is warm and dry. Capillary Refill: Capillary refill takes 2 to 3 seconds. Coloration: Skin is pale. Neurological:      Mental Status: He is alert and oriented to person, place, and time. Psychiatric:         Mood and Affect: Mood normal.          Data Review    Recent Results (from the past 24 hour(s))   CBC WITH AUTOMATED DIFF    Collection Time: 12/04/22  5:18 AM   Result Value Ref Range    WBC 4.4 4.1 - 11.1 K/uL    RBC 2.65 (L) 4.10 - 5.70 M/uL    HGB 9.3 (L) 12.1 - 17.0 g/dL    HCT 27.6 (L) 36.6 - 50.3 %    .2 (H) 80.0 - 99.0 FL    MCH 35.1 (H) 26.0 - 34.0 PG    MCHC 33.7 30.0 - 36.5 g/dL    RDW 14.2 11.5 - 14.5 %    PLATELET 468 467 - 600 K/uL    MPV 9.5 8.9 - 12.9 FL    NRBC 0.0 0  WBC    ABSOLUTE NRBC 0.00 0.00 - 0.01 K/uL    NEUTROPHILS 50 32 - 75 %    LYMPHOCYTES 34 12 - 49 %    MONOCYTES 10 5 - 13 %    EOSINOPHILS 4 0 - 7 %    BASOPHILS 1 0 - 1 %    IMMATURE GRANULOCYTES 1 (H) 0.0 - 0.5 %    ABS. NEUTROPHILS 2.2 1.8 - 8.0 K/UL    ABS. LYMPHOCYTES 1.5 0.8 - 3.5 K/UL    ABS. MONOCYTES 0.5 0.0 - 1.0 K/UL    ABS. EOSINOPHILS 0.2 0.0 - 0.4 K/UL    ABS. BASOPHILS 0.0 0.0 - 0.1 K/UL    ABS. IMM.  GRANS. 0.0 0.00 - 0.04 K/UL    DF AUTOMATED     METABOLIC PANEL, BASIC    Collection Time: 12/04/22  5:18 AM   Result Value Ref Range    Sodium 136 136 - 145 mmol/L    Potassium 4.3 3.5 - 5.1 mmol/L    Chloride 99 97 - 108 mmol/L    CO2 32 21 - 32 mmol/L    Anion gap 5 5 - 15 mmol/L    Glucose 81 65 - 100 mg/dL    BUN 16 6 - 20 MG/DL    Creatinine 1.23 0.70 - 1.30 MG/DL    BUN/Creatinine ratio 13 12 - 20 eGFR >60 >60 ml/min/1.73m2    Calcium 7.3 (L) 8.5 - 10.1 MG/DL   HEPATIC FUNCTION PANEL    Collection Time: 12/04/22  5:18 AM   Result Value Ref Range    Protein, total 3.8 (L) 6.4 - 8.2 g/dL    Albumin 1.4 (L) 3.5 - 5.0 g/dL    Globulin 2.4 2.0 - 4.0 g/dL    A-G Ratio 0.6 (L) 1.1 - 2.2      Bilirubin, total 0.5 0.2 - 1.0 MG/DL    Bilirubin, direct 0.3 (H) 0.0 - 0.2 MG/DL    Alk. phosphatase 110 45 - 117 U/L    AST (SGOT) 42 (H) 15 - 37 U/L    ALT (SGPT) 19 12 - 78 U/L   PHOSPHORUS    Collection Time: 12/04/22  5:18 AM   Result Value Ref Range    Phosphorus 2.9 2.6 - 4.7 MG/DL   MAGNESIUM    Collection Time: 12/04/22  5:18 AM   Result Value Ref Range    Magnesium 1.4 (L) 1.6 - 2.4 mg/dL       US ABD COMP   Final Result   Cirrhotic liver with moderate to large ascites. XR CHEST PORT   Final Result   No Acute Disease. DUPLEX LOWER EXT VENOUS BILAT   Final Result          Active Problems:    Weakness (11/30/2022)      Severe protein-calorie malnutrition ([address] (12/1/2022)      Anasarca (12/2/2022)      Hypotension (12/2/2022)        Assessment/Plan:   Hypotension         Anasacra        Hypoalbuminemia - POA        History of proteinuria and hepatic stenosis        Generalized weakness  - venous duplex shows no DVT to BLE  - chest xray shows no acute disease  - etiology of anasarca possibly related to hypoalbuminemic state. - Echocardiogram on 12/2/22 shows an EF of 50%-55% with normal wall thickness. No significant valvular disease.  - NT proBNP  trending downward 1,875  - urinalysis negative  - urine creatinine 137.00, urine protein 23 etiology possibly related to nephrotic syndrome  - abdominal ultrasound shows cirrhotic liver with moderate to large ascites. - paracentesis planned for tomorrow  - continue midodrine  - continue daily weights  - I&O.  - GI consulted     2. Malnutrition - moderate to severe  -  nutrition following     3. Macrocytic anemia  - Vitamin B12 and folate WNL     4. Hypothyroidism     - continue synthroid at current dose, repeat TSH in 8 weeks. 5. Indigestion/dyspepsia   - continue PPI and carafate     6. Hypocalcemia/Hypomagnesemia    - Ca+ 7.3     - Mg+ 1.4    - continue magnesium and calcium supplementation    - will monitor lab work    7. History of ETOH abuse   - He is a long time (>50 years hx) alcohol user, 3-4 drinks per day(beer Vodka). - counseled regarding cessation  - seizure precautions  - continue therapeutic multivitamins    PT/OT     Disposition: pending hospital course and GI work up.     DVT Prophylaxis: Lovenox on hold in lieu of paracentesis  Code Status:  Full Code  POA: Eric Mares ([address] [place] [address] [facility] [address] [place] discussed with: patient, nursing.  _______________________________________________________________    Kyaw Mathew NP

## 2022-12-04 NOTE — PROGRESS NOTES
End of Shift Note    Bedside shift change report given to Tyson Cintron (oncoming nurse) by Tejinder Deal (offgoing nurse). Report included the following information SBAR, Kardex, and MAR    Shift worked:  7p-7a     Shift summary and any significant changes:     Scheduled medications were given, see MAR. Lasix was held due to the patient's BP of 86/59, see MAR. Patient did complain of pain during my shift, Tramadol was administered, see PRN MAR.  IV has been flushed and is patent. Patient uses the urinal at bedside. Patient teaching and routine rounding has been done. Concerns for physician to address:       Zone phone for oncoming shift:          Activity:  Activity Level: Up with Assistance  Number times ambulated in hallways past shift: 0  Number of times OOB to chair past shift: 0    Cardiac:   Cardiac Monitoring: No           Access:  Current line(s): PIV     Genitourinary:   Urinary status: voiding    Respiratory:   O2 Device: None (Room air)  Chronic home O2 use?: NO  Incentive spirometer at bedside: NO       GI:  Last Bowel Movement Date: 12/02/22  Current diet:  ADULT DIET Regular  ADULT ORAL NUTRITION SUPPLEMENT Lunch; Clear Liquid  Passing flatus: YES  Tolerating current diet: YES       Pain Management:   Patient states pain is manageable on current regimen: YES    Skin:  Amadou Score: 16  Interventions: PT/OT consult    Patient Safety:  Fall Score:  Total Score: 4  Interventions: bed/chair alarm, assistive device (walker, cane, etc), and pt to call before getting OOB  High Fall Risk: Yes    Length of Stay:  Expected LOS: 2d 14h  Actual LOS: 2      Reita Roll

## 2022-12-05 ENCOUNTER — APPOINTMENT (OUTPATIENT)
Dept: ULTRASOUND IMAGING | Age: 77
DRG: 260 | End: 2022-12-05
Attending: INTERNAL MEDICINE
Payer: MEDICARE

## 2022-12-05 PROBLEM — R00.1 SINUS BRADYCARDIA: Status: ACTIVE | Noted: 2022-12-05

## 2022-12-05 LAB
ALBUMIN SERPL-MCNC: 1.4 G/DL (ref 3.5–5)
ALBUMIN/GLOB SERPL: 0.5 {RATIO} (ref 1.1–2.2)
ALP SERPL-CCNC: 118 U/L (ref 45–117)
ALT SERPL-CCNC: 22 U/L (ref 12–78)
ANION GAP SERPL CALC-SCNC: 3 MMOL/L (ref 5–15)
AST SERPL-CCNC: 50 U/L (ref 15–37)
BASOPHILS # BLD: 0.1 K/UL (ref 0–0.1)
BASOPHILS NFR BLD: 1 % (ref 0–1)
BILIRUB DIRECT SERPL-MCNC: 0.3 MG/DL (ref 0–0.2)
BILIRUB SERPL-MCNC: 0.6 MG/DL (ref 0.2–1)
BUN SERPL-MCNC: 19 MG/DL (ref 6–20)
BUN/CREAT SERPL: 15 (ref 12–20)
CALCIUM SERPL-MCNC: 7.8 MG/DL (ref 8.5–10.1)
CHLORIDE SERPL-SCNC: 100 MMOL/L (ref 97–108)
CO2 SERPL-SCNC: 31 MMOL/L (ref 21–32)
CREAT SERPL-MCNC: 1.25 MG/DL (ref 0.7–1.3)
DIFFERENTIAL METHOD BLD: ABNORMAL
EOSINOPHIL # BLD: 0.2 K/UL (ref 0–0.4)
EOSINOPHIL NFR BLD: 3 % (ref 0–7)
ERYTHROCYTE [DISTWIDTH] IN BLOOD BY AUTOMATED COUNT: 14.1 % (ref 11.5–14.5)
FERRITIN SERPL-MCNC: 146 NG/ML (ref 26–388)
GLOBULIN SER CALC-MCNC: 2.9 G/DL (ref 2–4)
GLUCOSE SERPL-MCNC: 85 MG/DL (ref 65–100)
HAV IGM SER QL: NONREACTIVE
HBV CORE IGM SER QL: NONREACTIVE
HBV SURFACE AG SER QL: <0.1 INDEX
HBV SURFACE AG SER QL: NEGATIVE
HCT VFR BLD AUTO: 29.9 % (ref 36.6–50.3)
HCV AB SERPL QL IA: NONREACTIVE
HGB BLD-MCNC: 10 G/DL (ref 12.1–17)
IMM GRANULOCYTES # BLD AUTO: 0 K/UL (ref 0–0.04)
IMM GRANULOCYTES NFR BLD AUTO: 0 % (ref 0–0.5)
LYMPHOCYTES # BLD: 1.4 K/UL (ref 0.8–3.5)
LYMPHOCYTES NFR BLD: 27 % (ref 12–49)
MAGNESIUM SERPL-MCNC: 2 MG/DL (ref 1.6–2.4)
MCH RBC QN AUTO: 35.5 PG (ref 26–34)
MCHC RBC AUTO-ENTMCNC: 33.4 G/DL (ref 30–36.5)
MCV RBC AUTO: 106 FL (ref 80–99)
MONOCYTES # BLD: 0.6 K/UL (ref 0–1)
MONOCYTES NFR BLD: 11 % (ref 5–13)
NEUTS SEG # BLD: 3.1 K/UL (ref 1.8–8)
NEUTS SEG NFR BLD: 58 % (ref 32–75)
NRBC # BLD: 0 K/UL (ref 0–0.01)
NRBC BLD-RTO: 0 PER 100 WBC
PHOSPHATE SERPL-MCNC: 3.2 MG/DL (ref 2.6–4.7)
PLATELET # BLD AUTO: 208 K/UL (ref 150–400)
PMV BLD AUTO: 9.4 FL (ref 8.9–12.9)
POTASSIUM SERPL-SCNC: 5.4 MMOL/L (ref 3.5–5.1)
PROT SERPL-MCNC: 4.3 G/DL (ref 6.4–8.2)
RBC # BLD AUTO: 2.82 M/UL (ref 4.1–5.7)
SODIUM SERPL-SCNC: 134 MMOL/L (ref 136–145)
SP1: NORMAL
SP2: NORMAL
SP3: NORMAL
WBC # BLD AUTO: 5.3 K/UL (ref 4.1–11.1)

## 2022-12-05 PROCEDURE — 74011250637 HC RX REV CODE- 250/637: Performed by: INTERNAL MEDICINE

## 2022-12-05 PROCEDURE — 74011000250 HC RX REV CODE- 250: Performed by: INTERNAL MEDICINE

## 2022-12-05 PROCEDURE — 76705 ECHO EXAM OF ABDOMEN: CPT

## 2022-12-05 PROCEDURE — 80076 HEPATIC FUNCTION PANEL: CPT

## 2022-12-05 PROCEDURE — 74011250637 HC RX REV CODE- 250/637: Performed by: NURSE PRACTITIONER

## 2022-12-05 PROCEDURE — 84100 ASSAY OF PHOSPHORUS: CPT

## 2022-12-05 PROCEDURE — 80048 BASIC METABOLIC PNL TOTAL CA: CPT

## 2022-12-05 PROCEDURE — 83735 ASSAY OF MAGNESIUM: CPT

## 2022-12-05 PROCEDURE — C9399 UNCLASSIFIED DRUGS OR BIOLOG: HCPCS | Performed by: INTERNAL MEDICINE

## 2022-12-05 PROCEDURE — 85025 COMPLETE CBC W/AUTO DIFF WBC: CPT

## 2022-12-05 PROCEDURE — 74011250636 HC RX REV CODE- 250/636: Performed by: INTERNAL MEDICINE

## 2022-12-05 PROCEDURE — 65270000029 HC RM PRIVATE

## 2022-12-05 PROCEDURE — 65270000046 HC RM TELEMETRY

## 2022-12-05 PROCEDURE — 36415 COLL VENOUS BLD VENIPUNCTURE: CPT

## 2022-12-05 PROCEDURE — 93005 ELECTROCARDIOGRAM TRACING: CPT

## 2022-12-05 RX ORDER — LORAZEPAM 1 MG/1
2 TABLET ORAL
Status: DISCONTINUED | OUTPATIENT
Start: 2022-12-05 | End: 2022-12-13 | Stop reason: HOSPADM

## 2022-12-05 RX ORDER — ALBUMIN HUMAN 250 G/1000ML
12.5 SOLUTION INTRAVENOUS EVERY 6 HOURS
Status: DISCONTINUED | OUTPATIENT
Start: 2022-12-05 | End: 2022-12-05 | Stop reason: CLARIF

## 2022-12-05 RX ORDER — MIDODRINE HYDROCHLORIDE 5 MG/1
10 TABLET ORAL
Status: DISCONTINUED | OUTPATIENT
Start: 2022-12-05 | End: 2022-12-07

## 2022-12-05 RX ORDER — CHLORDIAZEPOXIDE HYDROCHLORIDE 5 MG/1
5 CAPSULE, GELATIN COATED ORAL 2 TIMES DAILY
Status: DISCONTINUED | OUTPATIENT
Start: 2022-12-05 | End: 2022-12-13 | Stop reason: HOSPADM

## 2022-12-05 RX ADMIN — LACTULOSE 15 ML: 20 SOLUTION ORAL at 17:14

## 2022-12-05 RX ADMIN — LEVOTHYROXINE SODIUM 50 MCG: 0.05 TABLET ORAL at 05:06

## 2022-12-05 RX ADMIN — SODIUM CHLORIDE, PRESERVATIVE FREE 10 ML: 5 INJECTION INTRAVENOUS at 20:45

## 2022-12-05 RX ADMIN — Medication 1000 MCG: at 08:56

## 2022-12-05 RX ADMIN — SUCRALFATE 1 G: 1 TABLET ORAL at 20:45

## 2022-12-05 RX ADMIN — TRAMADOL HYDROCHLORIDE 50 MG: 50 TABLET ORAL at 05:06

## 2022-12-05 RX ADMIN — SUCRALFATE 1 G: 1 TABLET ORAL at 17:15

## 2022-12-05 RX ADMIN — FOLIC ACID 1 MG: 1 TABLET ORAL at 08:56

## 2022-12-05 RX ADMIN — CHLORDIAZEPOXIDE HYDROCHLORIDE 5 MG: 5 CAPSULE ORAL at 12:41

## 2022-12-05 RX ADMIN — MIDODRINE HYDROCHLORIDE 5 MG: 5 TABLET ORAL at 08:56

## 2022-12-05 RX ADMIN — TETRAHYDROZOLINE HCL 1 DROP: 0.05 SOLUTION/ DROPS OPHTHALMIC at 20:45

## 2022-12-05 RX ADMIN — SUCRALFATE 1 G: 1 TABLET ORAL at 08:56

## 2022-12-05 RX ADMIN — OXYCODONE HYDROCHLORIDE AND ACETAMINOPHEN 500 MG: 500 TABLET ORAL at 08:56

## 2022-12-05 RX ADMIN — SODIUM CHLORIDE, PRESERVATIVE FREE 10 ML: 5 INJECTION INTRAVENOUS at 05:06

## 2022-12-05 RX ADMIN — LACTULOSE 15 ML: 20 SOLUTION ORAL at 12:41

## 2022-12-05 RX ADMIN — SUCRALFATE 1 G: 1 TABLET ORAL at 12:41

## 2022-12-05 RX ADMIN — CALCIUM CARBONATE (ANTACID) CHEW TAB 500 MG 200 MG: 500 CHEW TAB at 17:15

## 2022-12-05 RX ADMIN — TRAMADOL HYDROCHLORIDE 50 MG: 50 TABLET ORAL at 20:46

## 2022-12-05 RX ADMIN — THIAMINE HCL TAB 100 MG 100 MG: 100 TAB at 08:56

## 2022-12-05 RX ADMIN — MIDODRINE HYDROCHLORIDE 10 MG: 5 TABLET ORAL at 12:40

## 2022-12-05 RX ADMIN — TETRAHYDROZOLINE HCL 1 DROP: 0.05 SOLUTION/ DROPS OPHTHALMIC at 09:01

## 2022-12-05 RX ADMIN — ERGOCALCIFEROL 50000 UNITS: 1.25 CAPSULE ORAL at 18:01

## 2022-12-05 RX ADMIN — ASPIRIN 81 MG: 81 TABLET, CHEWABLE ORAL at 08:55

## 2022-12-05 RX ADMIN — CALCIUM CARBONATE (ANTACID) CHEW TAB 500 MG 200 MG: 500 CHEW TAB at 12:41

## 2022-12-05 RX ADMIN — SODIUM CHLORIDE, PRESERVATIVE FREE 10 ML: 5 INJECTION INTRAVENOUS at 14:11

## 2022-12-05 RX ADMIN — CALCIUM CARBONATE (ANTACID) CHEW TAB 500 MG 200 MG: 500 CHEW TAB at 08:55

## 2022-12-05 RX ADMIN — PANTOPRAZOLE SODIUM 40 MG: 40 TABLET, DELAYED RELEASE ORAL at 08:56

## 2022-12-05 RX ADMIN — MAGNESIUM OXIDE TAB 400 MG (241.3 MG ELEMENTAL MG) 400 MG: 400 (241.3 MG) TAB at 08:56

## 2022-12-05 RX ADMIN — ALBUMIN HUMAN 12.5 G: 0.25 SOLUTION INTRAVENOUS at 17:12

## 2022-12-05 RX ADMIN — TETRAHYDROZOLINE HCL 1 DROP: 0.05 SOLUTION/ DROPS OPHTHALMIC at 05:06

## 2022-12-05 RX ADMIN — TETRAHYDROZOLINE HCL 1 DROP: 0.05 SOLUTION/ DROPS OPHTHALMIC at 17:18

## 2022-12-05 RX ADMIN — ALBUMIN HUMAN 12.5 G: 0.25 SOLUTION INTRAVENOUS at 20:46

## 2022-12-05 RX ADMIN — THERA TABS 1 TABLET: TAB at 08:56

## 2022-12-05 RX ADMIN — CHLORDIAZEPOXIDE HYDROCHLORIDE 5 MG: 5 CAPSULE ORAL at 17:15

## 2022-12-05 RX ADMIN — TETRAHYDROZOLINE HCL 1 DROP: 0.05 SOLUTION/ DROPS OPHTHALMIC at 12:43

## 2022-12-05 NOTE — PROGRESS NOTES
TRANSFER - IN REPORT:    Verbal report received from Nika Grimes, [facility] (name) on Floresita Moore  being received from oncology (unit) for change in patient condition(bradycardia)      Report consisted of patients Situation, Background, Assessment and   Recommendations(SBAR). Information from the following report(s) SBAR, Kardex, MAR, and Cardiac Rhythm sinus shad  was reviewed with the receiving nurse. Opportunity for questions and clarification was provided. Assessment completed upon patients arrival to unit and care assumed. Primary Nurse Jo Ann Samuel RN and Fitz Wright RN performed a dual skin assessment on this patient Impairment noted- see wound doc flow sheet  Amadou score is 16     Skin tear to right upper arm, bright red with some bruising noted. Blanchable redness to sacrum and coccyx area.

## 2022-12-05 NOTE — PROGRESS NOTES
Hospitalist Progress Note    Subjective:   Daily Progress Note: 12/5/2022 1:52 PM    Hospital Course:  Mr. Jonathan Zuniga is a 69year old male with PMH including remote gastric bypass surgery, indigestion, HTN, and hypothyroidism who presented to the ED with c/o BLE edema with associated weakness and activity intolerance. He reports the leg swelling is worse when he is on his feet all day; he worked in [employer] industry for many years and now works part time as . He reports decreased appetite and difficulty eating foods other than soups and soft foods due to long-standing history of indigestion. He admits to a 50 year history of \"several drinks a day\" of \"beer and vodka,\" but denies smoking or drug use. BLE duplex was (-) for DVT. An Echo demonstrated EF 14-48% with diastolic dysfunction present. Pro-BNP on admit was 2187, decreased on 12/3 to 1875. Pt given Lasix IV 40mg Q12. CXR on admit showed no acute disease. Orthostatics were profoundly (+) with SBP dropping from 116/74 while laying to 72/60 when standing, pt symptomatic with dizziness. Pt denies taking any BP medication at home aside from PO lasix. Midodrine was added for BP support. Pt's levothyroxine dose was increased from 25 mcg to 50 mcg on admission due to TSH 9.1. Free T4 and total T3 ordered on 12/5 results pending. Additionally pt labs revealed elevated LFTs with AST 50 and Alk Phos 118. Hepatitis panel was (-), abd US showed cirrhotic liver with moderate to large ascites, and GI was consulted. However when pt went for paracentesis 12/5 there was not enough cirrhotic fluid to drain. Pt was started on scheduled librium and PRN ativan but no s/s of active ETOH withdrawal at this time. 12/5 PM  Transfer Higher Level of Care  Pt had been mildly bradycardic intermittently throughout admission with HR in the 50s-70s, but on return from paracentesis attempt was bradycardic in high 30s to mid 40s.  Pt did not receive any medications for paracentesis. Pt did admit to feeling dizzy after being slid back from stretcher also. Pt placed on telemetry monitoring, EKG showed sinus bradycardia HR 43. Concern for possibility of midodrine worsening HR -- suspended midodrine at this time in case it's contributory. Added albumin to increase vascular fluid volume, support BP, decrease LE edema. Pt's levothyroxine dose was increased on admit so will hold off on any adjustments at this time though hypothryoidism may be contributory also. Consulted cardiology for symptomatic bradycardia and (+) orthostatics. Will transfer pt to stepdown level of care with telemetry for closer monitoring. Subjective:  Pt examined lying in bed with eyes open, pleasant and conversational.  He states \"I'm feeling better, my leg swelling is maybe a bit better. But I get winded when I get up to the bathroom. \"    Current Facility-Administered Medications   Medication Dose Route Frequency    [Held by provider] midodrine (PROAMATINE) tablet 10 mg  10 mg Oral TID WITH MEALS    lactulose (CHRONULAC) 10 gram/15 mL solution 15 mL  10 g Oral BID    LORazepam (ATIVAN) tablet 2 mg  2 mg Oral Q4H PRN    chlordiazePOXIDE (LIBRIUM) capsule 5 mg  5 mg Oral BID    albumin human 25% (BUMINATE) solution 12.5 g  12.5 g IntraVENous Q6H    thiamine mononitrate (B-1) tablet 100 mg  100 mg Oral DAILY    magnesium oxide (MAG-OX) tablet 400 mg  400 mg Oral DAILY    calcium carbonate (TUMS) chewable tablet 200 mg [elemental]  200 mg Oral TID WITH MEALS    tetrahydrozoline (OPTI-CLEAR) 0.05 % ophthalmic drops 1 Drop  1 Drop Both Eyes QID    traMADoL (ULTRAM) tablet 50 mg  50 mg Oral Q6H PRN    [Held by provider] potassium chloride SR (KLOR-CON 10) tablet 20 mEq  20 mEq Oral BID    levothyroxine (SYNTHROID) tablet 50 mcg  50 mcg Oral ACB    therapeutic multivitamin (THERAGRAN) tablet 1 Tablet  1 Tablet Oral DAILY    ascorbic acid (vitamin C) (VITAMIN C) tablet 500 mg  500 mg Oral DAILY [Held by provider] aspirin chewable tablet 81 mg  81 mg Oral DAILY    cyanocobalamin (VITAMIN B12) tablet 1,000 mcg  1,000 mcg Oral DAILY    ergocalciferol capsule 50,000 Units  50,000 Units Oral 2 TIMES WEEKLY    folic acid (FOLVITE) tablet 1 mg  1 mg Oral DAILY    pantoprazole (PROTONIX) tablet 40 mg  40 mg Oral DAILY    sucralfate (CARAFATE) tablet 1 g  1 g Oral QID    sodium chloride (NS) flush 5-40 mL  5-40 mL IntraVENous Q8H    sodium chloride (NS) flush 5-40 mL  5-40 mL IntraVENous PRN    acetaminophen (TYLENOL) tablet 650 mg  650 mg Oral Q6H PRN    Or    acetaminophen (TYLENOL) suppository 650 mg  650 mg Rectal Q6H PRN    polyethylene glycol (MIRALAX) packet 17 g  17 g Oral DAILY PRN    ondansetron (ZOFRAN ODT) tablet 4 mg  4 mg Oral Q8H PRN    Or    ondansetron (ZOFRAN) injection 4 mg  4 mg IntraVENous Q6H PRN    enoxaparin (LOVENOX) injection 40 mg  40 mg SubCUTAneous DAILY    [Held by provider] furosemide (LASIX) injection 40 mg  40 mg IntraVENous Q12H        Review of Systems:    Review of Systems   Constitutional:  Negative for chills, fever and weight loss. Respiratory:  Negative for cough, hemoptysis, sputum production and shortness of breath. Cardiovascular:  Positive for leg swelling. Negative for chest pain and palpitations. Gastrointestinal:  Negative for abdominal pain, constipation, diarrhea, nausea and vomiting. Musculoskeletal:  Negative for myalgias. Neurological:  Positive for dizziness and weakness. Negative for focal weakness and headaches. Objective:     Visit Vitals  BP (!) 111/47   Pulse (!) 48   Temp 98.9 °F (37.2 °C)   Resp 17   Ht 5' 8\" (1.727 m)   Wt 71.7 kg (158 lb)   SpO2 99%   BMI 24.02 kg/m²      O2 Device: None (Room air)    Temp (24hrs), Av.7 °F (36.5 °C), Min:96.8 °F (36 °C), Max:98.9 °F (37.2 °C)      No intake/output data recorded.    1901 -  0700  In: -   Out: 200 [Urine:200]    PHYSICAL EXAM:    Physical Exam  Constitutional:       General: He is not in acute distress. Appearance: Normal appearance. HENT:      Head: Normocephalic. Mouth/Throat:      Mouth: Mucous membranes are moist.   Eyes:      Pupils: Pupils are equal, round, and reactive to light. Cardiovascular:      Rate and Rhythm: Regular rhythm. Bradycardia present. Pulses:           Radial pulses are 2+ on the right side and 2+ on the left side. Dorsalis pedis pulses are 1+ on the right side and 1+ on the left side. Pulmonary:      Effort: Pulmonary effort is normal. No respiratory distress. Breath sounds: Normal breath sounds. Abdominal:      General: Bowel sounds are normal. There is no distension. Palpations: Abdomen is soft. There is no mass. Tenderness: There is no abdominal tenderness. There is no guarding. Comments: (+) BM 22   Musculoskeletal:         General: Swelling present. Right lower le+ Edema present. Left lower le+ Edema present. Skin:     General: Skin is warm and dry. Coloration: Skin is pale. Neurological:      General: No focal deficit present. Mental Status: He is alert and oriented to person, place, and time. Motor: Weakness present. Data Review    Recent Results (from the past 24 hour(s))   HEPATITIS PANEL, ACUTE    Collection Time: 22  8:14 PM   Result Value Ref Range    Hepatitis A, IgM NONREACTIVE NR      __          Hepatitis B surface Ag <0.10 Index    Hep B surface Ag Interp. Negative NEG      __          Hepatitis B core, IgM NONREACTIVE NR      __          Hep C virus Ab Interp.  NONREACTIVE NR     AMMONIA    Collection Time: 22  8:14 PM   Result Value Ref Range    Ammonia, plasma 32 (H) <32 UMOL/L   FERRITIN    Collection Time: 22  8:14 PM   Result Value Ref Range    Ferritin 146 26 - 388 NG/ML   PROTHROMBIN TIME + INR    Collection Time: 22  8:14 PM   Result Value Ref Range    INR 1.3 (H) 0.9 - 1.1      Prothrombin time 13.0 (H) 9.0 - 11.1 sec   CBC WITH AUTOMATED DIFF    Collection Time: 12/05/22  5:16 AM   Result Value Ref Range    WBC 5.3 4.1 - 11.1 K/uL    RBC 2.82 (L) 4.10 - 5.70 M/uL    HGB 10.0 (L) 12.1 - 17.0 g/dL    HCT 29.9 (L) 36.6 - 50.3 %    .0 (H) 80.0 - 99.0 FL    MCH 35.5 (H) 26.0 - 34.0 PG    MCHC 33.4 30.0 - 36.5 g/dL    RDW 14.1 11.5 - 14.5 %    PLATELET 408 011 - 438 K/uL    MPV 9.4 8.9 - 12.9 FL    NRBC 0.0 0  WBC    ABSOLUTE NRBC 0.00 0.00 - 0.01 K/uL    NEUTROPHILS 58 32 - 75 %    LYMPHOCYTES 27 12 - 49 %    MONOCYTES 11 5 - 13 %    EOSINOPHILS 3 0 - 7 %    BASOPHILS 1 0 - 1 %    IMMATURE GRANULOCYTES 0 0.0 - 0.5 %    ABS. NEUTROPHILS 3.1 1.8 - 8.0 K/UL    ABS. LYMPHOCYTES 1.4 0.8 - 3.5 K/UL    ABS. MONOCYTES 0.6 0.0 - 1.0 K/UL    ABS. EOSINOPHILS 0.2 0.0 - 0.4 K/UL    ABS. BASOPHILS 0.1 0.0 - 0.1 K/UL    ABS. IMM. GRANS. 0.0 0.00 - 0.04 K/UL    DF AUTOMATED     METABOLIC PANEL, BASIC    Collection Time: 12/05/22  5:16 AM   Result Value Ref Range    Sodium 134 (L) 136 - 145 mmol/L    Potassium 5.4 (H) 3.5 - 5.1 mmol/L    Chloride 100 97 - 108 mmol/L    CO2 31 21 - 32 mmol/L    Anion gap 3 (L) 5 - 15 mmol/L    Glucose 85 65 - 100 mg/dL    BUN 19 6 - 20 MG/DL    Creatinine 1.25 0.70 - 1.30 MG/DL    BUN/Creatinine ratio 15 12 - 20      eGFR 60 (L) >60 ml/min/1.73m2    Calcium 7.8 (L) 8.5 - 10.1 MG/DL   HEPATIC FUNCTION PANEL    Collection Time: 12/05/22  5:16 AM   Result Value Ref Range    Protein, total 4.3 (L) 6.4 - 8.2 g/dL    Albumin 1.4 (L) 3.5 - 5.0 g/dL    Globulin 2.9 2.0 - 4.0 g/dL    A-G Ratio 0.5 (L) 1.1 - 2.2      Bilirubin, total 0.6 0.2 - 1.0 MG/DL    Bilirubin, direct 0.3 (H) 0.0 - 0.2 MG/DL    Alk.  phosphatase 118 (H) 45 - 117 U/L    AST (SGOT) 50 (H) 15 - 37 U/L    ALT (SGPT) 22 12 - 78 U/L   PHOSPHORUS    Collection Time: 12/05/22  5:16 AM   Result Value Ref Range    Phosphorus 3.2 2.6 - 4.7 MG/DL   MAGNESIUM    Collection Time: 12/05/22  5:16 AM   Result Value Ref Range    Magnesium 2.0 1.6 - 2.4 mg/dL       US ABD LTD   Final Result   Small amount of ascites. US ABD COMP   Final Result   Cirrhotic liver with moderate to large ascites. XR CHEST PORT   Final Result   No Acute Disease. DUPLEX LOWER EXT VENOUS BILAT   Final Result          Active Problems:    Weakness (11/30/2022)      Severe protein-calorie malnutrition ([address] (12/1/2022)      Anasarca (12/2/2022)      Hypotension (12/2/2022)        Assessment/Plan:   Hypotension         Anasacra        Hypoalbuminemia - POA        History of proteinuria and hepatic stenosis        Generalized weakness        Borderline elevated Ammonia (32)  -  Venous duplex shows no DVT to BLE  - chest xray shows no acute disease  - Etiology of anasarca possibly related to hypoalbuminemic state. - Echocardiogram on 12/2/22 shows an EF of 50%-55% with normal wall thickness.  No significant valvular disease.  - NT proBNP  trending downward 1,875  - urinalysis negative  - urine creatinine 137.00, urine protein 23 etiology possibly related to nephrotic syndrome  - abdominal ultrasound shows cirrhotic liver with moderate to large ascites  - Daily weights  - Strict I&Os  - Added lactulose for ammonia & hyperkalemia 12/5  - GI consulted--appreciate recommendations  - paracentesis attempted 12/5 - not enough fluid to drain  - Midodrine held 12/5 due to bradycardia  - added Albumin 12.5mg IV q4 x 3 doses    Symptomatic Bradycardia         Suspect multifactorial due to hypothyroid, midodrine use, no hx of dysrhythmia per pt         12/5 EKG Sinus Bradycardia  Orthostatic Hypotension  - Telemetry monitoring  - Lasix on hold  - Suspended midodrine  - Giving albumin  - Replete electrolytes per protocol  - Cardiology consulted  - Transfer to [facility] for closer monitoring of VS/telemetry    Malnutrition - moderate to severe  -  nutrition following  - Encouraged PO diet as tolerated  - Regular Diet resume after paracentesis     Macrocytic anemia  - Vitamin B12 and folate WNL     Hypothyroidism - TSH 9.1     - Synthroid was increased to 50 mcg, repeat TSH in 8 weeks  - 12/5 ordered free T4 and total T3 as add-on     Indigestion/dyspepsia   - continue PPI and carafate     Electrolyte Disturbances  Hypocalcemia/Hypomagnesemia/Hypokalemia    - Ca+ 7.3     - Mg+ 1.4    - continue magnesium and calcium supplementation    - will monitor lab work  - 12/5 Hyperkalemic 5.4 will suspend Potassium Chloride supplementation     History of ETOH abuse   - He is a long time (>50 years hx) alcohol user, 3-4 drinks per day(beer Vodka).   - counseled regarding cessation  - seizure precautions  - Monitor for s/s withdrawal  - On multivitamin, thiamine, folic acid, ascorbic acid  - Added scheduled librium, PRN ativan      Disposition: Pending hospital course; PT/OT will need to re-eval  DVT Prophylaxis: Lovenox - on hold for paracentesis; SCDs  Code Status: Full Code  POA: Tyrone Dress - [phone]    Care Plan discussed with: Patient, Nursing  _______________________________________________________________    CHARANJIT Santos

## 2022-12-05 NOTE — PROGRESS NOTES
Transferred patient to room [phone] [id number] in PCU for higher level of care, patient has been bradycardic. Completed EKG per provider order, placed patient on telemetry. [facility] Allowed time for questions and concerns. Patient transferred via stretcher with Chirag Treviño RN and rapid response nurse.

## 2022-12-05 NOTE — PROGRESS NOTES
Gastroenterology Daily Progress Note   Claudia Elizabethma for Dr. Ladi Henderson)   [address] [place] Date: 11/30/2022     Follow up of new dx of cirrhosis and ascites    Subjective:       Less swelling  No new c/o  States he enjoys drinking and he's not ready to give it up completely.   May slow way down but has lived a good life and if the good Lord calls him home, he's ready    Current Facility-Administered Medications   Medication Dose Route Frequency    midodrine (PROAMATINE) tablet 10 mg  10 mg Oral TID WITH MEALS    lactulose (CHRONULAC) 10 gram/15 mL solution 15 mL  10 g Oral BID    LORazepam (ATIVAN) tablet 2 mg  2 mg Oral Q4H PRN    chlordiazePOXIDE (LIBRIUM) capsule 5 mg  5 mg Oral BID    thiamine mononitrate (B-1) tablet 100 mg  100 mg Oral DAILY    magnesium oxide (MAG-OX) tablet 400 mg  400 mg Oral DAILY    calcium carbonate (TUMS) chewable tablet 200 mg [elemental]  200 mg Oral TID WITH MEALS    tetrahydrozoline (OPTI-CLEAR) 0.05 % ophthalmic drops 1 Drop  1 Drop Both Eyes QID    traMADoL (ULTRAM) tablet 50 mg  50 mg Oral Q6H PRN    [Held by provider] potassium chloride SR (KLOR-CON 10) tablet 20 mEq  20 mEq Oral BID    levothyroxine (SYNTHROID) tablet 50 mcg  50 mcg Oral ACB    therapeutic multivitamin (THERAGRAN) tablet 1 Tablet  1 Tablet Oral DAILY    ascorbic acid (vitamin C) (VITAMIN C) tablet 500 mg  500 mg Oral DAILY    [Held by provider] aspirin chewable tablet 81 mg  81 mg Oral DAILY    cyanocobalamin (VITAMIN B12) tablet 1,000 mcg  1,000 mcg Oral DAILY    ergocalciferol capsule 50,000 Units  50,000 Units Oral 2 TIMES WEEKLY    folic acid (FOLVITE) tablet 1 mg  1 mg Oral DAILY    pantoprazole (PROTONIX) tablet 40 mg  40 mg Oral DAILY    sucralfate (CARAFATE) tablet 1 g  1 g Oral QID    sodium chloride (NS) flush 5-40 mL  5-40 mL IntraVENous Q8H    sodium chloride (NS) flush 5-40 mL  5-40 mL IntraVENous PRN    acetaminophen (TYLENOL) tablet 650 mg  650 mg Oral Q6H PRN    Or    acetaminophen (TYLENOL) suppository 650 mg  650 mg Rectal Q6H PRN    polyethylene glycol (MIRALAX) packet 17 g  17 g Oral DAILY PRN    ondansetron (ZOFRAN ODT) tablet 4 mg  4 mg Oral Q8H PRN    Or    ondansetron (ZOFRAN) injection 4 mg  4 mg IntraVENous Q6H PRN    [Held by provider] enoxaparin (LOVENOX) injection 40 mg  40 mg SubCUTAneous DAILY    [Held by provider] furosemide (LASIX) injection 40 mg  40 mg IntraVENous Q12H        Objective:     Visit Vitals  /60   Pulse (!) 49   Temp 96.8 °F (36 °C)   Resp 18   Ht 5' 8\" (1.727 m)   Wt 71.7 kg (158 lb)   SpO2 98%   BMI 24.02 kg/m²   Blood pressure 102/60, pulse (!) 49, temperature 96.8 °F (36 °C), resp. rate 18, height 5' 8\" (1.727 m), weight 71.7 kg (158 lb), SpO2 98 %. No intake/output data recorded. 12/03 1901 - 12/05 0700  In: -   Out: 200 [Urine:200]      Intake/Output Summary (Last 24 hours) at 12/5/2022 1034  Last data filed at 12/4/2022 1945  Gross per 24 hour   Intake --   Output 50 ml   Net -50 ml         Physical Exam:       General: pleasant wm in nad  Chest:  CTA, No rhonchi, rales or rubs. Heart: S1, S2, RRR  GI: ND, scaphoid, normal BS, non tender  Extremities: 2+ BLE edema   CNS: CN II-XII normal. No asterixis       Labs:       Recent Results (from the past 24 hour(s))   HEPATITIS PANEL, ACUTE    Collection Time: 12/04/22  8:14 PM   Result Value Ref Range    Hepatitis A, IgM NONREACTIVE NR      __          Hepatitis B surface Ag <0.10 Index    Hep B surface Ag Interp. Negative NEG      __          Hepatitis B core, IgM NONREACTIVE NR      __          Hep C virus Ab Interp.  NONREACTIVE NR     AMMONIA    Collection Time: 12/04/22  8:14 PM   Result Value Ref Range    Ammonia, plasma 32 (H) <32 UMOL/L   FERRITIN    Collection Time: 12/04/22  8:14 PM   Result Value Ref Range    Ferritin 146 26 - 388 NG/ML   PROTHROMBIN TIME + INR    Collection Time: 12/04/22  8:14 PM   Result Value Ref Range    INR 1.3 (H) 0.9 - 1.1 Prothrombin time 13.0 (H) 9.0 - 11.1 sec   CBC WITH AUTOMATED DIFF    Collection Time: 12/05/22  5:16 AM   Result Value Ref Range    WBC 5.3 4.1 - 11.1 K/uL    RBC 2.82 (L) 4.10 - 5.70 M/uL    HGB 10.0 (L) 12.1 - 17.0 g/dL    HCT 29.9 (L) 36.6 - 50.3 %    .0 (H) 80.0 - 99.0 FL    MCH 35.5 (H) 26.0 - 34.0 PG    MCHC 33.4 30.0 - 36.5 g/dL    RDW 14.1 11.5 - 14.5 %    PLATELET 883 678 - 958 K/uL    MPV 9.4 8.9 - 12.9 FL    NRBC 0.0 0  WBC    ABSOLUTE NRBC 0.00 0.00 - 0.01 K/uL    NEUTROPHILS 58 32 - 75 %    LYMPHOCYTES 27 12 - 49 %    MONOCYTES 11 5 - 13 %    EOSINOPHILS 3 0 - 7 %    BASOPHILS 1 0 - 1 %    IMMATURE GRANULOCYTES 0 0.0 - 0.5 %    ABS. NEUTROPHILS 3.1 1.8 - 8.0 K/UL    ABS. LYMPHOCYTES 1.4 0.8 - 3.5 K/UL    ABS. MONOCYTES 0.6 0.0 - 1.0 K/UL    ABS. EOSINOPHILS 0.2 0.0 - 0.4 K/UL    ABS. BASOPHILS 0.1 0.0 - 0.1 K/UL    ABS. IMM. GRANS. 0.0 0.00 - 0.04 K/UL    DF AUTOMATED     METABOLIC PANEL, BASIC    Collection Time: 12/05/22  5:16 AM   Result Value Ref Range    Sodium 134 (L) 136 - 145 mmol/L    Potassium 5.4 (H) 3.5 - 5.1 mmol/L    Chloride 100 97 - 108 mmol/L    CO2 31 21 - 32 mmol/L    Anion gap 3 (L) 5 - 15 mmol/L    Glucose 85 65 - 100 mg/dL    BUN 19 6 - 20 MG/DL    Creatinine 1.25 0.70 - 1.30 MG/DL    BUN/Creatinine ratio 15 12 - 20      eGFR 60 (L) >60 ml/min/1.73m2    Calcium 7.8 (L) 8.5 - 10.1 MG/DL   HEPATIC FUNCTION PANEL    Collection Time: 12/05/22  5:16 AM   Result Value Ref Range    Protein, total 4.3 (L) 6.4 - 8.2 g/dL    Albumin 1.4 (L) 3.5 - 5.0 g/dL    Globulin 2.9 2.0 - 4.0 g/dL    A-G Ratio 0.5 (L) 1.1 - 2.2      Bilirubin, total 0.6 0.2 - 1.0 MG/DL    Bilirubin, direct 0.3 (H) 0.0 - 0.2 MG/DL    Alk.  phosphatase 118 (H) 45 - 117 U/L    AST (SGOT) 50 (H) 15 - 37 U/L    ALT (SGPT) 22 12 - 78 U/L   PHOSPHORUS    Collection Time: 12/05/22  5:16 AM   Result Value Ref Range    Phosphorus 3.2 2.6 - 4.7 MG/DL   MAGNESIUM    Collection Time: 12/05/22  5:16 AM   Result Value Ref Range    Magnesium 2.0 1.6 - 2.4 mg/dL   LABRCNT(wbc:2,hgb:2,hct:2,plt:2,)  Recent Labs     12/05/22 0516 12/04/22  0518 12/03/22  0420   * 136 137   K 5.4* 4.3 3.5    99 100   CO2 31 32 34*   BUN 19 16 17   CREA 1.25 1.23 1.26   GLU 85 81 83   CA 7.8* 7.3* 7.0*   MG 2.0 1.4* 1.4*   PHOS 3.2 2.9  --    LABRCNT(sgot:3,gpt:3,ap:3,tbiL:3,TP:3,ALB:3,GLOB:3,ggt:3,aml:3,amyp:3,lpse:3,hlpse:3)  Recent Labs     12/04/22 2014   INR 1.3*   PTP 13.0*     Recent Labs     12/05/22 0516 12/04/22 0518   * 110   TP 4.3* 3.8*   ALB 1.4* 1.4*   GLOB 2.9 2.4   BRIEFLAB(B12,FOL,FOLAT,RBCF)  Lab Results   Component Value Date/Time    Folate 9.0 12/01/2022 05:23 AM   LABRCNT(CPK:3,CpKMB:3,ckndx:3,troiq:3)No components found for: GLPOCBRIEFLAB(CHOL,CHOLX,CHOLP,CHLST,CHOLV,HDL,HDLC,HDLP,LDL,DLDL,LDLC,DLDLP,TGL,TGLX,TRIGL,TRIGP,CHHD,CHHDX)No results for input(s): PH, PCO2, PO2 in the last 72 hours. LABRCNT(CPK:3,CpKMB:3,ckndx:3,troiq:3)Shannon Meryle Mcbride, PA  No results for input(s): CPK, CKNDX, TROIQ in the last 72 hours.     No lab exists for component: CPKMBMEShannon Meryle Mcbride, PA      Problem List:     Active Problems:    Weakness (11/30/2022)      Severe protein-calorie malnutrition ([address] (12/1/2022)      Anasarca (12/2/2022)      Hypotension (12/2/2022)        Impression:  Alcoholic cirrhosis with ascites         Acute hepatitis panel negative  Ferritin normal  Alpha 1 pending  GEENA pending  AMA pending  ASMA pending  Ceruloplasmin pending  Protein electrophoresis pending  AFP pending  Ammonia 32 (<32 is normal)    Plan:  MELD/Na=16; Child-Tang score 9, class B  Proceed with diagnostic paracentesis  2gm Na diet  CIWA protocol  Needs nutritional support, MVI, thiamine, folate  No signs of HE  Will need outpatient hepatitis A/B vaccinations and EGD to screen for varices         LELO Rose    12/5/2022 [address] [facility], [address]  [address] [address]  Loc: [phone]    Mr. Ricki Ascencio is a 78-year-old who white gentleman originally from [place] with a lifelong history of alcohol use, going on for over 50 years,  who presents with weakness, anasarca and protein calorie malnutrition. His albumin is very low. His MCV is elevated and he has mild anemia. O/E  AAO,  HEENT EOMI  Chest: clear  COR RRR  GI soft and NT      A:  Cirrhosis on US  Ascites  Alcoholism  Anasarca    P:  Ultrasound of the abdomen pelvis initially showed ascites with a cirrhotic liver, likely alcohol related. Serologies to r.o metabolic, autoimmune, genetic and viral causes of hepatopathy, as above. AFP w/ L3 ordered. NH3 is 32. MELD Na:16  Paracentesis today could not be completed as ascites was scant. Start diuresis with with low dose Aldactone/Furosemide while watching lytes and for WILLIAN. Outpatient screening EGD   Treatment for alcoholism/CIWA as per admitting team.  Low Na+ diet,MVI,Folate and Thiamine. Nutritious diet. Mag replenishment. Pertinent labs, provider notes and radiological testing was reviewed by me  I discussed the case with the Advance Practice Provider. I have personally reviewed the history and independently examined the patient. I have reviewed the chart and agree with the documentation recorded by the Mid Level Provider, including the assessment, treatment plan, and disposition. Erich Vázquez MD

## 2022-12-05 NOTE — PROGRESS NOTES
Comprehensive Nutrition Assessment    Type and Reason for Visit: Reassess    Nutrition Recommendations/Plan:   Continue current diet and supplements  Please document % meals and supplements consumed in flowsheet I/O's under intake      Malnutrition Assessment:  Malnutrition Status:  Severe malnutrition (12/01/22 1344)    Context:  Chronic illness     Findings of the 6 clinical characteristics of malnutrition:   Energy Intake:  75% or less est energy requirements for 1 month or longer  Weight Loss:  Unable to assess     Body Fat Loss:  Severe body fat loss, Triceps, Buccal region   Muscle Mass Loss:  Severe muscle mass loss, Scapula (trapezius), Clavicles (pectoralis &deltoids)  Fluid Accumulation:  Severe (3+ documented in flowsheet), Extremities   Strength:  Not performed     Nutrition Assessment:     Chart reviewed. Pt off the floor at time of visit. Good PO intake documented over the weekend. Multiple vitamin supplementation continues, any way to consolidate these so he isn't taking so many separately? Continue to encourage intake of meals and supplements. Patient Vitals for the past 168 hrs:   % Diet Eaten   12/03/22 0753 76 - 100%   Patient Vitals for the past 168 hrs:   Supplement intake %   12/03/22 0753 76 - 100%     Wt Readings from Last 5 Encounters:   12/02/22 71.7 kg (158 lb)   12/20/21 69.9 kg (154 lb)   04/10/21 86.2 kg (190 lb)   12/08/20 86.2 kg (190 lb)   01/20/20 106.1 kg (234 lb)   ]    Nutrition Related Findings:    Labs: Na 134, K 5.4. Meds: vit C, tums, vit B12, ergocalciferol, folvite, lactulose, synthroid, magnesium, protonix, sucralfate, MVI, thiamine. Edema: 2+BLE. BM 12/5.      Wound Type: None    Current Nutrition Intake & Therapies:  Average Meal Intake: %  Average Supplement Intake: Unable to assess  ADULT ORAL NUTRITION SUPPLEMENT Lunch; Clear Liquid  ADULT DIET Regular    Anthropometric Measures:  Height: 5' 8\" (172.7 cm)  Ideal Body Weight (IBW): 154 lbs (70 kg) Current Body Wt:  71.7 kg (158 lb), 91.6 % IBW. Bed scale  Current BMI (kg/m2): 24        Weight Adjustment: No adjustment                 BMI Category: Normal weight (BMI 22.0-24.9) age over 72    Estimated Daily Nutrient Needs:  Energy Requirements Based On: Formula  Weight Used for Energy Requirements: Current  Energy (kcal/day): 1800 (BMR x 1. 3AF)  Weight Used for Protein Requirements: Current  Protein (g/day): 64-77 (1.0-1.2 g/kg bw)  Method Used for Fluid Requirements: 1 ml/kcal  Fluid (ml/day): 1800 ml/day    Nutrition Diagnosis:   Inadequate protein-energy intake related to  (decreased appetite) as evidenced by intake 0-25%  Dx improving.      Nutrition Interventions:   Food and/or Nutrient Delivery: Continue current diet, Continue oral nutrition supplement  Nutrition Education/Counseling: No recommendations at this time  Coordination of Nutrition Care: Continue to monitor while inpatient       Goals:  Previous Goal Met: Progressing toward goal(s)  Goals: PO intake 75% or greater, by next RD assessment       Nutrition Monitoring and Evaluation:   Behavioral-Environmental Outcomes: None identified  Food/Nutrient Intake Outcomes: Food and nutrient intake, Supplement intake  Physical Signs/Symptoms Outcomes: Biochemical data, Skin, Weight, Nutrition focused physical findings    Discharge Planning:    Continue current diet    Pamela Edge, [address]  Contact: [phone]

## 2022-12-05 NOTE — PROGRESS NOTES
Notified sister, Ashley Ventura, of patient's transfer. Informed sister of new room number and reason for transfer. Allowed time for questions and concerns. Sister will inform brother, Alysa Livingston.

## 2022-12-05 NOTE — PROGRESS NOTES
Problem: Patient Education: Go to Patient Education Activity  Goal: Patient/Family Education  Outcome: Progressing Towards Goal     Problem: Pressure Injury - Risk of  Goal: *Prevention of pressure injury  Description: Document Amadou Scale and appropriate interventions in the flowsheet. Outcome: Progressing Towards Goal  Note: Pressure Injury Interventions:  Sensory Interventions: Keep linens dry and wrinkle-free    Moisture Interventions: Apply protective barrier, creams and emollients    Activity Interventions: Increase time out of bed    Mobility Interventions: HOB 30 degrees or less    Nutrition Interventions: Offer support with meals,snacks and hydration    Friction and Shear Interventions: HOB 30 degrees or less                Problem: Patient Education: Go to Patient Education Activity  Goal: Patient/Family Education  Outcome: Progressing Towards Goal     Problem: General Medical Care Plan  Goal: *Vital signs within specified parameters  Outcome: Progressing Towards Goal  Goal: *Labs within defined limits  Outcome: Progressing Towards Goal  Goal: *Absence of infection signs and symptoms  Outcome: Progressing Towards Goal  Goal: *Optimal pain control at patient's stated goal  Outcome: Progressing Towards Goal  Goal: *Skin integrity maintained  Outcome: Progressing Towards Goal  Goal: *Fluid volume balance  Outcome: Progressing Towards Goal  Goal: *Optimize nutritional status  Outcome: Progressing Towards Goal  Goal: *Anxiety reduced or absent  Outcome: Progressing Towards Goal  Goal: *Progressive mobility and function (eg: ADL's)  Outcome: Progressing Towards Goal     Problem: Patient Education: Go to Patient Education Activity  Goal: Patient/Family Education  Outcome: Progressing Towards Goal     Problem: Falls - Risk of  Goal: *Absence of Falls  Description: Document Carrie Fall Risk and appropriate interventions in the flowsheet.   Outcome: Progressing Towards Goal  Note: Fall Risk Interventions:  Mobility Interventions: Bed/chair exit alarm         Medication Interventions: Bed/chair exit alarm    Elimination Interventions: Call light in reach    History of Falls Interventions: Bed/chair exit alarm         Problem: Patient Education: Go to Patient Education Activity  Goal: Patient/Family Education  Outcome: Progressing Towards Goal

## 2022-12-05 NOTE — PROGRESS NOTES
Occupational Therapy    Chart reviewed in prep for OT session. Attempted to see pt, however, currently off floor for paracentesis. Will hold and follow-up later as able and medically appropriate. Thanks.     Teagan Smith MS, OTR/L

## 2022-12-05 NOTE — CONSULTS
[facility]  [address] [facility], [address] [place] [address]  [phone]  [email or web address]       CARDIOLOGY CONSULTATION       Date of  Admission: 11/30/2022  1:34 AM     Admission type:Emergency   Primary Care Physician:Jacinto Patterson MD     Attending Provider: Leonard Anguiano*  Cardiology Provider:     PLEASE NOTE THAT WE CONFIRMED WITH THE REFERRING PHYSICIAN PRIOR TO SEEING THE PATIENT THAT THE PATIENT IS BEING REFERRED FOR INITIAL HOSPITAL EVALUATION AND FOR LONG-TERM ONGOING CARDIAC CARE    CC/REASON FOR CONSULT: bradycardia     Subjective:     Bee Tejada is a 69 y.o. male admitted for Weakness [R53.1]  Hypotension [I95.9]  Anasarca [R60.1]. Patient denies dizziness, syncope. No cardiac Hx. Presented with edema.   Known cirrhosis with alb 1.4.  Echo normal      Patient Active Problem List    Diagnosis Date Noted    Sinus bradycardia 12/05/2022    Anasarca 12/02/2022    Hypotension 12/02/2022    Severe protein-calorie malnutrition ([address] 12/01/2022    Weakness 11/30/2022    WILLIAN (acute kidney injury) ([address] 04/11/2021    Hearing loss of left ear 06/03/2019    Paresthesia of both hands 06/04/2018    Gastroesophageal reflux disease without esophagitis 12/01/2017    Hyponatremia 10/04/2016    Hyperglycemia 07/19/2016    Venous insufficiency of both lower extremities 03/29/2016    Chronic right shoulder pain 03/29/2016    Cataract 07/13/2015    Bilateral knee pain 01/12/2015    ED (erectile dysfunction) 01/12/2015    Alcohol dependence ([address] 12/03/2013    Pedal edema 12/03/2013    Vitamin D deficiency 11/27/2012    Anxiety 12/02/2011    DJD (degenerative joint disease) 05/24/2011    HTN (hypertension) 11/15/2010    Obesity 11/15/2010    S/P gastric bypass 11/15/2010    Plantar fasciitis 11/15/2010      Dalia Cox MD  Past Medical History:   Diagnosis Date    DJD (degenerative joint disease)     Hypertension     Obesity       Social History     Socioeconomic History    Marital status:    Tobacco Use    Smoking status: Never    Smokeless tobacco: Never   Substance and Sexual Activity    Alcohol use: Yes     Alcohol/week: 136.0 standard drinks     Types: 126 Cans of beer, 10 Shots of liquor per week     Comment: Hx of abuse    Drug use: Yes     Types: Marijuana     Comment: rare    Sexual activity: Yes     Partners: Female     Allergies   Allergen Reactions    Propoxyphene N-Acetaminophen Other (comments)     hallucinate  Other reaction(s):  Adverse reaction to substance       Family History   Problem Relation Age of Onset    Diabetes Maternal Grandmother     Heart Disease Maternal Grandmother     Diabetes Maternal Grandfather     Heart Disease Maternal Grandfather     No Known Problems Mother     Stroke Father       Current Facility-Administered Medications   Medication Dose Route Frequency    [Held by provider] midodrine (PROAMATINE) tablet 10 mg  10 mg Oral TID WITH MEALS    lactulose (CHRONULAC) 10 gram/15 mL solution 15 mL  10 g Oral BID    LORazepam (ATIVAN) tablet 2 mg  2 mg Oral Q4H PRN    chlordiazePOXIDE (LIBRIUM) capsule 5 mg  5 mg Oral BID    albumin, human-kjda 25% (ALBUMINEX) intravenous solution 12.5 g  12.5 g IntraVENous Q6H    thiamine mononitrate (B-1) tablet 100 mg  100 mg Oral DAILY    magnesium oxide (MAG-OX) tablet 400 mg  400 mg Oral DAILY    calcium carbonate (TUMS) chewable tablet 200 mg [elemental]  200 mg Oral TID WITH MEALS    tetrahydrozoline (OPTI-CLEAR) 0.05 % ophthalmic drops 1 Drop  1 Drop Both Eyes QID    traMADoL (ULTRAM) tablet 50 mg  50 mg Oral Q6H PRN    [Held by provider] potassium chloride SR (KLOR-CON 10) tablet 20 mEq  20 mEq Oral BID    levothyroxine (SYNTHROID) tablet 50 mcg  50 mcg Oral ACB    therapeutic multivitamin (THERAGRAN) tablet 1 Tablet  1 Tablet Oral DAILY    ascorbic acid (vitamin C) (VITAMIN C) tablet 500 mg  500 mg Oral DAILY    [Held by provider] aspirin chewable tablet 81 mg  81 mg Oral DAILY cyanocobalamin (VITAMIN B12) tablet 1,000 mcg  1,000 mcg Oral DAILY    ergocalciferol capsule 50,000 Units  50,000 Units Oral 2 TIMES WEEKLY    folic acid (FOLVITE) tablet 1 mg  1 mg Oral DAILY    pantoprazole (PROTONIX) tablet 40 mg  40 mg Oral DAILY    sucralfate (CARAFATE) tablet 1 g  1 g Oral QID    sodium chloride (NS) flush 5-40 mL  5-40 mL IntraVENous Q8H    sodium chloride (NS) flush 5-40 mL  5-40 mL IntraVENous PRN    acetaminophen (TYLENOL) tablet 650 mg  650 mg Oral Q6H PRN    Or    acetaminophen (TYLENOL) suppository 650 mg  650 mg Rectal Q6H PRN    polyethylene glycol (MIRALAX) packet 17 g  17 g Oral DAILY PRN    ondansetron (ZOFRAN ODT) tablet 4 mg  4 mg Oral Q8H PRN    Or    ondansetron (ZOFRAN) injection 4 mg  4 mg IntraVENous Q6H PRN    enoxaparin (LOVENOX) injection 40 mg  40 mg SubCUTAneous DAILY    [Held by provider] furosemide (LASIX) injection 40 mg  40 mg IntraVENous Q12H        Review of Symptoms:     Constitutional: Negative for chills, fever and weight loss or excessive fatigue  HENT: Negative for nosebleeds, difficulty swallowing or tissue swelling   Eyes: Negative for blurred vision, double vision, occular pain  Respiratory: Negative for cough, shortness of breath , wheezing. Gastrointestinal: liver disease. Cardiovascular: Negative for chest pain, palpitations, orthopnea,+ leg swelling   Skin: Negative for rash, persistent diaphoresis, persistent pruritis  Neurological: Negative for dizziness, loss of consciousness, slurred speech, headache. Psychiatric: Negative for significant anxiety, memory disturbance, change in mood. Objective:      Visit Vitals  /66 (BP 1 Location: Left upper arm)   Pulse (!) 43   Temp 97.9 °F (36.6 °C)   Resp 16   Ht 5' 8\" (1.727 m)   Wt 158 lb (71.7 kg)   SpO2 100%   BMI 24.02 kg/m²       Physical Exam     General: Well developed, in no acute distress, cooperative and alert  HEENT: No carotid bruits, no JVD, trach is midline.  Neck Supple, PERRL, EOM intact. Heart:  Normal S1/S2 negative S3 or S4. Regular, no murmur, gallop or rub. Respiratory: Clear bilaterally x 4, no wheezing or rales  Abdomen:   Soft, non-tender, no masses, bowel sounds are active. Extremities:  3+ edema, normal cap refill, no cyanosis, atraumatic. Neuro: A&Ox3, speech clear, gait stable. Skin: Skin color is normal. No rashes or lesions. Non diaphoretic  Vascular: 2+ pulses symmetric in all extremities    Data Review:   Recent Labs     12/05/22 0516 12/04/22 0518   WBC 5.3 4.4   HGB 10.0* 9.3*   HCT 29.9* 27.6*    204     Recent Labs     12/05/22 0516 12/04/22 2014 12/04/22 0518 12/03/22  0420   *  --  136 137   K 5.4*  --  4.3 3.5     --  99 100   CO2 31  --  32 34*   GLU 85  --  81 83   BUN 19  --  16 17   CREA 1.25  --  1.23 1.26   CA 7.8*  --  7.3* 7.0*   MG 2.0  --  1.4* 1.4*   PHOS 3.2  --  2.9  --    ALB 1.4*  --  1.4*  --    TBILI 0.6  --  0.5  --    ALT 22  --  19  --    INR  --  1.3*  --   --      No results for input(s): CPK, CKMB, TROIQ in the last 72 hours. No lab exists for component: CKQMB, CPKMB, BMPP  No results for input(s): TROIQ, CPK, CKMB in the last 72 hours. Intake/Output Summary (Last 24 hours) at 12/5/2022 1621  Last data filed at 12/5/2022 1521  Gross per 24 hour   Intake --   Output 350 ml   Net -350 ml        Cardiographics    Telemetry: sinus shad 40's  ECG:   Echocardiogram: Normal and reviewed by myself  CXRAY:       Assessment:       Active Problems:    Weakness (11/30/2022)      Severe protein-calorie malnutrition ([address] (12/1/2022)      Anasarca (12/2/2022)      Hypotension (12/2/2022)      Sinus bradycardia (12/5/2022)         Plan:     Asymptomatic sinus shad; observe. Edema likely due to hypoalbuminemia and cirrhosis. Prob intravasc depleted at present. Needs stockings. Diuresis alone will just make him intra vasc depleted.   No objection to midodrine      Leyla Fischer MD  CC:Sahli, Kirke Ganser, MD

## 2022-12-05 NOTE — PROGRESS NOTES
End of Shift Note    Bedside shift change report given to Dylon El (oncoming nurse) by Bhavani Vázquez (offgoing nurse). Report included the following information SBAR, Kardex, and MAR    Shift worked:  7p-7a     Shift summary and any significant changes:     Scheduled medications were given, see MAR. Patient did complain of pain, Tramadol was administered, see PRN MAR.  IV has been flushed and is patent. Patient had a bowel movement during my shift. Patient teaching and routine rounding has been done. Concerns for physician to address:       Zone phone for oncoming shift:          Activity:  Activity Level: Up with Assistance  Number times ambulated in hallways past shift: 0  Number of times OOB to chair past shift: 0    Cardiac:   Cardiac Monitoring: No           Access:  Current line(s): PIV     Genitourinary:   Urinary status: voiding    Respiratory:   O2 Device: None (Room air)  Chronic home O2 use?: NO  Incentive spirometer at bedside: NO       GI:  Last Bowel Movement Date: 12/04/22  Current diet:  ADULT DIET Regular  ADULT ORAL NUTRITION SUPPLEMENT Lunch; Clear Liquid  DIET NPO  Passing flatus: YES  Tolerating current diet: YES       Pain Management:   Patient states pain is manageable on current regimen: YES    Skin:  Amadou Score: 19  Interventions: increase time out of bed    Patient Safety:  Fall Score:  Total Score: 2  Interventions: bed/chair alarm, assistive device (walker, cane, etc), and pt to call before getting OOB  High Fall Risk: Yes    Length of Stay:  Expected LOS: 2d 14h  Actual LOS: [address]

## 2022-12-05 NOTE — PROGRESS NOTES
End of Shift Note    Bedside shift change report given to Marty Frank RN (oncoming nurse) by Mamie Avalos RN (offgoing nurse). Report included the following information SBAR, Kardex, and MAR    Shift worked:  7a - 7:30p     Shift summary and any significant changes:     Scheduled meds given. Pt swallowed pills whole. Pt seen by GI. Paracentesis ordered for tomorrow. Ordered labs drawn by night shift RN Edward London     Concerns for physician to address:       Zone phone for oncoming shift:   [phone]       Activity:  Activity Level: Up with Assistance  Number times ambulated in hallways past shift: 0  Number of times OOB to chair past shift: 0    Cardiac:   Cardiac Monitoring: No           Access:  Current line(s): PIV     Genitourinary:   Urinary status: voiding    Respiratory:   O2 Device: None (Room air)  Chronic home O2 use?: NO  Incentive spirometer at bedside: NO       GI:  Last Bowel Movement Date: 12/03/22  Current diet:  ADULT DIET Regular  ADULT ORAL NUTRITION SUPPLEMENT Lunch; Clear Liquid  DIET NPO  Passing flatus: YES  Tolerating current diet: YES       Pain Management:   Patient states pain is manageable on current regimen: YES    Skin:  Amadou Score: 20  Interventions: speciality bed, float heels, increase time out of bed, PT/OT consult, and nutritional support     Patient Safety:  Fall Score:  Total Score: 2  Interventions: bed/chair alarm, assistive device (walker, cane, etc), gripper socks, pt to call before getting OOB, stay with me (per policy), and gait belt  High Fall Risk: Yes    Length of Stay:  Expected LOS: 2d 14h  Actual LOS: 2      Mamie Avalos RN

## 2022-12-05 NOTE — PROGRESS NOTES
Physical Therapy:  Chart reviewed, attempted to see patient for PT session. Patient current KELVIN for paracentesis. Will defer treatment and follow-up once patient is available.      Herminia Estrella Mc PT, DPT

## 2022-12-05 NOTE — ROUTINE PROCESS
Pt arrived to [address] [place] [address] for paracentesis. Pt is A/O x4 with no complaints of pain. Consent and VS to be obtained. Call bell within reach and bed in the lowest position. 1137:    Per Dr. Shamar Rivera Not enough fluid to perform procedure. Patient to be transported back to room. 1141:    TRANSFER - OUT REPORT:    Verbal report given to Aden Jensen RN(name) on Lazaro Pimentel  being transferred to Onc(unit) for routine progression of care       Report consisted of patients Situation, Background, Assessment and   Recommendations(SBAR). Information from the following report(s) SBAR was reviewed with the receiving nurse. Opportunity for questions and clarification was provided.

## 2022-12-05 NOTE — PROGRESS NOTES
CM staffed case with clinical team, regarding pts d/c needs and plans. Pt has been accepted to [address] [facility], and [facility] [place] will  on 22. CM informed by physician that pt is not medically stable to d/c on today and transition to snf. Pt will have planned paracentesis. CM informed physician of auth  date, and updated [facility]idier [facility] will be required prior to pts d/c and transition to snf. Pt will require updated therapy clinicals to send to [facility] for updated auth. CM will continue to follow.     Salvador Barnes MSW, [facility]

## 2022-12-06 LAB
A1AT SERPL-MCNC: 97 MG/DL (ref 101–187)
ALBUMIN SERPL-MCNC: 2 G/DL (ref 3.5–5)
ALBUMIN/GLOB SERPL: 0.8 {RATIO} (ref 1.1–2.2)
ALP SERPL-CCNC: 95 U/L (ref 45–117)
ALT SERPL-CCNC: 22 U/L (ref 12–78)
ANA SER QL: NEGATIVE
ANION GAP SERPL CALC-SCNC: 5 MMOL/L (ref 5–15)
AST SERPL-CCNC: 42 U/L (ref 15–37)
BASOPHILS # BLD: 0.1 K/UL (ref 0–0.1)
BASOPHILS NFR BLD: 1 % (ref 0–1)
BILIRUB DIRECT SERPL-MCNC: 0.4 MG/DL (ref 0–0.2)
BILIRUB SERPL-MCNC: 0.7 MG/DL (ref 0.2–1)
BUN SERPL-MCNC: 17 MG/DL (ref 6–20)
BUN/CREAT SERPL: 16 (ref 12–20)
CALCIUM SERPL-MCNC: 8.4 MG/DL (ref 8.5–10.1)
CERULOPLASMIN SERPL-MCNC: 14.3 MG/DL (ref 16–31)
CHLORIDE SERPL-SCNC: 99 MMOL/L (ref 97–108)
CO2 SERPL-SCNC: 31 MMOL/L (ref 21–32)
CREAT SERPL-MCNC: 1.07 MG/DL (ref 0.7–1.3)
DIFFERENTIAL METHOD BLD: ABNORMAL
EOSINOPHIL # BLD: 0.2 K/UL (ref 0–0.4)
EOSINOPHIL NFR BLD: 4 % (ref 0–7)
ERYTHROCYTE [DISTWIDTH] IN BLOOD BY AUTOMATED COUNT: 14.1 % (ref 11.5–14.5)
GLOBULIN SER CALC-MCNC: 2.4 G/DL (ref 2–4)
GLUCOSE SERPL-MCNC: 78 MG/DL (ref 65–100)
HCT VFR BLD AUTO: 26.7 % (ref 36.6–50.3)
HGB BLD-MCNC: 9 G/DL (ref 12.1–17)
IMM GRANULOCYTES # BLD AUTO: 0 K/UL (ref 0–0.04)
IMM GRANULOCYTES NFR BLD AUTO: 0 % (ref 0–0.5)
LYMPHOCYTES # BLD: 1.2 K/UL (ref 0.8–3.5)
LYMPHOCYTES NFR BLD: 25 % (ref 12–49)
MAGNESIUM SERPL-MCNC: 2.1 MG/DL (ref 1.6–2.4)
MCH RBC QN AUTO: 35 PG (ref 26–34)
MCHC RBC AUTO-ENTMCNC: 33.7 G/DL (ref 30–36.5)
MCV RBC AUTO: 103.9 FL (ref 80–99)
MITOCHONDRIA M2 IGG SER-ACNC: <20 UNITS (ref 0–20)
MONOCYTES # BLD: 0.5 K/UL (ref 0–1)
MONOCYTES NFR BLD: 11 % (ref 5–13)
NEUTS SEG # BLD: 2.8 K/UL (ref 1.8–8)
NEUTS SEG NFR BLD: 59 % (ref 32–75)
NRBC # BLD: 0 K/UL (ref 0–0.01)
NRBC BLD-RTO: 0 PER 100 WBC
PHOSPHATE SERPL-MCNC: 3.7 MG/DL (ref 2.6–4.7)
PLATELET # BLD AUTO: 200 K/UL (ref 150–400)
PMV BLD AUTO: 9.7 FL (ref 8.9–12.9)
POTASSIUM SERPL-SCNC: 4.9 MMOL/L (ref 3.5–5.1)
PROT SERPL-MCNC: 4.4 G/DL (ref 6.4–8.2)
RBC # BLD AUTO: 2.57 M/UL (ref 4.1–5.7)
SMA IGG SER-ACNC: 6 UNITS (ref 0–19)
SODIUM SERPL-SCNC: 135 MMOL/L (ref 136–145)
WBC # BLD AUTO: 4.8 K/UL (ref 4.1–11.1)

## 2022-12-06 PROCEDURE — 65270000046 HC RM TELEMETRY

## 2022-12-06 PROCEDURE — C9399 UNCLASSIFIED DRUGS OR BIOLOG: HCPCS | Performed by: INTERNAL MEDICINE

## 2022-12-06 PROCEDURE — 82525 ASSAY OF COPPER: CPT

## 2022-12-06 PROCEDURE — 74011250637 HC RX REV CODE- 250/637

## 2022-12-06 PROCEDURE — 74011250636 HC RX REV CODE- 250/636: Performed by: INTERNAL MEDICINE

## 2022-12-06 PROCEDURE — 51798 US URINE CAPACITY MEASURE: CPT

## 2022-12-06 PROCEDURE — 80048 BASIC METABOLIC PNL TOTAL CA: CPT

## 2022-12-06 PROCEDURE — 85025 COMPLETE CBC W/AUTO DIFF WBC: CPT

## 2022-12-06 PROCEDURE — 81332 SERPINA1 GENE: CPT

## 2022-12-06 PROCEDURE — 74011000250 HC RX REV CODE- 250

## 2022-12-06 PROCEDURE — 83735 ASSAY OF MAGNESIUM: CPT

## 2022-12-06 PROCEDURE — 80076 HEPATIC FUNCTION PANEL: CPT

## 2022-12-06 PROCEDURE — 65270000029 HC RM PRIVATE

## 2022-12-06 PROCEDURE — 84100 ASSAY OF PHOSPHORUS: CPT

## 2022-12-06 PROCEDURE — 74011250636 HC RX REV CODE- 250/636

## 2022-12-06 PROCEDURE — 36415 COLL VENOUS BLD VENIPUNCTURE: CPT

## 2022-12-06 RX ORDER — ALBUMIN HUMAN 250 G/1000ML
12.5 SOLUTION INTRAVENOUS EVERY 6 HOURS
Status: DISCONTINUED | OUTPATIENT
Start: 2022-12-06 | End: 2022-12-06

## 2022-12-06 RX ORDER — MIDODRINE HYDROCHLORIDE 5 MG/1
2.5 TABLET ORAL
Status: DISCONTINUED | OUTPATIENT
Start: 2022-12-06 | End: 2022-12-07

## 2022-12-06 RX ADMIN — CHLORDIAZEPOXIDE HYDROCHLORIDE 5 MG: 5 CAPSULE ORAL at 09:13

## 2022-12-06 RX ADMIN — CALCIUM CARBONATE (ANTACID) CHEW TAB 500 MG 200 MG: 500 CHEW TAB at 11:50

## 2022-12-06 RX ADMIN — LEVOTHYROXINE SODIUM 50 MCG: 0.05 TABLET ORAL at 05:26

## 2022-12-06 RX ADMIN — MAGNESIUM OXIDE TAB 400 MG (241.3 MG ELEMENTAL MG) 400 MG: 400 (241.3 MG) TAB at 09:13

## 2022-12-06 RX ADMIN — TETRAHYDROZOLINE HCL 1 DROP: 0.05 SOLUTION/ DROPS OPHTHALMIC at 11:51

## 2022-12-06 RX ADMIN — LACTULOSE 15 ML: 20 SOLUTION ORAL at 17:19

## 2022-12-06 RX ADMIN — SUCRALFATE 1 G: 1 TABLET ORAL at 17:19

## 2022-12-06 RX ADMIN — TRAMADOL HYDROCHLORIDE 50 MG: 50 TABLET ORAL at 15:53

## 2022-12-06 RX ADMIN — CHLORDIAZEPOXIDE HYDROCHLORIDE 5 MG: 5 CAPSULE ORAL at 17:19

## 2022-12-06 RX ADMIN — ALBUMIN HUMAN 12.5 G: 0.25 SOLUTION INTRAVENOUS at 02:24

## 2022-12-06 RX ADMIN — CALCIUM CARBONATE (ANTACID) CHEW TAB 500 MG 200 MG: 500 CHEW TAB at 09:13

## 2022-12-06 RX ADMIN — ALBUMIN HUMAN 12.5 G: 0.25 SOLUTION INTRAVENOUS at 17:19

## 2022-12-06 RX ADMIN — TRAMADOL HYDROCHLORIDE 50 MG: 50 TABLET ORAL at 02:53

## 2022-12-06 RX ADMIN — TETRAHYDROZOLINE HCL 1 DROP: 0.05 SOLUTION/ DROPS OPHTHALMIC at 21:26

## 2022-12-06 RX ADMIN — ENOXAPARIN SODIUM 40 MG: 100 INJECTION SUBCUTANEOUS at 09:13

## 2022-12-06 RX ADMIN — CALCIUM CARBONATE (ANTACID) CHEW TAB 500 MG 200 MG: 500 CHEW TAB at 17:19

## 2022-12-06 RX ADMIN — THIAMINE HCL TAB 100 MG 100 MG: 100 TAB at 09:13

## 2022-12-06 RX ADMIN — TRAMADOL HYDROCHLORIDE 50 MG: 50 TABLET ORAL at 09:55

## 2022-12-06 RX ADMIN — LACTULOSE 15 ML: 20 SOLUTION ORAL at 09:13

## 2022-12-06 RX ADMIN — FOLIC ACID 1 MG: 1 TABLET ORAL at 09:13

## 2022-12-06 RX ADMIN — SODIUM CHLORIDE, PRESERVATIVE FREE 10 ML: 5 INJECTION INTRAVENOUS at 11:50

## 2022-12-06 RX ADMIN — MIDODRINE HYDROCHLORIDE 2.5 MG: 5 TABLET ORAL at 17:19

## 2022-12-06 RX ADMIN — SUCRALFATE 1 G: 1 TABLET ORAL at 11:51

## 2022-12-06 RX ADMIN — TRAMADOL HYDROCHLORIDE 50 MG: 50 TABLET ORAL at 21:25

## 2022-12-06 RX ADMIN — THERA TABS 1 TABLET: TAB at 09:13

## 2022-12-06 RX ADMIN — TETRAHYDROZOLINE HCL 1 DROP: 0.05 SOLUTION/ DROPS OPHTHALMIC at 17:24

## 2022-12-06 RX ADMIN — SODIUM CHLORIDE, PRESERVATIVE FREE 10 ML: 5 INJECTION INTRAVENOUS at 21:26

## 2022-12-06 RX ADMIN — SUCRALFATE 1 G: 1 TABLET ORAL at 08:13

## 2022-12-06 RX ADMIN — PANTOPRAZOLE SODIUM 40 MG: 40 TABLET, DELAYED RELEASE ORAL at 09:13

## 2022-12-06 RX ADMIN — SUCRALFATE 1 G: 1 TABLET ORAL at 21:25

## 2022-12-06 RX ADMIN — SODIUM CHLORIDE, PRESERVATIVE FREE 10 ML: 5 INJECTION INTRAVENOUS at 05:26

## 2022-12-06 RX ADMIN — TETRAHYDROZOLINE HCL 1 DROP: 0.05 SOLUTION/ DROPS OPHTHALMIC at 09:14

## 2022-12-06 RX ADMIN — Medication 1000 MCG: at 09:13

## 2022-12-06 RX ADMIN — OXYCODONE HYDROCHLORIDE AND ACETAMINOPHEN 500 MG: 500 TABLET ORAL at 09:13

## 2022-12-06 NOTE — PROGRESS NOTES
Physical Therapy    Patient chart reviewed up to date. Attempted visit, however patient refusing all therapy today. Provided extensive education and encouragement, however patient continues to decline. Will continue to follow.     Ella Abreu

## 2022-12-06 NOTE — PROGRESS NOTES
FABIOLA ARCHIBALD  [facility]  [address]  [phone]  [email or web address]  CARDIOLOGY PROGRESS NOTE    12/6/2022 9:53 AM    Admit Date: 11/30/2022    Admit Diagnosis:   Weakness [R53.1]  Hypotension [I95.9]  Anasarca [R60.1]    Subjective:     Lazaro Pimentel denies chest pain or shortness of breath No dizziness.      Visit Vitals  BP 98/63 (BP 1 Location: Left upper arm, BP Patient Position: At rest)   Pulse (!) 50   Temp 98.3 °F (36.8 °C)   Resp 14   Ht 5' 8\" (1.727 m)   Wt 71.7 kg (158 lb)   SpO2 98%   BMI 24.02 kg/m²       Current Facility-Administered Medications   Medication Dose Route Frequency    [Held by provider] midodrine (PROAMATINE) tablet 10 mg  10 mg Oral TID WITH MEALS    lactulose (CHRONULAC) 10 gram/15 mL solution 15 mL  10 g Oral BID    LORazepam (ATIVAN) tablet 2 mg  2 mg Oral Q4H PRN    chlordiazePOXIDE (LIBRIUM) capsule 5 mg  5 mg Oral BID    thiamine mononitrate (B-1) tablet 100 mg  100 mg Oral DAILY    magnesium oxide (MAG-OX) tablet 400 mg  400 mg Oral DAILY    calcium carbonate (TUMS) chewable tablet 200 mg [elemental]  200 mg Oral TID WITH MEALS    tetrahydrozoline (OPTI-CLEAR) 0.05 % ophthalmic drops 1 Drop  1 Drop Both Eyes QID    traMADoL (ULTRAM) tablet 50 mg  50 mg Oral Q6H PRN    [Held by provider] potassium chloride SR (KLOR-CON 10) tablet 20 mEq  20 mEq Oral BID    levothyroxine (SYNTHROID) tablet 50 mcg  50 mcg Oral ACB    therapeutic multivitamin (THERAGRAN) tablet 1 Tablet  1 Tablet Oral DAILY    ascorbic acid (vitamin C) (VITAMIN C) tablet 500 mg  500 mg Oral DAILY    [Held by provider] aspirin chewable tablet 81 mg  81 mg Oral DAILY    cyanocobalamin (VITAMIN B12) tablet 1,000 mcg  1,000 mcg Oral DAILY    ergocalciferol capsule 50,000 Units  50,000 Units Oral 2 TIMES WEEKLY    folic acid (FOLVITE) tablet 1 mg  1 mg Oral DAILY    pantoprazole (PROTONIX) tablet 40 mg  40 mg Oral DAILY    sucralfate (CARAFATE) tablet 1 g 1 g Oral QID    sodium chloride (NS) flush 5-40 mL  5-40 mL IntraVENous Q8H    sodium chloride (NS) flush 5-40 mL  5-40 mL IntraVENous PRN    acetaminophen (TYLENOL) tablet 650 mg  650 mg Oral Q6H PRN    Or    acetaminophen (TYLENOL) suppository 650 mg  650 mg Rectal Q6H PRN    polyethylene glycol (MIRALAX) packet 17 g  17 g Oral DAILY PRN    ondansetron (ZOFRAN ODT) tablet 4 mg  4 mg Oral Q8H PRN    Or    ondansetron (ZOFRAN) injection 4 mg  4 mg IntraVENous Q6H PRN    enoxaparin (LOVENOX) injection 40 mg  40 mg SubCUTAneous DAILY    [Held by provider] furosemide (LASIX) injection 40 mg  40 mg IntraVENous Q12H         Objective:      Physical Exam   Physical Exam  Vitals reviewed. HENT:      Head: Normocephalic and atraumatic. Cardiovascular:      Rate and Rhythm: Bradycardia present. Pulses: Normal pulses. Heart sounds: Normal heart sounds. Pulmonary:      Effort: Pulmonary effort is normal.      Breath sounds: Normal breath sounds. Neurological:      Mental Status: He is alert. Data Review:   Recent Labs     12/06/22 0520 12/05/22 0516 12/04/22 0518   WBC 4.8 5.3 4.4   HGB 9.0* 10.0* 9.3*   HCT 26.7* 29.9* 27.6*    208 204     Recent Labs     12/06/22 0520 12/05/22 0516 12/04/22 2014 12/04/22 0518   * 134*  --  136   K 4.9 5.4*  --  4.3   CL 99 100  --  99   CO2 31 31  --  32   GLU 78 85  --  81   BUN 17 19  --  16   CREA 1.07 1.25  --  1.23   CA 8.4* 7.8*  --  7.3*   MG 2.1 2.0  --  1.4*   PHOS 3.7 3.2  --  2.9   ALB 2.0* 1.4*  --  1.4*   TBILI 0.7 0.6  --  0.5   ALT 22 22  --  19   INR  --   --  1.3*  --        No results for input(s): TROIQ, CPK, CKMB in the last 72 hours.       Intake/Output Summary (Last 24 hours) at 12/6/2022 0953  Last data filed at 12/5/2022 1925  Gross per 24 hour   Intake --   Output 400 ml   Net -400 ml        Telemetry:   EKG:  Cxray:    Assessment:     Active Problems:    Weakness (11/30/2022)      Severe protein-calorie malnutrition ([facility] [address] (12/1/2022)      Anasarca (12/2/2022)      Hypotension (12/2/2022)      Sinus bradycardia (12/5/2022)        Plan:     Asymptomatic sinus bradycardia, no profound bradycardic events on tele. No further recommendations from cardiology, avoid juliann agents. [facility]             Patient seen, examined by me personally. Plan discussed as detailed. Agree with note as outlined by  NP with modifications as noted. My independent physical exam reveals : Physical Exam  Vitals and nursing note reviewed. Constitutional:       Appearance: He is ill-appearing. Cardiovascular:      Rate and Rhythm: Regular rhythm. Bradycardia present. Heart sounds: Normal heart sounds. Neurological:      Mental Status: He is alert and oriented to person, place, and time. Psychiatric:         Mood and Affect: Mood normal.        No additional findings noted. Agree with plan as outlined above with modifications as noted.      Black Smith MD

## 2022-12-06 NOTE — PROGRESS NOTES
Problem: Pressure Injury - Risk of  Goal: *Prevention of pressure injury  Description: Document Amadou Scale and appropriate interventions in the flowsheet. Outcome: Progressing Towards Goal  Note: Pressure Injury Interventions:  Sensory Interventions: Assess changes in LOC, Assess need for specialty bed, Float heels, Discuss PT/OT consult with provider, Keep linens dry and wrinkle-free, Maintain/enhance activity level, Turn and reposition approx. every two hours (pillows and wedges if needed)    Moisture Interventions: Absorbent underpads, Apply protective barrier, creams and emollients, Limit adult briefs, Maintain skin hydration (lotion/cream), Minimize layers, Moisture barrier, Offer toileting Q_hr    Activity Interventions: Assess need for specialty bed, Pressure redistribution bed/mattress(bed type), PT/OT evaluation    Mobility Interventions: Assess need for specialty bed, Pressure redistribution bed/mattress (bed type), PT/OT evaluation, Turn and reposition approx.  every two hours(pillow and wedges)    Nutrition Interventions: Document food/fluid/supplement intake, Discuss nutritional consult with provider    Friction and Shear Interventions: Apply protective barrier, creams and emollients, Transfer aides:transfer board/Mary lift/ceiling lift, Transferring/repositioning devices

## 2022-12-06 NOTE — PROGRESS NOTES
Spiritual Care Assessment/Progress Note  [facility]      NAME: Sarai Laurent      MRN: [id number]  AGE: 69 y.o.  SEX: male  Episcopal Affiliation: Jew   Language: English     12/6/2022     Total Time (in minutes): 18     Spiritual Assessment begun in [facility] PROGRESSIVE CARE through conversation with:         [x]Patient        [] Family    [] Friend(s)        Reason for Consult: Initial/Spiritual assessment, patient floor     Spiritual beliefs: (Please include comment if needed)     [x] Identifies with a bobby tradition:         [] Supported by a bobby community:            [] Claims no spiritual orientation:           [] Seeking spiritual identity:                [] Adheres to an individual form of spirituality:           [] Not able to assess:                           Identified resources for coping:      [x] Prayer                               [] Music                  [] Guided Imagery     [x] Family/friends                 [] Pet visits     [] Devotional reading                         [] Unknown     [] Other:                                                Interventions offered during this visit: (See comments for more details)    Patient Interventions: Affirmation of emotions/emotional suffering, Coping skills reviewed/reinforced, Initial/Spiritual assessment, patient floor, Life review/legacy, Normalization of emotional/spiritual concerns, Episcopal beliefs/image of God discussed, Integration of medical assessment with existing values and beliefs, Affirmation of bobby, Catharsis/review of pertinent events in supportive environment, Iconic (affirming the presence of God/Higher Power)           Plan of Care:     [] Support spiritual and/or cultural needs    [] Support AMD and/or advance care planning process      [] Support grieving process   [] Coordinate Rites and/or Rituals    [] Coordination with community clergy   [] No spiritual needs identified at this time   [] Detailed Plan of Care below (See Comments)  [] Make referral to Music Therapy  [] Make referral to Pet Therapy     [] Make referral to Addiction services  [] Make referral to [facility]  [] Make referral to Spiritual Care Partner  [] No future visits requested        [x] Contact Spiritual Care for further referrals     Comments:  Visit was in 2246 for initial spiritual assessment. Patient was in bed and appeared tired. He received visit kindly and affirmed, when  asked, that he was tired. He had a long night, having been waken several time for care. He indicated he was however in good spirit. He has a great support system. He is very close to his siblings, a brother and sister, who comes regularly to see him. He also has a girlfriend and a godson who have been very supportive as well. He stated that he feels blessed to have such a great support system. Angela is also an important coping resources. He asked for prayer when  asked how he would like to be supported. Requested prayer offered, patient seemed encouraged and expressed appreciation for the visit and prayer.        Visited by: Boston Llamas  To ana velasco: [phone]  ([phone])

## 2022-12-06 NOTE — PROGRESS NOTES
Gastroenterology Daily Progress Note   Kennedy Lopez, [address] for Dr. Cassia Rivera)   [facility]    Admit Date: 11/30/2022     Follow up of new dx of cirrhosis and ascites    Subjective:       Not enough fluid for paracentesis  Transferred to higher level of care due to bradycardia  Cardiology consulted  HR 40-50's  No c/o     Current Facility-Administered Medications   Medication Dose Route Frequency    [Held by provider] midodrine (PROAMATINE) tablet 10 mg  10 mg Oral TID WITH MEALS    lactulose (CHRONULAC) 10 gram/15 mL solution 15 mL  10 g Oral BID    LORazepam (ATIVAN) tablet 2 mg  2 mg Oral Q4H PRN    chlordiazePOXIDE (LIBRIUM) capsule 5 mg  5 mg Oral BID    thiamine mononitrate (B-1) tablet 100 mg  100 mg Oral DAILY    magnesium oxide (MAG-OX) tablet 400 mg  400 mg Oral DAILY    calcium carbonate (TUMS) chewable tablet 200 mg [elemental]  200 mg Oral TID WITH MEALS    tetrahydrozoline (OPTI-CLEAR) 0.05 % ophthalmic drops 1 Drop  1 Drop Both Eyes QID    traMADoL (ULTRAM) tablet 50 mg  50 mg Oral Q6H PRN    [Held by provider] potassium chloride SR (KLOR-CON 10) tablet 20 mEq  20 mEq Oral BID    levothyroxine (SYNTHROID) tablet 50 mcg  50 mcg Oral ACB    therapeutic multivitamin (THERAGRAN) tablet 1 Tablet  1 Tablet Oral DAILY    ascorbic acid (vitamin C) (VITAMIN C) tablet 500 mg  500 mg Oral DAILY    [Held by provider] aspirin chewable tablet 81 mg  81 mg Oral DAILY    cyanocobalamin (VITAMIN B12) tablet 1,000 mcg  1,000 mcg Oral DAILY    ergocalciferol capsule 50,000 Units  50,000 Units Oral 2 TIMES WEEKLY    folic acid (FOLVITE) tablet 1 mg  1 mg Oral DAILY    pantoprazole (PROTONIX) tablet 40 mg  40 mg Oral DAILY    sucralfate (CARAFATE) tablet 1 g  1 g Oral QID    sodium chloride (NS) flush 5-40 mL  5-40 mL IntraVENous Q8H    sodium chloride (NS) flush 5-40 mL  5-40 mL IntraVENous PRN    acetaminophen (TYLENOL) tablet 650 mg  650 mg Oral Q6H PRN    Or    acetaminophen (TYLENOL) suppository 650 mg  650 mg Rectal Q6H PRN    polyethylene glycol (MIRALAX) packet 17 g  17 g Oral DAILY PRN    ondansetron (ZOFRAN ODT) tablet 4 mg  4 mg Oral Q8H PRN    Or    ondansetron (ZOFRAN) injection 4 mg  4 mg IntraVENous Q6H PRN    enoxaparin (LOVENOX) injection 40 mg  40 mg SubCUTAneous DAILY    [Held by provider] furosemide (LASIX) injection 40 mg  40 mg IntraVENous Q12H        Objective:     Visit Vitals  BP 98/63 (BP 1 Location: Left upper arm, BP Patient Position: At rest)   Pulse (!) 50   Temp 98.3 °F (36.8 °C)   Resp 14   Ht 5' 8\" (1.727 m)   Wt 71.7 kg (158 lb)   SpO2 98%   BMI 24.02 kg/m²   Blood pressure 98/63, pulse (!) 50, temperature 98.3 °F (36.8 °C), resp. rate 14, height 5' 8\" (1.727 m), weight 71.7 kg (158 lb), SpO2 98 %. No intake/output data recorded. 12/04 1901 - 12/06 0700  In: -   Out: 450 [Urine:450]      Intake/Output Summary (Last 24 hours) at 12/6/2022 2434  Last data filed at 12/5/2022 1925  Gross per 24 hour   Intake --   Output 400 ml   Net -400 ml         Physical Exam:       General: pleasant wm in nad  Chest:  CTA, No rhonchi, rales or rubs. Heart: HR 50's on monitor, regular rhythm   GI: ND, scaphoid, normal BS, non tender  Extremities: 2+ BLE edema   CNS: CN II-XII normal. No asterixis       Labs:       Recent Results (from the past 24 hour(s))   HEPATIC FUNCTION PANEL    Collection Time: 12/06/22  5:20 AM   Result Value Ref Range    Protein, total 4.4 (L) 6.4 - 8.2 g/dL    Albumin 2.0 (L) 3.5 - 5.0 g/dL    Globulin 2.4 2.0 - 4.0 g/dL    A-G Ratio 0.8 (L) 1.1 - 2.2      Bilirubin, total 0.7 0.2 - 1.0 MG/DL    Bilirubin, direct 0.4 (H) 0.0 - 0.2 MG/DL    Alk.  phosphatase 95 45 - 117 U/L    AST (SGOT) 42 (H) 15 - 37 U/L    ALT (SGPT) 22 12 - 78 U/L   PHOSPHORUS    Collection Time: 12/06/22  5:20 AM   Result Value Ref Range    Phosphorus 3.7 2.6 - 4.7 MG/DL   MAGNESIUM    Collection Time: 12/06/22  5:20 AM   Result Value Ref Range    Magnesium 2.1 1.6 - 2.4 mg/dL   CBC WITH AUTOMATED DIFF    Collection Time: 12/06/22  5:20 AM   Result Value Ref Range    WBC 4.8 4.1 - 11.1 K/uL    RBC 2.57 (L) 4.10 - 5.70 M/uL    HGB 9.0 (L) 12.1 - 17.0 g/dL    HCT 26.7 (L) 36.6 - 50.3 %    .9 (H) 80.0 - 99.0 FL    MCH 35.0 (H) 26.0 - 34.0 PG    MCHC 33.7 30.0 - 36.5 g/dL    RDW 14.1 11.5 - 14.5 %    PLATELET 503 414 - 089 K/uL    MPV 9.7 8.9 - 12.9 FL    NRBC 0.0 0  WBC    ABSOLUTE NRBC 0.00 0.00 - 0.01 K/uL    NEUTROPHILS 59 32 - 75 %    LYMPHOCYTES 25 12 - 49 %    MONOCYTES 11 5 - 13 %    EOSINOPHILS 4 0 - 7 %    BASOPHILS 1 0 - 1 %    IMMATURE GRANULOCYTES 0 0.0 - 0.5 %    ABS. NEUTROPHILS 2.8 1.8 - 8.0 K/UL    ABS. LYMPHOCYTES 1.2 0.8 - 3.5 K/UL    ABS. MONOCYTES 0.5 0.0 - 1.0 K/UL    ABS. EOSINOPHILS 0.2 0.0 - 0.4 K/UL    ABS. BASOPHILS 0.1 0.0 - 0.1 K/UL    ABS. IMM.  GRANS. 0.0 0.00 - 0.04 K/UL    DF AUTOMATED     METABOLIC PANEL, BASIC    Collection Time: 12/06/22  5:20 AM   Result Value Ref Range    Sodium 135 (L) 136 - 145 mmol/L    Potassium 4.9 3.5 - 5.1 mmol/L    Chloride 99 97 - 108 mmol/L    CO2 31 21 - 32 mmol/L    Anion gap 5 5 - 15 mmol/L    Glucose 78 65 - 100 mg/dL    BUN 17 6 - 20 MG/DL    Creatinine 1.07 0.70 - 1.30 MG/DL    BUN/Creatinine ratio 16 12 - 20      eGFR >60 >60 ml/min/1.73m2    Calcium 8.4 (L) 8.5 - 10.1 MG/DL   LABRCNT(wbc:2,hgb:2,hct:2,plt:2,)  Recent Labs     12/06/22  0520 12/05/22  0516 12/04/22 0518   * 134* 136   K 4.9 5.4* 4.3   CL 99 100 99   CO2 31 31 32   BUN 17 19 16   CREA 1.07 1.25 1.23   GLU 78 85 81   CA 8.4* 7.8* 7.3*   MG 2.1 2.0 1.4*   PHOS 3.7 3.2 2.9   LABRCNT(sgot:3,gpt:3,ap:3,tbiL:3,TP:3,ALB:3,GLOB:3,ggt:3,aml:3,amyp:3,lpse:3,hlpse:3)  Recent Labs     12/04/22 2014   INR 1.3*   PTP 13.0*     Recent Labs     12/06/22 0520 12/05/22 0516 12/04/22 0518   AP 95 118* 110   TP 4.4* 4.3* 3.8*   ALB 2.0* 1.4* 1.4*   GLOB 2.4 2.9 2.4   BRIEFLAB(B12,FOL,FOLAT,RBCF)  Lab Results   Component Value Date/Time    Folate 9.0 12/01/2022 05:23 AM   LABRCNT(CPK:3,CpKMB:3,ckndx:3,troiq:3)No components found for: GLPOCBRIEFLAB(CHOL,CHOLX,CHOLP,CHLST,CHOLV,HDL,HDLC,HDLP,LDL,DLDL,LDLC,DLDLP,TGL,TGLX,TRIGL,TRIGP,CHHD,CHHDX)No results for input(s): PH, PCO2, PO2 in the last 72 hours. LABRCNT(CPK:3,CpKMB:3,ckndx:3,troiq:3)LELO Guerrero  No results for input(s): CPK, CKNDX, TROIQ in the last 72 hours. No lab exists for component: CPKMBMEShLELO Henao      Problem List:     Active Problems:    Weakness (11/30/2022)      Severe protein-calorie malnutrition ([address] (12/1/2022)      Anasarca (12/2/2022)      Hypotension (12/2/2022)      Sinus bradycardia (12/5/2022)      Impression:  Alcoholic cirrhosis with ascites  Malnutrition with hypoalbuminemia  Bradycardia        Acute hepatitis panel negative  Ferritin normal  Alpha 1 slightly low, 97 (>100 is normal)  GEENA pending  AMA pending  ASMA pending  Ceruloplasmin slightly low, 14.3 (>16 is normal)  Protein electrophoresis pending  AFP pending  Ammonia 32 (<32 is normal)    Plan:  MELD/Na=16; Child-Tang score 9, class B  2gm Na diet  Diuresis with furosemide/spironolactone as tolerated  Compression stockings, albumin  CIWA protocol  Needs nutritional support, MVI, thiamine, folate  No signs of HE  Will need outpatient hepatitis A/B vaccinations and EGD to screen for varices  Send 24hr urine copper though Mateo's disease is not likely in this age group  Send alpha 1 antitrypsin genotype though this is also less likely         Miki Colon    12/6/2022 [address] [facility], [address] [place] [address] [place]: [phone]       I have examined the patient. I have reviewed the chart and agree with the documentation recorded by the CHESTER, including the assessment, treatment plan, and disposition. Patient seen and examined by me.  I personally performed all components of the history, physical, and medical decision making and agree with the assessment and plan with minor modifications as noted. Exam:  Alert and awake  HEENT AT  GI: soft w/ normal bowel sounds    Plan:    Ceruloplasmin and A1AT levels are mildly low. We will get urinary copper to r/o superimposed Mateo's(less likely) and A1AT genotype. Low salt diet,  Diuretics as suggested, if tolerated. No ETOH. Nutritious diet. Serologies as noted above including AFPL3 are pending. Needs OP EGD to screen for varices. Freda French MD  [facility])

## 2022-12-06 NOTE — PROGRESS NOTES
Occupational Therapy    Patient chart reviewed up to date. Attempted visit, however patient refusing all therapy today. Provided extensive education and encouragement, however patient continues to decline. Will continue to follow.     Barrington Donovan OTR/L

## 2022-12-06 NOTE — PROGRESS NOTES
End of Shift Note    Bedside shift change report given to Zi Weiner RN (oncoming nurse) by Rl Toro RN (offgoing nurse). Report included the following information SBAR, Kardex, and Recent Results    Shift worked:  7a-7p     Shift summary and any significant changes:     Patient bradycardic upon admission to unit. Cardiology in to assess patient this shift, continue to monitor HR. Concerns for physician to address:  HR     Zone phone for oncoming shift:   unknown       Activity:  Activity Level: Up with Assistance  Number times ambulated in hallways past shift: 0  Number of times OOB to chair past shift: 0    Cardiac:   Cardiac Monitoring: Yes      Cardiac Rhythm: Sinus Duane    Access:  Current line(s): PIV     Genitourinary:   Urinary status: voiding    Respiratory:   O2 Device: None (Room air)  Chronic home O2 use?: NO  Incentive spirometer at bedside: NO       GI:  Last Bowel Movement Date: 12/05/22  Current diet:  ADULT ORAL NUTRITION SUPPLEMENT Lunch; Clear Liquid  ADULT DIET Regular  Passing flatus: NO  Tolerating current diet: YES       Pain Management:   Patient states pain is manageable on current regimen: YES    Skin:  Amadou Score: 16  Interventions: increase time out of bed, limit briefs, and nutritional support     Patient Safety:  Fall Score:  Total Score: 3  Interventions: gripper socks, pt to call before getting OOB, and stay with me (per policy)  High Fall Risk: Yes    Length of Stay:  Expected LOS: 2d 14h  Actual LOS: 3      Rl Toro RN

## 2022-12-06 NOTE — PROGRESS NOTES
Hospitalist Progress Note    Subjective:   Daily Progress Note: 12/6/2022 2:55 PM    Hospital Course:  Mr. Rosario Fletcher is a 69year old male with PMH including remote gastric bypass surgery, indigestion, HTN, and hypothyroidism who presented to the ED with c/o BLE edema with associated weakness and activity intolerance. He reports the leg swelling is worse when he is on his feet all day; he worked in [employer] industry for many years and now works part time as . He reports decreased appetite and difficulty eating foods other than soups and soft foods due to long-standing history of indigestion. He admits to a 50 year history of \"several drinks a day\" of \"beer and vodka,\" but denies smoking or drug use. BLE duplex was (-) for DVT. An Echo demonstrated EF 43-44% with diastolic dysfunction present. Pro-BNP on admit was 2187, decreased on 12/3 to 1875. Pt given Lasix IV 40mg Q12. CXR on admit showed no acute disease. Orthostatics were profoundly (+) with SBP dropping from 116/74 while laying to 72/60 when standing, pt symptomatic with dizziness. Pt denies taking any BP medication at home aside from PO lasix. Midodrine was added for BP support. Pt's levothyroxine dose was increased from 25 mcg to 50 mcg on admission due to TSH 9.1. Free T4 and total T3 ordered on 12/5 results pending. Additionally pt labs revealed elevated LFTs with AST 50 and Alk Phos 118. Hepatitis panel was (-), abd US showed cirrhotic liver with moderate to large ascites, and GI was consulted. However when pt went for paracentesis 12/5 there was not enough cirrhotic fluid to drain. Pt was started on scheduled librium and PRN ativan but no s/s of active ETOH withdrawal at this time. Following paracentesis attempt  12/5 pt was noted to be bradycardic 30s-40s and transferred to stepdown unit for closer telemetry and vital sign monitoring. Midodrine was suspended for concern of contributing to lower HR. Albumin added to increase intravascular fluid volume and improve LE edema and hypoalbuminemia. Cardiology was consulted but no additional workup planned. Repeat orthostatics 12/6 remain profoundly positive, Bps 93/60 dropped to 73/57 on standing so resumed midrodrine at low dose 2.5mg, as cardiology stated OK to resume. Subjective:    Pt seen sitting up in bed  eating breakfast.  He states \"I feel a bit better, my leg swelling is going down. \"  Denies dizziness, does state \"I'm a little woozy when I stand up to go to the bathroom. \"    Current Facility-Administered Medications   Medication Dose Route Frequency    [Held by provider] midodrine (PROAMATINE) tablet 10 mg  10 mg Oral TID WITH MEALS    lactulose (CHRONULAC) 10 gram/15 mL solution 15 mL  10 g Oral BID    LORazepam (ATIVAN) tablet 2 mg  2 mg Oral Q4H PRN    chlordiazePOXIDE (LIBRIUM) capsule 5 mg  5 mg Oral BID    thiamine mononitrate (B-1) tablet 100 mg  100 mg Oral DAILY    magnesium oxide (MAG-OX) tablet 400 mg  400 mg Oral DAILY    calcium carbonate (TUMS) chewable tablet 200 mg [elemental]  200 mg Oral TID WITH MEALS    tetrahydrozoline (OPTI-CLEAR) 0.05 % ophthalmic drops 1 Drop  1 Drop Both Eyes QID    traMADoL (ULTRAM) tablet 50 mg  50 mg Oral Q6H PRN    [Held by provider] potassium chloride SR (KLOR-CON 10) tablet 20 mEq  20 mEq Oral BID    levothyroxine (SYNTHROID) tablet 50 mcg  50 mcg Oral ACB    therapeutic multivitamin (THERAGRAN) tablet 1 Tablet  1 Tablet Oral DAILY    ascorbic acid (vitamin C) (VITAMIN C) tablet 500 mg  500 mg Oral DAILY    [Held by provider] aspirin chewable tablet 81 mg  81 mg Oral DAILY    cyanocobalamin (VITAMIN B12) tablet 1,000 mcg  1,000 mcg Oral DAILY    ergocalciferol capsule 50,000 Units  50,000 Units Oral 2 TIMES WEEKLY    folic acid (FOLVITE) tablet 1 mg  1 mg Oral DAILY    pantoprazole (PROTONIX) tablet 40 mg  40 mg Oral DAILY    sucralfate (CARAFATE) tablet 1 g  1 g Oral QID    sodium chloride (NS) flush 5-40 mL  5-40 mL IntraVENous Q8H    sodium chloride (NS) flush 5-40 mL  5-40 mL IntraVENous PRN    acetaminophen (TYLENOL) tablet 650 mg  650 mg Oral Q6H PRN    Or    acetaminophen (TYLENOL) suppository 650 mg  650 mg Rectal Q6H PRN    polyethylene glycol (MIRALAX) packet 17 g  17 g Oral DAILY PRN    ondansetron (ZOFRAN ODT) tablet 4 mg  4 mg Oral Q8H PRN    Or    ondansetron (ZOFRAN) injection 4 mg  4 mg IntraVENous Q6H PRN    enoxaparin (LOVENOX) injection 40 mg  40 mg SubCUTAneous DAILY    [Held by provider] furosemide (LASIX) injection 40 mg  40 mg IntraVENous Q12H        Review of Systems:    Review of Systems   Constitutional:  Negative for fever, malaise/fatigue and weight loss. Respiratory:  Negative for cough and sputum production. Cardiovascular:  Positive for leg swelling. Negative for chest pain and palpitations. Gastrointestinal:  Negative for abdominal pain, constipation, diarrhea, nausea and vomiting. Neurological:  Positive for weakness. Negative for dizziness and headaches. Objective:     Visit Vitals  BP 92/63 (BP 1 Location: Right upper arm, BP Patient Position: At rest)   Pulse (!) 54   Temp 98 °F (36.7 °C)   Resp 14   Ht 5' 8\" (1.727 m)   Wt 71.7 kg (158 lb)   SpO2 98%   BMI 24.02 kg/m²      O2 Device: None (Room air)    Temp (24hrs), Av.1 °F (36.7 °C), Min:97.9 °F (36.6 °C), Max:98.3 °F (36.8 °C)      701 - 1900  In: 753 [P.O.:454]  Out: 300 [Urine:300]  1901 -  07  In: -   Out: 450 [Urine:450]    PHYSICAL EXAM:    Physical Exam  Constitutional:       General: He is not in acute distress. Appearance: He is underweight. HENT:      Head: Normocephalic. Mouth/Throat:      Mouth: Mucous membranes are moist.   Eyes:      Pupils: Pupils are equal, round, and reactive to light. Cardiovascular:      Rate and Rhythm: Regular rhythm. Bradycardia present. Pulses:           Radial pulses are 2+ on the right side and 2+ on the left side. Dorsalis pedis pulses are 1+ on the right side and 1+ on the left side. Heart sounds: Normal heart sounds. Pulmonary:      Effort: Pulmonary effort is normal.      Breath sounds: Normal breath sounds. Abdominal:      General: Bowel sounds are normal. There is no distension. Palpations: Abdomen is soft. Tenderness: There is no abdominal tenderness. There is no guarding. Comments: BM (+) 12/5   Musculoskeletal:      Right lower le+ Edema present. Left lower le+ Edema present. Skin:     General: Skin is warm and dry. Neurological:      Mental Status: He is alert and oriented to person, place, and time. Motor: Weakness present. Data Review    Recent Results (from the past 24 hour(s))   HEPATIC FUNCTION PANEL    Collection Time: 22  5:20 AM   Result Value Ref Range    Protein, total 4.4 (L) 6.4 - 8.2 g/dL    Albumin 2.0 (L) 3.5 - 5.0 g/dL    Globulin 2.4 2.0 - 4.0 g/dL    A-G Ratio 0.8 (L) 1.1 - 2.2      Bilirubin, total 0.7 0.2 - 1.0 MG/DL    Bilirubin, direct 0.4 (H) 0.0 - 0.2 MG/DL    Alk.  phosphatase 95 45 - 117 U/L    AST (SGOT) 42 (H) 15 - 37 U/L    ALT (SGPT) 22 12 - 78 U/L   PHOSPHORUS    Collection Time: 22  5:20 AM   Result Value Ref Range    Phosphorus 3.7 2.6 - 4.7 MG/DL   MAGNESIUM    Collection Time: 22  5:20 AM   Result Value Ref Range    Magnesium 2.1 1.6 - 2.4 mg/dL   CBC WITH AUTOMATED DIFF    Collection Time: 22  5:20 AM   Result Value Ref Range    WBC 4.8 4.1 - 11.1 K/uL    RBC 2.57 (L) 4.10 - 5.70 M/uL    HGB 9.0 (L) 12.1 - 17.0 g/dL    HCT 26.7 (L) 36.6 - 50.3 %    .9 (H) 80.0 - 99.0 FL    MCH 35.0 (H) 26.0 - 34.0 PG    MCHC 33.7 30.0 - 36.5 g/dL    RDW 14.1 11.5 - 14.5 %    PLATELET 997 388 - 822 K/uL    MPV 9.7 8.9 - 12.9 FL    NRBC 0.0 0  WBC    ABSOLUTE NRBC 0.00 0.00 - 0.01 K/uL    NEUTROPHILS 59 32 - 75 %    LYMPHOCYTES 25 12 - 49 %    MONOCYTES 11 5 - 13 %    EOSINOPHILS 4 0 - 7 %    BASOPHILS 1 0 - 1 %    IMMATURE GRANULOCYTES 0 0.0 - 0.5 %    ABS. NEUTROPHILS 2.8 1.8 - 8.0 K/UL    ABS. LYMPHOCYTES 1.2 0.8 - 3.5 K/UL    ABS. MONOCYTES 0.5 0.0 - 1.0 K/UL    ABS. EOSINOPHILS 0.2 0.0 - 0.4 K/UL    ABS. BASOPHILS 0.1 0.0 - 0.1 K/UL    ABS. IMM. GRANS. 0.0 0.00 - 0.04 K/UL    DF AUTOMATED     METABOLIC PANEL, BASIC    Collection Time: 12/06/22  5:20 AM   Result Value Ref Range    Sodium 135 (L) 136 - 145 mmol/L    Potassium 4.9 3.5 - 5.1 mmol/L    Chloride 99 97 - 108 mmol/L    CO2 31 21 - 32 mmol/L    Anion gap 5 5 - 15 mmol/L    Glucose 78 65 - 100 mg/dL    BUN 17 6 - 20 MG/DL    Creatinine 1.07 0.70 - 1.30 MG/DL    BUN/Creatinine ratio 16 12 - 20      eGFR >60 >60 ml/min/1.73m2    Calcium 8.4 (L) 8.5 - 10.1 MG/DL       US ABD LTD   Final Result   Small amount of ascites. US ABD COMP   Final Result   Cirrhotic liver with moderate to large ascites. XR CHEST PORT   Final Result   No Acute Disease. DUPLEX LOWER EXT VENOUS BILAT   Final Result          Active Problems:    Weakness (11/30/2022)      Severe protein-calorie malnutrition ([address] (12/1/2022)      Anasarca (12/2/2022)      Hypotension (12/2/2022)      Sinus bradycardia (12/5/2022)        Assessment/Plan:   Hypotension         Anasacra        Hypoalbuminemia - POA        History of proteinuria and hepatic stenosis        Generalized weakness        Borderline elevated Ammonia (32)  -  Venous duplex shows no DVT to BLE  - chest xray shows no acute disease  - Etiology of anasarca possibly related to hypoalbuminemic state. - Echocardiogram on 12/2/22 shows an EF of 50%-55% with normal wall thickness.  No significant valvular disease.  - NT proBNP  trending downward 1,875  - urinalysis negative  - urine creatinine 137.00, urine protein 23 etiology possibly related to nephrotic syndrome  - abdominal ultrasound shows cirrhotic liver with moderate to large ascites  - Daily weights  - Strict I&Os  - Added lactulose for ammonia & hyperkalemia 12/5  - GI consulted--appreciate recommendations  - paracentesis attempted 12/5 - not enough fluid to drain  - added Albumin 12.5mg IV q4 x 3 doses --- 12/6 albumin improving 1.4 >> 2.0, will give additional 3 doses     Symptomatic Bradycardia         Suspect multifactorial due to hypothyroid, midodrine use, no hx of dysrhythmia per pt         12/5 EKG Sinus Bradycardia  Orthostatic Hypotension  - Telemetry monitoring  - Lasix on hold  - Suspended midodrine  - Giving albumin  - Replete electrolytes per protocol  - Cardiology consulted -- appreciate recommendations  - Transfer to [facility] for closer monitoring of VS/telemetry  - Repeat orthostatics 12/6 remain profoundly positive, BP 93/60 dropped to 73/57 on standing  - Midodrine held 12/5 due to bradycardia --- added back 2.5 mg TID on 12/6     Malnutrition - moderate to severe  -  nutrition following  - Encouraged PO diet as tolerated  - Regular Diet resume after paracentesis     Macrocytic anemia  - Vitamin B12 and folate WNL     Hypothyroidism - TSH 9.1  - 12/5 ordered free T4 and total T3 as add-on  - TSH 9.1      Free T4 0.7     Total T3 pending    - Synthroid was increased to 50 mcg on admission, repeat TSH in 8 weeks     Indigestion/dyspepsia   - continue PPI and carafate     Electrolyte Disturbances  Hypocalcemia/Hypomagnesemia/Hypokalemia    - Ca+ 7.3     - Mg+ 1.4    - continue magnesium and calcium supplementation    - will monitor lab work  - 12/5 Hyperkalemic 5.4 - suspend Potassium Chloride supplementation  12/6 K trending down 4.9     History of ETOH abuse   - He is a long time (>50 years hx) alcohol user, 3-4 drinks per day(beer Vodka).   - counseled regarding cessation  - seizure precautions  - Monitor for s/s withdrawal  - On multivitamin, thiamine, folic acid, ascorbic acid  - Added scheduled librium, PRN ativan        Disposition: Pending hospital course; PT/OT will need to re-eval - pt refused to work with PT/OT 12/6  DVT Prophylaxis: Lovenox  Code Status: Full Code  POA: Serge Pancho - [phone]     Care Plan discussed with: Patient, Nursing  _______________________________________________________________    CHARANJIT Marquis

## 2022-12-06 NOTE — PROGRESS NOTES
0700: Bedside shift change report given to Rudy Diaz (oncoming nurse) by Kita Dominguez RN (offgoing nurse). Report included the following information SBAR, Kardex, Intake/Output, MAR, and Recent Results. 0830: GI at bedside assessing patient. Will send 24 hour urine and labs. Will continue to monitor. 4691: Patient voided this morning,  but it was discarded before 24 hour urine order was placed. Will start 24 hour urine when patient urinates next. 2366: Cardiology at bedside assessing patient, no further treatment changes, they will sign off.     1355: PT/OT attempting to work with patient, but he is refusing. I will attempt to get orthostatics since patient will not work with therapy. 1500: Orthostatics completed and placed in chart. Yunior Latham NP made aware and orders placed. 1815: Patient has only voided 300mL this shift. Bladder scan done, 150mL in bladder. 1900: End of Shift Note    Bedside shift change report given to oncoming RN (oncoming nurse) by Jake Franco (offgoing nurse). Report included the following information SBAR, Kardex, Intake/Output, MAR, and Recent Results    Shift worked:  4469-5807     Shift summary and any significant changes:     Pt orthostatic and still bradycardic- asymptomatic. Midodrine restarted and albumin continued. 24 urine started at 1230 pm per GI. Doppler ordered for BLE- pending. Concerns for physician to address:  none     [facility]   [facility]       Activity:  Activity Level:  Up with Assistance  Number times ambulated in hallways past shift: 0  Number of times OOB to chair past shift: 1    Cardiac:   Cardiac Monitoring: Yes      Cardiac Rhythm: Sinus Duane    Access:  Current line(s): PIV     Genitourinary:   Urinary status: voiding    Respiratory:   O2 Device: None (Room air)  Chronic home O2 use?: NO  Incentive spirometer at bedside: N/A       GI:  Last Bowel Movement Date: 12/05/22  Current diet:  ADULT ORAL NUTRITION SUPPLEMENT Lunch; Clear Liquid  ADULT DIET Regular  Passing flatus: YES  Tolerating current diet: YES       Pain Management:   Patient states pain is manageable on current regimen: YES    Skin:  Amadou Score: 18  Interventions: float heels, increase time out of bed, and PT/OT consult    Patient Safety:  Fall Score:  Total Score: 3  Interventions: bed/chair alarm, gripper socks, pt to call before getting OOB, and stay with me (per policy)  High Fall Risk: Yes    Length of Stay:  Expected LOS: 2d 14h  Actual LOS: [address]

## 2022-12-07 ENCOUNTER — APPOINTMENT (OUTPATIENT)
Dept: CT IMAGING | Age: 77
DRG: 260 | End: 2022-12-07
Payer: MEDICARE

## 2022-12-07 ENCOUNTER — APPOINTMENT (OUTPATIENT)
Dept: VASCULAR SURGERY | Age: 77
DRG: 260 | End: 2022-12-07
Payer: MEDICARE

## 2022-12-07 LAB
AFP L3 MFR SERPL: NORMAL % (ref 0–9.9)
AFP SERPL-MCNC: 3.3 NG/ML (ref 0–8.4)
ALBUMIN SERPL ELPH-MCNC: 1.8 G/DL (ref 2.9–4.4)
ALBUMIN/GLOB SERPL: 0.9 {RATIO} (ref 0.7–1.7)
ALPHA1 GLOB SERPL ELPH-MCNC: 0.2 G/DL (ref 0–0.4)
ALPHA2 GLOB SERPL ELPH-MCNC: 0.5 G/DL (ref 0.4–1)
ANION GAP SERPL CALC-SCNC: 7 MMOL/L (ref 5–15)
B-GLOBULIN SERPL ELPH-MCNC: 0.5 G/DL (ref 0.7–1.3)
BASOPHILS # BLD: 0.1 K/UL (ref 0–0.1)
BASOPHILS NFR BLD: 1 % (ref 0–1)
BUN SERPL-MCNC: 14 MG/DL (ref 6–20)
BUN/CREAT SERPL: 14 (ref 12–20)
CALCIUM SERPL-MCNC: 8.3 MG/DL (ref 8.5–10.1)
CHLORIDE SERPL-SCNC: 98 MMOL/L (ref 97–108)
CHOLEST SERPL-MCNC: 78 MG/DL
CO2 SERPL-SCNC: 30 MMOL/L (ref 21–32)
CREAT SERPL-MCNC: 1.02 MG/DL (ref 0.7–1.3)
DIFFERENTIAL METHOD BLD: ABNORMAL
EOSINOPHIL # BLD: 0.1 K/UL (ref 0–0.4)
EOSINOPHIL NFR BLD: 3 % (ref 0–7)
ERYTHROCYTE [DISTWIDTH] IN BLOOD BY AUTOMATED COUNT: 14 % (ref 11.5–14.5)
GAMMA GLOB SERPL ELPH-MCNC: 0.8 G/DL (ref 0.4–1.8)
GLOBULIN SER CALC-MCNC: 2 G/DL (ref 2.2–3.9)
GLUCOSE SERPL-MCNC: 81 MG/DL (ref 65–100)
HCT VFR BLD AUTO: 26.5 % (ref 36.6–50.3)
HDLC SERPL-MCNC: 42 MG/DL
HDLC SERPL: 1.9 {RATIO} (ref 0–5)
HGB BLD-MCNC: 8.6 G/DL (ref 12.1–17)
IMM GRANULOCYTES # BLD AUTO: 0 K/UL (ref 0–0.04)
IMM GRANULOCYTES NFR BLD AUTO: 1 % (ref 0–0.5)
LDLC SERPL CALC-MCNC: 27.8 MG/DL (ref 0–100)
LEFT CCA DIST DIAS: 16.8 CM/S
LEFT CCA DIST SYS: 76.8 CM/S
LEFT CCA PROX DIAS: 16.8 CM/S
LEFT CCA PROX SYS: 78.1 CM/S
LEFT ECA DIAS: 7.7 CM/S
LEFT ECA SYS: 66.4 CM/S
LEFT ICA DIST DIAS: 21 CM/S
LEFT ICA DIST SYS: 61.3 CM/S
LEFT ICA MID DIAS: 19.4 CM/S
LEFT ICA MID SYS: 91.1 CM/S
LEFT ICA PROX DIAS: 14.2 CM/S
LEFT ICA PROX SYS: 61.2 CM/S
LEFT ICA/CCA SYS: 1.2 NO UNITS
LEFT VERTEBRAL DIAS: 15.5 CM/S
LEFT VERTEBRAL SYS: 48.1 CM/S
LYMPHOCYTES # BLD: 1.3 K/UL (ref 0.8–3.5)
LYMPHOCYTES NFR BLD: 30 % (ref 12–49)
M PROTEIN SERPL ELPH-MCNC: ABNORMAL G/DL
MCH RBC QN AUTO: 34.4 PG (ref 26–34)
MCHC RBC AUTO-ENTMCNC: 32.5 G/DL (ref 30–36.5)
MCV RBC AUTO: 106 FL (ref 80–99)
MONOCYTES # BLD: 0.5 K/UL (ref 0–1)
MONOCYTES NFR BLD: 12 % (ref 5–13)
NEUTS SEG # BLD: 2.4 K/UL (ref 1.8–8)
NEUTS SEG NFR BLD: 53 % (ref 32–75)
NRBC # BLD: 0 K/UL (ref 0–0.01)
NRBC BLD-RTO: 0 PER 100 WBC
PLATELET # BLD AUTO: 217 K/UL (ref 150–400)
PMV BLD AUTO: 9.8 FL (ref 8.9–12.9)
POTASSIUM SERPL-SCNC: 4.6 MMOL/L (ref 3.5–5.1)
PROT SERPL-MCNC: 3.8 G/DL (ref 6–8.5)
RBC # BLD AUTO: 2.5 M/UL (ref 4.1–5.7)
RIGHT CCA DIST DIAS: 15.5 CM/S
RIGHT CCA DIST SYS: 71.6 CM/S
RIGHT CCA PROX DIAS: 19.4 CM/S
RIGHT CCA PROX SYS: 82 CM/S
RIGHT ECA DIAS: 10.3 CM/S
RIGHT ECA SYS: 78.1 CM/S
RIGHT ICA DIST DIAS: 32.5 CM/S
RIGHT ICA DIST SYS: 84.6 CM/S
RIGHT ICA MID DIAS: 28.6 CM/S
RIGHT ICA MID SYS: 80.7 CM/S
RIGHT ICA PROX DIAS: 20.7 CM/S
RIGHT ICA PROX SYS: 76.8 CM/S
RIGHT ICA/CCA SYS: 1.2 NO UNITS
RIGHT VERTEBRAL DIAS: 14.2 CM/S
RIGHT VERTEBRAL SYS: 40.3 CM/S
SODIUM SERPL-SCNC: 135 MMOL/L (ref 136–145)
TRIGL SERPL-MCNC: 41 MG/DL (ref ?–150)
VAS LEFT SUBCLAVIAN PROX EDV: 11.6 CM/S
VAS LEFT SUBCLAVIAN PROX PSV: 92.5 CM/S
VAS RIGHT SUBCLAVIAN PROX EDV: 6.7 CM/S
VAS RIGHT SUBCLAVIAN PROX PSV: 62.4 CM/S
VLDLC SERPL CALC-MCNC: 8.2 MG/DL
WBC # BLD AUTO: 4.4 K/UL (ref 4.1–11.1)

## 2022-12-07 PROCEDURE — 97530 THERAPEUTIC ACTIVITIES: CPT | Performed by: OCCUPATIONAL THERAPIST

## 2022-12-07 PROCEDURE — 80048 BASIC METABOLIC PNL TOTAL CA: CPT

## 2022-12-07 PROCEDURE — 74011250637 HC RX REV CODE- 250/637

## 2022-12-07 PROCEDURE — 93005 ELECTROCARDIOGRAM TRACING: CPT

## 2022-12-07 PROCEDURE — 74011250636 HC RX REV CODE- 250/636

## 2022-12-07 PROCEDURE — 74011000250 HC RX REV CODE- 250

## 2022-12-07 PROCEDURE — 74011250637 HC RX REV CODE- 250/637: Performed by: NURSE PRACTITIONER

## 2022-12-07 PROCEDURE — 65270000029 HC RM PRIVATE

## 2022-12-07 PROCEDURE — 74011250636 HC RX REV CODE- 250/636: Performed by: INTERNAL MEDICINE

## 2022-12-07 PROCEDURE — 97530 THERAPEUTIC ACTIVITIES: CPT

## 2022-12-07 PROCEDURE — 65270000046 HC RM TELEMETRY

## 2022-12-07 PROCEDURE — 70450 CT HEAD/BRAIN W/O DYE: CPT

## 2022-12-07 PROCEDURE — 93880 EXTRACRANIAL BILAT STUDY: CPT | Performed by: PSYCHIATRY & NEUROLOGY

## 2022-12-07 PROCEDURE — 80061 LIPID PANEL: CPT

## 2022-12-07 PROCEDURE — 97535 SELF CARE MNGMENT TRAINING: CPT | Performed by: OCCUPATIONAL THERAPIST

## 2022-12-07 PROCEDURE — 36415 COLL VENOUS BLD VENIPUNCTURE: CPT

## 2022-12-07 PROCEDURE — C9399 UNCLASSIFIED DRUGS OR BIOLOG: HCPCS | Performed by: INTERNAL MEDICINE

## 2022-12-07 PROCEDURE — 85025 COMPLETE CBC W/AUTO DIFF WBC: CPT

## 2022-12-07 PROCEDURE — 93880 EXTRACRANIAL BILAT STUDY: CPT

## 2022-12-07 PROCEDURE — 74011250637 HC RX REV CODE- 250/637: Performed by: INTERNAL MEDICINE

## 2022-12-07 RX ORDER — MIDODRINE HYDROCHLORIDE 5 MG/1
10 TABLET ORAL
Status: DISCONTINUED | OUTPATIENT
Start: 2022-12-07 | End: 2022-12-07

## 2022-12-07 RX ORDER — MIDODRINE HYDROCHLORIDE 5 MG/1
5 TABLET ORAL
Status: DISCONTINUED | OUTPATIENT
Start: 2022-12-07 | End: 2022-12-07

## 2022-12-07 RX ORDER — FUROSEMIDE 20 MG/1
20 TABLET ORAL DAILY
Status: DISCONTINUED | OUTPATIENT
Start: 2022-12-07 | End: 2022-12-13 | Stop reason: HOSPADM

## 2022-12-07 RX ORDER — MIDODRINE HYDROCHLORIDE 5 MG/1
5 TABLET ORAL
Status: DISCONTINUED | OUTPATIENT
Start: 2022-12-08 | End: 2022-12-09

## 2022-12-07 RX ORDER — SPIRONOLACTONE 25 MG/1
25 TABLET ORAL DAILY
Status: DISCONTINUED | OUTPATIENT
Start: 2022-12-07 | End: 2022-12-13 | Stop reason: HOSPADM

## 2022-12-07 RX ADMIN — TETRAHYDROZOLINE HCL 1 DROP: 0.05 SOLUTION/ DROPS OPHTHALMIC at 22:11

## 2022-12-07 RX ADMIN — SUCRALFATE 1 G: 1 TABLET ORAL at 17:29

## 2022-12-07 RX ADMIN — SUCRALFATE 1 G: 1 TABLET ORAL at 22:11

## 2022-12-07 RX ADMIN — SUCRALFATE 1 G: 1 TABLET ORAL at 12:18

## 2022-12-07 RX ADMIN — MAGNESIUM OXIDE TAB 400 MG (241.3 MG ELEMENTAL MG) 400 MG: 400 (241.3 MG) TAB at 09:28

## 2022-12-07 RX ADMIN — ENOXAPARIN SODIUM 40 MG: 100 INJECTION SUBCUTANEOUS at 09:28

## 2022-12-07 RX ADMIN — LACTULOSE 15 ML: 20 SOLUTION ORAL at 09:28

## 2022-12-07 RX ADMIN — CHLORDIAZEPOXIDE HYDROCHLORIDE 5 MG: 5 CAPSULE ORAL at 09:28

## 2022-12-07 RX ADMIN — MIDODRINE HYDROCHLORIDE 5 MG: 5 TABLET ORAL at 09:28

## 2022-12-07 RX ADMIN — ALBUMIN HUMAN 12.5 G: 0.25 SOLUTION INTRAVENOUS at 06:17

## 2022-12-07 RX ADMIN — CHLORDIAZEPOXIDE HYDROCHLORIDE 5 MG: 5 CAPSULE ORAL at 17:29

## 2022-12-07 RX ADMIN — FUROSEMIDE 20 MG: 20 TABLET ORAL at 12:18

## 2022-12-07 RX ADMIN — TETRAHYDROZOLINE HCL 1 DROP: 0.05 SOLUTION/ DROPS OPHTHALMIC at 09:28

## 2022-12-07 RX ADMIN — MIDODRINE HYDROCHLORIDE 5 MG: 5 TABLET ORAL at 12:18

## 2022-12-07 RX ADMIN — Medication 1000 MCG: at 09:28

## 2022-12-07 RX ADMIN — CALCIUM CARBONATE (ANTACID) CHEW TAB 500 MG 200 MG: 500 CHEW TAB at 09:28

## 2022-12-07 RX ADMIN — FOLIC ACID 1 MG: 1 TABLET ORAL at 09:28

## 2022-12-07 RX ADMIN — THERA TABS 1 TABLET: TAB at 09:28

## 2022-12-07 RX ADMIN — TRAMADOL HYDROCHLORIDE 50 MG: 50 TABLET ORAL at 20:09

## 2022-12-07 RX ADMIN — OXYCODONE HYDROCHLORIDE AND ACETAMINOPHEN 500 MG: 500 TABLET ORAL at 09:28

## 2022-12-07 RX ADMIN — SODIUM CHLORIDE, PRESERVATIVE FREE 10 ML: 5 INJECTION INTRAVENOUS at 22:11

## 2022-12-07 RX ADMIN — ALBUMIN HUMAN 12.5 G: 0.25 SOLUTION INTRAVENOUS at 00:51

## 2022-12-07 RX ADMIN — SPIRONOLACTONE 25 MG: 25 TABLET ORAL at 12:18

## 2022-12-07 RX ADMIN — THIAMINE HCL TAB 100 MG 100 MG: 100 TAB at 09:28

## 2022-12-07 RX ADMIN — TETRAHYDROZOLINE HCL 1 DROP: 0.05 SOLUTION/ DROPS OPHTHALMIC at 17:30

## 2022-12-07 RX ADMIN — CALCIUM CARBONATE (ANTACID) CHEW TAB 500 MG 200 MG: 500 CHEW TAB at 12:18

## 2022-12-07 RX ADMIN — PANTOPRAZOLE SODIUM 40 MG: 40 TABLET, DELAYED RELEASE ORAL at 09:28

## 2022-12-07 RX ADMIN — SODIUM CHLORIDE, PRESERVATIVE FREE 10 ML: 5 INJECTION INTRAVENOUS at 06:18

## 2022-12-07 RX ADMIN — SUCRALFATE 1 G: 1 TABLET ORAL at 09:28

## 2022-12-07 RX ADMIN — LACTULOSE 15 ML: 20 SOLUTION ORAL at 17:29

## 2022-12-07 RX ADMIN — LEVOTHYROXINE SODIUM 50 MCG: 0.05 TABLET ORAL at 06:17

## 2022-12-07 RX ADMIN — TETRAHYDROZOLINE HCL 1 DROP: 0.05 SOLUTION/ DROPS OPHTHALMIC at 12:19

## 2022-12-07 RX ADMIN — SODIUM CHLORIDE, PRESERVATIVE FREE 10 ML: 5 INJECTION INTRAVENOUS at 12:19

## 2022-12-07 RX ADMIN — CALCIUM CARBONATE (ANTACID) CHEW TAB 500 MG 200 MG: 500 CHEW TAB at 17:29

## 2022-12-07 RX ADMIN — TRAMADOL HYDROCHLORIDE 50 MG: 50 TABLET ORAL at 04:28

## 2022-12-07 NOTE — PROGRESS NOTES
Transition of Care Plan:     RUR: N/A - Juan DavidRODRIGUEZ signed 11/30  Disposition: [facility] accepted, auth received, pending medical stability  If SNF or IPR: Date FOC offered: SNF 11/30  Date [address] received: 11/30  Date authorization started with reference number: Auth submitted 12/1/22, Ref #[id number]  Date authorization received and expires: Received 12/2/22 approved for 5 days, NRD 12/6/22  Accepting facility: [facility]  Follow up appointments: PCP  DME needed: Owns cane, rollator, lift chair   Transportation at Discharge: Family/friend vs medical transport   Char [place] or means to access home:   N/A     IM Medicare Letter: N/A - Obs   Is patient a [place] and connected with the [address]? N/A             If yes, was [employer] transfer form completed and [facility] notified? Caregiver Contact: BrotherDomi [phone]  Discharge Caregiver contacted prior to     CM note:3:02 pm received call from NP who was interested in patient going to IPR. Notified her that PT recommends SNF. Documented in chart that he is accepted to [facility] [facility]. Texted [facility] [facility] to see if they will still be accepting patient for placement.  Notified NP Juani Menard that patient is not recommended for IPR    Rose Murguia  [facility]  [phone]

## 2022-12-07 NOTE — PROGRESS NOTES
Bedside shift change report given to Reggie saleem RN  (oncoming nurse) by Kofi Burk RN  (offgoing nurse). Report included the following information SBAR, Kardex, Intake/Output, MAR, Recent Results, and Cardiac Rhythm Sinus Larry Castillo  . End of Shift Note    Bedside shift change report given to Lissett N [facility] Anoop (oncoming nurse) by Alberto Mercado (offgoing nurse). Report included the following information SBAR, Kardex, Intake/Output, MAR, Recent Results, and Cardiac Rhythm Sinus Larry Castillo     Shift worked:  1900-0730     Shift summary and any significant changes:     None at this time      Concerns for physician to address:  None      Zone phone for oncoming shift:          Activity:  Activity Level: Up with Assistance  Number times ambulated in hallways past shift: 0  Number of times OOB to chair past shift: 0    Cardiac:   Cardiac Monitoring: Yes      Cardiac Rhythm: Sinus Duane    Access:  Current line(s): PIV     Genitourinary:   Urinary status: voiding    Respiratory:   O2 Device: None (Room air)  Chronic home O2 use?: NO  Incentive spirometer at bedside: NO       GI:  Last Bowel Movement Date: 12/05/22  Current diet:  ADULT ORAL NUTRITION SUPPLEMENT Lunch; Clear Liquid  ADULT DIET Regular  Passing flatus: YES  Tolerating current diet: YES       Pain Management:   Patient states pain is manageable on current regimen: YES    Skin:  Amadou Score: 17  Interventions: turn team, speciality bed, float heels, PT/OT consult, and internal/external urinary devices    Patient Safety:  Fall Score:  Total Score: 3  Interventions: bed/chair alarm, assistive device (walker, cane, etc), and stay with me (per policy)  High Fall Risk: Yes    Length of Stay:  Expected LOS: 2d 14h  Actual LOS: [address]

## 2022-12-07 NOTE — PROGRESS NOTES
Problem: Patient Education: Go to Patient Education Activity  Goal: Patient/Family Education  Outcome: Progressing Towards Goal     Problem: Pressure Injury - Risk of  Goal: *Prevention of pressure injury  Description: Document Amadou Scale and appropriate interventions in the flowsheet. Outcome: Progressing Towards Goal  Note: Pressure Injury Interventions:  Sensory Interventions: Assess changes in LOC, Assess need for specialty bed, Discuss PT/OT consult with provider, Keep linens dry and wrinkle-free, Turn and reposition approx. every two hours (pillows and wedges if needed)    Moisture Interventions: Assess need for specialty bed    Activity Interventions: Assess need for specialty bed, Increase time out of bed, PT/OT evaluation    Mobility Interventions: Assess need for specialty bed, PT/OT evaluation, Turn and reposition approx.  every two hours(pillow and wedges)    Nutrition Interventions: Document food/fluid/supplement intake    Friction and Shear Interventions: Apply protective barrier, creams and emollients, Lift sheet, Lift team/patient mobility team, Transferring/repositioning devices                Problem: Patient Education: Go to Patient Education Activity  Goal: Patient/Family Education  Outcome: Progressing Towards Goal     Problem: General Medical Care Plan  Goal: *Vital signs within specified parameters  Outcome: Progressing Towards Goal  Goal: *Labs within defined limits  Outcome: Progressing Towards Goal  Goal: *Absence of infection signs and symptoms  Outcome: Progressing Towards Goal  Goal: *Optimal pain control at patient's stated goal  Outcome: Progressing Towards Goal  Goal: *Skin integrity maintained  Outcome: Progressing Towards Goal  Goal: *Fluid volume balance  Outcome: Progressing Towards Goal  Goal: *Optimize nutritional status  Outcome: Progressing Towards Goal  Goal: *Anxiety reduced or absent  Outcome: Progressing Towards Goal  Goal: *Progressive mobility and function (eg: ADL's)  Outcome: Progressing Towards Goal     Problem: Patient Education: Go to Patient Education Activity  Goal: Patient/Family Education  Outcome: Progressing Towards Goal     Problem: Falls - Risk of  Goal: *Absence of Falls  Description: Document Liu Romeo Fall Risk and appropriate interventions in the flowsheet.   Outcome: Progressing Towards Goal  Note: Fall Risk Interventions:  Mobility Interventions: Bed/chair exit alarm, Patient to call before getting OOB, PT Consult for mobility concerns, PT Consult for assist device competence, Strengthening exercises (ROM-active/passive)         Medication Interventions: Bed/chair exit alarm, Patient to call before getting OOB, Teach patient to arise slowly    Elimination Interventions: Call light in reach, Bed/chair exit alarm, Toileting schedule/hourly rounds    History of Falls Interventions: Bed/chair exit alarm, Room close to nurse's station, Vital signs minimum Q4HRs X 24 hrs (comment for end date)         Problem: Patient Education: Go to Patient Education Activity  Goal: Patient/Family Education  Outcome: Progressing Towards Goal

## 2022-12-07 NOTE — PROGRESS NOTES
Hospitalist Progress Note    Subjective:   Daily Progress Note: 12/7/2022 2:49 PM    Hospital Course:  Mr. Alla Dorantes is a 69year old male with PMH including remote gastric bypass surgery, indigestion, HTN, and hypothyroidism who presented to the ED with c/o BLE edema w/ associated weakness & activity intolerance. He reports the leg swelling worse when he is on his feet all day at work as . He reports decreased PO intake & difficulty eating some foods due to long-standing history of indigestion. He admits to a 50 year history of \"several drinks a day\" of \"beer and vodka. \" Pt started on scheduled librium and PRN ativan but no s/s of active ETOH withdrawal.     BLE duplex (-) for DVT. Echo showed EF 53-06% wi/ diastolic dysfunction. Pro-BNP on admit was 2187, decreased to 1875. Pt given Lasix IV 40mg Q12. CXR showed no acute disease. Orthostatics profoundly (+) with SBP dropping from 116/74 while laying to 72/60 when standing, pt symptomatic with dizziness. Midodrine added for BP support. Pt's levothyroxine dose was increased from 25 mcg to 50 mcg on admission due to TSH 9.1. Free T4 and total T3 ordered on 12/5 results pending---ordered again on 12/7. Labs also revealed elevated LFTs with AST 50 and Alk Phos 118. Hepatitis panel was (-), abd US showed cirrhotic liver, moderate to large ascites, and GI was consulted. However when pt went for paracentesis there was not enough cirrhotic fluid to drain. Following paracentesis attempt pt was noted to be bradycardic 30s-40s. Transferred to stepdown unit for closer telemetry and vital sign monitoring. Repeat orthostatics 12/6 remained profoundly positive, Bps 93/60 dropped to 73/57. Midodrine was suspended for concern of contributing to lower HR on 12/5, resumed on 12/6 as cardiology stated OK to resume.   Albumin added to increase intravascular fluid volume and improve LE edema and hypoalbuminemia, continued and resumed lasix 12/7 to facilitate diuresis. Lasix & spironolactone resumed 12/7 as allowed by BP. Cardiology was consulted re: bradycardia, orthostatic hypotension, but no additional workup planned. Carotid dopplers pending. Addendum 12/7 - per d/w cardiology we will continue to hold lasix, keep spironolactone, increase midodrine to 10 mg TID. Subjective:  Pt seen sitting up in bed eating breakfast.  He states \"the swelling is better\" and that he is still \"woozy\" getting up and down. Encouraged pt to participate with PT today, he verbalized Jacinto Ace I will. \"    Current Facility-Administered Medications   Medication Dose Route Frequency    midodrine (PROAMATINE) tablet 5 mg  5 mg Oral TID WITH MEALS    spironolactone (ALDACTONE) tablet 25 mg  25 mg Oral DAILY    furosemide (LASIX) tablet 20 mg  20 mg Oral DAILY    lactulose (CHRONULAC) 10 gram/15 mL solution 15 mL  10 g Oral BID    LORazepam (ATIVAN) tablet 2 mg  2 mg Oral Q4H PRN    chlordiazePOXIDE (LIBRIUM) capsule 5 mg  5 mg Oral BID    thiamine mononitrate (B-1) tablet 100 mg  100 mg Oral DAILY    magnesium oxide (MAG-OX) tablet 400 mg  400 mg Oral DAILY    calcium carbonate (TUMS) chewable tablet 200 mg [elemental]  200 mg Oral TID WITH MEALS    tetrahydrozoline (OPTI-CLEAR) 0.05 % ophthalmic drops 1 Drop  1 Drop Both Eyes QID    traMADoL (ULTRAM) tablet 50 mg  50 mg Oral Q6H PRN    levothyroxine (SYNTHROID) tablet 50 mcg  50 mcg Oral ACB    therapeutic multivitamin (THERAGRAN) tablet 1 Tablet  1 Tablet Oral DAILY    ascorbic acid (vitamin C) (VITAMIN C) tablet 500 mg  500 mg Oral DAILY    [Held by provider] aspirin chewable tablet 81 mg  81 mg Oral DAILY    cyanocobalamin (VITAMIN B12) tablet 1,000 mcg  1,000 mcg Oral DAILY    ergocalciferol capsule 50,000 Units  50,000 Units Oral 2 TIMES WEEKLY    folic acid (FOLVITE) tablet 1 mg  1 mg Oral DAILY    pantoprazole (PROTONIX) tablet 40 mg  40 mg Oral DAILY    sucralfate (CARAFATE) tablet 1 g  1 g Oral QID    sodium chloride (NS) flush 5-40 mL  5-40 mL IntraVENous Q8H    sodium chloride (NS) flush 5-40 mL  5-40 mL IntraVENous PRN    acetaminophen (TYLENOL) tablet 650 mg  650 mg Oral Q6H PRN    Or    acetaminophen (TYLENOL) suppository 650 mg  650 mg Rectal Q6H PRN    polyethylene glycol (MIRALAX) packet 17 g  17 g Oral DAILY PRN    ondansetron (ZOFRAN ODT) tablet 4 mg  4 mg Oral Q8H PRN    Or    ondansetron (ZOFRAN) injection 4 mg  4 mg IntraVENous Q6H PRN    enoxaparin (LOVENOX) injection 40 mg  40 mg SubCUTAneous DAILY        Review of Systems:    Review of Systems   Constitutional:  Negative for chills, fever, malaise/fatigue and weight loss. Respiratory:  Negative for cough, sputum production and shortness of breath. Cardiovascular:  Positive for leg swelling. Negative for chest pain and palpitations. Gastrointestinal:  Negative for abdominal pain, blood in stool, diarrhea, nausea and vomiting. Neurological:  Positive for dizziness and weakness. Negative for headaches. Objective:     Visit Vitals  /65   Pulse (!) 48   Temp 97.5 °F (36.4 °C)   Resp 15   Ht 5' 8\" (1.727 m)   Wt 71.7 kg (158 lb)   SpO2 97%   BMI 24.02 kg/m²      O2 Device: None (Room air)    Temp (24hrs), Av.9 °F (36.6 °C), Min:97.5 °F (36.4 °C), Max:98.1 °F (36.7 °C)       07 -  1900  In: 480 [P.O.:480]  Out: 300 [Urine:300]   190 -  0700  In: 0212 [P.O.:1044]  Out: 1150 [Urine:1150]    PHYSICAL EXAM:    Physical Exam  Constitutional:       General: He is not in acute distress. Appearance: He is cachectic. HENT:      Head: Normocephalic and atraumatic. Mouth/Throat:      Mouth: Mucous membranes are moist.   Eyes:      Pupils: Pupils are equal, round, and reactive to light. Cardiovascular:      Rate and Rhythm: Regular rhythm. Bradycardia present. Pulses:           Radial pulses are 2+ on the right side and 2+ on the left side.         Dorsalis pedis pulses are 1+ on the right side and 1+ on the left side. Heart sounds: Normal heart sounds. Pulmonary:      Effort: Pulmonary effort is normal.      Breath sounds: Normal breath sounds. Abdominal:      General: Bowel sounds are normal. There is no distension. Palpations: Abdomen is soft. Tenderness: There is no abdominal tenderness. There is no guarding. Musculoskeletal:      Right lower leg: Edema present. Left lower leg: Edema present. Skin:     General: Skin is warm and dry. Neurological:      Mental Status: He is alert and oriented to person, place, and time. Motor: Weakness present. Comments: LLE 1+ edema  RLE 2+ edema          Data Review    Recent Results (from the past 24 hour(s))   CBC WITH AUTOMATED DIFF    Collection Time: 12/07/22  4:26 AM   Result Value Ref Range    WBC 4.4 4.1 - 11.1 K/uL    RBC 2.50 (L) 4.10 - 5.70 M/uL    HGB 8.6 (L) 12.1 - 17.0 g/dL    HCT 26.5 (L) 36.6 - 50.3 %    .0 (H) 80.0 - 99.0 FL    MCH 34.4 (H) 26.0 - 34.0 PG    MCHC 32.5 30.0 - 36.5 g/dL    RDW 14.0 11.5 - 14.5 %    PLATELET 899 593 - 881 K/uL    MPV 9.8 8.9 - 12.9 FL    NRBC 0.0 0  WBC    ABSOLUTE NRBC 0.00 0.00 - 0.01 K/uL    NEUTROPHILS 53 32 - 75 %    LYMPHOCYTES 30 12 - 49 %    MONOCYTES 12 5 - 13 %    EOSINOPHILS 3 0 - 7 %    BASOPHILS 1 0 - 1 %    IMMATURE GRANULOCYTES 1 (H) 0.0 - 0.5 %    ABS. NEUTROPHILS 2.4 1.8 - 8.0 K/UL    ABS. LYMPHOCYTES 1.3 0.8 - 3.5 K/UL    ABS. MONOCYTES 0.5 0.0 - 1.0 K/UL    ABS. EOSINOPHILS 0.1 0.0 - 0.4 K/UL    ABS. BASOPHILS 0.1 0.0 - 0.1 K/UL    ABS. IMM.  GRANS. 0.0 0.00 - 0.04 K/UL    DF AUTOMATED     LIPID PANEL    Collection Time: 12/07/22  4:26 AM   Result Value Ref Range    Cholesterol, total 78 <200 MG/DL    Triglyceride 41 <150 MG/DL    HDL Cholesterol 42 MG/DL    LDL, calculated 27.8 0 - 100 MG/DL    VLDL, calculated 8.2 MG/DL    CHOL/HDL Ratio 1.9 0.0 - 5.0     METABOLIC PANEL, BASIC    Collection Time: 12/07/22  4:26 AM   Result Value Ref Range    Sodium 135 (L) 136 - 145 mmol/L    Potassium 4.6 3.5 - 5.1 mmol/L    Chloride 98 97 - 108 mmol/L    CO2 30 21 - 32 mmol/L    Anion gap 7 5 - 15 mmol/L    Glucose 81 65 - 100 mg/dL    BUN 14 6 - 20 MG/DL    Creatinine 1.02 0.70 - 1.30 MG/DL    BUN/Creatinine ratio 14 12 - 20      eGFR >60 >60 ml/min/1.73m2    Calcium 8.3 (L) 8.5 - 10.1 MG/DL   DUPLEX CAROTID BILATERAL    Collection Time: 12/07/22  9:07 AM   Result Value Ref Range    Right subclavian prox PSV 62.4 cm/s    Right subclavian prox EDV 6.7 cm/s    Right cca dist sys 71.6 cm/s    Right CCA dist aranda 15.5 cm/s    Right CCA prox sys 82.0 cm/s    Right CCA prox aranda 19.4 cm/s    Right ICA dist sys 84.6 cm/s    Right ICA dist aranda 32.5 cm/s    Right ICA mid sys 80.7 cm/s    Right ICA mid aranda 28.6 cm/s    Right ICA prox sys 76.8 cm/s    Right ICA prox aranda 20.7 cm/s    Right eca sys 78.1 cm/s    RIGHT EXTERNAL CAROTID ARTERY D 10.30 cm/s    Right vertebral sys 40.3 cm/s    RIGHT VERTEBRAL ARTERY D 14.20 cm/s    Right ICA/CCA sys 1.2 no units    Left subclavian prox PSV 92.5 cm/s    Left subclavian prox EDV 11.6 cm/s    Left CCA dist sys 76.8 cm/s    Left CCA dist aranda 16.8 cm/s    Left CCA prox sys 78.1 cm/s    Left CCA prox aranda 16.8 cm/s    Left ICA dist sys 61.3 cm/s    Left ICA dist aranda 21.0 cm/s    Left ICA mid sys 91.1 cm/s    Left ICA mid aranda 19.4 cm/s    Left ICA prox sys 61.2 cm/s    Left ICA prox aranda 14.2 cm/s    Left ECA sys 66.4 cm/s    LEFT EXTERNAL CAROTID ARTERY D 7.70 cm/s    Left vertebral sys 48.1 cm/s    LEFT VERTEBRAL ARTERY D 15.50 cm/s    Left ICA/CCA sys 1.20 no units       DUPLEX CAROTID BILATERAL         US ABD LTD   Final Result   Small amount of ascites. US ABD COMP   Final Result   Cirrhotic liver with moderate to large ascites. XR CHEST PORT   Final Result   No Acute Disease.           DUPLEX LOWER EXT VENOUS BILAT   Final Result          Active Problems:    Weakness (11/30/2022)      Severe protein-calorie malnutrition ([address] (12/1/2022)      Anasarca (12/2/2022)      Hypotension (12/2/2022)      Sinus bradycardia (12/5/2022)        Assessment/Plan:   Hypotension         Anasacra        Hypoalbuminemia - POA        History of proteinuria and hepatic stenosis        Generalized weakness        Borderline elevated Ammonia (32)  -  Venous duplex shows no DVT to BLE  - chest xray shows no acute disease  - Etiology of anasarca possibly related to hypoalbuminemic state. - Echocardiogram on 12/2/22 shows an EF of 50%-55% with normal wall thickness.  No significant valvular disease.  - NT proBNP  trending downward 1,875  - urinalysis negative  - urine creatinine 137.00, urine protein 23 etiology possibly related to nephrotic syndrome  - abdominal ultrasound shows cirrhotic liver with moderate to large ascites  - Daily weights  - Strict I&Os  - Added lactulose for ammonia & hyperkalemia 12/5  - GI consulted--appreciate recommendations  - paracentesis attempted 12/5 - not enough fluid to drain  - added Albumin 12.5mg IV q4 x 3 doses --- 12/6 albumin improving 1.4 >> 2.0, will give additional 3 doses  PEDRO hose on during the day off at night  - Lasix & spironolactone resumed for diuresis if tolerated by BP     Symptomatic Bradycardia         Suspect multifactorial due to hypothyroid, midodrine use, no hx of dysrhythmia per pt         12/5 EKG Sinus Bradycardia  Orthostatic Hypotension  - Telemetry monitoring  - Lasix on hold  - Suspended midodrine  - Giving albumin  - Replete electrolytes per protocol  - Cardiology consulted -- appreciate recommendations  - Transfer to [facility] for closer monitoring of VS/telemetry  - Repeat orthostatics 12/6 remain profoundly positive, BP 93/60 dropped to 73/57 on standing  - Midodrine held 12/5 due to bradycardia --- added back 2.5 mg TID on 12/6     Malnutrition - moderate to severe  -  nutrition following  - Encouraged PO diet as tolerated  - Regular Diet     Macrocytic anemia  - Vitamin B12 and folate WNL Hypothyroidism - TSH 9.1  - 12/5 ordered free T4 and total T3 as add-on  - TSH 9.1      Free T4 0.7     Total T3 pending    - Synthroid was increased to 50 mcg on admission, repeat TSH in 8 weeks     Indigestion/dyspepsia   - continue PPI and carafate     Electrolyte Disturbances  Hypocalcemia/Hypomagnesemia/Hypokalemia    - Ca+ 7.3     - Mg+ 1.4    - continue magnesium and calcium supplementation    - will monitor lab work  - 12/5 Hyperkalemic 5.4 - suspend Potassium Chloride supplementation  12/6 K trending down 4.9     History of ETOH abuse   - He is a long time (>50 years hx) alcohol user, 3-4 drinks per day(beer Vodka). - counseled regarding cessation  - seizure precautions  - Monitor for s/s withdrawal  - On multivitamin, thiamine, folic acid, ascorbic acid  - Added scheduled librium, PRN ativan      Disposition: Pending hospital course, discussed IPR vs SNF with CM  PT recommending SNF however from a medical standpoint pt will be high risk for readmission to hospital due to ongoing orthostatic BP issues which could be more closely monitored by IPR physicians and medically optimized in inpatient rehab. Patient's goals as stated to me were to get back to independence including return to work.     DVT Prophylaxis:  Lovenox  Code Status:  Full Code  POA: Elver Velasquez - [phone]    Care Plan discussed with: Patient, Nursing, Case Management  _______________________________________________________________    CHARANJIT Michel

## 2022-12-07 NOTE — PROGRESS NOTES
Problem: Pressure Injury - Risk of  Goal: *Prevention of pressure injury  Description: Document Amadou Scale and appropriate interventions in the flowsheet. Outcome: Progressing Towards Goal  Note: Pressure Injury Interventions:  Sensory Interventions: Assess changes in LOC, Assess need for specialty bed, Minimize linen layers, Monitor skin under medical devices, Turn and reposition approx. every two hours (pillows and wedges if needed)    Moisture Interventions: Absorbent underpads, Apply protective barrier, creams and emollients, Maintain skin hydration (lotion/cream), Minimize layers, Moisture barrier, Offer toileting Q_hr, Limit adult briefs    Activity Interventions: Assess need for specialty bed, Pressure redistribution bed/mattress(bed type), PT/OT evaluation    Mobility Interventions: Assess need for specialty bed, Pressure redistribution bed/mattress (bed type), PT/OT evaluation, Turn and reposition approx.  every two hours(pillow and wedges)    Nutrition Interventions: Discuss nutritional consult with provider, Document food/fluid/supplement intake    Friction and Shear Interventions: Apply protective barrier, creams and emollients, Feet elevated on foot rest, Transferring/repositioning devices

## 2022-12-07 NOTE — PROGRESS NOTES
Problem: Mobility Impaired (Adult and Pediatric)  Goal: *Acute Goals and Plan of Care (Insert Text)  Description: FUNCTIONAL STATUS PRIOR TO ADMISSION: Pt lives alone and has supportive sister and friends. Until ~2 weeks ago, pt was working at the [employer], amb with no device (intermittent furniture support) in the home and cane when out. He was attending OPPT at [facility] but stopped due to getting very sore. He has noted a decline in last 2 weeks with increased swelling of BLE. He states he used to get swelling that would then go down at night but over last 2 weeks it does not go down and legs are very painful. HOME SUPPORT PRIOR TO ADMISSION: The patient lived alone with friends/family to provide assistance. Physical Therapy Goals  Revised 12/7/2022  1. Patient will move from supine to sit and sit to supine , scoot up and down, and roll side to side in bed with minimal assistance/contact guard assist within 7 day(s). 2.  Patient will transfer from bed to chair and chair to bed with minimal assistance/contact guard assist using the least restrictive device within 7 day(s). 3.  Patient will perform sit to stand with minimum assistance/cg assist within 7 day(s). 4.  Patient will ambulate with minimal assistance/contact guard assist for 65 feet with the least restrictive device within 7 day(s). 5.  Patient will perform 5 sit to stands in less than 60 seconds with minimal assistance/contact guard assist within 7 day(s). Physical Therapy Goals  Initiated 12/1/2022  1. Patient will move from supine to sit and sit to supine , scoot up and down, and roll side to side in bed with modified independence within 7 day(s). Not met 12/7/22.  2.  Patient will transfer from bed to chair and chair to bed with modified independence using the least restrictive device within 7 day(s). Not met 12/7/22. 3.  Patient will perform sit to stand with modified independence within 7 day(s). Not met 12/7/22.   4. Patient will ambulate with modified independence for 150 feet with the least restrictive device within 7 day(s). Not met 12/7/22.  5.  Patient will ascend/descend 4 stairs with handrail(s) with modified independence within 7 day(s). Not met 12/7/22. Outcome: Not Progressing Towards Goal   PHYSICAL THERAPY TREATMENT: WEEKLY REASSESSMENT  Patient: Lucho Borja (84 y.o. male)  Date: 12/7/2022  Primary Diagnosis: Weakness [R53.1]  Hypotension [I95.9]  Anasarca [R60.1]       Precautions:  Fall, Skin      ASSESSMENT  Patient continues with skilled PT services and is not progressing towards goals. Patient demonstrates generalized weakness, chronic edema in bilateral legs, decreased balance reactions, decreased activity tolerance and decreased mobility skills well below functional baseline noted above. Lying in bed when PT arrived and agrees to PT re-assessment. Provided exercises to bilateral lower extremities to decrease stiffness, pain and weakness with good tolerance by patient. Needed mod a and cues to come to sitting. Sitting balance was good/fair. Needed moderate assistance to come to standing due to weakness and impaired standing balance. Gait was limited to about 5 feet from bed to recliner chair with min a with bilateral armhold assistance for balance and stability. Gait was slow with tiny shuffling steps and overall unsteadiness. Left patient up in chair with lunch tray an all needs met when the session ended. Will need rehab to regain adequate strength, balance and mobility skills prior to returning home alone. Patient's progression toward goals since last assessment: Barthel declined from 55 to 50 in the last week and no progress toward any PT goals. Updated PT goals to lower level goals for the next week. Current Level of Function Impacting Discharge (mobility/balance): mod a supine to sit, sit to stand transfers. Min a ambulation 5 feet from bed to chair. Functional Outcome Measure:   The patient scored 50 on the Barthel outcome measure which is indicative of being partially dependent for ADL and mobility skills. Other factors to consider for discharge: high risk for falls and skin breakdown, poor activity tolerance will need slower paced rehab setting; lives alone, but has family and friends support - unknown how much availability. PLAN :  Goals have been updated based on progression since last assessment. Patient continues to benefit from skilled intervention to address the above impairments. Recommendations and Planned Interventions: bed mobility training, transfer training, gait training, therapeutic exercises, neuromuscular re-education, edema management/control, patient and family training/education, and therapeutic activities      Frequency/Duration: Patient will be followed by physical therapy:  4 times a week to address goals. Recommendation for discharge: (in order for the patient to meet his/her long term goals)  Therapy up to 5 days/week in SNF setting    This discharge recommendation:  Has been made in collaboration with the attending provider and/or case management    IF patient discharges home will need the following DME: bedside commode and rolling walker - unsafe to be home alone at this time. SUBJECTIVE:   Patient stated  Thank you so much for your help.     OBJECTIVE DATA SUMMARY:   HISTORY:    Past Medical History:   Diagnosis Date    DJD (degenerative joint disease)     Hypertension     Obesity      Past Surgical History:   Procedure Laterality Date    COLONOSCOPY N/A 12/20/2021    COLONOSCOPY performed by Kevon Jarvis MD at \[facility]\"" ENDOSCOPY    HX ORTHOPAEDIC      bilat hip replacements    [place] GASTRIC BYPASS,OBESE<150CM TAO-EN-Y         Personal factors and/or comorbidities impacting plan of care: liver cirrhosis with ascites and chronic lower extremity edema.     Home Situation  Home Environment: Private residence  # Steps to Enter: 4  Rails to Enter: Yes  Hand Rails : Bilateral (close together)  One/Two Story Residence: One story  Living Alone: Yes  Support Systems: Friend/Neighbor, Other (Comment) (Brother, sister)  Patient Expects to be Discharged to[de-identified] Skilled nursing facility  Current DME Used/Available at Home: Saratha [place], rolling  Tub or Shower Type: Shower    EXAMINATION/PRESENTATION/DECISION MAKING:   Critical Behavior:  Neurologic State: Alert  Orientation Level: Oriented X4  Cognition: Appropriate decision making, Appropriate for age attention/concentration, Appropriate safety awareness, Follows commands  Safety/Judgement: Awareness of environment, Good awareness of safety precautions, Fall prevention (weakness with decreased standing balance)  Hearing: Auditory  Auditory Impairment: Hard of hearing, bilateral  Skin:  dry  Edema: moderate bilateral legs and feet  Range Of Motion:  AROM: Generally decreased, functional           PROM: Within functional limits           Strength:    Strength: Generally decreased, functional (hips 3-/5; knees 4-/5; ankles 3/5)                    Tone & Sensation:   Tone: Normal              Sensation: Impaired (bilateral feet decreased)               Coordination:  Coordination: Generally decreased, functional (due to weakness and chronic edema)    Functional Mobility:  Bed Mobility:  Rolling: Minimum assistance  Supine to Sit: Moderate assistance;Bed Modified (log roll)     Scooting: Moderate assistance  Transfers:  Sit to Stand: Moderate assistance  Stand to Sit: Moderate assistance        Bed to Chair: Minimum assistance (bilateral arm hold assistance)              Balance:   Sitting: Impaired  Sitting - Static: Good (unsupported)  Sitting - Dynamic: Fair (occasional)  Standing: Impaired  Standing - Static: Fair;Constant support  Standing - Dynamic : Poor;Constant support  Ambulation/Gait Training:  Distance (ft): 5 Feet (ft)  Assistive Device: Gait belt; Other (comment) (bilateral arm hold assistance)  Ambulation - Level of Assistance: Minimal assistance     Gait Description (WDL): Exceptions to WDL  Gait Abnormalities: Decreased step clearance; Altered arm swing;Path deviations; Step to gait (unsteady; tiny shuffled steps.)        Base of Support: Widened     Speed/Mady: Slow;Shuffled  Step Length: Right shortened;Left shortened        Interventions: Tactile cues;Manual cues; Verbal cues    Therapeutic Exercises: Ankle pumps. Quad sets, heel slides, hip abd. 5-10 reps each based on tolerance. Functional Measure:  Barthel Index:    Bathin  Bladder: 5  Bowels: 10  Groomin  Dressin  Feeding: 10  Mobility: 0  Stairs: 0  Toilet Use: 5  Transfer (Bed to Chair and Back): 10  Total: 50/100       The Barthel ADL Index: Guidelines  1. The index should be used as a record of what a patient does, not as a record of what a patient could do. 2. The main aim is to establish degree of independence from any help, physical or verbal, however minor and for whatever reason. 3. The need for supervision renders the patient not independent. 4. A patient's performance should be established using the best available evidence. Asking the patient, friends/relatives and nurses are the usual sources, but direct observation and common sense are also important. However direct testing is not needed. 5. Usually the patient's performance over the preceding 24-48 hours is important, but occasionally longer periods will be relevant. 6. Middle categories imply that the patient supplies over 50 per cent of the effort. 7. Use of aids to be independent is allowed. Score Interpretation (from [facility] 83)    Independent   60-79 Minimally independent   40-59 Partially dependent   20-39 Very dependent   <20 Totally dependent     -Ashley Vega., Barthel, D.W. (1965). Functional evaluation: the Barthel Index. [address] ([address] [address] ().  The Barthel activities of daily living index: self-reporting versus actual performance in the old (> or = 75 years). Journal of [address] [place] [address] 45(3), [facility], BOBBY, Jared Mills., Deana Kenny. (1999). Measuring the change in disability after inpatient rehabilitation; comparison of the responsiveness of the Barthel Index and Functional Mathews Measure. Journal of Neurology, Neurosurgery, and Psychiatry, 66(4), 479-282. LULA Chong, NORA Potts, & Larissa Estrada M.A. (2004) Assessment of post-stroke quality of life in cost-effectiveness studies: The usefulness of the Barthel Index and the EuroQoL-5D. Quality of Life Research, 13, 427-43       Pain Rating:  Generalized aches and pains 4/10    Activity Tolerance:   Poor, SpO2 stable on RA, and requires frequent rest breaks    After treatment patient left in no apparent distress:   Sitting in chair and Call bell within reach    COMMUNICATION/EDUCATION:   The patients plan of care was discussed with: Occupational therapist, Registered nurse, and Case management. Fall prevention education was provided and the patient/caregiver indicated understanding., Patient/family have participated as able in goal setting and plan of care. , and Patient/family agree to work toward stated goals and plan of care.     Thank you for this referral.  Narinder Castaneda, PT   Time Calculation: 13 mins

## 2022-12-07 NOTE — PROGRESS NOTES
Problem: Self Care Deficits Care Plan (Adult)  Goal: *Acute Goals and Plan of Care (Insert Text)  Description: FUNCTIONAL STATUS PRIOR TO ADMISSION: recently ambulating with SPC, difficulty with sit to stand that has progressed, performed ADLs on his own, works at the [employer]: The patient lived alone with brother and sister to provide assistance. Intermittent assist    Occupational Therapy Goals:  Initiated 12/1/2022  1. Patient will perform grooming with modified independence within 7 days. 2. Patient will perform upper body dressing and lower body dressing with modified independence within 7 days. 3. Patient will perform toileting with modified independence within 7 days. 4. Patient will transfer from toilet with modified independence using the least restrictive device and appropriate durable medical equipment within 7 days. Outcome: Progressing Towards Goal     OCCUPATIONAL THERAPY TREATMENT  Patient: Wm Baxter (70 y.o. male)  Date: 12/7/2022  Diagnosis: Weakness [R53.1]  Hypotension [I95.9]  Anasarca [R60.1] <principal problem not specified>      Precautions: Fall, Skin  Chart, occupational therapy assessment, plan of care, and goals were reviewed. ASSESSMENT  Patient agreeable to participation in therapy after some encouragement. He is making steady functional progress, but demonstrates limited motivation for participation in increasingly challenging functional activities and limited tolerance for therapy. Overall he was supervision to min A for bed mobility, CGA to mod A for transfers and he was CGA to ambulate to and from the bathroom with a RW. In regard to ADLs he was min A for LB dressing, up to min A for toileting and he was CGA for standing grooming. No major LOB during standing ADLs and ambulation with the RW, but he is mildly unsteady. Some cueing needed for safety during transfers and ambulation, but the patient is receptive. At this time he will continue to benefit from acute OT and will need SNF rehab after discharge. PLAN :  Patient continues to benefit from skilled intervention to address the above impairments. Continue treatment per established plan of care. to address goals. Recommendation for discharge: (in order for the patient to meet his/her long term goals)  Therapy up to 5 days/week in SNF setting      Equipment recommendations for successful discharge (if) home:TBD in SNF         OBJECTIVE DATA SUMMARY:   Cognitive/Behavioral Status:  Neurologic State: Alert  Orientation Level: Oriented X4  Cognition: Follows commands; Appropriate for age attention/concentration  Perception: Appears intact  Perseveration: No perseveration noted  Safety/Judgement: Decreased awareness of need for safety    Functional Mobility and Transfers for ADLs:  Bed Mobility:  Rolling: Supervision  Supine to Sit: Supervision; Additional time  Sit to Supine: Minimum assistance;Assist x1  Scooting: Supervision; Additional time    Transfers:  Sit to Stand: Contact guard assistance  Functional Transfers  Bathroom Mobility: Contact guard assistance (ambulating with a RW)  Toilet Transfer : Moderate assistance;Assist x1 (using grab bar)  Cues: Tactile cues provided;Verbal cues provided      Balance:  Sitting: Intact  Sitting - Static: Good (unsupported)  Sitting - Dynamic: Fair (occasional)  Standing: Impaired  Standing - Static: Good (with RW)  Standing - Dynamic : Fair;Good (Fair with RW for ambulation, Good for standing grooming)    ADL Intervention:  Grooming  Position Performed: Standing  Washing Hands: Contact guard assistance    Lower Body Dressing Assistance  Underpants: Minimum assistance; Compensatory technique training  Slip on Shoes Without Back: Supervision;Set-up    Toileting  Toileting Assistance: Contact guard assistance  Bowel Hygiene: Contact guard assistance  Clothing Management: Contact guard assistance  Cues: Verbal cues provided    Cognitive Retraining  Safety/Judgement: Decreased awareness of need for safety    Pain:  No complaint of pain    Activity Tolerance:   Fair  VSS    After treatment patient left in no apparent distress:   Supine in bed and Call bell within reach    COMMUNICATION/COLLABORATION:   The patients plan of care was discussed with: Physical Therapist    Apryl Hodgson OTR/L  Time Calculation: 29 mins

## 2022-12-07 NOTE — PROGRESS NOTES
0700: Bedside shift change report given to Kaden Stone (oncoming nurse) by Ingrid Schmitz RN (offgoing nurse). Report included the following information SBAR, Kardex, Intake/Output, MAR, and Recent Results. 0845: Patient off the floor to vascular lab. 0935: Patient back on the floor from vascular. 1625: Patient's HR has been in the 40s-50s, but he had an episode where he went down into the 30s. Arden Amaya NP paged at this time to be made aware.     1700: Spoke with Junie Lockahrt NP regarding bradycardia. She wants me to page cardiology to see there recommendation. Cardiology paged at this time. 1735: Spoke with Dr. Allyson Rosales, cardiology. He states to just monitor HR at this time and we can continue Midodrine. No other orders placed at this time. Junie Lockhart NP made aware.     1900: End of Shift Note    Bedside shift change report given to Ingrid Schmitz RN (oncoming nurse) by Iain Kc (offgoing nurse). Report included the following information SBAR, Kardex, Intake/Output, MAR, and Recent Results    Shift worked:  0804-0705     Shift summary and any significant changes:     BP has been s table, but patient HR lower. Cardiology paged regarding HR, continue to monitor. Concerns for physician to address:  none     Zone phone for oncoming shift:   XXX       Activity:  Activity Level:  Up with Assistance  Number times ambulated in hallways past shift: 0  Number of times OOB to chair past shift: 1    Cardiac:   Cardiac Monitoring: Yes      Cardiac Rhythm: Sinus Duane    Access:  Current line(s): PIV     Genitourinary:   Urinary status: voiding    Respiratory:   O2 Device: None (Room air)  Chronic home O2 use?: NO  Incentive spirometer at bedside: N/A       GI:  Last Bowel Movement Date: 12/06/22  Current diet:  ADULT ORAL NUTRITION SUPPLEMENT Lunch; Clear Liquid  ADULT DIET Regular  Passing flatus: YES  Tolerating current diet: YES       Pain Management:   Patient states pain is manageable on current regimen: YES    Skin:  Amadou Score: 18  Interventions: turn team, float heels, increase time out of bed, and PT/OT consult    Patient Safety:  Fall Score:  Total Score: 3  Interventions: bed/chair alarm, gripper socks, pt to call before getting OOB, and stay with me (per policy)  High Fall Risk: Yes    Length of Stay:  Expected LOS: 2d 14h  Actual LOS: [place]

## 2022-12-07 NOTE — PROGRESS NOTES
Physician Progress Note      PATIENT:               Kiarra Capellan  CSN #:                  [id number]  :                       1945  ADMIT DATE:       2022 1:34 AM  DISCH DATE:  RESPONDING  PROVIDER #:        Zenobia DONOHUE          QUERY TEXT:    Pt admitted with anasarca and has malnutrition documented. Please further specify type of malnutrition with documentation in the medical record. The medical record reflects the following:  Risk Factors: Alcoholic cirrhosis with ascites  Clinical Indicators: -Malnutrition Assessment:  Malnutrition Status:  Severe malnutrition (22 1344)  Context:  Chronic illness  Findings of the 6 clinical characteristics of malnutrition:  Energy Intake:  75% or less est energy requirements for 1 month or longer  Weight Loss:  Unable to assess  Body Fat Loss:  Severe body fat loss, Triceps, Buccal region  Muscle Mass Loss:  Severe muscle mass loss, Scapula (trapezius), Clavicles (pectoralis &deltoids)  Fluid Accumulation:  Severe (3+ documented in flowsheet), Extremities  Treatment:supplements, I&Os  Thanks,  Agata Morales RN/CDI     ASPEN Criteria:  https://aspenjournals. onlinelibrary. mitchell. com/doi/full/10.1177/6431061637731428  Options provided:  -- Severe Malnutrition  -- Severe Protein calorie malnutrition  -- Other - I will add my own diagnosis  -- Disagree - Not applicable / Not valid  -- Disagree - Clinically unable to determine / Unknown  -- Refer to Clinical Documentation Reviewer    PROVIDER RESPONSE TEXT:    This patient has severe malnutrition.     Query created by: Desirae Lopez on 2022 11:32 AM      Electronically signed by:  Zenobia DONOHUE 2022 11:44 AM

## 2022-12-07 NOTE — PROGRESS NOTES
Progress Note  Date:2022       Room:[address] [place] [address]  Patient Name:Ozzie Leon     YOB: 1945     Age:77 y.o. Subjective    Subjective:  Symptoms:  Stable. Diet:  Adequate intake. No nausea or vomiting. Pain:  He reports no pain. Review of Systems   Constitutional:  Negative for fever. Cardiovascular:  Positive for leg swelling. Gastrointestinal:  Negative for abdominal distention, abdominal pain, blood in stool, nausea and vomiting. Objective         Vitals Last 24 Hours:  TEMPERATURE:  Temp  Av °F (36.7 °C)  Min: 97.8 °F (36.6 °C)  Max: 98.1 °F (36.7 °C)  RESPIRATIONS RANGE: Resp  Av.7  Min: 14  Max: 16  PULSE OXIMETRY RANGE: SpO2  Av.6 %  Min: 97 %  Max: 98 %  PULSE RANGE: Pulse  Av.6  Min: 51  Max: 61  BLOOD PRESSURE RANGE: Systolic (06ZFS), TXB:66 , Min:73 , EGU:918   ; Diastolic (25SWR), DWF:08, Min:57, Max:81    I/O (24Hr): Intake/Output Summary (Last 24 hours) at 2022 0951  Last data filed at 2022 4382  Gross per 24 hour   Intake 1044 ml   Output 1050 ml   Net -6 ml     Objective:  General Appearance:  Comfortable and in no acute distress. Vital signs: (most recent): Blood pressure 107/67, pulse (!) 56, temperature 98.1 °F (36.7 °C), resp. rate 14, height 5' 8\" (1.727 m), weight 71.7 kg (158 lb), SpO2 97 %. Vital signs are normal.  No fever. (HR 64). Output: No stool output. HEENT: Normal HEENT exam.    Lungs:  Normal effort and normal respiratory rate. Heart: Normal rate. Abdomen: Abdomen is soft, flat and non-distended. There are no signs of ascites. Bowel sounds are normal.   There is no abdominal tenderness. Extremities: There is local swelling. Neurological: Patient is alert and oriented to person, place and time. Skin:  Warm and dry.     Labs/Imaging/Diagnostics    Labs:  CBC:  Recent Labs     22  0426 22  0520 22  0516   WBC 4.4 4.8 5.3   RBC 2.50* 2.57* 2.82*   HGB 8.6* 9.0* 10.0*   HCT 26.5* 26.7* 29.9*   .0* 103.9* 106.0*   RDW 14.0 14.1 14.1    200 208     CHEMISTRIES:  Recent Labs     12/07/22  0426 12/06/22  0520 12/05/22  0516   * 135* 134*   K 4.6 4.9 5.4*   CL 98 99 100   CO2 30 31 31   BUN 14 17 19   CA 8.3* 8.4* 7.8*   PHOS  --  3.7 3.2   MG  --  2.1 2.0   PT/INR:  Recent Labs     12/04/22 2014   INR 1.3*     APTT:No results for input(s): APTT in the last 72 hours. LIVER PROFILE:  Recent Labs     12/06/22  0520 12/05/22  0516   AST 42* 50*   ALT 22 22     Lab Results   Component Value Date/Time    ALT (SGPT) 22 12/06/2022 05:20 AM    AST (SGOT) 42 (H) 12/06/2022 05:20 AM    Alk. phosphatase 95 12/06/2022 05:20 AM    Bilirubin, direct 0.4 (H) 12/06/2022 05:20 AM    Bilirubin, total 0.7 12/06/2022 05:20 AM       Imaging Last 24 Hours:  No results found.   Assessment//Plan   Active Problems:    Weakness (11/30/2022)      Severe protein-calorie malnutrition ([address] (12/1/2022)      Anasarca (12/2/2022)      Hypotension (12/2/2022)      Sinus bradycardia (12/5/2022)      Assessment & Plan  Impression:  Alcoholic cirrhosis with ascites  Malnutrition with hypoalbuminemia  Bradycardia          Acute hepatitis panel negative  Ferritin normal  Alpha 1 slightly low, 97 (>100 is normal) - A1AT pending  GEENA negative  AMA negative  ASMA negative  Ceruloplasmin slightly low, 14.3 (>16 is normal), 24h urine copper pending  Protein electrophoresis pending  AFP pending  Ammonia 32 (<32 is normal)     Plan:  MELD/Na=16; Child-Tang score 9, class B  2gm Na diet  Diuresis with furosemide/spironolactone as tolerated (currently on hold 2/2 HR?)  Compression stockings, albumin  CIWA protocol  Needs nutritional support, MVI, thiamine, folate  No signs of HE  Will need outpatient hepatitis A/B vaccinations and EGD to screen for varices  F/U 24hr urine copper and alpha 1 antitrypsin genotype       Electronically signed by Mike Amaro NP on 12/7/2022 at 9:51 AM        I have examined the patient. I have reviewed the chart and agree with the documentation recorded by the CHESTER, including the assessment, treatment plan, and disposition. Patient seen and examined by me. I personally performed all components of the history, physical, and medical decision making and agree with the assessment and plan with minor modifications as noted. Seen in the room. Comfortable    Exam:  Alert and awake. Going for Doppler US lower extremities  HEENT AT  GI: soft w/ normal bowel sounds       A:  Alcoholic cirrhosis with ascites  Hypoalbuminemia  Bradycardia  Edema    Plan:    Aldactone 25 mg/day and 20 mg of Furosemide started. Please adjust according to lytes, renal function. Ceruloplasmin and A1AT levels are mildly low. Urinary copper to r/o superimposed Mateo's(less likely) and A1AT genotype ordered. Low salt diet,  No ETOH. Nutritious diet. AFPL3: pending. Needs OP EGD to screen for varices. We will see one more day. Erich Martines MD

## 2022-12-08 LAB
AMMONIA PLAS-SCNC: 37 UMOL/L
ANION GAP SERPL CALC-SCNC: 5 MMOL/L (ref 5–15)
BASOPHILS # BLD: 0.1 K/UL (ref 0–0.1)
BASOPHILS NFR BLD: 1 % (ref 0–1)
BUN SERPL-MCNC: 15 MG/DL (ref 6–20)
BUN/CREAT SERPL: 14 (ref 12–20)
CALCIUM SERPL-MCNC: 8.4 MG/DL (ref 8.5–10.1)
CHLORIDE SERPL-SCNC: 98 MMOL/L (ref 97–108)
CO2 SERPL-SCNC: 30 MMOL/L (ref 21–32)
CORTIS AM PEAK SERPL-MCNC: 8.8 UG/DL (ref 4.3–22.45)
CREAT SERPL-MCNC: 1.09 MG/DL (ref 0.7–1.3)
DIFFERENTIAL METHOD BLD: ABNORMAL
EOSINOPHIL # BLD: 0.2 K/UL (ref 0–0.4)
EOSINOPHIL NFR BLD: 4 % (ref 0–7)
ERYTHROCYTE [DISTWIDTH] IN BLOOD BY AUTOMATED COUNT: 13.7 % (ref 11.5–14.5)
GLUCOSE SERPL-MCNC: 77 MG/DL (ref 65–100)
HCT VFR BLD AUTO: 27.7 % (ref 36.6–50.3)
HGB BLD-MCNC: 9.3 G/DL (ref 12.1–17)
IMM GRANULOCYTES # BLD AUTO: 0 K/UL (ref 0–0.04)
IMM GRANULOCYTES NFR BLD AUTO: 1 % (ref 0–0.5)
LYMPHOCYTES # BLD: 1.3 K/UL (ref 0.8–3.5)
LYMPHOCYTES NFR BLD: 31 % (ref 12–49)
MCH RBC QN AUTO: 35.5 PG (ref 26–34)
MCHC RBC AUTO-ENTMCNC: 33.6 G/DL (ref 30–36.5)
MCV RBC AUTO: 105.7 FL (ref 80–99)
MONOCYTES # BLD: 0.5 K/UL (ref 0–1)
MONOCYTES NFR BLD: 11 % (ref 5–13)
NEUTS SEG # BLD: 2.2 K/UL (ref 1.8–8)
NEUTS SEG NFR BLD: 52 % (ref 32–75)
NRBC # BLD: 0 K/UL (ref 0–0.01)
NRBC BLD-RTO: 0 PER 100 WBC
PLATELET # BLD AUTO: 228 K/UL (ref 150–400)
PMV BLD AUTO: 9.8 FL (ref 8.9–12.9)
POTASSIUM SERPL-SCNC: 4.8 MMOL/L (ref 3.5–5.1)
RBC # BLD AUTO: 2.62 M/UL (ref 4.1–5.7)
SODIUM SERPL-SCNC: 133 MMOL/L (ref 136–145)
T4 FREE SERPL-MCNC: 1 NG/DL (ref 0.8–1.5)
WBC # BLD AUTO: 4.2 K/UL (ref 4.1–11.1)

## 2022-12-08 PROCEDURE — 74011250637 HC RX REV CODE- 250/637

## 2022-12-08 PROCEDURE — 85025 COMPLETE CBC W/AUTO DIFF WBC: CPT

## 2022-12-08 PROCEDURE — 82140 ASSAY OF AMMONIA: CPT

## 2022-12-08 PROCEDURE — 65270000029 HC RM PRIVATE

## 2022-12-08 PROCEDURE — 93005 ELECTROCARDIOGRAM TRACING: CPT

## 2022-12-08 PROCEDURE — 74011000250 HC RX REV CODE- 250

## 2022-12-08 PROCEDURE — 84439 ASSAY OF FREE THYROXINE: CPT

## 2022-12-08 PROCEDURE — 65270000046 HC RM TELEMETRY

## 2022-12-08 PROCEDURE — 82533 TOTAL CORTISOL: CPT

## 2022-12-08 PROCEDURE — 84480 ASSAY TRIIODOTHYRONINE (T3): CPT

## 2022-12-08 PROCEDURE — 74011250637 HC RX REV CODE- 250/637: Performed by: INTERNAL MEDICINE

## 2022-12-08 PROCEDURE — 74011250636 HC RX REV CODE- 250/636

## 2022-12-08 PROCEDURE — 36415 COLL VENOUS BLD VENIPUNCTURE: CPT

## 2022-12-08 PROCEDURE — 80048 BASIC METABOLIC PNL TOTAL CA: CPT

## 2022-12-08 RX ADMIN — OXYCODONE HYDROCHLORIDE AND ACETAMINOPHEN 500 MG: 500 TABLET ORAL at 08:34

## 2022-12-08 RX ADMIN — PANTOPRAZOLE SODIUM 40 MG: 40 TABLET, DELAYED RELEASE ORAL at 08:33

## 2022-12-08 RX ADMIN — TETRAHYDROZOLINE HCL 1 DROP: 0.05 SOLUTION/ DROPS OPHTHALMIC at 08:37

## 2022-12-08 RX ADMIN — TETRAHYDROZOLINE HCL 1 DROP: 0.05 SOLUTION/ DROPS OPHTHALMIC at 13:19

## 2022-12-08 RX ADMIN — SUCRALFATE 1 G: 1 TABLET ORAL at 21:58

## 2022-12-08 RX ADMIN — LACTULOSE 15 ML: 20 SOLUTION ORAL at 18:08

## 2022-12-08 RX ADMIN — Medication 1000 MCG: at 08:33

## 2022-12-08 RX ADMIN — THIAMINE HCL TAB 100 MG 100 MG: 100 TAB at 08:34

## 2022-12-08 RX ADMIN — SUCRALFATE 1 G: 1 TABLET ORAL at 18:08

## 2022-12-08 RX ADMIN — MAGNESIUM OXIDE TAB 400 MG (241.3 MG ELEMENTAL MG) 400 MG: 400 (241.3 MG) TAB at 08:33

## 2022-12-08 RX ADMIN — LACTULOSE 15 ML: 20 SOLUTION ORAL at 08:34

## 2022-12-08 RX ADMIN — SUCRALFATE 1 G: 1 TABLET ORAL at 08:33

## 2022-12-08 RX ADMIN — FOLIC ACID 1 MG: 1 TABLET ORAL at 08:33

## 2022-12-08 RX ADMIN — CALCIUM CARBONATE (ANTACID) CHEW TAB 500 MG 200 MG: 500 CHEW TAB at 12:02

## 2022-12-08 RX ADMIN — MIDODRINE HYDROCHLORIDE 5 MG: 5 TABLET ORAL at 08:33

## 2022-12-08 RX ADMIN — TETRAHYDROZOLINE HCL 1 DROP: 0.05 SOLUTION/ DROPS OPHTHALMIC at 18:09

## 2022-12-08 RX ADMIN — SUCRALFATE 1 G: 1 TABLET ORAL at 12:02

## 2022-12-08 RX ADMIN — TRAMADOL HYDROCHLORIDE 50 MG: 50 TABLET ORAL at 21:58

## 2022-12-08 RX ADMIN — CALCIUM CARBONATE (ANTACID) CHEW TAB 500 MG 200 MG: 500 CHEW TAB at 16:34

## 2022-12-08 RX ADMIN — SODIUM CHLORIDE, PRESERVATIVE FREE 10 ML: 5 INJECTION INTRAVENOUS at 13:19

## 2022-12-08 RX ADMIN — CHLORDIAZEPOXIDE HYDROCHLORIDE 5 MG: 5 CAPSULE ORAL at 18:08

## 2022-12-08 RX ADMIN — MIDODRINE HYDROCHLORIDE 5 MG: 5 TABLET ORAL at 12:02

## 2022-12-08 RX ADMIN — CALCIUM CARBONATE (ANTACID) CHEW TAB 500 MG 200 MG: 500 CHEW TAB at 08:33

## 2022-12-08 RX ADMIN — TRAMADOL HYDROCHLORIDE 50 MG: 50 TABLET ORAL at 15:49

## 2022-12-08 RX ADMIN — TRAMADOL HYDROCHLORIDE 50 MG: 50 TABLET ORAL at 03:27

## 2022-12-08 RX ADMIN — SPIRONOLACTONE 25 MG: 25 TABLET ORAL at 08:33

## 2022-12-08 RX ADMIN — ENOXAPARIN SODIUM 40 MG: 100 INJECTION SUBCUTANEOUS at 08:37

## 2022-12-08 RX ADMIN — TETRAHYDROZOLINE HCL 1 DROP: 0.05 SOLUTION/ DROPS OPHTHALMIC at 21:58

## 2022-12-08 RX ADMIN — SODIUM CHLORIDE, PRESERVATIVE FREE 10 ML: 5 INJECTION INTRAVENOUS at 06:22

## 2022-12-08 RX ADMIN — CHLORDIAZEPOXIDE HYDROCHLORIDE 5 MG: 5 CAPSULE ORAL at 08:33

## 2022-12-08 RX ADMIN — MIDODRINE HYDROCHLORIDE 5 MG: 5 TABLET ORAL at 16:34

## 2022-12-08 RX ADMIN — SODIUM CHLORIDE, PRESERVATIVE FREE 10 ML: 5 INJECTION INTRAVENOUS at 21:58

## 2022-12-08 RX ADMIN — LEVOTHYROXINE SODIUM 50 MCG: 0.05 TABLET ORAL at 06:22

## 2022-12-08 RX ADMIN — THERA TABS 1 TABLET: TAB at 08:33

## 2022-12-08 NOTE — PROGRESS NOTES
Progress Note  LELO Garcia for Dr. Danny Wright)  Date:2022       Room:[address] [place] [address]  Patient Mercy Walker     YOB: 1945     Age:77 y.o. Subjective    Subjective:  Symptoms:  Stable. Diet:  Adequate intake. No nausea or vomiting. Pain:  He reports no pain. Review of Systems   Constitutional:  Negative for fever. Cardiovascular:  Positive for leg swelling. Gastrointestinal:  Negative for abdominal distention, abdominal pain, blood in stool, nausea and vomiting. Compression stockings in place. Objective         Vitals Last 24 Hours:  TEMPERATURE:  Temp  Av.8 °F (36.6 °C)  Min: 97.4 °F (36.3 °C)  Max: 98.4 °F (36.9 °C)  RESPIRATIONS RANGE: Resp  Avg: 15.3  Min: 14  Max: 16  PULSE OXIMETRY RANGE: SpO2  Av %  Min: 97 %  Max: 100 %  PULSE RANGE: Pulse  Av.5  Min: 38  Max: 62  BLOOD PRESSURE RANGE: Systolic (53FTO), NWE:625 , Min:98 , GBA:234   ; Diastolic (75BYI), ZFY:10, Min:62, Max:87    I/O (24Hr): Intake/Output Summary (Last 24 hours) at 2022 0934  Last data filed at 2022 0844  Gross per 24 hour   Intake 770 ml   Output 1310 ml   Net -540 ml       Objective:  General Appearance:  Comfortable and in no acute distress. Vital signs: (most recent): Blood pressure 100/65, pulse (!) 52, temperature 97.9 °F (36.6 °C), resp. rate 16, height 5' 8\" (1.727 m), weight 71.7 kg (158 lb), SpO2 97 %. Vital signs are normal.  No fever. (HR 64). Output: No stool output. HEENT: Normal HEENT exam.    Lungs:  Normal effort and normal respiratory rate. Heart: Normal rate. Abdomen: Abdomen is soft, flat and non-distended. There are no signs of ascites. Bowel sounds are normal.   There is no abdominal tenderness. Extremities: There is local swelling. Neurological: Patient is alert and oriented to person, place and time. Skin:  Warm and dry.     Labs/Imaging/Diagnostics    Labs:  CBC:  Recent Labs 12/08/22 0325 12/07/22 0426 12/06/22  0520   WBC 4.2 4.4 4.8   RBC 2.62* 2.50* 2.57*   HGB 9.3* 8.6* 9.0*   HCT 27.7* 26.5* 26.7*   .7* 106.0* 103.9*   RDW 13.7 14.0 14.1    217 200       CHEMISTRIES:  Recent Labs     12/08/22 0325 12/07/22 0426 12/06/22  0520   * 135* 135*   K 4.8 4.6 4.9   CL 98 98 99   CO2 30 30 31   BUN 15 14 17   CA 8.4* 8.3* 8.4*   PHOS  --   --  3.7   MG  --   --  2.1     PT/INR:  No results for input(s): INR, INREXT, INREXT in the last 72 hours. No lab exists for component: PROTIME    APTT:No results for input(s): APTT in the last 72 hours. LIVER PROFILE:  Recent Labs     12/06/22  0520   AST 42*   ALT 22       Lab Results   Component Value Date/Time    ALT (SGPT) 22 12/06/2022 05:20 AM    AST (SGOT) 42 (H) 12/06/2022 05:20 AM    Alk. phosphatase 95 12/06/2022 05:20 AM    Bilirubin, direct 0.4 (H) 12/06/2022 05:20 AM    Bilirubin, total 0.7 12/06/2022 05:20 AM       Imaging Last 24 Hours:  CT HEAD WO CONT    Result Date: 12/7/2022  EXAM: CT HEAD WO CONT INDICATION: Syncope and peripheral edema. COMPARISON: CT head on 8/1/2017 and 7/31/2017 TECHNIQUE: Noncontrast head CT. Coronal and sagittal reformats. CT dose reduction was achieved through the use of a standardized protocol tailored for this examination and automatic exposure control for dose modulation. FINDINGS: The cerebral volume loss is increased but mild for age without hydrocephalus. There is no mass effect or midline shift. There is no intracranial hemorrhage or extra-axial fluid collection. Chronic microvascular ischemic disease is unchanged. No CT evidence of acute infarct. The calvarium is intact. The visualized paranasal sinuses and mastoid air cells are clear. No acute intracranial abnormality on this noncontrast head CT. Increased cerebral volume loss.     Assessment//Plan   Active Problems:    Weakness (11/30/2022)      Severe protein-calorie malnutrition ([address] (12/1/2022)      Anasarca (12/2/2022)      Hypotension (12/2/2022)      Sinus bradycardia (12/5/2022)    Assessment & Plan  Impression:  Alcoholic cirrhosis with ascites  Malnutrition with hypoalbuminemia  Bradycardia          Acute hepatitis panel negative  Ferritin normal  Alpha 1 slightly low, 97 (>100 is normal) - A1AT pending  GEENA negative  AMA negative  ASMA negative  Ceruloplasmin slightly low, 14.3 (>16 is normal), 24h urine copper pending  Protein electrophoresis no spikes   AFP negative  Ammonia 32 (<32 is normal)     Plan:  MELD/Na=16; Child-Tang score 9, class B  2gm Na diet  Diuresis with furosemide/spironolactone as tolerated (currently on hold 2/2 HR?)  Compression stockings, albumin  CIWA protocol  Needs nutritional support, MVI, thiamine, folate  No signs of HE  Will need outpatient hepatitis A/B vaccinations and EGD to screen for varices  F/U 24hr urine copper and alpha 1 antitrypsin genotype    Will sign off and see again on request     Electronically signed by LELO Easton on 12/8/2022 at 9:51 AM      I have examined the patient. I have reviewed the chart and agree with the documentation recorded by the CHESTER, including the assessment, treatment plan, and disposition. Patient seen and examined by me. I personally performed all components of the history, physical, and medical decision making and agree with the assessment and plan with minor modifications as noted. Exam:  Alert and awake  HEENT AT  GI: soft w/ normal bowel sounds    Plan:  We again discussed alcohol avoidance. Pt is stable from a GI perspective. Plan d/w him in detail. We will set up an OP EGD for him. We will see on request. Please feel free to call us prn. Morena French MD  [facility](A)

## 2022-12-08 NOTE — PROGRESS NOTES
Hospitalist Progress Note    Subjective:   Daily Progress Note: 12/8/2022 10:23 AM    Hospital Course:  Mr. Nilsa Mota is a 69year old male with PMH including remote gastric bypass surgery, indigestion, HTN, and hypothyroidism who presented to the ED with c/o BLE edema w/ associated weakness & activity intolerance. He reports the leg swelling worse when he is on his feet all day at work as . He reports decreased PO intake & difficulty eating some foods due to long-standing history of indigestion. He admits to a 50 year history of \"several drinks a day\" of \"beer and vodka. \" Pt started on scheduled librium and PRN ativan but no s/s of active ETOH withdrawal.     BLE duplex (-) for DVT. Echo showed EF 17-54% wi/ diastolic dysfunction. Pro-BNP on admit 2187, decreased to 1875. Pt given Lasix IV 40mg Q12. CXR showed no acute disease. Orthostatics profoundly (+) with SBP dropping from 116/74 while laying to 72/60 when standing, pt symptomatic with dizziness. Carotid dopplers (-), CT head (-). Midodrine added for BP support. Pt's levothyroxine dose increased from 25 mcg to 50 mcg on admission due to TSH 9.1. Free T4 and total T3 ordered on 12/5 results pending---ordered again on 12/7. Labs also revealed elevated LFTs with AST 50 and Alk Phos 118. Hepatitis panel (-), abd US showed cirrhotic liver, moderate to large ascites, and GI was consulted. However when pt went for paracentesis there was not enough cirrhotic fluid to drain. Following paracentesis attempt 12/5 pt became bradycardic 30s-40s. Transferred to stepdown unit for closer telemetry and vital sign monitoring. Between 12/5 and 12/8 several rechecks of repeat remained profoundly positive, BP dropping as low as 60s/20s upon standing, pt \"woozy\" but no LOC. HR ranging between 30s-60s sinus shad.   Midodrine suspended 2/2 bradycardia but cardiology stated OK to resume so we have been attempting to titrate up and down for BP support without dropping HR. Albumin added to increase intravascular fluid volume and improve LE edema and hypoalbuminemia. Lasix suspended, spironolactone resumed 12/7 as allowed by BP.  PEDRO hose ordered. Cardiology consulted re: bradycardia, orthostatic hypotension, at first no additional workup planned but after 12/7 episode of HR 30s they are now planning on PPM insertion tentatively 12/9. Subjective:  Pt examined sitting in bed and states he feels \"so tired\" and says \"I've never had any heart issues before. \"  He denies new complaints but endorses fatigue, weakness, \"woozy\" on standing, and leg swelling unchanged from yesterday.     Current Facility-Administered Medications   Medication Dose Route Frequency    spironolactone (ALDACTONE) tablet 25 mg  25 mg Oral DAILY    [Held by provider] furosemide (LASIX) tablet 20 mg  20 mg Oral DAILY    midodrine (PROAMATINE) tablet 5 mg  5 mg Oral TID WITH MEALS    lactulose (CHRONULAC) 10 gram/15 mL solution 15 mL  10 g Oral BID    LORazepam (ATIVAN) tablet 2 mg  2 mg Oral Q4H PRN    chlordiazePOXIDE (LIBRIUM) capsule 5 mg  5 mg Oral BID    thiamine mononitrate (B-1) tablet 100 mg  100 mg Oral DAILY    magnesium oxide (MAG-OX) tablet 400 mg  400 mg Oral DAILY    calcium carbonate (TUMS) chewable tablet 200 mg [elemental]  200 mg Oral TID WITH MEALS    tetrahydrozoline (OPTI-CLEAR) 0.05 % ophthalmic drops 1 Drop  1 Drop Both Eyes QID    traMADoL (ULTRAM) tablet 50 mg  50 mg Oral Q6H PRN    levothyroxine (SYNTHROID) tablet 50 mcg  50 mcg Oral ACB    therapeutic multivitamin (THERAGRAN) tablet 1 Tablet  1 Tablet Oral DAILY    ascorbic acid (vitamin C) (VITAMIN C) tablet 500 mg  500 mg Oral DAILY    [Held by provider] aspirin chewable tablet 81 mg  81 mg Oral DAILY    cyanocobalamin (VITAMIN B12) tablet 1,000 mcg  1,000 mcg Oral DAILY    ergocalciferol capsule 50,000 Units  50,000 Units Oral 2 TIMES WEEKLY    folic acid (FOLVITE) tablet 1 mg  1 mg Oral DAILY pantoprazole (PROTONIX) tablet 40 mg  40 mg Oral DAILY    sucralfate (CARAFATE) tablet 1 g  1 g Oral QID    sodium chloride (NS) flush 5-40 mL  5-40 mL IntraVENous Q8H    sodium chloride (NS) flush 5-40 mL  5-40 mL IntraVENous PRN    acetaminophen (TYLENOL) tablet 650 mg  650 mg Oral Q6H PRN    Or    acetaminophen (TYLENOL) suppository 650 mg  650 mg Rectal Q6H PRN    polyethylene glycol (MIRALAX) packet 17 g  17 g Oral DAILY PRN    ondansetron (ZOFRAN ODT) tablet 4 mg  4 mg Oral Q8H PRN    Or    ondansetron (ZOFRAN) injection 4 mg  4 mg IntraVENous Q6H PRN    enoxaparin (LOVENOX) injection 40 mg  40 mg SubCUTAneous DAILY        Review of Systems:    Review of Systems   Constitutional:  Positive for malaise/fatigue. Negative for chills and fever. Respiratory:  Negative for cough and shortness of breath. Cardiovascular:  Positive for leg swelling. Negative for chest pain and palpitations. Gastrointestinal:  Negative for abdominal pain, constipation, diarrhea, nausea and vomiting. Neurological:  Positive for dizziness and weakness. Objective:     Visit Vitals  /62   Pulse 66   Temp 97.9 °F (36.6 °C)   Resp 16   Ht 5' 8\" (1.727 m)   Wt 71.7 kg (158 lb)   SpO2 97%   BMI 24.02 kg/m²      O2 Device: None (Room air)    Temp (24hrs), Av.8 °F (36.6 °C), Min:97.4 °F (36.3 °C), Max:98.4 °F (36.9 °C)      701 - 1900  In: 140 [P.O.:140]  Out: 250 [Urine:250]  1901 -  0700  In: 9526 [P.O.:1220]  Out: 1910 [Urine:1910]    PHYSICAL EXAM:    Physical Exam  Constitutional:       General: He is not in acute distress. Appearance: He is cachectic. HENT:      Head: Normocephalic and atraumatic. Mouth/Throat:      Mouth: Mucous membranes are moist.   Eyes:      Pupils: Pupils are equal, round, and reactive to light. Cardiovascular:      Rate and Rhythm: Bradycardia present. Pulses:           Radial pulses are 2+ on the right side and 2+ on the left side.         Dorsalis pedis pulses are 1+ on the right side and 1+ on the left side. Heart sounds: Normal heart sounds. Pulmonary:      Effort: Pulmonary effort is normal.      Breath sounds: Normal breath sounds. Abdominal:      General: Bowel sounds are normal. There is no distension. Palpations: Abdomen is soft. Tenderness: There is no abdominal tenderness. There is no guarding. Musculoskeletal:      Right lower leg: Edema present. Left lower leg: Edema present. Comments: LLE 1+ edema, RLE 2+ edema  PEDRO hose in place   Skin:     General: Skin is warm and dry. Neurological:      Mental Status: He is alert and oriented to person, place, and time. Data Review    Recent Results (from the past 24 hour(s))   EKG, 12 LEAD, INITIAL    Collection Time: 12/07/22  6:09 PM   Result Value Ref Range    Ventricular Rate 49 BPM    QRS Duration 54 ms    Q-T Interval 458 ms    QTC Calculation (Bezet) 413 ms    Calculated R Axis -21 degrees    Calculated T Axis 13 degrees    Diagnosis       Junctional rhythm  Low voltage QRS  Inferior infarct , age undetermined  Abnormal ECG  When compared with ECG of 10-APR-2021 18:43,  Junctional rhythm has replaced Sinus rhythm  Vent.  rate has decreased BY  24 BPM  Right bundle branch block is no longer present  Inferior infarct is now present     T4, FREE    Collection Time: 12/08/22  3:25 AM   Result Value Ref Range    T4, Free 1.0 0.8 - 1.5 NG/DL   CBC WITH AUTOMATED DIFF    Collection Time: 12/08/22  3:25 AM   Result Value Ref Range    WBC 4.2 4.1 - 11.1 K/uL    RBC 2.62 (L) 4.10 - 5.70 M/uL    HGB 9.3 (L) 12.1 - 17.0 g/dL    HCT 27.7 (L) 36.6 - 50.3 %    .7 (H) 80.0 - 99.0 FL    MCH 35.5 (H) 26.0 - 34.0 PG    MCHC 33.6 30.0 - 36.5 g/dL    RDW 13.7 11.5 - 14.5 %    PLATELET 814 310 - 819 K/uL    MPV 9.8 8.9 - 12.9 FL    NRBC 0.0 0  WBC    ABSOLUTE NRBC 0.00 0.00 - 0.01 K/uL    NEUTROPHILS 52 32 - 75 %    LYMPHOCYTES 31 12 - 49 %    MONOCYTES 11 5 - 13 % EOSINOPHILS 4 0 - 7 %    BASOPHILS 1 0 - 1 %    IMMATURE GRANULOCYTES 1 (H) 0.0 - 0.5 %    ABS. NEUTROPHILS 2.2 1.8 - 8.0 K/UL    ABS. LYMPHOCYTES 1.3 0.8 - 3.5 K/UL    ABS. MONOCYTES 0.5 0.0 - 1.0 K/UL    ABS. EOSINOPHILS 0.2 0.0 - 0.4 K/UL    ABS. BASOPHILS 0.1 0.0 - 0.1 K/UL    ABS. IMM. GRANS. 0.0 0.00 - 0.04 K/UL    DF AUTOMATED     AMMONIA    Collection Time: 12/08/22  3:25 AM   Result Value Ref Range    Ammonia, plasma 37 (H) <85 UMOL/L   METABOLIC PANEL, BASIC    Collection Time: 12/08/22  3:25 AM   Result Value Ref Range    Sodium 133 (L) 136 - 145 mmol/L    Potassium 4.8 3.5 - 5.1 mmol/L    Chloride 98 97 - 108 mmol/L    CO2 30 21 - 32 mmol/L    Anion gap 5 5 - 15 mmol/L    Glucose 77 65 - 100 mg/dL    BUN 15 6 - 20 MG/DL    Creatinine 1.09 0.70 - 1.30 MG/DL    BUN/Creatinine ratio 14 12 - 20      eGFR >60 >60 ml/min/1.73m2    Calcium 8.4 (L) 8.5 - 10.1 MG/DL   CORTISOL, AM    Collection Time: 12/08/22  3:25 AM   Result Value Ref Range    Cortisol, a.m. 8.8 4.30 - 22.45 ug/dL       CT HEAD WO CONT   Final Result      No acute intracranial abnormality on this noncontrast head CT. Increased   cerebral volume loss. DUPLEX CAROTID BILATERAL   Final Result      US ABD LTD   Final Result   Small amount of ascites. US ABD COMP   Final Result   Cirrhotic liver with moderate to large ascites. XR CHEST PORT   Final Result   No Acute Disease.           DUPLEX LOWER EXT VENOUS BILAT   Final Result          Active Problems:    Weakness (11/30/2022)      Severe protein-calorie malnutrition ([address] (12/1/2022)      Anasarca (12/2/2022)      Hypotension (12/2/2022)      Sinus bradycardia (12/5/2022)        Assessment/Plan:   Hypotension         Anasacra        Hypoalbuminemia - POA        History of proteinuria and hepatic stenosis        Generalized weakness        Borderline elevated Ammonia (32)  -  Venous duplex shows no DVT to BLE  - chest xray shows no acute disease  - Etiology of anasarca possibly related to hypoalbuminemic state. - Echocardiogram on 12/2/22 shows an EF of 50%-55% with normal wall thickness.  No significant valvular disease.  - NT proBNP  trending downward 1,875  - urinalysis negative  - urine creatinine 137.00, urine protein 23 etiology possibly related to nephrotic syndrome  - abdominal ultrasound shows cirrhotic liver with moderate to large ascites  - Daily weights  - Strict I&Os  - Added lactulose for ammonia & hyperkalemia 12/5  - GI consulted--appreciate recommendations  - paracentesis attempted 12/5 - not enough fluid to drain  - added Albumin 12.5mg IV q4 x 3 doses --- 12/6 albumin improving 1.4 >> 2.0, will give additional 3 doses  PEDRO hose on during the day off at night  - Lasix & spironolactone resumed for diuresis if tolerated by BP     Symptomatic Bradycardia         Suspect multifactorial due to hypothyroid, midodrine use, no hx of dysrhythmia per pt         12/5 EKG Sinus Bradycardia  Orthostatic Hypotension  - Telemetry monitoring  - Lasix on hold  - Suspended midodrine  - Giving albumin  - Replete electrolytes per protocol  - Cardiology consulted -- appreciate recommendations  - Transfer to [facility] for closer monitoring of VS/telemetry  - Repeat orthostatics 12/6 & 12/8 remain profoundly positive -- HR ranging from 30s-60s  - Midodrine - has been adjusted up/down without signficant improvement in either orthostatic hypotension or bradycardia  - d/w cardiology - recommending PPM placement, pt wants to d/w family, tentative plan for 12/9     Malnutrition - moderate to severe  -  nutrition following  - Encouraged PO diet as tolerated  - Regular Diet     Macrocytic anemia  - Vitamin B12 and folate WNL     Hypothyroidism - TSH 9.1  - 12/5 ordered free T4 and total T3 as add-on  - TSH 9.1      Free T4 1.0     Total T3 pending    - Synthroid increased to 50 mcg on admission, repeat TSH in 8 weeks     Indigestion/dyspepsia   - continue PPI and carafate     Electrolyte Disturbances  Hypocalcemia/Hypomagnesemia/Hypokalemia    - Ca+ 7.3     - Mg+ 1.4    - continue magnesium and calcium supplementation    - will monitor lab work  - 12/5 Hyperkalemic 5.4 - suspend Potassium Chloride supplementation  12/6 K trending down 4.9     History of ETOH abuse   - He is a long time (>50 years hx) alcohol user, 3-4 drinks per day(beer Vodka).   - counseled regarding cessation  - seizure precautions  - Monitor for s/s withdrawal  - On multivitamin, thiamine, folic acid, ascorbic acid  - Added scheduled librium, PRN ativan      Disposition: Pending hospital course, discussed IPR vs SNF with CM, SNF recc by PT    DVT Prophylaxis: Lovenox  Code Status: Full Code  POA: [facility] - [phone]    Care Plan discussed with: Patient, Nursing, Cardiology  _______________________________________________________________    CHARANJIT Diana

## 2022-12-08 NOTE — PROGRESS NOTES
Bedside shift change report given to Eddi Holbrook (oncoming nurse) by Carlos Ewing (offgoing nurse). Report included the following information SBAR, Kardex, MAR, and Recent Results.       09:52 AM  Orthostatic BP see flowsheet, pt asymptomatic, only complaint is feeling \"tired\"

## 2022-12-08 NOTE — PROGRESS NOTES
Comprehensive Nutrition Assessment    Type and Reason for Visit: Reassess    Nutrition Recommendations/Plan:   Continue regular diet + ensure clear daily      Malnutrition Assessment:  Malnutrition Status:  Severe malnutrition (12/01/22 1344)    Context:  Chronic illness       Nutrition Assessment:    Chart reviewed; medically noted for weakness, hyponatremia, anasarca, severe malnutrition, cirrhosis, and alcohol abuse. Patient continues on a regular diet. He reports he's eating pretty well. Drinking ensure clear daily. New menu provided to help with meal selections; he states he would like more soups and cereal. Encouraged PO intake of meals/ONS. Patient Vitals for the past 168 hrs:   % Diet Eaten   12/08/22 0844 76 - 100%   12/07/22 1731 76 - 100%   12/07/22 1200 51 - 75%   12/07/22 0712 76 - 100%   12/06/22 1723 76 - 100%   12/06/22 1142 51 - 75%   12/06/22 0738 51 - 75%   12/03/22 0753 76 - 100%     Nutrition Related Findings:    Na 133, K+ 4.8, Phos 3.7, BG 77   2-3+ edema   BM 12/8   Vitamin C, Tums, Vitamin X32, Vitamin D, Folic acid, Lactulose, Synthroid, Mag-ox, Midodrine, protonix, Aldactone, carafate, MVI, Thiamine   Wound Type: None    Current Nutrition Intake & Therapies:  Average Meal Intake: %  Average Supplement Intake: 51-75%  ADULT ORAL NUTRITION SUPPLEMENT Lunch; Clear Liquid  ADULT DIET Regular  DIET NPO    Anthropometric Measures:  Height: 5' 8\" (172.7 cm)  Ideal Body Weight (IBW): 154 lbs (70 kg)     Current Body Wt:  71.7 kg (158 lb), 91.6 % IBW. Bed scale  Current BMI (kg/m2): 24        Weight Adjustment: No adjustment                 BMI Category: Normal weight (BMI 22.0-24.9) age over 72    Estimated Daily Nutrient Needs:  Energy Requirements Based On: Formula  Weight Used for Energy Requirements: Current  Energy (kcal/day): 1800 (BMR x 1. 3AF)  Weight Used for Protein Requirements: Current  Protein (g/day): 93g (1.3 g/kg bw)  Method Used for Fluid Requirements: 1 ml/kcal  Fluid (ml/day): 1800 ml/day    Nutrition Diagnosis:   Inadequate protein-energy intake related to  (decreased appetite) as evidenced by intake 0-25%    Nutrition Interventions:   Food and/or Nutrient Delivery: Continue current diet, Continue oral nutrition supplement  Nutrition Education/Counseling: No recommendations at this time  Coordination of Nutrition Care: Continue to monitor while inpatient       Goals:  Previous Goal Met: Progressing toward goal(s)  Goals: PO intake 75% or greater, by next RD assessment       Nutrition Monitoring and Evaluation:   Behavioral-Environmental Outcomes: None identified  Food/Nutrient Intake Outcomes: Food and nutrient intake, Supplement intake  Physical Signs/Symptoms Outcomes: Biochemical data, Weight    Discharge Planning:    Continue current diet, Continue oral nutrition supplement    Rico Flanagan RD  Contact: ext [phone]

## 2022-12-08 NOTE — PROGRESS NOTES
Patient was added to my treatment team tonight. Followed by [facility]   I think this is an error, so I removed the patient from my list.

## 2022-12-08 NOTE — PROGRESS NOTES
Bedside shift change report given to Reggie saleem RN (oncoming nurse) by Kofi Burk RN  (offgoing nurse). Report included the following information SBAR, Kardex, MAR, Recent Results, and Cardiac Rhythm Sinus Larry Anna . 1945: Pt being picked up by transport for CT of head   2009:  Pt returned from CT Medication given    End of Shift Note    Bedside shift change report given to Kofi Burk RN  (oncoming nurse) by Alberto Mercado (offgoing nurse). Report included the following information SBAR, Kardex, Procedure Summary, Intake/Output, MAR, Recent Results, and Cardiac Rhythm Sinus Larry Castillo     Shift worked:  [id number]     Shift summary and any significant changes:     No acute changes in Pt condition      Concerns for physician to address:  None at this time      Zone phone for oncoming shift:          Activity:  Activity Level: Up with Assistance  Number times ambulated in hallways past shift: 0  Number of times OOB to chair past shift: 0    Cardiac:   Cardiac Monitoring: Yes      Cardiac Rhythm: Sinus Duane    Access:  Current line(s): PIV     Genitourinary:   Urinary status: voiding    Respiratory:   O2 Device: None (Room air)  Chronic home O2 use?: NO  Incentive spirometer at bedside: NO       GI:  Last Bowel Movement Date: 12/08/22  Current diet:  ADULT ORAL NUTRITION SUPPLEMENT Lunch; Clear Liquid  ADULT DIET Regular  Passing flatus: YES  Tolerating current diet: YES       Pain Management:   Patient states pain is manageable on current regimen: YES    Skin:  Amadou Score: 18  Interventions: turn team, increase time out of bed, and PT/OT consult    Patient Safety:  Fall Score:  Total Score: 3  Interventions: bed/chair alarm, assistive device (walker, cane, etc), gripper socks, and pt to call before getting OOB  High Fall Risk: Yes    Length of Stay:  Expected LOS: 2d 14h  Actual LOS: 6      Alberto Mercado

## 2022-12-08 NOTE — PROGRESS NOTES
[facility]  [address]  [phone]  WWW. [employer]  CARDIOLOGY PROGRESS NOTE    12/8/2022 9:53 AM    Admit Date: 11/30/2022    Admit Diagnosis:   Weakness [R53.1]  Hypotension [I95.9]  Anasarca [R60.1]    Subjective:     Sascha Hernandez had a profound bradycardic event yesterday with heart rate 36 and hypotensive symptomatic. Cardiology has been reconsulted to manage.     Visit Vitals  /62   Pulse 66   Temp 97.9 °F (36.6 °C)   Resp 16   Ht 5' 8\" (1.727 m)   Wt 71.7 kg (158 lb)   SpO2 97%   BMI 24.02 kg/m²       Current Facility-Administered Medications   Medication Dose Route Frequency    spironolactone (ALDACTONE) tablet 25 mg  25 mg Oral DAILY    [Held by provider] furosemide (LASIX) tablet 20 mg  20 mg Oral DAILY    midodrine (PROAMATINE) tablet 5 mg  5 mg Oral TID WITH MEALS    lactulose (CHRONULAC) 10 gram/15 mL solution 15 mL  10 g Oral BID    LORazepam (ATIVAN) tablet 2 mg  2 mg Oral Q4H PRN    chlordiazePOXIDE (LIBRIUM) capsule 5 mg  5 mg Oral BID    thiamine mononitrate (B-1) tablet 100 mg  100 mg Oral DAILY    magnesium oxide (MAG-OX) tablet 400 mg  400 mg Oral DAILY    calcium carbonate (TUMS) chewable tablet 200 mg [elemental]  200 mg Oral TID WITH MEALS    tetrahydrozoline (OPTI-CLEAR) 0.05 % ophthalmic drops 1 Drop  1 Drop Both Eyes QID    traMADoL (ULTRAM) tablet 50 mg  50 mg Oral Q6H PRN    levothyroxine (SYNTHROID) tablet 50 mcg  50 mcg Oral ACB    therapeutic multivitamin (THERAGRAN) tablet 1 Tablet  1 Tablet Oral DAILY    ascorbic acid (vitamin C) (VITAMIN C) tablet 500 mg  500 mg Oral DAILY    [Held by provider] aspirin chewable tablet 81 mg  81 mg Oral DAILY    cyanocobalamin (VITAMIN B12) tablet 1,000 mcg  1,000 mcg Oral DAILY    ergocalciferol capsule 50,000 Units  50,000 Units Oral 2 TIMES WEEKLY    folic acid (FOLVITE) tablet 1 mg  1 mg Oral DAILY    pantoprazole (PROTONIX) tablet 40 mg  40 mg Oral DAILY sucralfate (CARAFATE) tablet 1 g  1 g Oral QID    sodium chloride (NS) flush 5-40 mL  5-40 mL IntraVENous Q8H    sodium chloride (NS) flush 5-40 mL  5-40 mL IntraVENous PRN    acetaminophen (TYLENOL) tablet 650 mg  650 mg Oral Q6H PRN    Or    acetaminophen (TYLENOL) suppository 650 mg  650 mg Rectal Q6H PRN    polyethylene glycol (MIRALAX) packet 17 g  17 g Oral DAILY PRN    ondansetron (ZOFRAN ODT) tablet 4 mg  4 mg Oral Q8H PRN    Or    ondansetron (ZOFRAN) injection 4 mg  4 mg IntraVENous Q6H PRN    enoxaparin (LOVENOX) injection 40 mg  40 mg SubCUTAneous DAILY         Objective:      Physical Exam   Physical Exam  Vitals reviewed. HENT:      Head: Normocephalic and atraumatic. Cardiovascular:      Rate and Rhythm: Bradycardia present. Pulses: Normal pulses. Heart sounds: Normal heart sounds. Pulmonary:      Effort: Pulmonary effort is normal.      Breath sounds: Normal breath sounds. Neurological:      Mental Status: He is alert. Data Review:   Recent Labs     12/08/22  0325 12/07/22  0426 12/06/22  0520   WBC 4.2 4.4 4.8   HGB 9.3* 8.6* 9.0*   HCT 27.7* 26.5* 26.7*    217 200       Recent Labs     12/08/22  0325 12/07/22  0426 12/06/22  0520   * 135* 135*   K 4.8 4.6 4.9   CL 98 98 99   CO2 30 30 31   GLU 77 81 78   BUN 15 14 17   CREA 1.09 1.02 1.07   CA 8.4* 8.3* 8.4*   MG  --   --  2.1   PHOS  --   --  3.7   ALB  --   --  2.0*   TBILI  --   --  0.7   ALT  --   --  22         No results for input(s): TROIQ, CPK, CKMB in the last 72 hours.       Intake/Output Summary (Last 24 hours) at 12/8/2022 1022  Last data filed at 12/8/2022 0844  Gross per 24 hour   Intake 770 ml   Output 1310 ml   Net -540 ml          Telemetry: Sinus bradycardia with episodes of profound bradycardia  EKG:  Cxray:    Assessment:     Active Problems:    Weakness (11/30/2022)      Severe protein-calorie malnutrition ([address] (12/1/2022)      Anasarca (12/2/2022)      Hypotension (12/2/2022)      Sinus bradycardia (12/5/2022)      Plan:     Patient is a 28-year-old male admitted for weakness anasarca cardiology initially consulted for asymptomatic sinus bradycardia without juliann agent. Yesterday had profound bradycardic event with associated hypotension. Recommend dual-chamber pacemaker. Discussed risk and benefits of pacemaker at this point he is willing to proceed however would like to discuss with his family first.  Tentatively will schedule for tomorrow. N.p.o. after midnight.     Iram Rodgers DNP,ANP-BC

## 2022-12-09 ENCOUNTER — APPOINTMENT (OUTPATIENT)
Dept: GENERAL RADIOLOGY | Age: 77
DRG: 260 | End: 2022-12-09
Attending: INTERNAL MEDICINE
Payer: MEDICARE

## 2022-12-09 LAB
ANION GAP SERPL CALC-SCNC: 3 MMOL/L (ref 5–15)
APTT PPP: 24.7 SEC (ref 22.1–31)
ATRIAL RATE: 42 BPM
ATRIAL RATE: 47 BPM
BUN SERPL-MCNC: 16 MG/DL (ref 6–20)
BUN/CREAT SERPL: 15 (ref 12–20)
CALCIUM SERPL-MCNC: 8.2 MG/DL (ref 8.5–10.1)
CALCULATED P AXIS, ECG09: 1 DEGREES
CALCULATED P AXIS, ECG09: 51 DEGREES
CALCULATED R AXIS, ECG10: -14 DEGREES
CALCULATED R AXIS, ECG10: -17 DEGREES
CALCULATED R AXIS, ECG10: -21 DEGREES
CALCULATED T AXIS, ECG11: 13 DEGREES
CALCULATED T AXIS, ECG11: 19 DEGREES
CALCULATED T AXIS, ECG11: 22 DEGREES
CHLORIDE SERPL-SCNC: 99 MMOL/L (ref 97–108)
CO2 SERPL-SCNC: 31 MMOL/L (ref 21–32)
COLLECT DURATION TIME UR: 24 HR
COPPER 24H UR-MRATE: 13 UG/24 HR (ref 3–35)
COPPER UR-MCNC: 16 UG/L
COPPER/CREAT UR: 24 UG/G CREAT (ref 0–49)
CREAT SERPL-MCNC: 1.06 MG/DL (ref 0.7–1.3)
CREAT UR-MCNC: 0.68 G/L (ref 0.3–3)
DIAGNOSIS, 93000: NORMAL
ERYTHROCYTE [DISTWIDTH] IN BLOOD BY AUTOMATED COUNT: 13.7 % (ref 11.5–14.5)
GLUCOSE SERPL-MCNC: 73 MG/DL (ref 65–100)
HCT VFR BLD AUTO: 27.4 % (ref 36.6–50.3)
HGB BLD-MCNC: 9 G/DL (ref 12.1–17)
INR PPP: 1.3 (ref 0.9–1.1)
MCH RBC QN AUTO: 34.9 PG (ref 26–34)
MCHC RBC AUTO-ENTMCNC: 32.8 G/DL (ref 30–36.5)
MCV RBC AUTO: 106.2 FL (ref 80–99)
NRBC # BLD: 0 K/UL (ref 0–0.01)
NRBC BLD-RTO: 0 PER 100 WBC
P-R INTERVAL, ECG05: 104 MS
P-R INTERVAL, ECG05: 150 MS
PLATELET # BLD AUTO: 236 K/UL (ref 150–400)
PMV BLD AUTO: 10.1 FL (ref 8.9–12.9)
POTASSIUM SERPL-SCNC: 4.5 MMOL/L (ref 3.5–5.1)
PROTHROMBIN TIME: 13 SEC (ref 9–11.1)
Q-T INTERVAL, ECG07: 458 MS
Q-T INTERVAL, ECG07: 470 MS
Q-T INTERVAL, ECG07: 484 MS
QRS DURATION, ECG06: 54 MS
QRS DURATION, ECG06: 64 MS
QRS DURATION, ECG06: 86 MS
QTC CALCULATION (BEZET), ECG08: 404 MS
QTC CALCULATION (BEZET), ECG08: 413 MS
QTC CALCULATION (BEZET), ECG08: 415 MS
RBC # BLD AUTO: 2.58 M/UL (ref 4.1–5.7)
SODIUM SERPL-SCNC: 133 MMOL/L (ref 136–145)
SPECIMEN VOL ?TM UR: 800 ML
T3 SERPL-MCNC: 62 NG/DL (ref 71–180)
THERAPEUTIC RANGE,PTTT: NORMAL SECS (ref 58–77)
VENTRICULAR RATE, ECG03: 42 BPM
VENTRICULAR RATE, ECG03: 47 BPM
VENTRICULAR RATE, ECG03: 49 BPM
WBC # BLD AUTO: 4.6 K/UL (ref 4.1–11.1)

## 2022-12-09 PROCEDURE — 85027 COMPLETE CBC AUTOMATED: CPT

## 2022-12-09 PROCEDURE — 80048 BASIC METABOLIC PNL TOTAL CA: CPT

## 2022-12-09 PROCEDURE — C1893 INTRO/SHEATH, FIXED,NON-PEEL: HCPCS | Performed by: INTERNAL MEDICINE

## 2022-12-09 PROCEDURE — C1898 LEAD, PMKR, OTHER THAN TRANS: HCPCS | Performed by: INTERNAL MEDICINE

## 2022-12-09 PROCEDURE — 74011000250 HC RX REV CODE- 250

## 2022-12-09 PROCEDURE — C1785 PMKR, DUAL, RATE-RESP: HCPCS | Performed by: INTERNAL MEDICINE

## 2022-12-09 PROCEDURE — 77030010507 HC ADH SKN DERMBND J&J -B: Performed by: INTERNAL MEDICINE

## 2022-12-09 PROCEDURE — 99152 MOD SED SAME PHYS/QHP 5/>YRS: CPT | Performed by: INTERNAL MEDICINE

## 2022-12-09 PROCEDURE — 74011250637 HC RX REV CODE- 250/637

## 2022-12-09 PROCEDURE — 85730 THROMBOPLASTIN TIME PARTIAL: CPT

## 2022-12-09 PROCEDURE — 85610 PROTHROMBIN TIME: CPT

## 2022-12-09 PROCEDURE — 77030002996 HC SUT SLK J&J -A: Performed by: INTERNAL MEDICINE

## 2022-12-09 PROCEDURE — 99153 MOD SED SAME PHYS/QHP EA: CPT | Performed by: INTERNAL MEDICINE

## 2022-12-09 PROCEDURE — 74011000636 HC RX REV CODE- 636: Performed by: INTERNAL MEDICINE

## 2022-12-09 PROCEDURE — 71045 X-RAY EXAM CHEST 1 VIEW: CPT

## 2022-12-09 PROCEDURE — C1894 INTRO/SHEATH, NON-LASER: HCPCS | Performed by: INTERNAL MEDICINE

## 2022-12-09 PROCEDURE — 65270000046 HC RM TELEMETRY

## 2022-12-09 PROCEDURE — 77030022704 HC SUT VLOC COVD -B: Performed by: INTERNAL MEDICINE

## 2022-12-09 PROCEDURE — 02H63JZ INSERTION OF PACEMAKER LEAD INTO RIGHT ATRIUM, PERCUTANEOUS APPROACH: ICD-10-PCS | Performed by: INTERNAL MEDICINE

## 2022-12-09 PROCEDURE — 97164 PT RE-EVAL EST PLAN CARE: CPT

## 2022-12-09 PROCEDURE — 74011250637 HC RX REV CODE- 250/637: Performed by: INTERNAL MEDICINE

## 2022-12-09 PROCEDURE — 97530 THERAPEUTIC ACTIVITIES: CPT

## 2022-12-09 PROCEDURE — C1892 INTRO/SHEATH,FIXED,PEEL-AWAY: HCPCS | Performed by: INTERNAL MEDICINE

## 2022-12-09 PROCEDURE — 74011250636 HC RX REV CODE- 250/636: Performed by: INTERNAL MEDICINE

## 2022-12-09 PROCEDURE — 33208 INSRT HEART PM ATRIAL & VENT: CPT | Performed by: INTERNAL MEDICINE

## 2022-12-09 PROCEDURE — 77010033678 HC OXYGEN DAILY

## 2022-12-09 PROCEDURE — 02HK3JZ INSERTION OF PACEMAKER LEAD INTO RIGHT VENTRICLE, PERCUTANEOUS APPROACH: ICD-10-PCS | Performed by: INTERNAL MEDICINE

## 2022-12-09 PROCEDURE — 0JH606Z INSERTION OF PACEMAKER, DUAL CHAMBER INTO CHEST SUBCUTANEOUS TISSUE AND FASCIA, OPEN APPROACH: ICD-10-PCS | Performed by: INTERNAL MEDICINE

## 2022-12-09 PROCEDURE — 74011000250 HC RX REV CODE- 250: Performed by: INTERNAL MEDICINE

## 2022-12-09 PROCEDURE — 65270000029 HC RM PRIVATE

## 2022-12-09 PROCEDURE — 36415 COLL VENOUS BLD VENIPUNCTURE: CPT

## 2022-12-09 DEVICE — IPG W1DR01 AZURE XT DR MRI USA
Type: IMPLANTABLE DEVICE | Status: FUNCTIONAL
Brand: AZURE™ XT DR MRI SURESCAN™

## 2022-12-09 DEVICE — LEAD 5076-52 MRI US RCMCRD
Type: IMPLANTABLE DEVICE | Status: FUNCTIONAL
Brand: CAPSUREFIX NOVUS MRI™ SURESCAN®

## 2022-12-09 DEVICE — LEAD 5076-58 MRI US RCMCRD
Type: IMPLANTABLE DEVICE | Status: FUNCTIONAL
Brand: CAPSUREFIX NOVUS MRI™ SURESCAN®

## 2022-12-09 RX ORDER — SPIRONOLACTONE 25 MG/1
25 TABLET ORAL DAILY
Qty: 30 TABLET | Refills: 0 | Status: SHIPPED | OUTPATIENT
Start: 2022-12-10 | End: 2023-01-09

## 2022-12-09 RX ORDER — NALOXONE HYDROCHLORIDE 0.4 MG/ML
0.4 INJECTION, SOLUTION INTRAMUSCULAR; INTRAVENOUS; SUBCUTANEOUS AS NEEDED
Status: DISCONTINUED | OUTPATIENT
Start: 2022-12-09 | End: 2022-12-13 | Stop reason: HOSPADM

## 2022-12-09 RX ORDER — ASPIRIN 325 MG/1
100 TABLET, FILM COATED ORAL DAILY
Qty: 30 TABLET | Refills: 0 | Status: SHIPPED | OUTPATIENT
Start: 2022-12-10 | End: 2023-01-09

## 2022-12-09 RX ORDER — MIDAZOLAM HYDROCHLORIDE 1 MG/ML
INJECTION, SOLUTION INTRAMUSCULAR; INTRAVENOUS AS NEEDED
Status: DISCONTINUED | OUTPATIENT
Start: 2022-12-09 | End: 2022-12-09 | Stop reason: HOSPADM

## 2022-12-09 RX ORDER — FENTANYL CITRATE 50 UG/ML
INJECTION, SOLUTION INTRAMUSCULAR; INTRAVENOUS AS NEEDED
Status: DISCONTINUED | OUTPATIENT
Start: 2022-12-09 | End: 2022-12-09 | Stop reason: HOSPADM

## 2022-12-09 RX ORDER — HEPARIN SODIUM 200 [USP'U]/100ML
INJECTION, SOLUTION INTRAVENOUS
Status: COMPLETED | OUTPATIENT
Start: 2022-12-09 | End: 2022-12-09

## 2022-12-09 RX ORDER — SODIUM CHLORIDE 0.9 % (FLUSH) 0.9 %
5-40 SYRINGE (ML) INJECTION AS NEEDED
Status: DISCONTINUED | OUTPATIENT
Start: 2022-12-09 | End: 2022-12-13 | Stop reason: HOSPADM

## 2022-12-09 RX ORDER — SODIUM CHLORIDE 0.9 % (FLUSH) 0.9 %
5-40 SYRINGE (ML) INJECTION EVERY 8 HOURS
Status: DISCONTINUED | OUTPATIENT
Start: 2022-12-09 | End: 2022-12-13 | Stop reason: HOSPADM

## 2022-12-09 RX ORDER — LIDOCAINE HYDROCHLORIDE AND EPINEPHRINE 10; 10 MG/ML; UG/ML
INJECTION, SOLUTION INFILTRATION; PERINEURAL AS NEEDED
Status: DISCONTINUED | OUTPATIENT
Start: 2022-12-09 | End: 2022-12-09 | Stop reason: HOSPADM

## 2022-12-09 RX ORDER — LEVOTHYROXINE SODIUM 50 UG/1
50 TABLET ORAL
Qty: 30 TABLET | Refills: 0 | Status: SHIPPED | OUTPATIENT
Start: 2022-12-10 | End: 2023-01-09

## 2022-12-09 RX ORDER — FUROSEMIDE 20 MG/1
20 TABLET ORAL DAILY
Qty: 30 TABLET | Refills: 0 | Status: SHIPPED | OUTPATIENT
Start: 2022-12-09 | End: 2023-01-08

## 2022-12-09 RX ADMIN — FOLIC ACID 1 MG: 1 TABLET ORAL at 12:06

## 2022-12-09 RX ADMIN — LACTULOSE 15 ML: 20 SOLUTION ORAL at 18:39

## 2022-12-09 RX ADMIN — OXYCODONE HYDROCHLORIDE AND ACETAMINOPHEN 500 MG: 500 TABLET ORAL at 17:26

## 2022-12-09 RX ADMIN — SODIUM CHLORIDE, PRESERVATIVE FREE 10 ML: 5 INJECTION INTRAVENOUS at 06:46

## 2022-12-09 RX ADMIN — TRAMADOL HYDROCHLORIDE 50 MG: 50 TABLET ORAL at 06:45

## 2022-12-09 RX ADMIN — TRAMADOL HYDROCHLORIDE 50 MG: 50 TABLET ORAL at 22:55

## 2022-12-09 RX ADMIN — PANTOPRAZOLE SODIUM 40 MG: 40 TABLET, DELAYED RELEASE ORAL at 12:04

## 2022-12-09 RX ADMIN — TETRAHYDROZOLINE HCL 1 DROP: 0.05 SOLUTION/ DROPS OPHTHALMIC at 22:55

## 2022-12-09 RX ADMIN — TRAMADOL HYDROCHLORIDE 50 MG: 50 TABLET ORAL at 12:03

## 2022-12-09 RX ADMIN — MAGNESIUM OXIDE TAB 400 MG (241.3 MG ELEMENTAL MG) 400 MG: 400 (241.3 MG) TAB at 12:04

## 2022-12-09 RX ADMIN — MIDODRINE HYDROCHLORIDE 5 MG: 5 TABLET ORAL at 12:04

## 2022-12-09 RX ADMIN — TRAMADOL HYDROCHLORIDE 50 MG: 50 TABLET ORAL at 17:25

## 2022-12-09 RX ADMIN — CHLORDIAZEPOXIDE HYDROCHLORIDE 5 MG: 5 CAPSULE ORAL at 17:26

## 2022-12-09 RX ADMIN — THERA TABS 1 TABLET: TAB at 12:35

## 2022-12-09 RX ADMIN — SUCRALFATE 1 G: 1 TABLET ORAL at 12:18

## 2022-12-09 RX ADMIN — MIDODRINE HYDROCHLORIDE 5 MG: 5 TABLET ORAL at 17:26

## 2022-12-09 RX ADMIN — SODIUM CHLORIDE, PRESERVATIVE FREE 10 ML: 5 INJECTION INTRAVENOUS at 12:07

## 2022-12-09 RX ADMIN — ACETAMINOPHEN 650 MG: 325 TABLET ORAL at 23:12

## 2022-12-09 RX ADMIN — SODIUM CHLORIDE, PRESERVATIVE FREE 10 ML: 5 INJECTION INTRAVENOUS at 18:34

## 2022-12-09 RX ADMIN — SPIRONOLACTONE 25 MG: 25 TABLET ORAL at 12:04

## 2022-12-09 RX ADMIN — ERGOCALCIFEROL 50000 UNITS: 1.25 CAPSULE ORAL at 18:34

## 2022-12-09 RX ADMIN — LACTULOSE 15 ML: 20 SOLUTION ORAL at 12:06

## 2022-12-09 RX ADMIN — SUCRALFATE 1 G: 1 TABLET ORAL at 17:26

## 2022-12-09 RX ADMIN — Medication 1000 MCG: at 12:03

## 2022-12-09 RX ADMIN — THIAMINE HCL TAB 100 MG 100 MG: 100 TAB at 12:24

## 2022-12-09 RX ADMIN — SUCRALFATE 1 G: 1 TABLET ORAL at 12:05

## 2022-12-09 RX ADMIN — SUCRALFATE 1 G: 1 TABLET ORAL at 22:55

## 2022-12-09 RX ADMIN — CALCIUM CARBONATE (ANTACID) CHEW TAB 500 MG 200 MG: 500 CHEW TAB at 17:26

## 2022-12-09 RX ADMIN — CALCIUM CARBONATE (ANTACID) CHEW TAB 500 MG 200 MG: 500 CHEW TAB at 12:23

## 2022-12-09 RX ADMIN — LEVOTHYROXINE SODIUM 50 MCG: 0.05 TABLET ORAL at 06:41

## 2022-12-09 RX ADMIN — SODIUM CHLORIDE, PRESERVATIVE FREE 10 ML: 5 INJECTION INTRAVENOUS at 22:55

## 2022-12-09 NOTE — PROGRESS NOTES
Cardiology Progress Note       [facility]  [address]  [phone]  WWW. [employer]     12/9/2022 9:27 AM    Admit Date: 11/30/2022    Admit Diagnosis: Weakness [R53.1]  Hypotension [I95.9]  Anasarca [R60.1]    Subjective:     Tod Sales   denies chest pain.     Visit Vitals  BP (!) 105/54   Pulse 73   Temp 97.5 °F (36.4 °C)   Resp 12   Ht 5' 8\" (1.727 m)   Wt 155 lb 10.3 oz (70.6 kg)   SpO2 98%   BMI 23.67 kg/m²     Current Facility-Administered Medications   Medication Dose Route Frequency    sodium chloride (NS) flush 5-40 mL  5-40 mL IntraVENous Q8H    sodium chloride (NS) flush 5-40 mL  5-40 mL IntraVENous PRN    naloxone (NARCAN) injection 0.4 mg  0.4 mg IntraVENous PRN    spironolactone (ALDACTONE) tablet 25 mg  25 mg Oral DAILY    [Held by provider] furosemide (LASIX) tablet 20 mg  20 mg Oral DAILY    midodrine (PROAMATINE) tablet 5 mg  5 mg Oral TID WITH MEALS    lactulose (CHRONULAC) 10 gram/15 mL solution 15 mL  10 g Oral BID    LORazepam (ATIVAN) tablet 2 mg  2 mg Oral Q4H PRN    chlordiazePOXIDE (LIBRIUM) capsule 5 mg  5 mg Oral BID    thiamine mononitrate (B-1) tablet 100 mg  100 mg Oral DAILY    magnesium oxide (MAG-OX) tablet 400 mg  400 mg Oral DAILY    calcium carbonate (TUMS) chewable tablet 200 mg [elemental]  200 mg Oral TID WITH MEALS    tetrahydrozoline (OPTI-CLEAR) 0.05 % ophthalmic drops 1 Drop  1 Drop Both Eyes QID    traMADoL (ULTRAM) tablet 50 mg  50 mg Oral Q6H PRN    levothyroxine (SYNTHROID) tablet 50 mcg  50 mcg Oral ACB    therapeutic multivitamin (THERAGRAN) tablet 1 Tablet  1 Tablet Oral DAILY    ascorbic acid (vitamin C) (VITAMIN C) tablet 500 mg  500 mg Oral DAILY    [Held by provider] aspirin chewable tablet 81 mg  81 mg Oral DAILY    cyanocobalamin (VITAMIN B12) tablet 1,000 mcg  1,000 mcg Oral DAILY    ergocalciferol capsule 50,000 Units  50,000 Units Oral 2 TIMES WEEKLY    folic acid (FOLVITE) tablet 1 mg  1 mg Oral DAILY    pantoprazole (PROTONIX) tablet 40 mg  40 mg Oral DAILY    sucralfate (CARAFATE) tablet 1 g  1 g Oral QID    sodium chloride (NS) flush 5-40 mL  5-40 mL IntraVENous Q8H    sodium chloride (NS) flush 5-40 mL  5-40 mL IntraVENous PRN    acetaminophen (TYLENOL) tablet 650 mg  650 mg Oral Q6H PRN    Or    acetaminophen (TYLENOL) suppository 650 mg  650 mg Rectal Q6H PRN    polyethylene glycol (MIRALAX) packet 17 g  17 g Oral DAILY PRN    ondansetron (ZOFRAN ODT) tablet 4 mg  4 mg Oral Q8H PRN    Or    ondansetron (ZOFRAN) injection 4 mg  4 mg IntraVENous Q6H PRN    [Held by provider] enoxaparin (LOVENOX) injection 40 mg  40 mg SubCUTAneous DAILY         Objective:      Visit Vitals  BP (!) 105/54   Pulse 73   Temp 97.5 °F (36.4 °C)   Resp 12   Ht 5' 8\" (1.727 m)   Wt 155 lb 10.3 oz (70.6 kg)   SpO2 98%   BMI 23.67 kg/m²       Physical Exam:  General:  Alert, cooperative, well noursished, well developed, appears stated age   Eyes:  Sclera anicteric. Pupils equally round and reactive to light. Mouth/Throat: Mucous membranes normal, oral pharynx clear   Neck: Supple   Lungs:   Clear to auscultation bilaterally, good effort   CV:  Regular rate and rhythm,no murmur, click, rub or gallop   Abdomen:   Soft, non-tender.  bowel sounds normal. non-distended   Extremities: No cyanosis or edema   Skin: Skin color, texture, turgor normal. no acute rash or lesions   Lymph nodes: Cervical and supraclavicular normal   Musculoskeletal: No swelling or deformity       Psych: Alert and oriented, normal mood affect given the setting         Data Review:   Labs:    Recent Labs     12/09/22  0411 12/08/22  0325 12/07/22  0426   WBC 4.6 4.2 4.4   HGB 9.0* 9.3* 8.6*   HCT 27.4* 27.7* 26.5*    228 217     Recent Labs     12/09/22  0411 12/08/22  0325 12/07/22  0426   * 133* 135*   K 4.5 4.8 4.6   CL 99 98 98   CO2 31 30 30   GLU 73 77 81   BUN 16 15 14   CREA 1.06 1.09 1.02   CA 8.2* 8.4* 8.3*   INR 1.3*  -- --        No results for input(s): TROIQ, CPK, CKMB in the last 72 hours. Intake/Output Summary (Last 24 hours) at 12/9/2022 0927  Last data filed at 12/9/2022 0402  Gross per 24 hour   Intake 200 ml   Output 1300 ml   Net -1100 ml        Telemetry: nsr    Assessment:     Active Problems:    Weakness (11/30/2022)      Severe protein-calorie malnutrition ([address] (12/1/2022)      Anasarca (12/2/2022)      Hypotension (12/2/2022)      Sinus bradycardia (12/5/2022)        Plan:     Mey Lutz is sp dc ppm. Cxr wnl. Ok for dc this afternoon from cardiac standpoint.  Follow up in 1-2 weeks    Bigg Rubin MD, [facility]    12/9/2022

## 2022-12-09 NOTE — PROGRESS NOTES
Problem: Mobility Impaired (Adult and Pediatric)  Goal: *Acute Goals and Plan of Care (Insert Text)  Description: FUNCTIONAL STATUS PRIOR TO ADMISSION: Pt lives alone and has supportive sister and friends. Until ~2 weeks ago, pt was working at the [employer], amb with no device (intermittent furniture support) in the home and cane when out. He was attending OPPT at [address] [facility] but stopped due to getting very sore. He has noted a decline in last 2 weeks with increased swelling of BLE. He states he used to get swelling that would then go down at night but over last 2 weeks it does not go down and legs are very painful. HOME SUPPORT PRIOR TO ADMISSION: The patient lived alone with friends/family to provide assistance. Physical Therapy Goals  Revised 12/7/2022. Re-evaluation s/p PPM placement performed 12/9/2022. 1.  Patient will move from supine to sit and sit to supine , scoot up and down, and roll side to side in bed with minimal assistance/contact guard assist within 7 day(s). 2.  Patient will transfer from bed to chair and chair to bed with minimal assistance/contact guard assist using the least restrictive device within 7 day(s). 3.  Patient will perform sit to stand with minimum assistance/cg assist within 7 day(s). 4.  Patient will ambulate with minimal assistance/contact guard assist for 65 feet with the least restrictive device within 7 day(s). 5.  Patient will perform 5 sit to stands in less than 60 seconds with minimal assistance/contact guard assist within 7 day(s). Physical Therapy Goals  Initiated 12/1/2022  1. Patient will move from supine to sit and sit to supine , scoot up and down, and roll side to side in bed with modified independence within 7 day(s). Not met 12/7/22.  2.  Patient will transfer from bed to chair and chair to bed with modified independence using the least restrictive device within 7 day(s). Not met 12/7/22.   3.  Patient will perform sit to stand with modified independence within 7 day(s). Not met 12/7/22. 4.  Patient will ambulate with modified independence for 150 feet with the least restrictive device within 7 day(s). Not met 12/7/22.  5.  Patient will ascend/descend 4 stairs with handrail(s) with modified independence within 7 day(s). Not met 12/7/22. Outcome: Progressing Towards Goal   PHYSICAL THERAPY REEVALUATION  Patient: Jose Coppola (89 y.o. male)  Date: 12/9/2022  Primary Diagnosis: Weakness [R53.1]  Hypotension [I95.9]  Anasarca [R60.1]  Procedure(s) (LRB):  INSERT PPM DUAL (N/A) Day of Surgery   Precautions:   Fall, Skin      ASSESSMENT  Based on the objective data described below, the patient presents s/p pacemaker placement today. He was received supine in bed, agreeable to therapy with encouragement. Orthostatic BP taken in all positions and listed below. Pt demonstrated bed mobility with Min-mod A x2, limited due to L UE pain. Pt donned sling with total A and adjusted to pt comfort for LUE positioning assist. Sit <> EOB with Mod A x2, extra time. Pt stand-stepped bed > BSC for BM attempt, mod A x2 for balance and support during stepping. Pt sat > 5min on BSC, unsuccessful at toileting, and returned to bed with Mod A x2. Sit > supine with Mod A x2 HOB elevated for support. He remains below his mobility baseline and will require rehab at [facility] for mobility interventions. Will continue to follow . Vitals:   12/09/22 1400 12/09/22 1405 12/09/22 1410   Vital Signs   /77 (!) 140/98 (!) 163/109   MAP (Calculated) 95 112 127   BP Patient Position Supine Sitting Standing     Current Level of Function Impacting Discharge (mobility/balance): up to mod A x2    Functional Outcome Measure: The patient scored 45/100 on the Barthel Index outcome measure which is indicative of 55% impairment in mobility and ADLs.       Other factors to consider for discharge: will need rehab     Patient will benefit from skilled therapy intervention to address the above noted impairments. PLAN :  Recommendations and Planned Interventions: bed mobility training, transfer training, gait training, therapeutic exercises, neuromuscular re-education, edema management/control, patient and family training/education, and therapeutic activities      Frequency/Duration: Patient will be followed by physical therapy:  4 times a week to address goals. Recommendation for discharge: (in order for the patient to meet his/her long term goals)  rehab    This discharge recommendation:  Has been made in collaboration with the attending provider and/or case management    Equipment recommendations for successful discharge (if) home: TBD         SUBJECTIVE:   Patient stated I would like to try the commode if we can.     OBJECTIVE DATA SUMMARY:   HISTORY:    Past Medical History:   Diagnosis Date    DJD (degenerative joint disease)     Hypertension     Obesity      Past Surgical History:   Procedure Laterality Date    COLONOSCOPY N/A 12/20/2021    COLONOSCOPY performed by Gio Ingram MD at [facility] ENDOSCOPY    HX ORTHOPAEDIC      bilat hip replacements    [facility] [address] course since last seen and reason for reevaluation: Pacemaker placement today with significant change in mobility post op    Personal factors and/or comorbidities impacting plan of care: complex PMH, 2 persons needed at this time for mobility    Home Situation  Home Environment: Private residence  # Steps to Enter: 4  Rails to Enter: Yes  Hand Rails : Bilateral (close together)  One/Two Story Residence: One story  Living Alone: Yes  Support Systems: Friend/Neighbor, Other (Comment) (Brother, sister)  Patient Expects to be Discharged to[de-identified] Skilled nursing facility  Current DME Used/Available at Home: Walker, rolling  Tub or Shower Type: Shower    EXAMINATION/PRESENTATION/DECISION MAKING:   Critical Behavior:  Neurologic State: Alert  Orientation Level: Oriented X4  Cognition: Follows commands  Safety/Judgement: Decreased awareness of need for safety  Hearing: Auditory  Auditory Impairment: Hard of hearing, bilateral  Skin:  appears intact, Noted R UE weeping (RN aware)  Edema: B LE edema (RN aware)  Range Of Motion:  AROM: Generally decreased, functional                       Strength:    Strength: Generally decreased, functional                    Tone & Sensation:   Tone: Normal              Sensation: Impaired               Coordination:  Coordination: Within functional limits  Vision:      Functional Mobility:  Bed Mobility:  Rolling: Minimum assistance  Supine to Sit: Moderate assistance;Assist x2  Sit to Supine: Minimum assistance;Assist x2  Scooting: Minimum assistance;Assist x2  Transfers:  Sit to Stand: Moderate assistance;Assist x2  Stand to Sit: Moderate assistance;Assist x2                       Balance:   Sitting: Intact; With support  Sitting - Static: Good (unsupported)  Sitting - Dynamic: Fair (occasional)  Standing: Impaired; With support  Standing - Static: Constant support; Fair  Standing - Dynamic : Constant support;Poor        Functional Measure:  Barthel Index:    Bathin  Bladder: 5  Bowels: 10  Groomin  Dressin  Feeding: 10  Mobility: 0  Stairs: 0  Toilet Use: 5  Transfer (Bed to Chair and Back): 5  Total: 45/100       The Barthel ADL Index: Guidelines  1. The index should be used as a record of what a patient does, not as a record of what a patient could do. 2. The main aim is to establish degree of independence from any help, physical or verbal, however minor and for whatever reason. 3. The need for supervision renders the patient not independent. 4. A patient's performance should be established using the best available evidence. Asking the patient, friends/relatives and nurses are the usual sources, but direct observation and common sense are also important. However direct testing is not needed.   5. Usually the patient's performance over the preceding 24-48 hours is important, but occasionally longer periods will be relevant. 6. Middle categories imply that the patient supplies over 50 per cent of the effort. 7. Use of aids to be independent is allowed. Score Interpretation (from [address] [facility] 83)    Independent   60-79 Minimally independent   40-59 Partially dependent   20-39 Very dependent   <20 Totally dependent     -Ashley Vega., Barthel, D.W. (1965). Functional evaluation: the Barthel Index. [address] ([address] (1997). The Barthel activities of daily living index: self-reporting versus actual performance in the old (> or = 75 years). Journal of [address] [place] [address] 45(7), [facility], BOBBY, Yvette Young, Rebecca Boswell. (1999). Measuring the change in disability after inpatient rehabilitation; comparison of the responsiveness of the Barthel Index and Functional Mayes Measure. Journal of Neurology, Neurosurgery, and Psychiatry, 66(4), 895-725. LULA Varela, NORA Potts, & Vishal Anthony MZOILA. (2004) Assessment of post-stroke quality of life in cost-effectiveness studies: The usefulness of the Barthel Index and the EuroQoL-5D. Quality of Life Research, 13, 427-53             Pain Rating:  Did not quantify pain, reports pain in L UE, some alleviated by sling    Activity Tolerance:   Fair and requires rest breaks    After treatment patient left in no apparent distress:   Supine in bed, Call bell within reach, Caregiver / family present, and Side rails x 3    COMMUNICATION/EDUCATION:   The patients plan of care was discussed with: Registered nurse. Fall prevention education was provided and the patient/caregiver indicated understanding.     Thank you for this referral.  Juve Frank, PT, DPT, NCS   Time Calculation: 28 mins

## 2022-12-09 NOTE — PROGRESS NOTES
Transition of Care Plan:     RUR:12% low risk   Disposition: [facility] accepted,   If SNF or IPR: Date FOC offered: SNF   Date FOC received:   Date authorization started with reference number: resubmitted: 22 reference #: [id number]  Date authorization received and expires:   Accepting facility: [facility]  Follow up appointments: PCP  DME needed: Owns cane, rollator, lift chair   Transportation at Discharge: Family/friend vs medical transport   Reche Crick or means to access home:   N/A     IM Medicare Letter: to be provided     Caregiver Contact: Jazmyne Whitehead Oar [phone]  Discharge Caregiver contacted prior to       Pt transferred from PCU. CM notified by attending that Pt is medically stable for d/c. Per chart review, Pt's auth  on  for [facility]. CM contacted [facility] to restart auth. CM was instructed to send updated clinicals to: [phone].     Auth reference#: [id number]      MIKE Novak, [address]

## 2022-12-09 NOTE — ROUTINE PROCESS
0845- Pt in post EP lab. Pacer incision intact, skin tear directly below skin tear 4cm x3cm noted. EP staff aware. Vaseline gauze placed directly to skin tear below dermabond of incision with telfa pad in order to place ice. Hortencia 44 completed. 1500- Orthostatic BP neg per therapy. 1930- Ice to left chest remains. Drsg changed to left chest wall. No c/o CP SOB. Paced on monitor.

## 2022-12-09 NOTE — PROGRESS NOTES
1900 Bedside and Verbal shift change report given to [address] (oncoming nurse) by Kelsey Kim RN (offgoing nurse). Report included the following information SBAR, Kardex, Intake/Output, MAR, Recent Results, and Cardiac Rhythm SB .     2140 TRANSFER - OUT REPORT:    Verbal report given to receiving RN on Rose Willis  being transferred to [facility] (unit) for routine progression of care       Report consisted of patients Situation, Background, Assessment and   Recommendations(SBAR). Information from the following report(s) SBAR, Kardex, Intake/Output, MAR, Recent Results, and Cardiac Rhythm SB  was reviewed with the receiving nurse. Lines:   Peripheral IV 11/29/22 Right Antecubital (Active)   Site Assessment Bleeding;Dry; Intact 12/08/22 2230   Phlebitis Assessment 0 12/08/22 2230   Infiltration Assessment 0 12/08/22 2230   Dressing Status Dry; Intact; Old drainage 12/08/22 2230   Dressing Type Transparent 12/08/22 2230   Hub Color/Line Status Pink;Capped 12/08/22 2230   Action Taken Open ports on tubing capped 12/08/22 1910   Alcohol Cap Used Yes 12/08/22 1910        Opportunity for questions and clarification was provided.       Patient transported with:   Monitor  Registered Nurse

## 2022-12-09 NOTE — PROGRESS NOTES
Left chest skin tear beside incision noted by Dr. Mra Vicente at end of pacemaker insertion, MD advised tech no dressing was needed. Reported out to [facility].

## 2022-12-09 NOTE — PROGRESS NOTES
Progress Note  (Nilo Devries MD fo    I have examined the patient. I have reviewed the chart and agree with the documentation recorded by the CHESTER, including the assessment, treatment plan, and disposition. Patient seen and examined by me. I personally performed all components of the history, physical, and medical decision making and agree with the assessment and plan with minor modifications as noted. Exam:  Alert and awake  HEENT AT  GI: soft w/ normal bowel sounds    A:   Impression:  Alcoholic cirrhosis with ascites  Malnutrition with hypoalbuminemia  Bradycardia          Acute hepatitis panel negative  Ferritin normal  Alpha 1 slightly low, 97 (>100 is normal) - A1AT pending  GEENA negative  AMA negative  ASMA negative  Ceruloplasmin slightly low, 14.3 (>16 is normal), 24h urine copper pending  Protein electrophoresis no spikes   AFP negative  Ammonia 32 (<32 is normal)    Plan:  Had an episode of bradycardia and hypotension. Plans are for a  dual-chamber pacemaker. Pt is stable from a GI perspective. We will set up an OP EGD for him. I will sign off      THEODORE French MD  [facility])

## 2022-12-09 NOTE — DISCHARGE SUMMARY
Hospitalist Discharge Summary     Patient ID:    Adria Alvarez  [id number]  47 y.o.  1945    Admit date: 11/30/2022    Discharge date : 12/9/2022        Final Diagnoses: Active Problems:    Weakness (11/30/2022)      Severe protein-calorie malnutrition ([address] (12/1/2022)      Anasarca (12/2/2022)      Hypotension (12/2/2022)      Sinus bradycardia (12/5/2022)        Reason for Hospitalization:  Lower extremity swelling    Hospital Course:   Mr. Stephanie Torres is a 69year old male with PMH including remote gastric bypass surgery, indigestion, HTN, and hypothyroidism who presented to the ED with c/o BLE edema w/ associated weakness & activity intolerance. He admits to a 50 year history of \"several drinks a day\" of \"beer and vodka. \"  BLE duplex (-) for DVT, EF 15-54% w/ diastolic dysfunction on Echo, pBNP 2187 on admit. Elevated LFTs (AST 50, Alk Phos 118), ammonia 32, and abd US showed cirrhotic liver w mod to large ascites. GI was consulted and a paracentesis attempted but not enough fluid to drain at that time. Pt started on lasix and spironolactone as well as lactulose. Throughout the admission he was sinus bradycardic on telemetry as low as mid 30s at times, and severely orthostatic Bps dropping as low as 60s/20s on standing. PEDRO hose place, Midodrine was added, pt received IV albumin, and adjustments were made to diuretics trying to support BP and increase HR, without success, so on 12/9 pt went for PPM with Dr. Devon Munroe to left chest.  Post PPM insertion, HR 60 atrial paced, and BP at rest 125/73, pt reported subjective improvement. GI signed off with instructions to follow up outpt for EGD, cardiology signed off with instructions to follow up in office in 1-2 weeks. PT is recommending DC to SNF. Pt is medically cleared for discharge when SNF arrangements made.     Discharge Medications:   Current Discharge Medication List        START taking these medications    Details thiamine mononitrate (B-1) 100 mg tablet Take 1 Tablet by mouth daily for 30 days. Qty: 30 Tablet, Refills: 0  Start date: 12/10/2022, End date: 1/9/2023      spironolactone (ALDACTONE) 25 mg tablet Take 1 Tablet by mouth daily for 30 days. Qty: 30 Tablet, Refills: 0  Start date: 12/10/2022, End date: 1/9/2023           CONTINUE these medications which have CHANGED    Details   levothyroxine (synthroid) 50 mcg tablet Take 1 Tablet by mouth Daily (before breakfast) for 30 days. Indications: a condition with low thyroid hormone levels  Qty: 30 Tablet, Refills: 0  Start date: 12/10/2022, End date: 1/9/2023      furosemide (LASIX) 20 mg tablet Take 1 Tablet by mouth daily for 30 days. Qty: 30 Tablet, Refills: 0  Start date: 12/9/2022, End date: 1/8/2023           CONTINUE these medications which have NOT CHANGED    Details   pantoprazole (PROTONIX) 40 mg tablet Take 40 mg by mouth daily. multivitamin (ONE A DAY) tablet Take 1 Tab by mouth daily. aspirin 81 mg chewable tablet Take 81 mg by mouth daily. sucralfate (CARAFATE) 1 gram tablet Take 1 g by mouth four (4) times daily.  and at [facility]      ergocalciferol (ERGOCALCIFEROL) 1,250 mcg (50,000 unit) capsule Take 50,000 Units by mouth two (2) times a week. LAST TAKEN April 6,4864      folic acid (FOLVITE) 079 mcg tablet Take 800 mcg by mouth daily. cyanocobalamin 1,000 mcg tablet Take  by mouth daily. Takes unknown dose      ascorbic acid, vitamin C, (VITAMIN C) 500 mg tablet Take  by mouth daily. Takes unknown dose               Follow up Care:    1. Kimberlee Russo MD in 1-2 weeks.       Follow-up Information       Follow up With Specialties Details Why Contact Info    Kimberlee Russo MD Family Medicine Schedule an appointment as soon as possible for a visit in 2 days  [address] [place] [address]  [phone]      [facility] EMERGENCY DEPT Emergency Medicine  As needed [address] [facility] [address]   [address] [address] [phone] [facility] HE[facility] Urgent Care Follow up This is a mobile urgent care that can visit and evaluate you at home. If you are in need of visit or have any questions, please call to discuss. [id number]  [facility]  [address]   [address] [facility]    Donna Feliz MD Clinical Cardiac Electrophysiology Physician, Cardio Vascular Surgery, Cardiovascular Disease Physician Follow up in 1 week(s)  [address] [facility]  [phone]                * Follow-up Care/Patient Instructions: Activity: Activity as tolerated  Diet: Regular Diet  Wound Care: Ice to area for comfort, keep left arm immobilized, NWB to left arm, site check per cardiology followup     Condition at Discharge:  Stable  __________________________________________________________________    Disposition  [facility]  ____________________________________________________________________    Code Status:  Full Code  ___________________________________________________________________    Discharge Exam:  Patient seen and examined by me on discharge day. Physical Exam  Constitutional:       General: He is not in acute distress. Appearance: Normal appearance. HENT:      Head: Normocephalic and atraumatic. Mouth/Throat:      Mouth: Mucous membranes are moist.   Eyes:      Pupils: Pupils are equal, round, and reactive to light. Cardiovascular:      Pulses:           Radial pulses are 2+ on the right side and 2+ on the left side. Dorsalis pedis pulses are 1+ on the right side and 1+ on the left side. Heart sounds: Normal heart sounds. Comments: Atrial paced HR 60 on telemetry  Pulmonary:      Effort: Pulmonary effort is normal.      Breath sounds: Normal breath sounds. Abdominal:      General: Abdomen is flat.  Bowel sounds are normal. There is no distension. Palpations: Abdomen is soft. Tenderness: There is no abdominal tenderness. There is no guarding. Musculoskeletal:      Right lower leg: Edema present. Left lower leg: Edema present. Comments: 1+ LLE, 2+ RLE   Skin:     General: Skin is warm and dry. Comments: Left chest PPM insertion site covered with gauze, clean dry and intact. Neurological:      Mental Status: He is alert and oriented to person, place, and time. CONSULTATIONS: Cardiology and GI    Significant Diagnostic Studies:   Recent Results (from the past 24 hour(s))   METABOLIC PANEL, BASIC    Collection Time: 12/09/22  4:11 AM   Result Value Ref Range    Sodium 133 (L) 136 - 145 mmol/L    Potassium 4.5 3.5 - 5.1 mmol/L    Chloride 99 97 - 108 mmol/L    CO2 31 21 - 32 mmol/L    Anion gap 3 (L) 5 - 15 mmol/L    Glucose 73 65 - 100 mg/dL    BUN 16 6 - 20 MG/DL    Creatinine 1.06 0.70 - 1.30 MG/DL    BUN/Creatinine ratio 15 12 - 20      eGFR >60 >60 ml/min/1.73m2    Calcium 8.2 (L) 8.5 - 10.1 MG/DL   CBC W/O DIFF    Collection Time: 12/09/22  4:11 AM   Result Value Ref Range    WBC 4.6 4.1 - 11.1 K/uL    RBC 2.58 (L) 4.10 - 5.70 M/uL    HGB 9.0 (L) 12.1 - 17.0 g/dL    HCT 27.4 (L) 36.6 - 50.3 %    .2 (H) 80.0 - 99.0 FL    MCH 34.9 (H) 26.0 - 34.0 PG    MCHC 32.8 30.0 - 36.5 g/dL    RDW 13.7 11.5 - 14.5 %    PLATELET 402 893 - 663 K/uL    MPV 10.1 8.9 - 12.9 FL    NRBC 0.0 0  WBC    ABSOLUTE NRBC 0.00 0.00 - 0.01 K/uL   PROTHROMBIN TIME + INR    Collection Time: 12/09/22  4:11 AM   Result Value Ref Range    INR 1.3 (H) 0.9 - 1.1      Prothrombin time 13.0 (H) 9.0 - 11.1 sec   PTT    Collection Time: 12/09/22  4:11 AM   Result Value Ref Range    aPTT 24.7 22.1 - 31.0 sec    aPTT, therapeutic range     58.0 - 77.0 SECS     XR CHEST PORT   Final Result    impression: Cardiac pacemaker, no pneumothorax. .         CT HEAD WO CONT   Final Result      No acute intracranial abnormality on this noncontrast head CT. Increased   cerebral volume loss. DUPLEX CAROTID BILATERAL   Final Result      US ABD LTD   Final Result   Small amount of ascites. US ABD COMP   Final Result   Cirrhotic liver with moderate to large ascites. XR CHEST PORT   Final Result   No Acute Disease. DUPLEX LOWER EXT VENOUS BILAT   Final Result          Discharge: time spent greater than 35 minutes in discharge  Education and counseling.      Signed:  Wandalee Nissen, APRN  12/9/2022  1:02 PM

## 2022-12-10 LAB
ANION GAP SERPL CALC-SCNC: 5 MMOL/L (ref 5–15)
BUN SERPL-MCNC: 16 MG/DL (ref 6–20)
BUN/CREAT SERPL: 16 (ref 12–20)
CALCIUM SERPL-MCNC: 8.1 MG/DL (ref 8.5–10.1)
CHLORIDE SERPL-SCNC: 99 MMOL/L (ref 97–108)
CO2 SERPL-SCNC: 29 MMOL/L (ref 21–32)
CREAT SERPL-MCNC: 1 MG/DL (ref 0.7–1.3)
ERYTHROCYTE [DISTWIDTH] IN BLOOD BY AUTOMATED COUNT: 13.6 % (ref 11.5–14.5)
GLUCOSE SERPL-MCNC: 76 MG/DL (ref 65–100)
HCT VFR BLD AUTO: 30.4 % (ref 36.6–50.3)
HGB BLD-MCNC: 10 G/DL (ref 12.1–17)
MCH RBC QN AUTO: 34.8 PG (ref 26–34)
MCHC RBC AUTO-ENTMCNC: 32.9 G/DL (ref 30–36.5)
MCV RBC AUTO: 105.9 FL (ref 80–99)
NRBC # BLD: 0 K/UL (ref 0–0.01)
NRBC BLD-RTO: 0 PER 100 WBC
PLATELET # BLD AUTO: 240 K/UL (ref 150–400)
PMV BLD AUTO: 9.5 FL (ref 8.9–12.9)
POTASSIUM SERPL-SCNC: 4.4 MMOL/L (ref 3.5–5.1)
RBC # BLD AUTO: 2.87 M/UL (ref 4.1–5.7)
SODIUM SERPL-SCNC: 133 MMOL/L (ref 136–145)
WBC # BLD AUTO: 5.2 K/UL (ref 4.1–11.1)

## 2022-12-10 PROCEDURE — 74011000250 HC RX REV CODE- 250: Performed by: INTERNAL MEDICINE

## 2022-12-10 PROCEDURE — 36415 COLL VENOUS BLD VENIPUNCTURE: CPT

## 2022-12-10 PROCEDURE — 65270000046 HC RM TELEMETRY

## 2022-12-10 PROCEDURE — 80048 BASIC METABOLIC PNL TOTAL CA: CPT

## 2022-12-10 PROCEDURE — 74011250636 HC RX REV CODE- 250/636

## 2022-12-10 PROCEDURE — 74011250637 HC RX REV CODE- 250/637

## 2022-12-10 PROCEDURE — 85027 COMPLETE CBC AUTOMATED: CPT

## 2022-12-10 PROCEDURE — 65270000029 HC RM PRIVATE

## 2022-12-10 PROCEDURE — 74011250637 HC RX REV CODE- 250/637: Performed by: INTERNAL MEDICINE

## 2022-12-10 RX ORDER — HYDROXYZINE HYDROCHLORIDE 10 MG/1
10 TABLET, FILM COATED ORAL
Status: DISCONTINUED | OUTPATIENT
Start: 2022-12-10 | End: 2022-12-13 | Stop reason: HOSPADM

## 2022-12-10 RX ADMIN — TRAMADOL HYDROCHLORIDE 50 MG: 50 TABLET ORAL at 21:17

## 2022-12-10 RX ADMIN — TRAMADOL HYDROCHLORIDE 50 MG: 50 TABLET ORAL at 12:05

## 2022-12-10 RX ADMIN — THERA TABS 1 TABLET: TAB at 09:12

## 2022-12-10 RX ADMIN — SUCRALFATE 1 G: 1 TABLET ORAL at 21:17

## 2022-12-10 RX ADMIN — TETRAHYDROZOLINE HCL 1 DROP: 0.05 SOLUTION/ DROPS OPHTHALMIC at 09:09

## 2022-12-10 RX ADMIN — SUCRALFATE 1 G: 1 TABLET ORAL at 17:15

## 2022-12-10 RX ADMIN — ASPIRIN 81 MG: 81 TABLET, CHEWABLE ORAL at 09:08

## 2022-12-10 RX ADMIN — SODIUM CHLORIDE, PRESERVATIVE FREE 10 ML: 5 INJECTION INTRAVENOUS at 06:22

## 2022-12-10 RX ADMIN — TETRAHYDROZOLINE HCL 1 DROP: 0.05 SOLUTION/ DROPS OPHTHALMIC at 17:19

## 2022-12-10 RX ADMIN — SODIUM CHLORIDE, PRESERVATIVE FREE 10 ML: 5 INJECTION INTRAVENOUS at 17:17

## 2022-12-10 RX ADMIN — Medication 1000 MCG: at 09:08

## 2022-12-10 RX ADMIN — LACTULOSE 15 ML: 20 SOLUTION ORAL at 09:05

## 2022-12-10 RX ADMIN — LACTULOSE 15 ML: 20 SOLUTION ORAL at 17:15

## 2022-12-10 RX ADMIN — SODIUM CHLORIDE, PRESERVATIVE FREE 10 ML: 5 INJECTION INTRAVENOUS at 21:18

## 2022-12-10 RX ADMIN — TRAMADOL HYDROCHLORIDE 50 MG: 50 TABLET ORAL at 06:22

## 2022-12-10 RX ADMIN — CALCIUM CARBONATE (ANTACID) CHEW TAB 500 MG 200 MG: 500 CHEW TAB at 12:04

## 2022-12-10 RX ADMIN — OXYCODONE HYDROCHLORIDE AND ACETAMINOPHEN 500 MG: 500 TABLET ORAL at 09:58

## 2022-12-10 RX ADMIN — FOLIC ACID 1 MG: 1 TABLET ORAL at 09:08

## 2022-12-10 RX ADMIN — PANTOPRAZOLE SODIUM 40 MG: 40 TABLET, DELAYED RELEASE ORAL at 09:08

## 2022-12-10 RX ADMIN — CHLORDIAZEPOXIDE HYDROCHLORIDE 5 MG: 5 CAPSULE ORAL at 09:58

## 2022-12-10 RX ADMIN — LEVOTHYROXINE SODIUM 50 MCG: 0.05 TABLET ORAL at 06:22

## 2022-12-10 RX ADMIN — CALCIUM CARBONATE (ANTACID) CHEW TAB 500 MG 200 MG: 500 CHEW TAB at 17:15

## 2022-12-10 RX ADMIN — CALCIUM CARBONATE (ANTACID) CHEW TAB 500 MG 200 MG: 500 CHEW TAB at 09:08

## 2022-12-10 RX ADMIN — FUROSEMIDE 20 MG: 20 TABLET ORAL at 09:08

## 2022-12-10 RX ADMIN — TETRAHYDROZOLINE HCL 1 DROP: 0.05 SOLUTION/ DROPS OPHTHALMIC at 12:06

## 2022-12-10 RX ADMIN — THIAMINE HCL TAB 100 MG 100 MG: 100 TAB at 09:08

## 2022-12-10 RX ADMIN — SPIRONOLACTONE 25 MG: 25 TABLET ORAL at 09:08

## 2022-12-10 RX ADMIN — SUCRALFATE 1 G: 1 TABLET ORAL at 09:08

## 2022-12-10 RX ADMIN — ENOXAPARIN SODIUM 40 MG: 100 INJECTION SUBCUTANEOUS at 09:09

## 2022-12-10 RX ADMIN — CHLORDIAZEPOXIDE HYDROCHLORIDE 5 MG: 5 CAPSULE ORAL at 17:15

## 2022-12-10 RX ADMIN — SUCRALFATE 1 G: 1 TABLET ORAL at 12:04

## 2022-12-10 RX ADMIN — MAGNESIUM OXIDE TAB 400 MG (241.3 MG ELEMENTAL MG) 400 MG: 400 (241.3 MG) TAB at 09:08

## 2022-12-10 NOTE — DISCHARGE SUMMARY
Hospitalist Discharge Summary     Patient ID:    Sarai Laurent  [id number]  85 y.o.  1945    Admit date: 11/30/2022    Discharge date : 12/10/2022        Final Diagnoses: Active Problems:    Weakness (11/30/2022)      Severe protein-calorie malnutrition ([address] (12/1/2022)      Anasarca (12/2/2022)      Hypotension (12/2/2022)      Sinus bradycardia (12/5/2022)        Reason for Hospitalization:  Lower extremity swelling     Hospital Course:   Mr. Alla Dorantes is a 69year old male with PMH including remote gastric bypass surgery, indigestion, HTN, and hypothyroidism who presented to the ED with c/o BLE edema w/ associated weakness & activity intolerance. He admits to a 50 year history of \"several drinks a day\" of \"beer and vodka. \"  BLE duplex (-) for DVT, EF 13-10% w/ diastolic dysfunction on Echo, pBNP 2187 on admit. Elevated LFTs (AST 50, Alk Phos 118), ammonia 32, and abd US showed cirrhotic liver w mod to large ascites. GI was consulted and a paracentesis attempted but not enough fluid to drain at that time. Pt started on lasix and spironolactone as well as lactulose. Throughout the admission he was sinus bradycardic on telemetry as low as mid 30s at times, and severely orthostatic Bps dropping as low as 60s/20s on standing. PEDRO hose place, Midodrine was added, pt received IV albumin, and adjustments were made to diuretics trying to support BP and increase HR, without success, so on 12/9 pt went for PPM with Dr. Parris Kamara to left chest.  Post PPM insertion, HR 60 atrial paced, and BP at rest 125/73, pt reported subjective improvement. BP in fact running higher so Dc'd midodrine, was able to resume lasix and spironolactone, pt will be discharged with these. GI signed off with instructions to follow up outpt for EGD, cardiology signed off with instructions to follow up in office in 1-2 weeks. PT is recommending DC to SNF.   Pt remains medically cleared for discharge when SNF arrangements made. Discharge Medications:   Current Discharge Medication List        START taking these medications    Details   thiamine mononitrate (B-1) 100 mg tablet Take 1 Tablet by mouth daily for 30 days. Qty: 30 Tablet, Refills: 0  Start date: 12/10/2022, End date: 1/9/2023      spironolactone (ALDACTONE) 25 mg tablet Take 1 Tablet by mouth daily for 30 days. Qty: 30 Tablet, Refills: 0  Start date: 12/10/2022, End date: 1/9/2023           CONTINUE these medications which have CHANGED    Details   levothyroxine (synthroid) 50 mcg tablet Take 1 Tablet by mouth Daily (before breakfast) for 30 days. Indications: a condition with low thyroid hormone levels  Qty: 30 Tablet, Refills: 0  Start date: 12/10/2022, End date: 1/9/2023      furosemide (LASIX) 20 mg tablet Take 1 Tablet by mouth daily for 30 days. Qty: 30 Tablet, Refills: 0  Start date: 12/9/2022, End date: 1/8/2023           CONTINUE these medications which have NOT CHANGED    Details   pantoprazole (PROTONIX) 40 mg tablet Take 40 mg by mouth daily. multivitamin (ONE A DAY) tablet Take 1 Tab by mouth daily. aspirin 81 mg chewable tablet Take 81 mg by mouth daily. sucralfate (CARAFATE) 1 gram tablet Take 1 g by mouth four (4) times daily.  and at [facility]      ergocalciferol (ERGOCALCIFEROL) 1,250 mcg (50,000 unit) capsule Take 50,000 Units by mouth two (2) times a week. LAST TAKEN April 1,7297      folic acid (FOLVITE) 948 mcg tablet Take 800 mcg by mouth daily. cyanocobalamin 1,000 mcg tablet Take  by mouth daily. Takes unknown dose      ascorbic acid, vitamin C, (VITAMIN C) 500 mg tablet Take  by mouth daily. Takes unknown dose               Follow up Care:    1. Suad Gomez MD in 1-2 weeks.       Follow-up Information       Follow up With Specialties Details Why Contact Info    Suad Gomez MD Family Medicine Schedule an appointment as soon as possible for a visit in 2 days  [address] [place] [address]  [phone]      [facility] EMERGENCY DEPT Emergency Medicine  As needed [address]  [address] [facility]  [phone]    [facility] Follow up This is a mobile urgent care that can visit and evaluate you at home. If you are in need of visit or have any questions, please call to discuss. [id number]   [address] [facility]    Jorge A Hayes MD Clinical Cardiac Electrophysiology Physician, [address] [place] [address] Vascular Surgery, Cardiovascular Disease Physician Follow up in 1 week(s)  [address]oc[address]ado [id number] [phone]                ** Follow-up Care/Patient Instructions: Activity: Activity as tolerated  Diet: Regular Diet  Wound Care: Ice to area for comfort, keep left arm immobilized, NWB to left arm, site check per cardiology followup      Condition at Discharge:  Stable  __________________________________________________________________     Disposition  [facility]  ____________________________________________________________________     Code Status:  Full Code  ___________________________________________________________________     Discharge Exam:  Patient seen and examined by me on discharge day. Physical Exam  Constitutional:       General: He is not in acute distress. Appearance: Normal appearance. HENT:      Head: Normocephalic and atraumatic. Mouth/Throat:      Mouth: Mucous membranes are moist.   Eyes:      Pupils: Pupils are equal, round, and reactive to light. Cardiovascular:      Pulses:           Radial pulses are 2+ on the right side and 2+ on the left side. Dorsalis pedis pulses are 1+ on the right side and 1+ on the left side. Heart sounds: Normal heart sounds.       Comments: Atrial paced HR 60 on telemetry  Pulmonary:      Effort: Pulmonary effort is normal.      Breath sounds: Normal breath sounds. Abdominal:      General: Abdomen is flat. Bowel sounds are normal. There is no distension. Palpations: Abdomen is soft. Tenderness: There is no abdominal tenderness. There is no guarding. Musculoskeletal:      Right lower leg: Edema present. Left lower leg: Edema present. Comments: 1+ LLE, 2+ RLE   Skin:     General: Skin is warm and dry. Comments: Left chest PPM insertion site covered with gauze, clean dry and intact. Neurological:      Mental Status: He is alert and oriented to person, place, and time. CONSULTATIONS: Cardiology and GI    Significant Diagnostic Studies:   Recent Results (from the past 24 hour(s))   METABOLIC PANEL, BASIC    Collection Time: 12/10/22  3:28 AM   Result Value Ref Range    Sodium 133 (L) 136 - 145 mmol/L    Potassium 4.4 3.5 - 5.1 mmol/L    Chloride 99 97 - 108 mmol/L    CO2 29 21 - 32 mmol/L    Anion gap 5 5 - 15 mmol/L    Glucose 76 65 - 100 mg/dL    BUN 16 6 - 20 MG/DL    Creatinine 1.00 0.70 - 1.30 MG/DL    BUN/Creatinine ratio 16 12 - 20      eGFR >60 >60 ml/min/1.73m2    Calcium 8.1 (L) 8.5 - 10.1 MG/DL   CBC W/O DIFF    Collection Time: 12/10/22  3:28 AM   Result Value Ref Range    WBC 5.2 4.1 - 11.1 K/uL    RBC 2.87 (L) 4.10 - 5.70 M/uL    HGB 10.0 (L) 12.1 - 17.0 g/dL    HCT 30.4 (L) 36.6 - 50.3 %    .9 (H) 80.0 - 99.0 FL    MCH 34.8 (H) 26.0 - 34.0 PG    MCHC 32.9 30.0 - 36.5 g/dL    RDW 13.6 11.5 - 14.5 %    PLATELET 837 948 - 955 K/uL    MPV 9.5 8.9 - 12.9 FL    NRBC 0.0 0  WBC    ABSOLUTE NRBC 0.00 0.00 - 0.01 K/uL     XR CHEST PORT   Final Result    impression: Cardiac pacemaker, no pneumothorax. .         CT HEAD WO CONT   Final Result      No acute intracranial abnormality on this noncontrast head CT. Increased   cerebral volume loss. DUPLEX CAROTID BILATERAL   Final Result      US ABD LTD   Final Result   Small amount of ascites.       US ABD COMP   Final Result   Cirrhotic liver with moderate to large ascites. XR CHEST PORT   Final Result   No Acute Disease. DUPLEX LOWER EXT VENOUS BILAT   Final Result          Discharge: time spent greater than 35 minutes in discharge  Education and counseling.      Signed:  CHARANJIT Duenas  12/10/2022  8:07 AM

## 2022-12-11 PROCEDURE — 74011250637 HC RX REV CODE- 250/637

## 2022-12-11 PROCEDURE — 65270000029 HC RM PRIVATE

## 2022-12-11 PROCEDURE — 74011000250 HC RX REV CODE- 250: Performed by: INTERNAL MEDICINE

## 2022-12-11 PROCEDURE — 65270000046 HC RM TELEMETRY

## 2022-12-11 PROCEDURE — 74011250637 HC RX REV CODE- 250/637: Performed by: INTERNAL MEDICINE

## 2022-12-11 PROCEDURE — 74011250636 HC RX REV CODE- 250/636

## 2022-12-11 RX ADMIN — TETRAHYDROZOLINE HCL 1 DROP: 0.05 SOLUTION/ DROPS OPHTHALMIC at 08:29

## 2022-12-11 RX ADMIN — SUCRALFATE 1 G: 1 TABLET ORAL at 08:27

## 2022-12-11 RX ADMIN — THERA TABS 1 TABLET: TAB at 08:27

## 2022-12-11 RX ADMIN — THIAMINE HCL TAB 100 MG 100 MG: 100 TAB at 08:28

## 2022-12-11 RX ADMIN — SODIUM CHLORIDE, PRESERVATIVE FREE 10 ML: 5 INJECTION INTRAVENOUS at 21:44

## 2022-12-11 RX ADMIN — FOLIC ACID 1 MG: 1 TABLET ORAL at 08:27

## 2022-12-11 RX ADMIN — TETRAHYDROZOLINE HCL 1 DROP: 0.05 SOLUTION/ DROPS OPHTHALMIC at 17:39

## 2022-12-11 RX ADMIN — LACTULOSE 15 ML: 20 SOLUTION ORAL at 08:28

## 2022-12-11 RX ADMIN — TETRAHYDROZOLINE HCL 1 DROP: 0.05 SOLUTION/ DROPS OPHTHALMIC at 21:47

## 2022-12-11 RX ADMIN — FUROSEMIDE 20 MG: 20 TABLET ORAL at 08:28

## 2022-12-11 RX ADMIN — Medication 1000 MCG: at 08:27

## 2022-12-11 RX ADMIN — SODIUM CHLORIDE, PRESERVATIVE FREE 10 ML: 5 INJECTION INTRAVENOUS at 15:12

## 2022-12-11 RX ADMIN — LEVOTHYROXINE SODIUM 50 MCG: 0.05 TABLET ORAL at 06:30

## 2022-12-11 RX ADMIN — TRAMADOL HYDROCHLORIDE 50 MG: 50 TABLET ORAL at 03:20

## 2022-12-11 RX ADMIN — CHLORDIAZEPOXIDE HYDROCHLORIDE 5 MG: 5 CAPSULE ORAL at 17:35

## 2022-12-11 RX ADMIN — CALCIUM CARBONATE (ANTACID) CHEW TAB 500 MG 200 MG: 500 CHEW TAB at 17:35

## 2022-12-11 RX ADMIN — CALCIUM CARBONATE (ANTACID) CHEW TAB 500 MG 200 MG: 500 CHEW TAB at 12:47

## 2022-12-11 RX ADMIN — SPIRONOLACTONE 25 MG: 25 TABLET ORAL at 08:28

## 2022-12-11 RX ADMIN — PANTOPRAZOLE SODIUM 40 MG: 40 TABLET, DELAYED RELEASE ORAL at 08:27

## 2022-12-11 RX ADMIN — MAGNESIUM OXIDE TAB 400 MG (241.3 MG ELEMENTAL MG) 400 MG: 400 (241.3 MG) TAB at 08:27

## 2022-12-11 RX ADMIN — OXYCODONE HYDROCHLORIDE AND ACETAMINOPHEN 500 MG: 500 TABLET ORAL at 12:47

## 2022-12-11 RX ADMIN — TRAMADOL HYDROCHLORIDE 50 MG: 50 TABLET ORAL at 21:47

## 2022-12-11 RX ADMIN — ENOXAPARIN SODIUM 40 MG: 100 INJECTION SUBCUTANEOUS at 08:28

## 2022-12-11 RX ADMIN — CALCIUM CARBONATE (ANTACID) CHEW TAB 500 MG 200 MG: 500 CHEW TAB at 08:27

## 2022-12-11 RX ADMIN — TETRAHYDROZOLINE HCL 1 DROP: 0.05 SOLUTION/ DROPS OPHTHALMIC at 12:48

## 2022-12-11 RX ADMIN — LACTULOSE 15 ML: 20 SOLUTION ORAL at 17:35

## 2022-12-11 RX ADMIN — SUCRALFATE 1 G: 1 TABLET ORAL at 12:47

## 2022-12-11 RX ADMIN — TRAMADOL HYDROCHLORIDE 50 MG: 50 TABLET ORAL at 12:47

## 2022-12-11 RX ADMIN — SODIUM CHLORIDE, PRESERVATIVE FREE 10 ML: 5 INJECTION INTRAVENOUS at 06:51

## 2022-12-11 RX ADMIN — CHLORDIAZEPOXIDE HYDROCHLORIDE 5 MG: 5 CAPSULE ORAL at 08:27

## 2022-12-11 RX ADMIN — ACETAMINOPHEN 650 MG: 325 TABLET ORAL at 12:47

## 2022-12-11 RX ADMIN — SUCRALFATE 1 G: 1 TABLET ORAL at 21:44

## 2022-12-11 RX ADMIN — SUCRALFATE 1 G: 1 TABLET ORAL at 17:35

## 2022-12-11 RX ADMIN — ASPIRIN 81 MG: 81 TABLET, CHEWABLE ORAL at 08:27

## 2022-12-11 NOTE — DISCHARGE SUMMARY
Hospitalist Discharge Summary     Patient ID:    Serina Springer  [id number]  30 y.o.  1945    Admit date: 11/30/2022    Discharge date : 12/11/2022        Final Diagnoses: Active Problems:    Weakness (11/30/2022)      Severe protein-calorie malnutrition ([address] (12/1/2022)      Anasarca (12/2/2022)      Hypotension (12/2/2022)      Sinus bradycardia (12/5/2022)        Reason for Hospitalization:  Lower extremity swelling     Hospital Course:   Mr. Rosario Fletcher is a 69year old male with PMH including remote gastric bypass surgery, indigestion, HTN, and hypothyroidism who presented to the ED with c/o BLE edema w/ associated weakness & activity intolerance. He admits to a 50 year history of \"several drinks a day\" of \"beer and vodka. \"  BLE duplex (-) for DVT, EF 30-20% w/ diastolic dysfunction on Echo, pBNP 2187 on admit. Elevated LFTs (AST 50, Alk Phos 118), ammonia 32, and abd US showed cirrhotic liver w mod to large ascites. GI was consulted and a paracentesis attempted but not enough fluid to drain at that time. Pt started on lasix and spironolactone as well as lactulose. Throughout the admission he was sinus bradycardic on telemetry as low as mid 30s at times, and severely orthostatic Bps dropping as low as 60s/20s on standing. PEDRO hose place, Midodrine was added, pt received IV albumin, and adjustments were made to diuretics trying to support BP and increase HR, without success, so on 12/9 pt went for PPM with Dr. Earl Torres to left chest.  Post PPM insertion, HR 60 atrial paced, and BP at rest 125/73, pt reported subjective improvement. BP in fact running higher so Dc'd midodrine, was able to resume lasix and spironolactone, pt will be discharged with these. GI signed off with instructions to follow up outpt for EGD, cardiology signed off with instructions to follow up in office in 1-2 weeks.   Pt unmotivated to get OOB to chair, encouraged mobility during conversation with pt, reiterated with RN. PT/OT is recommending DC to SNF, pending auth at [address] [facility]. Pt remains medically cleared for discharge when SNF arrangements made. Discharge Medications:   Current Discharge Medication List        START taking these medications    Details   thiamine mononitrate (B-1) 100 mg tablet Take 1 Tablet by mouth daily for 30 days. Qty: 30 Tablet, Refills: 0  Start date: 12/10/2022, End date: 1/9/2023      spironolactone (ALDACTONE) 25 mg tablet Take 1 Tablet by mouth daily for 30 days. Qty: 30 Tablet, Refills: 0  Start date: 12/10/2022, End date: 1/9/2023           CONTINUE these medications which have CHANGED    Details   levothyroxine (synthroid) 50 mcg tablet Take 1 Tablet by mouth Daily (before breakfast) for 30 days. Indications: a condition with low thyroid hormone levels  Qty: 30 Tablet, Refills: 0  Start date: 12/10/2022, End date: 1/9/2023      furosemide (LASIX) 20 mg tablet Take 1 Tablet by mouth daily for 30 days. Qty: 30 Tablet, Refills: 0  Start date: 12/9/2022, End date: 1/8/2023           CONTINUE these medications which have NOT CHANGED    Details   pantoprazole (PROTONIX) 40 mg tablet Take 40 mg by mouth daily. multivitamin (ONE A DAY) tablet Take 1 Tab by mouth daily. aspirin 81 mg chewable tablet Take 81 mg by mouth daily. sucralfate (CARAFATE) 1 gram tablet Take 1 g by mouth four (4) times daily.  and at [facility]      ergocalciferol (ERGOCALCIFEROL) 1,250 mcg (50,000 unit) capsule Take 50,000 Units by mouth two (2) times a week. LAST TAKEN April 1,6846      folic acid (FOLVITE) 331 mcg tablet Take 800 mcg by mouth daily. cyanocobalamin 1,000 mcg tablet Take  by mouth daily. Takes unknown dose      ascorbic acid, vitamin C, (VITAMIN C) 500 mg tablet Take  by mouth daily. Takes unknown dose               Follow up Care:    1. Christiano Maharaj MD in 1-2 weeks.       Follow-up Information       Follow up With Specialties Details Why Contact Info    Christiano Maharaj MD Family Medicine Schedule an appointment as soon as possible for a visit in 2 days  [id number] [place] [address] [facility]  [phone]      [facility] [place] EM[facility]ENCY [facility] Emergency Medicine  As needed [address]  [address] [facility]  [phone]    [facility] Follow up This is a mobile urgent care that can visit and evaluate you at home. If you are in need of visit or have any questions, please call to discuss. [address] [place], [place] [address] [address] [facility]    Marie Gandhi MD Clinical Cardiac Electrophysiology Physician, Cardio Vascular Surgery, Cardiovascular Disease Physician Follow up in 1 week(s)  [address] [facility]  [phone]                * Follow-up Care/Patient Instructions: Activity: Activity as tolerated  Diet: Regular Diet  Wound Care: Ice to area for comfort, keep left arm immobilized, NWB to left arm, site check per cardiology followup       Condition at Discharge:  Stable  __________________________________________________________________    Disposition  [facility]  ____________________________________________________________________    Code Status:  Full Code  ___________________________________________________________________    Discharge Exam:  Patient seen and examined by me on discharge day. Physical Exam  Constitutional:       General: He is not in acute distress. Appearance: Normal appearance. HENT:      Head: Normocephalic and atraumatic. Mouth/Throat:      Mouth: Mucous membranes are moist.   Eyes:      Pupils: Pupils are equal, round, and reactive to light. Cardiovascular:      Pulses:           Radial pulses are 2+ on the right side and 2+ on the left side. Dorsalis pedis pulses are 1+ on the right side and 1+ on the left side. Heart sounds: Normal heart sounds. Comments: Atrial paced HR 60 on telemetry  Pulmonary:      Effort: Pulmonary effort is normal.      Breath sounds: Normal breath sounds. Abdominal:      General: Abdomen is flat. Bowel sounds are normal. There is no distension. Palpations: Abdomen is soft. Tenderness: There is no abdominal tenderness. There is no guarding. Musculoskeletal:      Right lower leg: Edema present. Left lower leg: Edema present. Comments: 1+ LLE, 2+ RLE   Skin:     General: Skin is warm and dry. Comments: Left chest PPM insertion site covered with gauze, clean dry and intact. Neurological:      Mental Status: He is alert and oriented to person, place, and time. CONSULTATIONS: Cardiology    Significant Diagnostic Studies:   No results found for this or any previous visit (from the past 24 hour(s)). XR CHEST PORT   Final Result    impression: Cardiac pacemaker, no pneumothorax. .         CT HEAD WO CONT   Final Result      No acute intracranial abnormality on this noncontrast head CT. Increased   cerebral volume loss. DUPLEX CAROTID BILATERAL   Final Result      US ABD LTD   Final Result   Small amount of ascites. US ABD COMP   Final Result   Cirrhotic liver with moderate to large ascites. XR CHEST PORT   Final Result   No Acute Disease. DUPLEX LOWER EXT VENOUS BILAT   Final Result          Discharge: time spent greater than 35 minutes in discharge  Education and counseling.      Signed:  CHARANJIT Wolf  12/11/2022  8:13 AM

## 2022-12-11 NOTE — PROGRESS NOTES
Transition of Care Plan:     RUR:12% low risk   Disposition: [facility] accepted,   If SNF or IPR: Date FOC offered: SNF   Date FOC received:   Date authorization started with reference number: resubmitted: 22 reference #: [id number]  Date authorization received and expires:   Accepting facility: [address] [place] [address],[address]  Follow up appointments: PCP  DME needed: Owns cane, rollator, lift chair   Transportation at Discharge: Family/friend vs medical transport   [address] [place] [address] or means to access home:   N/A     IM Medicare Letter: to be provided   Caregiver Contact: Roslyn Whitehead Bradford [phone]  Discharge Caregiver contacted prior to      9:29AM UPDATE  D/c orders placed. Per chart review, pt to discharge to [address] [place] [address],[address] pending updated insurance auth approval as previous auth  on 22. Updated auth approval for [facility] still pending at this time.       Auth reference#: [id number]    Arline Turcios,  W [facility]  [phone]

## 2022-12-11 NOTE — PROGRESS NOTES
Problem: Patient Education: Go to Patient Education Activity  Goal: Patient/Family Education  Outcome: Progressing Towards Goal     Problem: Pressure Injury - Risk of  Goal: *Prevention of pressure injury  Description: Document Amadou Scale and appropriate interventions in the flowsheet. Outcome: Progressing Towards Goal  Note: Pressure Injury Interventions:  Sensory Interventions: Assess changes in LOC, Assess need for specialty bed, Avoid rigorous massage over bony prominences, Chair cushion, Check visual cues for pain, Discuss PT/OT consult with provider, Float heels, Keep linens dry and wrinkle-free, Pad between skin to skin, Turn and reposition approx.  every two hours (pillows and wedges if needed)    Moisture Interventions: Absorbent underpads, Apply protective barrier, creams and emollients, Check for incontinence Q2 hours and as needed, Contain wound drainage, Internal/External urinary devices    Activity Interventions: PT/OT evaluation, Pressure redistribution bed/mattress(bed type)    Mobility Interventions: Float heels, HOB 30 degrees or less    Nutrition Interventions: Offer support with meals,snacks and hydration    Friction and Shear Interventions: HOB 30 degrees or less, Lift sheet, Lift team/patient mobility team, Transferring/repositioning devices, Trapeze to reposition                Problem: Patient Education: Go to Patient Education Activity  Goal: Patient/Family Education  Outcome: Progressing Towards Goal     Problem: General Medical Care Plan  Goal: *Vital signs within specified parameters  Outcome: Progressing Towards Goal  Goal: *Labs within defined limits  Outcome: Progressing Towards Goal  Goal: *Absence of infection signs and symptoms  Outcome: Progressing Towards Goal  Goal: *Optimal pain control at patient's stated goal  Outcome: Progressing Towards Goal  Goal: *Skin integrity maintained  Outcome: Progressing Towards Goal  Goal: *Fluid volume balance  Outcome: Progressing Towards Goal  Goal: *Optimize nutritional status  Outcome: Progressing Towards Goal  Goal: *Anxiety reduced or absent  Outcome: Progressing Towards Goal  Goal: *Progressive mobility and function (eg: ADL's)  Outcome: Progressing Towards Goal     Problem: Patient Education: Go to Patient Education Activity  Goal: Patient/Family Education  Outcome: Progressing Towards Goal     Problem: Falls - Risk of  Goal: *Absence of Falls  Description: Document Carrie Fall Risk and appropriate interventions in the flowsheet.   Outcome: Progressing Towards Goal  Note: Fall Risk Interventions:  Mobility Interventions: Patient to call before getting OOB         Medication Interventions: Patient to call before getting OOB    Elimination Interventions: Call light in reach, Bed/chair exit alarm    History of Falls Interventions: Bed/chair exit alarm         Problem: Patient Education: Go to Patient Education Activity  Goal: Patient/Family Education  Outcome: Progressing Towards Goal     Problem: Patient Education: Go to Patient Education Activity  Goal: Patient/Family Education  Outcome: Progressing Towards Goal     Problem: Pacer/ICD: Pre-Procedure  Goal: Off Pathway (Use only if patient is Off Pathway)  Outcome: Progressing Towards Goal  Goal: Activity/Safety  Outcome: Progressing Towards Goal  Goal: Consults, if ordered  Outcome: Progressing Towards Goal  Goal: Diagnostic Test/Procedures  Outcome: Progressing Towards Goal  Goal: Nutrition/Diet  Outcome: Progressing Towards Goal  Goal: Discharge Planning  Outcome: Progressing Towards Goal  Goal: Medications  Outcome: Progressing Towards Goal  Goal: Respiratory  Outcome: Progressing Towards Goal  Goal: Treatments/Interventions/Procedures  Outcome: Progressing Towards Goal  Goal: Psychosocial  Outcome: Progressing Towards Goal  Goal: *Verbalize description of procedure  Outcome: Progressing Towards Goal  Goal: *Consent signed  Outcome: Progressing Towards Goal     Problem: Pacer/ICD: Post-Procedure  Goal: Off Pathway (Use only if patient is Off Pathway)  Outcome: Progressing Towards Goal  Goal: Activity/Safety  Outcome: Progressing Towards Goal  Goal: Consults, if ordered  Outcome: Progressing Towards Goal  Goal: Diagnostic Test/Procedures  Outcome: Progressing Towards Goal  Goal: Nutrition/Diet  Outcome: Progressing Towards Goal  Goal: Discharge Planning  Outcome: Progressing Towards Goal  Goal: Medications  Outcome: Progressing Towards Goal  Goal: Respiratory  Outcome: Progressing Towards Goal  Goal: Treatments/Interventions/Procedures  Outcome: Progressing Towards Goal  Goal: Psychosocial  Outcome: Progressing Towards Goal  Goal: *Hemodynamically stable  Outcome: Progressing Towards Goal  Goal: *Optimal pain control at patient's stated goal  Outcome: Progressing Towards Goal  Goal: *Absence of signs and symptoms of infection or wound complication  Outcome: Progressing Towards Goal     Problem: Pacer/ICD: Day 1  Goal: Off Pathway (Use only if patient is Off Pathway)  Outcome: Progressing Towards Goal  Goal: Activity/Safety  Outcome: Progressing Towards Goal  Goal: Diagnostic Test/Procedures  Outcome: Progressing Towards Goal  Goal: Nutrition/Diet  Outcome: Progressing Towards Goal  Goal: Discharge Planning  Outcome: Progressing Towards Goal  Goal: Medications  Outcome: Progressing Towards Goal  Goal: Respiratory  Outcome: Progressing Towards Goal  Goal: Treatments/Interventions/Procedures  Outcome: Progressing Towards Goal  Goal: Psychosocial  Outcome: Progressing Towards Goal     Problem: Pacer/ICD: Discharge Outcomes  Goal: *Hemodynamically stable  Outcome: Progressing Towards Goal  Goal: *Stable cardiac rhythm  Outcome: Progressing Towards Goal  Goal: *Lungs clear or at baseline  Outcome: Progressing Towards Goal  Goal: *Demonstrates ability to perform prescribed activity without shortness of breath or discomfort  Outcome: Progressing Towards Goal  Goal: *Verbalizes home exercise program, activity guidelines, cardiac precautions  Outcome: Progressing Towards Goal  Goal: *Verbalizes understanding and describes prescribed diet  Outcome: Progressing Towards Goal  Goal: *Verbalizes understanding and describes medication purposes and frequencies  Outcome: Progressing Towards Goal  Goal: *Identifies cardiac risk factors  Outcome: Progressing Towards Goal  Goal: *No signs and symptoms of infection or wound complications  Outcome: Progressing Towards Goal  Goal: *Anxiety reduced or absent  Outcome: Progressing Towards Goal  Goal: *Understands and describes signs and symptoms to report to providers(Stroke Metric)  Outcome: Progressing Towards Goal  Goal: *Describes follow-up/return visits to physicians  Outcome: Progressing Towards Goal  Goal: *Describes available resources and support systems  Outcome: Progressing Towards Goal  Goal: *Influenza immunization  Outcome: Progressing Towards Goal  Goal: *Pneumococcal immunization  Outcome: Progressing Towards Goal  Goal: *Optimal pain control at patient's stated goal  Outcome: Progressing Towards Goal

## 2022-12-12 PROCEDURE — 74011250637 HC RX REV CODE- 250/637

## 2022-12-12 PROCEDURE — 74011250636 HC RX REV CODE- 250/636

## 2022-12-12 PROCEDURE — 74011000250 HC RX REV CODE- 250: Performed by: INTERNAL MEDICINE

## 2022-12-12 PROCEDURE — 74011250637 HC RX REV CODE- 250/637: Performed by: INTERNAL MEDICINE

## 2022-12-12 PROCEDURE — 65270000029 HC RM PRIVATE

## 2022-12-12 PROCEDURE — 65270000046 HC RM TELEMETRY

## 2022-12-12 RX ADMIN — Medication 1000 MCG: at 09:38

## 2022-12-12 RX ADMIN — ACETAMINOPHEN 650 MG: 325 TABLET ORAL at 09:38

## 2022-12-12 RX ADMIN — LEVOTHYROXINE SODIUM 50 MCG: 0.05 TABLET ORAL at 05:11

## 2022-12-12 RX ADMIN — LACTULOSE 15 ML: 20 SOLUTION ORAL at 17:44

## 2022-12-12 RX ADMIN — TRAMADOL HYDROCHLORIDE 50 MG: 50 TABLET ORAL at 05:15

## 2022-12-12 RX ADMIN — SUCRALFATE 1 G: 1 TABLET ORAL at 12:48

## 2022-12-12 RX ADMIN — SODIUM CHLORIDE, PRESERVATIVE FREE 10 ML: 5 INJECTION INTRAVENOUS at 14:09

## 2022-12-12 RX ADMIN — THIAMINE HCL TAB 100 MG 100 MG: 100 TAB at 09:38

## 2022-12-12 RX ADMIN — SODIUM CHLORIDE, PRESERVATIVE FREE 10 ML: 5 INJECTION INTRAVENOUS at 05:11

## 2022-12-12 RX ADMIN — SUCRALFATE 1 G: 1 TABLET ORAL at 09:38

## 2022-12-12 RX ADMIN — SPIRONOLACTONE 25 MG: 25 TABLET ORAL at 09:38

## 2022-12-12 RX ADMIN — TETRAHYDROZOLINE HCL 1 DROP: 0.05 SOLUTION/ DROPS OPHTHALMIC at 21:07

## 2022-12-12 RX ADMIN — ASPIRIN 81 MG: 81 TABLET, CHEWABLE ORAL at 09:38

## 2022-12-12 RX ADMIN — MAGNESIUM OXIDE TAB 400 MG (241.3 MG ELEMENTAL MG) 400 MG: 400 (241.3 MG) TAB at 09:38

## 2022-12-12 RX ADMIN — TRAMADOL HYDROCHLORIDE 50 MG: 50 TABLET ORAL at 12:48

## 2022-12-12 RX ADMIN — CALCIUM CARBONATE (ANTACID) CHEW TAB 500 MG 200 MG: 500 CHEW TAB at 17:43

## 2022-12-12 RX ADMIN — ENOXAPARIN SODIUM 40 MG: 100 INJECTION SUBCUTANEOUS at 09:39

## 2022-12-12 RX ADMIN — CHLORDIAZEPOXIDE HYDROCHLORIDE 5 MG: 5 CAPSULE ORAL at 09:38

## 2022-12-12 RX ADMIN — FUROSEMIDE 20 MG: 20 TABLET ORAL at 09:38

## 2022-12-12 RX ADMIN — LACTULOSE 15 ML: 20 SOLUTION ORAL at 09:38

## 2022-12-12 RX ADMIN — TETRAHYDROZOLINE HCL 1 DROP: 0.05 SOLUTION/ DROPS OPHTHALMIC at 09:39

## 2022-12-12 RX ADMIN — TETRAHYDROZOLINE HCL 1 DROP: 0.05 SOLUTION/ DROPS OPHTHALMIC at 12:48

## 2022-12-12 RX ADMIN — ERGOCALCIFEROL 50000 UNITS: 1.25 CAPSULE ORAL at 17:48

## 2022-12-12 RX ADMIN — CALCIUM CARBONATE (ANTACID) CHEW TAB 500 MG 200 MG: 500 CHEW TAB at 12:48

## 2022-12-12 RX ADMIN — TRAMADOL HYDROCHLORIDE 50 MG: 50 TABLET ORAL at 21:06

## 2022-12-12 RX ADMIN — SUCRALFATE 1 G: 1 TABLET ORAL at 17:43

## 2022-12-12 RX ADMIN — OXYCODONE HYDROCHLORIDE AND ACETAMINOPHEN 500 MG: 500 TABLET ORAL at 09:38

## 2022-12-12 RX ADMIN — TETRAHYDROZOLINE HCL 1 DROP: 0.05 SOLUTION/ DROPS OPHTHALMIC at 17:45

## 2022-12-12 RX ADMIN — ACETAMINOPHEN 650 MG: 325 TABLET ORAL at 17:43

## 2022-12-12 RX ADMIN — PANTOPRAZOLE SODIUM 40 MG: 40 TABLET, DELAYED RELEASE ORAL at 09:38

## 2022-12-12 RX ADMIN — CHLORDIAZEPOXIDE HYDROCHLORIDE 5 MG: 5 CAPSULE ORAL at 17:42

## 2022-12-12 RX ADMIN — SUCRALFATE 1 G: 1 TABLET ORAL at 21:06

## 2022-12-12 RX ADMIN — CALCIUM CARBONATE (ANTACID) CHEW TAB 500 MG 200 MG: 500 CHEW TAB at 09:38

## 2022-12-12 RX ADMIN — FOLIC ACID 1 MG: 1 TABLET ORAL at 09:48

## 2022-12-12 RX ADMIN — THERA TABS 1 TABLET: TAB at 09:38

## 2022-12-12 RX ADMIN — SODIUM CHLORIDE, PRESERVATIVE FREE 10 ML: 5 INJECTION INTRAVENOUS at 21:07

## 2022-12-12 NOTE — DISCHARGE SUMMARY
Hospitalist Discharge Summary     Patient ID:    Lazaro Pimentel  [id number]  74 y.o.  1945    Admit date: 11/30/2022    Discharge date : 12/12/2022        Final Diagnoses: Active Problems:    Weakness (11/30/2022)      Severe protein-calorie malnutrition ([address] (12/1/2022)      Anasarca (12/2/2022)      Hypotension (12/2/2022)      Sinus bradycardia (12/5/2022)      Reason for Hospitalization:  Lower extremity swelling     Hospital Course:   Mr. Fabienne Chandler is a 69year old male with PMH including remote gastric bypass surgery, indigestion, HTN, and hypothyroidism who presented to the ED with c/o BLE edema w/ associated weakness & activity intolerance. He admits to a 50 year history of \"several drinks a day\" of \"beer and vodka. \"  BLE duplex (-) for DVT, EF 54-96% w/ diastolic dysfunction on Echo, pBNP 2187 on admit. Elevated LFTs (AST 50, Alk Phos 118), ammonia 32, and abd US showed cirrhotic liver w mod to large ascites. GI was consulted and a paracentesis attempted but not enough fluid to drain at that time. Pt started on lasix and spironolactone as well as lactulose. Throughout the admission he was sinus bradycardic on telemetry as low as mid 30s at times, and severely orthostatic Bps dropping as low as 60s/20s on standing. PEDRO hose place, Midodrine was added, pt received IV albumin, and adjustments were made to diuretics trying to support BP and increase HR, without success, so on 12/9 pt went for PPM with Dr. Landon Hamman to left chest.  Post PPM insertion, HR 60 atrial paced, and BP at rest 125/73, pt reported subjective improvement. BP in fact running higher so Dc'd midodrine, was able to resume lasix and spironolactone, pt will be discharged with these. GI signed off with instructions to follow up outpt for EGD, cardiology signed off with instructions to follow up in office in 1-2 weeks.   Pt unmotivated to get OOB to chair, encouraged mobility during conversation with pt, reiterated with RN. PT/OT is recommending DC to SNF, pending auth at [facility]. Pt remains medically cleared for discharge when SNF arrangements made. On December 12, insurance authorization still pending. Discharge Medications:   Current Discharge Medication List        START taking these medications    Details   thiamine mononitrate (B-1) 100 mg tablet Take 1 Tablet by mouth daily for 30 days. Qty: 30 Tablet, Refills: 0  Start date: 12/10/2022, End date: 1/9/2023      spironolactone (ALDACTONE) 25 mg tablet Take 1 Tablet by mouth daily for 30 days. Qty: 30 Tablet, Refills: 0  Start date: 12/10/2022, End date: 1/9/2023           CONTINUE these medications which have CHANGED    Details   levothyroxine (synthroid) 50 mcg tablet Take 1 Tablet by mouth Daily (before breakfast) for 30 days. Indications: a condition with low thyroid hormone levels  Qty: 30 Tablet, Refills: 0  Start date: 12/10/2022, End date: 1/9/2023      furosemide (LASIX) 20 mg tablet Take 1 Tablet by mouth daily for 30 days. Qty: 30 Tablet, Refills: 0  Start date: 12/9/2022, End date: 1/8/2023           CONTINUE these medications which have NOT CHANGED    Details   pantoprazole (PROTONIX) 40 mg tablet Take 40 mg by mouth daily. multivitamin (ONE A DAY) tablet Take 1 Tab by mouth daily. aspirin 81 mg chewable tablet Take 81 mg by mouth daily. sucralfate (CARAFATE) 1 gram tablet Take 1 g by mouth four (4) times daily.  and at [facility]      ergocalciferol (ERGOCALCIFEROL) 1,250 mcg (50,000 unit) capsule Take 50,000 Units by mouth two (2) times a week. LAST TAKEN April 2,8587      folic acid (FOLVITE) 741 mcg tablet Take 800 mcg by mouth daily. cyanocobalamin 1,000 mcg tablet Take  by mouth daily. Takes unknown dose      ascorbic acid, vitamin C, (VITAMIN C) 500 mg tablet Take  by mouth daily. Takes unknown dose               Follow up Care:    1. Khanh Frank MD in 1-2 weeks.       Follow-up Information       Follow up With Specialties Details Why Contact Info    Christy Trinidad MD Family Medicine Schedule an appointment as soon as possible for a visit in 2 days  [id number] [place] [address] [facility]  [phone]      [facility] EMERGENCY DEPT Emergency Medicine  As needed [address]  [address]  [phone]    [facility] Follow up This is a mobile urgent care that can visit and evaluate you at home. If you are in need of visit or have any questions, please call to discuss. [address] [place] [address] [facility]  [address]   [address] [facility]    Liam Ariza MD Clinical Cardiac Electrophysiology Physician, Cardio Vascular Surgery, Cardiovascular Disease Physician Follow up in 1 week(s)  [address] [facility]  [phone]                * Follow-up Care/Patient Instructions: Activity: Activity as tolerated  Diet: Regular Diet  Wound Care: Ice to area for comfort, keep left arm immobilized, NWB to left arm, site check per cardiology followup       Condition at Discharge:  Stable  __________________________________________________________________    Disposition  [facility]  ____________________________________________________________________    Code Status:  Full Code  ___________________________________________________________________    Discharge Exam:  Patient seen and examined by me on discharge day. Physical Exam  Constitutional:       General: He is not in acute distress. Appearance: Normal appearance. HENT:      Head: Normocephalic and atraumatic. Mouth/Throat:      Mouth: Mucous membranes are moist.   Eyes:      Pupils: Pupils are equal, round, and reactive to light. Cardiovascular:      Pulses:           Radial pulses are 2+ on the right side and 2+ on the left side.         Dorsalis pedis pulses are 1+ on the right side and 1+ on the left side. Heart sounds: Normal heart sounds. Comments: Atrial paced HR 60 on telemetry  Pulmonary:      Effort: Pulmonary effort is normal.      Breath sounds: Normal breath sounds. Abdominal:      General: Abdomen is flat. Bowel sounds are normal. There is no distension. Palpations: Abdomen is soft. Tenderness: There is no abdominal tenderness. There is no guarding. Musculoskeletal:      Right lower leg: Edema present. Left lower leg: Edema present. Comments: 1+ LLE, 2+ RLE   Skin:     General: Skin is warm and dry. Comments: Left chest PPM insertion site covered with gauze, clean dry and intact. Neurological:      Mental Status: He is alert and oriented to person, place, and time. CONSULTATIONS: Cardiology    Significant Diagnostic Studies:   No results found for this or any previous visit (from the past 24 hour(s)). XR CHEST PORT   Final Result    impression: Cardiac pacemaker, no pneumothorax. .         CT HEAD WO CONT   Final Result      No acute intracranial abnormality on this noncontrast head CT. Increased   cerebral volume loss. DUPLEX CAROTID BILATERAL   Final Result      US ABD LTD   Final Result   Small amount of ascites. US ABD COMP   Final Result   Cirrhotic liver with moderate to large ascites. XR CHEST PORT   Final Result   No Acute Disease. DUPLEX LOWER EXT VENOUS BILAT   Final Result          Discharge: time spent greater than 35 minutes in discharge  Education and counseling.      Signed:  Danilo Villar MD  12/12/2022  8:13 AM

## 2022-12-12 NOTE — PROGRESS NOTES
This RN gave report to Evangelina (oncoming RN). All questions answered. Pt transferred from [facility]. Pt had an uneventful night. At handoff, pt awake in bed, interactive with this RN and oncoming RN.

## 2022-12-12 NOTE — PROGRESS NOTES
Transition of Care Plan:     RUR: 12% (low)  Disposition: [facility] accepted, insurance authorization pending. If SNF or IPR: Date FOC offered: SNF 11/30/2022  Date [address] received: 11/30/2022  Date authorization started with reference number: Resubmitted: 12/9/22 reference #: [id number]  Date authorization received and expires: TBD. Accepting facility: [facility]  Follow up appointments: PCP/specialists if needed. DME needed: TBD. Patient owns cane, rollator, lift chair   Transportation at Discharge: Family/friend vs medical transport   Dietra Sovereign or means to access home: Facility to provide. IM Medicare Letter: 2nd IM needed prior to discharge. Caregiver Contact: Brother Ulices Ybarra - [phone]  Discharge Caregiver contacted prior to discharge: Patient to contact. Care Conference needed: No.     Insurance authorization resubmitted on 12/9/2022. CM will continue to follow for any discharge needs.       Vero Dean, MIKEN, RN    Care Management  [phone]

## 2022-12-13 VITALS
RESPIRATION RATE: 18 BRPM | DIASTOLIC BLOOD PRESSURE: 77 MMHG | OXYGEN SATURATION: 99 % | HEIGHT: 68 IN | SYSTOLIC BLOOD PRESSURE: 116 MMHG | WEIGHT: 155.65 LBS | HEART RATE: 63 BPM | BODY MASS INDEX: 23.59 KG/M2 | TEMPERATURE: 97.9 F

## 2022-12-13 LAB
ATRIAL RATE: 49 BPM
CALCULATED P AXIS, ECG09: 29 DEGREES
CALCULATED R AXIS, ECG10: -13 DEGREES
CALCULATED T AXIS, ECG11: 31 DEGREES
COVID-19 RAPID TEST, COVR: NOT DETECTED
DIAGNOSIS, 93000: NORMAL
P-R INTERVAL, ECG05: 132 MS
Q-T INTERVAL, ECG07: 462 MS
QRS DURATION, ECG06: 84 MS
QTC CALCULATION (BEZET), ECG08: 417 MS
SOURCE, COVRS: NORMAL
VENTRICULAR RATE, ECG03: 49 BPM

## 2022-12-13 PROCEDURE — 74011000250 HC RX REV CODE- 250: Performed by: INTERNAL MEDICINE

## 2022-12-13 PROCEDURE — 87635 SARS-COV-2 COVID-19 AMP PRB: CPT

## 2022-12-13 PROCEDURE — 74011250637 HC RX REV CODE- 250/637

## 2022-12-13 PROCEDURE — 74011250636 HC RX REV CODE- 250/636

## 2022-12-13 PROCEDURE — 74011250637 HC RX REV CODE- 250/637: Performed by: INTERNAL MEDICINE

## 2022-12-13 RX ADMIN — OXYCODONE HYDROCHLORIDE AND ACETAMINOPHEN 500 MG: 500 TABLET ORAL at 08:40

## 2022-12-13 RX ADMIN — LACTULOSE 15 ML: 20 SOLUTION ORAL at 08:42

## 2022-12-13 RX ADMIN — MAGNESIUM OXIDE TAB 400 MG (241.3 MG ELEMENTAL MG) 400 MG: 400 (241.3 MG) TAB at 08:41

## 2022-12-13 RX ADMIN — Medication 1000 MCG: at 08:41

## 2022-12-13 RX ADMIN — ASPIRIN 81 MG: 81 TABLET, CHEWABLE ORAL at 08:40

## 2022-12-13 RX ADMIN — THERA TABS 1 TABLET: TAB at 08:40

## 2022-12-13 RX ADMIN — PANTOPRAZOLE SODIUM 40 MG: 40 TABLET, DELAYED RELEASE ORAL at 08:41

## 2022-12-13 RX ADMIN — FOLIC ACID 1 MG: 1 TABLET ORAL at 08:42

## 2022-12-13 RX ADMIN — CALCIUM CARBONATE (ANTACID) CHEW TAB 500 MG 200 MG: 500 CHEW TAB at 08:41

## 2022-12-13 RX ADMIN — THIAMINE HCL TAB 100 MG 100 MG: 100 TAB at 08:40

## 2022-12-13 RX ADMIN — SPIRONOLACTONE 25 MG: 25 TABLET ORAL at 08:41

## 2022-12-13 RX ADMIN — FUROSEMIDE 20 MG: 20 TABLET ORAL at 08:41

## 2022-12-13 RX ADMIN — ENOXAPARIN SODIUM 40 MG: 100 INJECTION SUBCUTANEOUS at 08:42

## 2022-12-13 RX ADMIN — SODIUM CHLORIDE, PRESERVATIVE FREE 10 ML: 5 INJECTION INTRAVENOUS at 05:35

## 2022-12-13 RX ADMIN — CHLORDIAZEPOXIDE HYDROCHLORIDE 5 MG: 5 CAPSULE ORAL at 08:41

## 2022-12-13 RX ADMIN — CALCIUM CARBONATE (ANTACID) CHEW TAB 500 MG 200 MG: 500 CHEW TAB at 12:42

## 2022-12-13 RX ADMIN — SUCRALFATE 1 G: 1 TABLET ORAL at 12:42

## 2022-12-13 RX ADMIN — LEVOTHYROXINE SODIUM 50 MCG: 0.05 TABLET ORAL at 05:35

## 2022-12-13 RX ADMIN — TRAMADOL HYDROCHLORIDE 50 MG: 50 TABLET ORAL at 04:06

## 2022-12-13 RX ADMIN — TETRAHYDROZOLINE HCL 1 DROP: 0.05 SOLUTION/ DROPS OPHTHALMIC at 08:43

## 2022-12-13 RX ADMIN — ACETAMINOPHEN 650 MG: 325 TABLET ORAL at 12:41

## 2022-12-13 RX ADMIN — TETRAHYDROZOLINE HCL 1 DROP: 0.05 SOLUTION/ DROPS OPHTHALMIC at 13:15

## 2022-12-13 RX ADMIN — SUCRALFATE 1 G: 1 TABLET ORAL at 08:41

## 2022-12-13 NOTE — PROGRESS NOTES
Problem: Pressure Injury - Risk of  Goal: *Prevention of pressure injury  Description: Document Amadou Scale and appropriate interventions in the flowsheet. Outcome: Progressing Towards Goal  Note: Pressure Injury Interventions:  Sensory Interventions: Float heels, Minimize linen layers    Moisture Interventions: Check for incontinence Q2 hours and as needed    Activity Interventions: Increase time out of bed, Pressure redistribution bed/mattress(bed type)    Mobility Interventions: Chair cushion, Float heels, HOB 30 degrees or less, Pressure redistribution bed/mattress (bed type)    Nutrition Interventions: Offer support with meals,snacks and hydration, Document food/fluid/supplement intake    Friction and Shear Interventions: Foam dressings/transparent film/skin sealants, HOB 30 degrees or less, Minimize layers                Problem: Patient Education: Go to Patient Education Activity  Goal: Patient/Family Education  Outcome: Progressing Towards Goal     Problem: Falls - Risk of  Goal: *Absence of Falls  Description: Document Carrie Fall Risk and appropriate interventions in the flowsheet.   Outcome: Progressing Towards Goal  Note: Fall Risk Interventions:  Mobility Interventions: Bed/chair exit alarm, Patient to call before getting OOB         Medication Interventions: Assess postural VS orthostatic hypotension, Bed/chair exit alarm, Patient to call before getting OOB, Teach patient to arise slowly    Elimination Interventions: Bed/chair exit alarm, Call light in reach, Patient to call for help with toileting needs    History of Falls Interventions: Bed/chair exit alarm, Room close to nurse's station         Problem: Patient Education: Go to Patient Education Activity  Goal: Patient/Family Education  Outcome: Progressing Towards Goal

## 2022-12-13 NOTE — PROGRESS NOTES
Nursing: For any issues with transportation, please contact [facility] at [phone]. Transition of Care Plan: [address] [place] [address],5[address] Floor SNF. RUR: 12% (low)  Disposition: [address] [place] [address],5[address] Floor SNF. If SNF or IPR: Date FOC offered: SNF 11/30/2022  Date [address] received: 11/30/2022  Date authorization started with reference number: Resubmitted: 12/9/22 reference #: [id number]  Date authorization received and expires: 12/13/2022. Accepting facility: [facility]  Follow up appointments: defer to SNF. DME needed: defer to SNF. Patient owns cane, rollator, lift chair   Transportation at Discharge: [facility] at 4:30 pm, to [address] [place] [address],[address] [place] [address], bed #101A, nursing call report to . Nursing please ensure hard scripts on chart is transported with patient. [place] or means to access home: Facility to provide.  Medicare Letter: 2nd  received 12/13/2022. Caregiver Contact: Brother Ulices Vail - [phone]  Discharge Caregiver contacted prior to discharge: Patient to contact. Care Conference needed: No.    1321 - CM contacted Palma with [facility] who confirmed authorization was approved from 12/10/2022 to 12/13/2022 and patient needs to be admitted to SNF by 12/14/2022 11:59 pm, otherwise new authorization is needed. COVID rapid negative done today. Auth ID: [id number], Reference #: [id number]. [facility] CM: Christa Huff. 1328 - CM contacted [address] [place] [address],[address] and left message with  regarding bed needed for patient with approved authorization. 1351 - CM sent message to [address] [place] [address],[address] Floor admissions as well regarding bed needed for patient. CM spoke with patient who is agreeable to discharge to [address] [place] [address] this evening if bed is available. He confirmed transportation would be needed. [facility] for [facility]:30 pm and [facility] paperwork placed on chart. Patient has no further questions or concerns for CM.     [address] [place] [address] contacted CM and confirmed patient could come into room 101A today at 4:30 PM.    1435 - KALI attempted to call [facility], no answer. [facility] set up for 4:30 pm.  [facility] requested cancelled. 5 - CM notified nurse of transport time and for hard scripts on chart to go with patient to facility. Transition of Care Plan to SNF/Rehab    Communication to Patient/Family:  Met with patient and family and they are agreeable to the transition plan. The Plan for Transition of Care is related to the following treatment goals: PT/OT    The Patient and/or patient representative was provided with a choice of provider and agrees  with the discharge plan. Yes [x] No []    A Freedom of choice list was provided with basic dialogue that supports the patient's individualized plan of care/goals and shares the quality data associated with the providers. Yes [x] No []    SNF/Rehab Transition:  Patient has been accepted to [address] [place] [address] SNF/Rehab and meets criteria for admission. Patient will transported by [facility] and expected to leave at 4:30 pm.    Communication to SNF/Rehab:  Bedside RN, Cameron Steiner, has been notified to update the transition plan to the facility and call report (phone number). Discharge information has been updated on the AVS. And communicated to facility via "[employer]All Scripts, or CC link. Discharge instructions to be sent with patient via paper copy. Nursing Please include all hard scripts for controlled substances, med rec and dc summary, and AVS in packet. Reviewed and confirmed with facility, [address] [place] [address], can manage the patient care needs for the following:     Rei Patel with (X) only those applicable:  Medication:  [x]Medications are available at the facility  []IV Antibiotics    [x]Controlled Substance - hard copies available sent.   []Weekly Labs    Equipment:  []CPAP/BiPAP  []Wound Vacuum  []Shah or Urinary Device  []PICC/Central Line  []Nebulizer  []Ventilator    Treatment:  []Isolation (for MRSA, VRE, etc.)  []Surgical Drain Management  []Tracheostomy Care  []Dressing Changes  []Dialysis with transportation  []PEG Care  []Oxygen  []Daily Weights for Heart Failure    Dietary:  []Any diet limitations  []Tube Feedings   []Total Parenteral Management (TPN)    Financial Resources:  []Medicaid Application Completed    []UAI Completed and copy given to pt/family  and copy given to pt/family  []A screening has previously been completed. []Level II Completed    [] Private pay individual who will not become   financially eligible for Medicaid within 6 months from admission to a [address] [place] [address] facility. [] Individual refused to have screening conducted. []Medicaid Application Completed    []The screening denied because it was determined individual did not need/did not qualify for nursing facility level of care. [] Out of state residents seeking direct admission to a [address] facility. [] Individuals who are inpatients of an out of state hospital, or in state or out of state veterans/ hospital and seek direct admission to a [address] facility  [] Individuals who are pateints or residents of a state owned/operated facility that is licensed by Department of [employer] ([employer]) and seek direct admission to [address] [place] [address]  [] A screening not required for enrollment in [address] [address] [address] services as set out in [address] [place] [address] 74-  [] [facility]) staff shall perform screenings of the [facility] clients. Advanced Care Plan:  []Surrogate Decision Maker of Care  []POA  []Communicated Code Status and copy sent.     Other:               AMAYA Cisneros, RN    Care Management  [phone]

## 2022-12-13 NOTE — PROGRESS NOTES
This RN inquired if pt's Telemetry order can be discontinued as pt is pending insurance auth to discharge. Per MD, okay to discontinue. Telemetry to be removed.

## 2022-12-13 NOTE — PROGRESS NOTES
Physical Therapy    Attempted PT treatment. Session deferred per pt request due to c/o HA; recently medicated, but no relief at this time.      Hemal Mcgee, PT, MPT

## 2022-12-13 NOTE — DISCHARGE SUMMARY
Hospitalist Discharge Summary     Patient ID:    Samantha Estimable  [id number]  33 y.o.  1945    Admit date: 11/30/2022    Discharge date : 12/13/2022        Final Diagnoses: Active Problems:    Weakness (11/30/2022)      Severe protein-calorie malnutrition ([address] (12/1/2022)      Anasarca (12/2/2022)      Hypotension (12/2/2022)      Sinus bradycardia (12/5/2022)      Reason for Hospitalization:  Lower extremity swelling     Hospital Course:   Mr. Daly Garrido is a 69year old male with PMH including remote gastric bypass surgery, indigestion, HTN, and hypothyroidism who presented to the ED with c/o BLE edema w/ associated weakness & activity intolerance. He admits to a 50 year history of \"several drinks a day\" of \"beer and vodka. \"  BLE duplex (-) for DVT, EF 32-85% w/ diastolic dysfunction on Echo, pBNP 2187 on admit. Elevated LFTs (AST 50, Alk Phos 118), ammonia 32, and abd US showed cirrhotic liver w mod to large ascites. GI was consulted and a paracentesis attempted but not enough fluid to drain at that time. Pt started on lasix and spironolactone as well as lactulose. Throughout the admission he was sinus bradycardic on telemetry as low as mid 30s at times, and severely orthostatic Bps dropping as low as 60s/20s on standing. PEDRO hose place, Midodrine was added, pt received IV albumin, and adjustments were made to diuretics trying to support BP and increase HR, without success, so on 12/9 pt went for PPM with Dr. Deyanira Crawford to left chest.  Post PPM insertion, HR 60 atrial paced, and BP at rest 125/73, pt reported subjective improvement. BP in fact running higher so Dc'd midodrine, was able to resume lasix and spironolactone, pt will be discharged with these. GI signed off with instructions to follow up outpt for EGD, cardiology signed off with instructions to follow up in office in 1-2 weeks.   Pt unmotivated to get OOB to chair, encouraged mobility during conversation with pt, reiterated with RN. PT/OT is recommending DC to SNF, pending auth at [facility]. Pt remains medically cleared for discharge when SNF arrangements made. On December 12, insurance authorization still pending. Discharge Medications:   Current Discharge Medication List        START taking these medications    Details   thiamine mononitrate (B-1) 100 mg tablet Take 1 Tablet by mouth daily for 30 days. Qty: 30 Tablet, Refills: 0  Start date: 12/10/2022, End date: 1/9/2023      spironolactone (ALDACTONE) 25 mg tablet Take 1 Tablet by mouth daily for 30 days. Qty: 30 Tablet, Refills: 0  Start date: 12/10/2022, End date: 1/9/2023           CONTINUE these medications which have CHANGED    Details   levothyroxine (synthroid) 50 mcg tablet Take 1 Tablet by mouth Daily (before breakfast) for 30 days. Indications: a condition with low thyroid hormone levels  Qty: 30 Tablet, Refills: 0  Start date: 12/10/2022, End date: 1/9/2023      furosemide (LASIX) 20 mg tablet Take 1 Tablet by mouth daily for 30 days. Qty: 30 Tablet, Refills: 0  Start date: 12/9/2022, End date: 1/8/2023           CONTINUE these medications which have NOT CHANGED    Details   pantoprazole (PROTONIX) 40 mg tablet Take 40 mg by mouth daily. multivitamin (ONE A DAY) tablet Take 1 Tab by mouth daily. aspirin 81 mg chewable tablet Take 81 mg by mouth daily. sucralfate (CARAFATE) 1 gram tablet Take 1 g by mouth four (4) times daily.  and at [facility]      ergocalciferol (ERGOCALCIFEROL) 1,250 mcg (50,000 unit) capsule Take 50,000 Units by mouth two (2) times a week. LAST TAKEN April 0,2414      folic acid (FOLVITE) 216 mcg tablet Take 800 mcg by mouth daily. cyanocobalamin 1,000 mcg tablet Take  by mouth daily. Takes unknown dose      ascorbic acid, vitamin C, (VITAMIN C) 500 mg tablet Take  by mouth daily. Takes unknown dose               Follow up Care:    1. Rustam Lugo MD in 1-2 weeks.       Follow-up Information       Follow up With Specialties Details Why Contact Info    Breezy Drake MD Family Medicine Schedule an appointment as soon as possible for a visit in 2 days  [address] [address] [facility]  [phone]      [facility] EMERGENCY DEPT Emergency Medicine  As needed [address]  [address] [facility]  [phone]    [facility] Follow up This is a mobile urgent care that can visit and evaluate you at home. If you are in need of visit or have any questions, please call to discuss. [address] [place] [address] Place [facility]  [address]   [address] [facility]    Eliza Ibarra MD Clinical Cardiac Electrophysiology Physician, Cardio Vascular Surgery, Cardiovascular Disease Physician Follow up in 1 week(s)  [address] [facility]  [phone]                * Follow-up Care/Patient Instructions: Activity: Activity as tolerated  Diet: Regular Diet  Wound Care: Ice to area for comfort, keep left arm immobilized, NWB to left arm, site check per cardiology followup       Condition at Discharge:  Stable  __________________________________________________________________    Disposition  [facility]  ____________________________________________________________________    Code Status:  Full Code  ___________________________________________________________________    Discharge Exam:  Patient seen and examined by me on discharge day. Physical Exam  Constitutional:       General: He is not in acute distress. Appearance: Normal appearance. HENT:      Head: Normocephalic and atraumatic. Mouth/Throat:      Mouth: Mucous membranes are moist.   Eyes:      Pupils: Pupils are equal, round, and reactive to light. Cardiovascular:      Pulses:           Radial pulses are 2+ on the right side and 2+ on the left side.         Dorsalis pedis pulses are 1+ on the right side and 1+ on the left side. Heart sounds: Normal heart sounds. Comments: Atrial paced HR 60 on telemetry  Pulmonary:      Effort: Pulmonary effort is normal.      Breath sounds: Normal breath sounds. Abdominal:      General: Abdomen is flat. Bowel sounds are normal. There is no distension. Palpations: Abdomen is soft. Tenderness: There is no abdominal tenderness. There is no guarding. Musculoskeletal:      Right lower leg: Edema present. Left lower leg: Edema present. Comments: 1+ LLE, 2+ RLE   Skin:     General: Skin is warm and dry. Comments: Left chest PPM insertion site covered with gauze, clean dry and intact. Neurological:      Mental Status: He is alert and oriented to person, place, and time. CONSULTATIONS: Cardiology    Significant Diagnostic Studies:   Recent Results (from the past 24 hour(s))   COVID-19 RAPID TEST    Collection Time: 12/13/22 12:45 PM   Result Value Ref Range    Specimen source Nasopharyngeal      COVID-19 rapid test Not detected NOTD       XR CHEST PORT   Final Result    impression: Cardiac pacemaker, no pneumothorax. .         CT HEAD WO CONT   Final Result      No acute intracranial abnormality on this noncontrast head CT. Increased   cerebral volume loss. DUPLEX CAROTID BILATERAL   Final Result      US ABD LTD   Final Result   Small amount of ascites. US ABD COMP   Final Result   Cirrhotic liver with moderate to large ascites. XR CHEST PORT   Final Result   No Acute Disease. DUPLEX LOWER EXT VENOUS BILAT   Final Result          Discharge: time spent greater than 35 minutes in discharge  Education and counseling.      Signed:  Tasneem Ibarra MD  12/13/2022  8:13 AM

## 2022-12-13 NOTE — PROGRESS NOTES
Pharmacist Discharge Medication Reconciliation      Significant PMH:   Past Medical History:   Diagnosis Date    DJD (degenerative joint disease)     Hypertension     Obesity      Encounter Diagnoses:   Encounter Diagnoses   Name Primary? Chronic pain of both lower extremities Yes    Dependent edema     Orthostatic hypotension [I95.1 (ICD-10-CM)]     Bilateral carotid artery stenosis [I65.23 (ICD-10-CM)]     Vasovagal syncope [R55 (ICD-10-CM)]     Bradycardia      Allergies: Propoxyphene n-acetaminophen    Admission date: 11/30/2022     Discharge Medications:   Current Discharge Medication List        START taking these medications    Details   thiamine mononitrate (B-1) 100 mg tablet Take 1 Tablet by mouth daily for 30 days. Qty: 30 Tablet, Refills: 0  Start date: 12/10/2022, End date: 1/9/2023      spironolactone (ALDACTONE) 25 mg tablet Take 1 Tablet by mouth daily for 30 days. Qty: 30 Tablet, Refills: 0  Start date: 12/10/2022, End date: 1/9/2023           CONTINUE these medications which have CHANGED    Details   levothyroxine (synthroid) 50 mcg tablet Take 1 Tablet by mouth Daily (before breakfast) for 30 days. Indications: a condition with low thyroid hormone levels  Qty: 30 Tablet, Refills: 0  Start date: 12/10/2022, End date: 1/9/2023      furosemide (LASIX) 20 mg tablet Take 1 Tablet by mouth daily for 30 days. Qty: 30 Tablet, Refills: 0  Start date: 12/9/2022, End date: 1/8/2023           CONTINUE these medications which have NOT CHANGED    Details   pantoprazole (PROTONIX) 40 mg tablet Take 40 mg by mouth daily. multivitamin (ONE A DAY) tablet Take 1 Tab by mouth daily. aspirin 81 mg chewable tablet Take 81 mg by mouth daily. sucralfate (CARAFATE) 1 gram tablet Take 1 g by mouth four (4) times daily.  and at [facility]      ergocalciferol (ERGOCALCIFEROL) 1,250 mcg (50,000 unit) capsule Take 50,000 Units by mouth two (2) times a week.  LAST TAKEN April 0,2099      folic acid (FOLVITE) 968 mcg tablet Take 800 mcg by mouth daily. cyanocobalamin 1,000 mcg tablet Take  by mouth daily. Takes unknown dose      ascorbic acid, vitamin C, (VITAMIN C) 500 mg tablet Take  by mouth daily. Takes unknown dose             The patient's chart, MAR and AVS were reviewed by Kevyn Morris, [address] [place] [address].     Discharging Provider: Ismael Bettencourt MD     Thank you,     Kevyn Morris, [address] [place] [address]

## 2022-12-13 NOTE — PROGRESS NOTES
This RN gave report to Jesus Moses (oncoming RN). All questions answered. Pt had no events today. Pt still pending insurance authorization. Pt was visited by his brother. At handoff, pt awake in bed, watching TV.

## 2022-12-14 NOTE — PROGRESS NOTES
Patient is 68 YO male admitted for Lower extremity swelling. Order received to discharge patient. Patient verbalized understanding and acceptance of discharge. Handoff and discharge report given to receiving nurse Rose Lynn LPN of  [address] [place] [address],5[address] Floor facility. Report included the following information Kardex, ED Summary, Procedure Summary, Intake/Output, MAR, Recent Results, and Cardiac Rhythm . Receiving nurse verbalized understanding of handoff report, discharge instructions and medications. Peripheral IV accesses discontinued and patient belongings returned to patient prior to discharge from the unit. Discharge paperwork handed over to transportation personnel to be delivered to nursing staff on arrival to receiving facility. Patient was in stable condition during shift and at discharge. Patient was safely discharged from the unit in the care of the ambulance personnel.

## 2022-12-15 LAB
LAB DIRECTOR NAME PROVIDER: NORMAL
SERPINA1 GENE MUT ANL BLD/T: NORMAL
SERPINA1 GENE MUT TESTED BLD/T: NORMAL

## 2023-07-06 ENCOUNTER — APPOINTMENT (OUTPATIENT)
Facility: HOSPITAL | Age: 78
End: 2023-07-06
Payer: MEDICARE

## 2023-07-06 ENCOUNTER — HOSPITAL ENCOUNTER (EMERGENCY)
Facility: HOSPITAL | Age: 78
Discharge: HOME | End: 2023-07-06
Attending: EMERGENCY MEDICINE
Payer: MEDICARE

## 2023-07-06 VITALS
OXYGEN SATURATION: 98 % | SYSTOLIC BLOOD PRESSURE: 127 MMHG | WEIGHT: 155.65 LBS | RESPIRATION RATE: 18 BRPM | BODY MASS INDEX: 23.67 KG/M2 | HEART RATE: 63 BPM | DIASTOLIC BLOOD PRESSURE: 94 MMHG | TEMPERATURE: 97.9 F

## 2023-07-06 DIAGNOSIS — R53.83 FATIGUE, UNSPECIFIED TYPE: Primary | ICD-10-CM

## 2023-07-06 LAB
ALBUMIN SERPL-MCNC: 2.2 G/DL (ref 3.5–5)
ALBUMIN/GLOB SERPL: 0.8 (ref 1.1–2.2)
ALP SERPL-CCNC: 160 U/L (ref 45–117)
ALT SERPL-CCNC: 16 U/L (ref 12–78)
ANION GAP SERPL CALC-SCNC: 4 MMOL/L (ref 5–15)
APPEARANCE UR: CLEAR
AST SERPL-CCNC: 20 U/L (ref 15–37)
BACTERIA URNS QL MICRO: NEGATIVE /HPF
BASOPHILS # BLD: 0.1 K/UL (ref 0–0.1)
BASOPHILS NFR BLD: 1 % (ref 0–1)
BILIRUB SERPL-MCNC: 0.3 MG/DL (ref 0.2–1)
BILIRUB UR QL: NEGATIVE
BUN SERPL-MCNC: 18 MG/DL (ref 6–20)
BUN/CREAT SERPL: 16 (ref 12–20)
CALCIUM SERPL-MCNC: 8 MG/DL (ref 8.5–10.1)
CHLORIDE SERPL-SCNC: 110 MMOL/L (ref 97–108)
CO2 SERPL-SCNC: 26 MMOL/L (ref 21–32)
COLOR UR: NORMAL
COMMENT:: NORMAL
CREAT SERPL-MCNC: 1.13 MG/DL (ref 0.7–1.3)
DIFFERENTIAL METHOD BLD: ABNORMAL
EOSINOPHIL # BLD: 0.3 K/UL (ref 0–0.4)
EOSINOPHIL NFR BLD: 4 % (ref 0–7)
EPITH CASTS URNS QL MICRO: NORMAL /LPF
ERYTHROCYTE [DISTWIDTH] IN BLOOD BY AUTOMATED COUNT: 16 % (ref 11.5–14.5)
GLOBULIN SER CALC-MCNC: 2.9 G/DL (ref 2–4)
GLUCOSE SERPL-MCNC: 83 MG/DL (ref 65–100)
GLUCOSE UR STRIP.AUTO-MCNC: NEGATIVE MG/DL
HCT VFR BLD AUTO: 34.1 % (ref 36.6–50.3)
HGB BLD-MCNC: 11 G/DL (ref 12.1–17)
HGB UR QL STRIP: NEGATIVE
HYALINE CASTS URNS QL MICRO: NORMAL /LPF (ref 0–2)
IMM GRANULOCYTES # BLD AUTO: 0 K/UL (ref 0–0.04)
IMM GRANULOCYTES NFR BLD AUTO: 0 % (ref 0–0.5)
KETONES UR QL STRIP.AUTO: NEGATIVE MG/DL
LEUKOCYTE ESTERASE UR QL STRIP.AUTO: NEGATIVE
LYMPHOCYTES # BLD: 1.5 K/UL (ref 0.8–3.5)
LYMPHOCYTES NFR BLD: 21 % (ref 12–49)
MCH RBC QN AUTO: 32.5 PG (ref 26–34)
MCHC RBC AUTO-ENTMCNC: 32.3 G/DL (ref 30–36.5)
MCV RBC AUTO: 100.9 FL (ref 80–99)
MONOCYTES # BLD: 0.6 K/UL (ref 0–1)
MONOCYTES NFR BLD: 9 % (ref 5–13)
NEUTS SEG # BLD: 4.6 K/UL (ref 1.8–8)
NEUTS SEG NFR BLD: 65 % (ref 32–75)
NITRITE UR QL STRIP.AUTO: NEGATIVE
NRBC # BLD: 0 K/UL (ref 0–0.01)
NRBC BLD-RTO: 0 PER 100 WBC
PH UR STRIP: 5.5 (ref 5–8)
PLATELET # BLD AUTO: 276 K/UL (ref 150–400)
PMV BLD AUTO: 9.2 FL (ref 8.9–12.9)
POTASSIUM SERPL-SCNC: 4.9 MMOL/L (ref 3.5–5.1)
PROT SERPL-MCNC: 5.1 G/DL (ref 6.4–8.2)
PROT UR STRIP-MCNC: NEGATIVE MG/DL
RBC # BLD AUTO: 3.38 M/UL (ref 4.1–5.7)
RBC #/AREA URNS HPF: NORMAL /HPF (ref 0–5)
SODIUM SERPL-SCNC: 140 MMOL/L (ref 136–145)
SP GR UR REFRACTOMETRY: 1.02
SPECIMEN HOLD: NORMAL
TROPONIN I SERPL HS-MCNC: 7 NG/L (ref 0–76)
TSH SERPL DL<=0.05 MIU/L-ACNC: 6.79 UIU/ML (ref 0.36–3.74)
URINE CULTURE IF INDICATED: NORMAL
UROBILINOGEN UR QL STRIP.AUTO: 1 EU/DL (ref 0.2–1)
WBC # BLD AUTO: 7.1 K/UL (ref 4.1–11.1)
WBC URNS QL MICRO: NORMAL /HPF (ref 0–4)

## 2023-07-06 PROCEDURE — 96375 TX/PRO/DX INJ NEW DRUG ADDON: CPT

## 2023-07-06 PROCEDURE — 6360000002 HC RX W HCPCS: Performed by: EMERGENCY MEDICINE

## 2023-07-06 PROCEDURE — 85025 COMPLETE CBC W/AUTO DIFF WBC: CPT

## 2023-07-06 PROCEDURE — 99285 EMERGENCY DEPT VISIT HI MDM: CPT

## 2023-07-06 PROCEDURE — 96361 HYDRATE IV INFUSION ADD-ON: CPT

## 2023-07-06 PROCEDURE — 84484 ASSAY OF TROPONIN QUANT: CPT

## 2023-07-06 PROCEDURE — 81001 URINALYSIS AUTO W/SCOPE: CPT

## 2023-07-06 PROCEDURE — 80053 COMPREHEN METABOLIC PANEL: CPT

## 2023-07-06 PROCEDURE — 71045 X-RAY EXAM CHEST 1 VIEW: CPT

## 2023-07-06 PROCEDURE — 36415 COLL VENOUS BLD VENIPUNCTURE: CPT

## 2023-07-06 PROCEDURE — 74176 CT ABD & PELVIS W/O CONTRAST: CPT

## 2023-07-06 PROCEDURE — 93005 ELECTROCARDIOGRAM TRACING: CPT | Performed by: EMERGENCY MEDICINE

## 2023-07-06 PROCEDURE — 84443 ASSAY THYROID STIM HORMONE: CPT

## 2023-07-06 PROCEDURE — 2580000003 HC RX 258: Performed by: EMERGENCY MEDICINE

## 2023-07-06 PROCEDURE — 6370000000 HC RX 637 (ALT 250 FOR IP): Performed by: EMERGENCY MEDICINE

## 2023-07-06 PROCEDURE — 96374 THER/PROPH/DIAG INJ IV PUSH: CPT

## 2023-07-06 RX ORDER — KETOROLAC TROMETHAMINE 30 MG/ML
15 INJECTION, SOLUTION INTRAMUSCULAR; INTRAVENOUS ONCE
Status: COMPLETED | OUTPATIENT
Start: 2023-07-06 | End: 2023-07-06

## 2023-07-06 RX ORDER — MORPHINE SULFATE 2 MG/ML
2 INJECTION, SOLUTION INTRAMUSCULAR; INTRAVENOUS
Status: COMPLETED | OUTPATIENT
Start: 2023-07-06 | End: 2023-07-06

## 2023-07-06 RX ORDER — ACETAMINOPHEN 500 MG
1000 TABLET ORAL
Status: COMPLETED | OUTPATIENT
Start: 2023-07-06 | End: 2023-07-06

## 2023-07-06 RX ORDER — SODIUM CHLORIDE, SODIUM LACTATE, POTASSIUM CHLORIDE, AND CALCIUM CHLORIDE .6; .31; .03; .02 G/100ML; G/100ML; G/100ML; G/100ML
500 INJECTION, SOLUTION INTRAVENOUS ONCE
Status: COMPLETED | OUTPATIENT
Start: 2023-07-06 | End: 2023-07-06

## 2023-07-06 RX ADMIN — ACETAMINOPHEN 1000 MG: 500 TABLET ORAL at 18:51

## 2023-07-06 RX ADMIN — MORPHINE SULFATE 2 MG: 2 INJECTION, SOLUTION INTRAMUSCULAR; INTRAVENOUS at 18:57

## 2023-07-06 RX ADMIN — KETOROLAC TROMETHAMINE 15 MG: 30 INJECTION, SOLUTION INTRAMUSCULAR; INTRAVENOUS at 18:51

## 2023-07-06 RX ADMIN — SODIUM CHLORIDE, POTASSIUM CHLORIDE, SODIUM LACTATE AND CALCIUM CHLORIDE 500 ML: 600; 310; 30; 20 INJECTION, SOLUTION INTRAVENOUS at 18:51

## 2023-07-06 ASSESSMENT — PAIN - FUNCTIONAL ASSESSMENT
PAIN_FUNCTIONAL_ASSESSMENT: PREVENTS OR INTERFERES SOME ACTIVE ACTIVITIES AND ADLS
PAIN_FUNCTIONAL_ASSESSMENT: NONE - DENIES PAIN

## 2023-07-06 ASSESSMENT — PAIN DESCRIPTION - LOCATION
LOCATION: BACK
LOCATION: BACK

## 2023-07-06 ASSESSMENT — PAIN DESCRIPTION - DESCRIPTORS
DESCRIPTORS: SORE
DESCRIPTORS: ACHING;SORE

## 2023-07-06 ASSESSMENT — PAIN SCALES - GENERAL
PAINLEVEL_OUTOF10: 10

## 2023-07-06 NOTE — ED NOTES
Presented to the ED with increased weakness all over for the past couple of days.       Francesca Monsivais RN  07/06/23 3444

## 2023-07-06 NOTE — ED NOTES
Assumed care of pt. White board updated. Plan of care discussed. Call bell in reach. Will continue to monitor.         Marta Morales RN  07/06/23 7382

## 2023-07-06 NOTE — ED NOTES
Blood work sent to lab, extra tubes on hold. Protocols orders placed.       Freda Fabry, RN  07/06/23 1281

## 2023-07-07 NOTE — DISCHARGE INSTRUCTIONS
It was a pleasure taking care of you at [facility] Emergency Department today. We know that when you come to [facility], you are entrusting us with your health, comfort, and safety. Our physicians and nurses honor that trust, and we truly appreciate the opportunity to care for you and your loved ones. We also value your feedback. If you receive a survey about your Emergency Department experience today, please fill it out. We care about our patients' feedback, and we listen to what you have to say. Thank you!

## 2023-07-07 NOTE — ED PROVIDER NOTES
not done, Shared Decision Making, Pt Expectation of Test or Tx.):      FINAL IMPRESSION     1. Fatigue, unspecified type          DISPOSITION/PLAN   DISPOSITION Decision To Discharge 07/06/2023 10:04:02 PM      Discharge Note: The patient is stable for discharge home. The signs, symptoms, diagnosis, and discharge instructions have been discussed, understanding conveyed, and agreed upon. The patient is to follow up as recommended or return to ER should their symptoms worsen. PATIENT REFERRED TO:  David Portillo MD  [address]  [address] [place] [address]  [phone]    In 1 week  follow-up appointment    [facility] EMERGENCY DEPT  [address]  [address] [place] [address] [address]  [phone]  In 2 days  If symptoms worsen         DISCHARGE MEDICATIONS:     Medication List        ASK your doctor about these medications      ascorbic acid 500 MG tablet  Commonly known as: VITAMIN C     aspirin 81 MG chewable tablet     cyanocobalamin 1000 MCG tablet     ergocalciferol 1.25 MG (23712 UT) capsule  Commonly known as: ERGOCALCIFEROL     folic acid 597 MCG tablet  Commonly known as: FOLVITE     pantoprazole 40 MG tablet  Commonly known as: PROTONIX     sucralfate 1 GM tablet  Commonly known as: CARAFATE                DISCONTINUED MEDICATIONS:  Discharge Medication List as of 7/6/2023 10:09 PM          I am the Primary Clinician of Record. Fito Rodriguez MD (electronically signed)      (Please note that parts of this dictation were completed with voice recognition software. Quite often unanticipated grammatical, syntax, homophones, and other interpretive errors are inadvertently transcribed by the computer software. Please disregards these errors.  Please excuse any errors that have escaped final proofreading.)         Fito Rodriguez MD  07/06/23 7404

## 2023-07-08 LAB
EKG ATRIAL RATE: 61 BPM
EKG DIAGNOSIS: NORMAL
EKG P-R INTERVAL: 204 MS
EKG Q-T INTERVAL: 410 MS
EKG QRS DURATION: 134 MS
EKG QTC CALCULATION (BAZETT): 435 MS
EKG R AXIS: -30 DEGREES
EKG T AXIS: 13 DEGREES
EKG VENTRICULAR RATE: 68 BPM

## 2023-07-29 ENCOUNTER — HOSPITAL ENCOUNTER (EMERGENCY)
Facility: HOSPITAL | Age: 78
Discharge: HOME | End: 2023-07-29
Payer: MEDICARE

## 2023-07-29 VITALS
HEIGHT: 68 IN | BODY MASS INDEX: 24.25 KG/M2 | SYSTOLIC BLOOD PRESSURE: 113 MMHG | HEART RATE: 80 BPM | DIASTOLIC BLOOD PRESSURE: 67 MMHG | OXYGEN SATURATION: 98 % | WEIGHT: 160 LBS | TEMPERATURE: 98.5 F | RESPIRATION RATE: 18 BRPM

## 2023-07-29 DIAGNOSIS — S32.000A COMPRESSION FRACTURE OF LUMBAR VERTEBRA, UNSPECIFIED LUMBAR VERTEBRAL LEVEL, INITIAL ENCOUNTER (HCC): ICD-10-CM

## 2023-07-29 DIAGNOSIS — M54.6 THORACOLUMBAR BACK PAIN: Primary | ICD-10-CM

## 2023-07-29 DIAGNOSIS — S22.000A COMPRESSION FRACTURE OF THORACIC VERTEBRA, UNSPECIFIED THORACIC VERTEBRAL LEVEL, INITIAL ENCOUNTER (HCC): ICD-10-CM

## 2023-07-29 DIAGNOSIS — M54.50 THORACOLUMBAR BACK PAIN: Primary | ICD-10-CM

## 2023-07-29 LAB
ALBUMIN SERPL-MCNC: 2.2 G/DL (ref 3.5–5)
ALBUMIN/GLOB SERPL: 0.7 (ref 1.1–2.2)
ALP SERPL-CCNC: 156 U/L (ref 45–117)
ALT SERPL-CCNC: 12 U/L (ref 12–78)
ANION GAP SERPL CALC-SCNC: 8 MMOL/L (ref 5–15)
AST SERPL-CCNC: 14 U/L (ref 15–37)
BASOPHILS # BLD: 0.1 K/UL (ref 0–0.1)
BASOPHILS NFR BLD: 1 % (ref 0–1)
BILIRUB SERPL-MCNC: 0.4 MG/DL (ref 0.2–1)
BUN SERPL-MCNC: 14 MG/DL (ref 6–20)
BUN/CREAT SERPL: 10 (ref 12–20)
CALCIUM SERPL-MCNC: 8.1 MG/DL (ref 8.5–10.1)
CHLORIDE SERPL-SCNC: 111 MMOL/L (ref 97–108)
CO2 SERPL-SCNC: 21 MMOL/L (ref 21–32)
CREAT SERPL-MCNC: 1.41 MG/DL (ref 0.7–1.3)
CRP SERPL-MCNC: 1.47 MG/DL (ref 0–0.6)
DIFFERENTIAL METHOD BLD: ABNORMAL
EOSINOPHIL # BLD: 0.3 K/UL (ref 0–0.4)
EOSINOPHIL NFR BLD: 4 % (ref 0–7)
ERYTHROCYTE [DISTWIDTH] IN BLOOD BY AUTOMATED COUNT: 16.8 % (ref 11.5–14.5)
ERYTHROCYTE [SEDIMENTATION RATE] IN BLOOD: 13 MM/HR (ref 0–20)
GLOBULIN SER CALC-MCNC: 3 G/DL (ref 2–4)
GLUCOSE SERPL-MCNC: 82 MG/DL (ref 65–100)
HCT VFR BLD AUTO: 34.6 % (ref 36.6–50.3)
HGB BLD-MCNC: 11.2 G/DL (ref 12.1–17)
IMM GRANULOCYTES # BLD AUTO: 0 K/UL (ref 0–0.04)
IMM GRANULOCYTES NFR BLD AUTO: 1 % (ref 0–0.5)
LYMPHOCYTES # BLD: 1.2 K/UL (ref 0.8–3.5)
LYMPHOCYTES NFR BLD: 16 % (ref 12–49)
MCH RBC QN AUTO: 31.6 PG (ref 26–34)
MCHC RBC AUTO-ENTMCNC: 32.4 G/DL (ref 30–36.5)
MCV RBC AUTO: 97.7 FL (ref 80–99)
MONOCYTES # BLD: 0.4 K/UL (ref 0–1)
MONOCYTES NFR BLD: 5 % (ref 5–13)
NEUTS SEG # BLD: 5.3 K/UL (ref 1.8–8)
NEUTS SEG NFR BLD: 73 % (ref 32–75)
NRBC # BLD: 0 K/UL (ref 0–0.01)
NRBC BLD-RTO: 0 PER 100 WBC
PLATELET # BLD AUTO: 304 K/UL (ref 150–400)
PMV BLD AUTO: 9.2 FL (ref 8.9–12.9)
POTASSIUM SERPL-SCNC: 3.5 MMOL/L (ref 3.5–5.1)
PROT SERPL-MCNC: 5.2 G/DL (ref 6.4–8.2)
RBC # BLD AUTO: 3.54 M/UL (ref 4.1–5.7)
SODIUM SERPL-SCNC: 140 MMOL/L (ref 136–145)
WBC # BLD AUTO: 7.2 K/UL (ref 4.1–11.1)

## 2023-07-29 PROCEDURE — 85025 COMPLETE CBC W/AUTO DIFF WBC: CPT

## 2023-07-29 PROCEDURE — 99284 EMERGENCY DEPT VISIT MOD MDM: CPT

## 2023-07-29 PROCEDURE — 36415 COLL VENOUS BLD VENIPUNCTURE: CPT

## 2023-07-29 PROCEDURE — 6360000002 HC RX W HCPCS: Performed by: PHYSICIAN ASSISTANT

## 2023-07-29 PROCEDURE — 80053 COMPREHEN METABOLIC PANEL: CPT

## 2023-07-29 PROCEDURE — 86140 C-REACTIVE PROTEIN: CPT

## 2023-07-29 PROCEDURE — 85652 RBC SED RATE AUTOMATED: CPT

## 2023-07-29 PROCEDURE — 96374 THER/PROPH/DIAG INJ IV PUSH: CPT

## 2023-07-29 RX ORDER — HYDROCODONE BITARTRATE AND ACETAMINOPHEN 5; 325 MG/1; MG/1
1 TABLET ORAL EVERY 8 HOURS PRN
Qty: 12 TABLET | Refills: 0 | Status: SHIPPED | OUTPATIENT
Start: 2023-07-29 | End: 2023-08-01

## 2023-07-29 RX ORDER — MORPHINE SULFATE 4 MG/ML
4 INJECTION INTRAVENOUS
Status: COMPLETED | OUTPATIENT
Start: 2023-07-29 | End: 2023-07-29

## 2023-07-29 RX ADMIN — MORPHINE SULFATE 4 MG: 4 INJECTION INTRAVENOUS at 12:17

## 2023-07-29 ASSESSMENT — ENCOUNTER SYMPTOMS
SHORTNESS OF BREATH: 0
BACK PAIN: 1
NAUSEA: 0
VOMITING: 0
ABDOMINAL PAIN: 0

## 2023-07-29 ASSESSMENT — LIFESTYLE VARIABLES
HOW MANY STANDARD DRINKS CONTAINING ALCOHOL DO YOU HAVE ON A TYPICAL DAY: 1 OR 2
HOW OFTEN DO YOU HAVE A DRINK CONTAINING ALCOHOL: MONTHLY OR LESS

## 2023-07-29 ASSESSMENT — PAIN DESCRIPTION - ORIENTATION
ORIENTATION: UPPER

## 2023-07-29 ASSESSMENT — PAIN DESCRIPTION - LOCATION
LOCATION: BACK

## 2023-07-29 ASSESSMENT — PAIN SCALES - GENERAL
PAINLEVEL_OUTOF10: 10
PAINLEVEL_OUTOF10: 10
PAINLEVEL_OUTOF10: 5

## 2023-07-29 NOTE — CARE COORDINATION
Care Management Initial Assessment       RUR: N/A-ER patient  Readmission? No  1st IM letter given? No  1st  letter given: No        07/29/23 3144   Service Assessment   Patient Orientation Alert and Oriented;Person;Place;Situation;Self   Cognition Alert   History Provided By Patient   Primary Steve Bang is: Patient Declined (Legal Next of Kin Remains as Decision Maker)   PCP Verified by CM Yes   Last Visit to PCP Within last 3 months  (patient reports he has an appointment Wednesday (8/2) with Ortho and PCP)   Prior Functional Level Independent in ADLs/IADLs   Current Functional Level Assistance with the following:;Cooking;Housework; Shopping  (Reports that can not stand for long periods of time due to back pain)   Can patient return to prior living arrangement Yes   Family able to assist with home care needs: No   Would you like for me to discuss the discharge plan with any other family members/significant others, and if so, who? No   Financial Resources Medicare   Social/Functional History   Lives With Alone   Type of [address] Help From Friend(s)   Active  Yes   Discharge Planning   Type of Residence House   Living Arrangements Alone   Current Services Prior To Admission None   Patient expects to be discharged to: [facility] Discharge   Transition of Care Consult (CM Consult)   (SN, PT, OT, MSW)   Condition of Participation: Discharge Planning   The Plan for Transition of Care is related to the following treatment goals: Home with [address] [place] [address],[address]   The Patient and/or Patient Representative was provided with a Choice of Provider? Patient   The Patient and/Or Patient Representative agree with the Discharge Plan?  Yes   Freedom of Choice list was provided with basic dialogue that supports the patient's individualized plan of care/goals, treatment preferences, and shares the quality data associated with the providers? Yes      CM met with patient at bedside. Introduced self/role. Demographics verified. Resides alone. Patient reports that he is normally independent and takes care of himself but that his back pain has caused him become intolerable. Patient reports that he has become unable to stand for any length of time due to the back pain. Reports he has an appointment with Ortho Wednesday (8/3). CM discussed [facility] with patient. Patient is agreeable. Referral sent to [facility]). Discussed with provider.      [address] [facility] [address] [place], [facility]

## 2023-07-29 NOTE — ED PROVIDER NOTES
[facility] EMERGENCY DEPT  EMERGENCY DEPARTMENT ENCOUNTER       Pt Name: Pilar Cooper  MRN: [id number]  [facility] [facility] 1945  Date of evaluation: 7/29/2023  Provider: SHY Bowles   PCP: Lisa Jiménez MD  Note Started:   @NOWNR 7/29/23     CHIEF COMPLAINT       Chief Complaint   Patient presents with    Back Pain     Acute on Chronic Upper Back Pain. HISTORY OF PRESENT ILLNESS: 1 or more elements      History From: Patient, EMS  HPI Limitations: None     Pilar Cooper is a 69 y.o. male with afib on xarelto, SSS with pacemaker, HTN, GERD, anemia, who presents BIBA with CC of chronic thoracolumbar back pain. He describes of pain in the middle of his middle and lower back that has been ongoing for months and progressively getting worse. Pain is made worse when he gets up and ambulates. Pain is slightly improved when he lays flat in bed. He denies new trauma or injury. Denies radiculopathy, numbness, tingling, focal weakness. Denies fevers, chills, history of malignancy. No chest pain, trouble breathing, abdominal pain, urinary symptoms, anorexia. He tells me that he was seen here for the same problem earlier this month and received a work-up with a CT scan. He has an appointment to establish care with Ortho next week. He also has a PCP appointment scheduled for next week. He is  and lives alone. Nursing Notes were all reviewed and agreed with or any disagreements were addressed in the HPI. REVIEW OF SYSTEMS      Review of Systems   Constitutional:  Negative for chills and fever. Respiratory:  Negative for shortness of breath. Cardiovascular:  Negative for chest pain. Gastrointestinal:  Negative for abdominal pain, nausea and vomiting. Genitourinary:  Negative for difficulty urinating and dysuria. Musculoskeletal:  Positive for back pain. Neurological:  Negative for dizziness. Hematological:  Bruises/bleeds easily.       Positives and Pertinent negatives as per General: He is not in acute distress. Appearance: Normal appearance. He is not toxic-appearing. HENT:      Head: Normocephalic and atraumatic. Nose: Nose normal.   Eyes:      Extraocular Movements: Extraocular movements intact. Conjunctiva/sclera: Conjunctivae normal.   Cardiovascular:      Rate and Rhythm: Normal rate and regular rhythm. Pulmonary:      Effort: Pulmonary effort is normal.      Breath sounds: Normal breath sounds. Abdominal:      Palpations: Abdomen is soft. Tenderness: There is no abdominal tenderness. Musculoskeletal:      Cervical back: Normal range of motion and neck supple. Comments: Subjective pain reported in the midline lower thoracic and lumbar region. No TTP. Strength and sensation equal bilateral upper and lower extremities. Skin:     General: Skin is warm and dry. Neurological:      General: No focal deficit present. Mental Status: He is alert and oriented to person, place, and time. Psychiatric:         Mood and Affect: Mood normal.         Behavior: Behavior normal.        DIAGNOSTIC RESULTS   LABS:     Labs Reviewed   CBC WITH AUTO DIFFERENTIAL - Abnormal; Notable for the following components:       Result Value    RBC 3.54 (*)     Hemoglobin 11.2 (*)     Hematocrit 34.6 (*)     RDW 16.8 (*)     Immature Granulocytes 1 (*)     All other components within normal limits   COMPREHENSIVE METABOLIC PANEL - Abnormal; Notable for the following components:    Chloride 111 (*)     Creatinine 1.41 (*)     Bun/Cre Ratio 10 (*)     Est, Glom Filt Rate 51 (*)     Calcium 8.1 (*)     AST 14 (*)     Alk Phosphatase 156 (*)     Total Protein 5.2 (*)     Albumin 2.2 (*)     Albumin/Globulin Ratio 0.7 (*)     All other components within normal limits   SEDIMENTATION RATE   C-REACTIVE PROTEIN          EKG:  When ordered, EKG's are interpreted by the Emergency Department Physician in the absence of a cardiologist.  Please see their note for interpretation

## 2023-08-10 ENCOUNTER — HOSPITAL ENCOUNTER (INPATIENT)
Facility: HOSPITAL | Age: 78
LOS: 11 days | Discharge: NURSING, REHAB OR LONG-TERM CARE | DRG: 516 | End: 2023-08-21
Attending: STUDENT IN AN ORGANIZED HEALTH CARE EDUCATION/TRAINING PROGRAM | Admitting: FAMILY MEDICINE
Payer: MEDICARE

## 2023-08-10 ENCOUNTER — APPOINTMENT (OUTPATIENT)
Facility: HOSPITAL | Age: 78
DRG: 516 | End: 2023-08-10
Payer: MEDICARE

## 2023-08-10 DIAGNOSIS — S22.080A COMPRESSION FRACTURE OF T12 VERTEBRA, INITIAL ENCOUNTER (HCC): ICD-10-CM

## 2023-08-10 DIAGNOSIS — Z74.09 IMPAIRED MOBILITY AND ADLS: ICD-10-CM

## 2023-08-10 DIAGNOSIS — S22.080A T12 COMPRESSION FRACTURE, INITIAL ENCOUNTER (HCC): ICD-10-CM

## 2023-08-10 DIAGNOSIS — Z78.9 IMPAIRED MOBILITY AND ADLS: ICD-10-CM

## 2023-08-10 DIAGNOSIS — N17.9 AKI (ACUTE KIDNEY INJURY) (HCC): Primary | ICD-10-CM

## 2023-08-10 LAB
ALBUMIN SERPL-MCNC: 2.4 G/DL (ref 3.5–5)
ALBUMIN/GLOB SERPL: 0.8 (ref 1.1–2.2)
ALP SERPL-CCNC: 184 U/L (ref 45–117)
ALT SERPL-CCNC: 13 U/L (ref 12–78)
ANION GAP SERPL CALC-SCNC: 5 MMOL/L (ref 5–15)
AST SERPL-CCNC: 16 U/L (ref 15–37)
BASOPHILS # BLD: 0 K/UL (ref 0–0.1)
BASOPHILS NFR BLD: 1 % (ref 0–1)
BILIRUB SERPL-MCNC: 0.3 MG/DL (ref 0.2–1)
BUN SERPL-MCNC: 22 MG/DL (ref 6–20)
BUN/CREAT SERPL: 14 (ref 12–20)
CALCIUM SERPL-MCNC: 8.5 MG/DL (ref 8.5–10.1)
CHLORIDE SERPL-SCNC: 107 MMOL/L (ref 97–108)
CO2 SERPL-SCNC: 25 MMOL/L (ref 21–32)
COMMENT:: NORMAL
CREAT SERPL-MCNC: 1.57 MG/DL (ref 0.7–1.3)
DIFFERENTIAL METHOD BLD: ABNORMAL
EOSINOPHIL # BLD: 0.1 K/UL (ref 0–0.4)
EOSINOPHIL NFR BLD: 2 % (ref 0–7)
ERYTHROCYTE [DISTWIDTH] IN BLOOD BY AUTOMATED COUNT: 16.7 % (ref 11.5–14.5)
GLOBULIN SER CALC-MCNC: 3.2 G/DL (ref 2–4)
GLUCOSE SERPL-MCNC: 94 MG/DL (ref 65–100)
HCT VFR BLD AUTO: 36.6 % (ref 36.6–50.3)
HGB BLD-MCNC: 12.1 G/DL (ref 12.1–17)
IMM GRANULOCYTES # BLD AUTO: 0.1 K/UL (ref 0–0.04)
IMM GRANULOCYTES NFR BLD AUTO: 1 % (ref 0–0.5)
LYMPHOCYTES # BLD: 1.7 K/UL (ref 0.8–3.5)
LYMPHOCYTES NFR BLD: 21 % (ref 12–49)
MAGNESIUM SERPL-MCNC: 1.9 MG/DL (ref 1.6–2.4)
MCH RBC QN AUTO: 32.1 PG (ref 26–34)
MCHC RBC AUTO-ENTMCNC: 33.1 G/DL (ref 30–36.5)
MCV RBC AUTO: 97.1 FL (ref 80–99)
MONOCYTES # BLD: 0.5 K/UL (ref 0–1)
MONOCYTES NFR BLD: 6 % (ref 5–13)
NEUTS SEG # BLD: 5.6 K/UL (ref 1.8–8)
NEUTS SEG NFR BLD: 69 % (ref 32–75)
NRBC # BLD: 0 K/UL (ref 0–0.01)
NRBC BLD-RTO: 0 PER 100 WBC
PLATELET # BLD AUTO: 358 K/UL (ref 150–400)
PMV BLD AUTO: 9.3 FL (ref 8.9–12.9)
POTASSIUM SERPL-SCNC: 3.9 MMOL/L (ref 3.5–5.1)
PROT SERPL-MCNC: 5.6 G/DL (ref 6.4–8.2)
RBC # BLD AUTO: 3.77 M/UL (ref 4.1–5.7)
SODIUM SERPL-SCNC: 137 MMOL/L (ref 136–145)
SPECIMEN HOLD: NORMAL
WBC # BLD AUTO: 8 K/UL (ref 4.1–11.1)

## 2023-08-10 PROCEDURE — 83735 ASSAY OF MAGNESIUM: CPT

## 2023-08-10 PROCEDURE — 2580000003 HC RX 258: Performed by: FAMILY MEDICINE

## 2023-08-10 PROCEDURE — 85025 COMPLETE CBC W/AUTO DIFF WBC: CPT

## 2023-08-10 PROCEDURE — 93005 ELECTROCARDIOGRAM TRACING: CPT | Performed by: STUDENT IN AN ORGANIZED HEALTH CARE EDUCATION/TRAINING PROGRAM

## 2023-08-10 PROCEDURE — 72131 CT LUMBAR SPINE W/O DYE: CPT

## 2023-08-10 PROCEDURE — 80053 COMPREHEN METABOLIC PANEL: CPT

## 2023-08-10 PROCEDURE — 84443 ASSAY THYROID STIM HORMONE: CPT

## 2023-08-10 PROCEDURE — 6370000000 HC RX 637 (ALT 250 FOR IP): Performed by: STUDENT IN AN ORGANIZED HEALTH CARE EDUCATION/TRAINING PROGRAM

## 2023-08-10 PROCEDURE — 2580000003 HC RX 258: Performed by: STUDENT IN AN ORGANIZED HEALTH CARE EDUCATION/TRAINING PROGRAM

## 2023-08-10 PROCEDURE — 99285 EMERGENCY DEPT VISIT HI MDM: CPT

## 2023-08-10 PROCEDURE — 1100000000 HC RM PRIVATE

## 2023-08-10 PROCEDURE — 36415 COLL VENOUS BLD VENIPUNCTURE: CPT

## 2023-08-10 RX ORDER — 0.9 % SODIUM CHLORIDE 0.9 %
1000 INTRAVENOUS SOLUTION INTRAVENOUS ONCE
Status: COMPLETED | OUTPATIENT
Start: 2023-08-10 | End: 2023-08-10

## 2023-08-10 RX ORDER — ACETAMINOPHEN 650 MG/1
650 SUPPOSITORY RECTAL EVERY 6 HOURS PRN
Status: DISCONTINUED | OUTPATIENT
Start: 2023-08-10 | End: 2023-08-11

## 2023-08-10 RX ORDER — ONDANSETRON 2 MG/ML
4 INJECTION INTRAMUSCULAR; INTRAVENOUS EVERY 6 HOURS PRN
Status: DISCONTINUED | OUTPATIENT
Start: 2023-08-10 | End: 2023-08-21 | Stop reason: HOSPADM

## 2023-08-10 RX ORDER — HYDROCODONE BITARTRATE AND ACETAMINOPHEN 5; 325 MG/1; MG/1
1 TABLET ORAL
Status: COMPLETED | OUTPATIENT
Start: 2023-08-10 | End: 2023-08-10

## 2023-08-10 RX ORDER — ONDANSETRON 4 MG/1
4 TABLET, ORALLY DISINTEGRATING ORAL EVERY 8 HOURS PRN
Status: DISCONTINUED | OUTPATIENT
Start: 2023-08-10 | End: 2023-08-21 | Stop reason: HOSPADM

## 2023-08-10 RX ORDER — HYDROCODONE BITARTRATE AND ACETAMINOPHEN 10; 325 MG/1; MG/1
1 TABLET ORAL ONCE
Status: COMPLETED | OUTPATIENT
Start: 2023-08-10 | End: 2023-08-10

## 2023-08-10 RX ORDER — ACETAMINOPHEN 325 MG/1
650 TABLET ORAL EVERY 6 HOURS PRN
Status: DISCONTINUED | OUTPATIENT
Start: 2023-08-10 | End: 2023-08-11

## 2023-08-10 RX ORDER — SODIUM CHLORIDE 9 MG/ML
INJECTION, SOLUTION INTRAVENOUS PRN
Status: DISCONTINUED | OUTPATIENT
Start: 2023-08-10 | End: 2023-08-21 | Stop reason: HOSPADM

## 2023-08-10 RX ORDER — ACETAMINOPHEN 500 MG
1000 TABLET ORAL
Status: DISCONTINUED | OUTPATIENT
Start: 2023-08-10 | End: 2023-08-10

## 2023-08-10 RX ORDER — SODIUM CHLORIDE 0.9 % (FLUSH) 0.9 %
5-40 SYRINGE (ML) INJECTION EVERY 12 HOURS SCHEDULED
Status: DISCONTINUED | OUTPATIENT
Start: 2023-08-10 | End: 2023-08-21 | Stop reason: HOSPADM

## 2023-08-10 RX ORDER — SODIUM CHLORIDE 0.9 % (FLUSH) 0.9 %
5-40 SYRINGE (ML) INJECTION PRN
Status: DISCONTINUED | OUTPATIENT
Start: 2023-08-10 | End: 2023-08-21 | Stop reason: HOSPADM

## 2023-08-10 RX ORDER — POLYETHYLENE GLYCOL 3350 17 G/17G
17 POWDER, FOR SOLUTION ORAL DAILY PRN
Status: DISCONTINUED | OUTPATIENT
Start: 2023-08-10 | End: 2023-08-21 | Stop reason: HOSPADM

## 2023-08-10 RX ADMIN — HYDROCODONE BITARTRATE AND ACETAMINOPHEN 1 TABLET: 10; 325 TABLET ORAL at 17:02

## 2023-08-10 RX ADMIN — HYDROCODONE BITARTRATE AND ACETAMINOPHEN 1 TABLET: 5; 325 TABLET ORAL at 23:23

## 2023-08-10 RX ADMIN — SODIUM CHLORIDE 1000 ML: 9 INJECTION, SOLUTION INTRAVENOUS at 20:54

## 2023-08-10 RX ADMIN — SODIUM CHLORIDE, PRESERVATIVE FREE 10 ML: 5 INJECTION INTRAVENOUS at 23:00

## 2023-08-10 ASSESSMENT — PAIN - FUNCTIONAL ASSESSMENT
PAIN_FUNCTIONAL_ASSESSMENT: 0-10

## 2023-08-10 ASSESSMENT — PAIN SCALES - GENERAL
PAINLEVEL_OUTOF10: 8
PAINLEVEL_OUTOF10: 6
PAINLEVEL_OUTOF10: 10
PAINLEVEL_OUTOF10: 9
PAINLEVEL_OUTOF10: 9

## 2023-08-10 ASSESSMENT — PAIN DESCRIPTION - ORIENTATION
ORIENTATION: LOWER
ORIENTATION: LOWER

## 2023-08-10 ASSESSMENT — PAIN DESCRIPTION - LOCATION
LOCATION: BACK

## 2023-08-10 ASSESSMENT — PAIN DESCRIPTION - DESCRIPTORS: DESCRIPTORS: ACHING

## 2023-08-10 NOTE — ED PROVIDER NOTES
EMERGENCY DEPARTMENT PHYSICIAN NOTE     Patient: Sherie Guillen     Time of Service: 8/10/2023  6:48 PM     Chief complaint:   Chief Complaint   Patient presents with    Back Pain        HISTORY:  Patient is a 69 y.o. male who presents to the emergency department with complaints of worsening legs and fatigue as well as generalized weakness over the last month. Patient states he can no longer get up out of bed into the bathroom to the kitchen. Patient was alone has no family nearby. Patient also reports low back pain for 2 to 3 weeks. Denies history of prostate cancer. No focal neurologic deficits. No skin findings on exam of the back. Patient awake and alert in no acute distress but is unable to even sit up in bed without assistance. Patient afebrile with stable vital signs. As patient is able to perform ADLs case management contacted for evaluation and recommendations for the patient. Past Medical History:   Diagnosis Date    DJD (degenerative joint disease)     Hypertension     Obesity         Past Surgical History:   Procedure Laterality Date    COLONOSCOPY N/A 12/20/2021    COLONOSCOPY [facility] by Ho Hillman MD at hospitals ENDOSCOPY    GASTRIC BYPASS,OBESE<150CM EM-EN-Y      ORTHOPEDIC SURGERY      bilat hip replacements        Family History   Problem Relation Age of Onset    Diabetes Maternal Grandfather     Heart Disease Maternal Grandmother     Diabetes Maternal Grandmother     Stroke Father     No Known Problems Mother     Heart Disease Maternal Grandfather         Social History     Socioeconomic History    Marital status:    Tobacco Use    Smoking status: Never    Smokeless tobacco: Never   Substance and Sexual Activity    Alcohol use: Yes     Alcohol/week: 136.0 standard drinks    Drug use: Yes     Types: Marijuana Theresa Seaman)        Current Medications: Reviewed in chart.     Allergies: No Known Allergies       REVIEW OF SYSTEMS: See HPI for pertinent positives and

## 2023-08-10 NOTE — CARE COORDINATION
CM consult received and appreciated. EMR reviewed. Case discussed w/ Dr. Mirta Velásquez. Met w/patient and introduced to role of CM patient is A/O X4  CC back pain and increased weakness. Mr. Kristal Parker endorses current symptoms interfere w/ his ADLs and today was unable to ambulate. DME includes rollator. Patient states I cant get up to do my hygiene and my legs buckle. Previous provider [facility] informed of hospitalization near [place] and 3 weeks at ED [facility] and [place] No reported falls however stumbled yesterday. PCP is Dr. Tariq Mackenzie (last appointment in July) . Patient was able to drive to appointment. Supportive family daughter, sister, brother and ex spouse. Family has been bringing meals. Wan Kincaid is [facility] provider. Home is 1 story (2STE) and has ramp. Discussed NEERU planning. Mr. Kristal Parker does not have a secondary insurance or LTC policy. Previously applied for medicaid was denied and then appeal. Patient states his sister Colby Richardson would have more information regarding outcome. Decision Maker is Corona Plascencia. CM will continue to follow for transitions of care needs. Informed of pending medical work up. Mr. Kristal Parker requesting the need for facility care to improve his strength. Informed of need for ER test results and medical necessity. [employer] is insurance provider and will require authorization. Patient requesting pain medication. Updates provided to Dr. Jay Hess. Call placed to [facility] patient is opened to services SN, OG, MSW - OT was to start today and informed of ED visit. Spoke w/ Maximiliano Dye at agency provided this writer's number and requested MSW to contact.

## 2023-08-10 NOTE — CARE COORDINATION
VM received from Maryse Noland MSW informed last week referral sent to [address] [place] [address] requesting UAI screening. [address] [facility] MSW was to assist patient w/ completing  medicaid application next week. Mr. Farshad Durbin would like to move in the [facility] has applied however will need LT payment source. MSW acknowledges patient has been experiencing pain issues and is limited w/ available support to come to his home. Updates received from Dr. Crispin Vazquez informed of acute findings and will consult the Hospitalist.     [facility] Call placed to sister Ivana Diamond [phone] introduced self discussed medicaid. Sister informed of application while at [facility] however DSS informed did not need application. Sister reapplied for patient and called in and completed over the phone last week of July. Brother Popeye Solorzano and sister have worked closely w/ trying to obtain resources for patient . 1905 Met w/patient provided list of SNFs. Discussed would need PT/OT recommendations when appropriate. Informed insurance will require authorization. Patient has preference of [facility] if SNF LOC recommended. Mr. Farshad Durbin will review list and discuss w/ his siblings for additional feedback. CM Department will follow .

## 2023-08-10 NOTE — ED TRIAGE NOTES
Pt arrived via EMS from home c/o lower back pain and generalized weakness over the last month. Pt denies fall.

## 2023-08-10 NOTE — ED NOTES
Shift change report given to DIANA Lopez (oncoming nurse) by Mary Jane Frausto (offgoing nurse). Report included the following information Nurse Handoff Report, Index, ED Encounter Summary, ED SBAR, Adult Overview, Intake/Output, MAR, and Recent Results.         Aruna Carlson RN  08/10/23 1955

## 2023-08-11 ENCOUNTER — APPOINTMENT (OUTPATIENT)
Facility: HOSPITAL | Age: 78
DRG: 516 | End: 2023-08-11
Payer: MEDICARE

## 2023-08-11 LAB
ANION GAP SERPL CALC-SCNC: 5 MMOL/L (ref 5–15)
BASOPHILS # BLD: 0 K/UL (ref 0–0.1)
BASOPHILS NFR BLD: 0 % (ref 0–1)
BUN SERPL-MCNC: 21 MG/DL (ref 6–20)
BUN/CREAT SERPL: 16 (ref 12–20)
CALCIUM SERPL-MCNC: 7.7 MG/DL (ref 8.5–10.1)
CHLORIDE SERPL-SCNC: 110 MMOL/L (ref 97–108)
CO2 SERPL-SCNC: 21 MMOL/L (ref 21–32)
CREAT SERPL-MCNC: 1.3 MG/DL (ref 0.7–1.3)
CREAT UR-MCNC: 123 MG/DL
DIFFERENTIAL METHOD BLD: ABNORMAL
EOSINOPHIL # BLD: 0.3 K/UL (ref 0–0.4)
EOSINOPHIL NFR BLD: 5 % (ref 0–7)
ERYTHROCYTE [DISTWIDTH] IN BLOOD BY AUTOMATED COUNT: 16.9 % (ref 11.5–14.5)
FOLATE SERPL-MCNC: 16.1 NG/ML (ref 5–21)
GLUCOSE SERPL-MCNC: 71 MG/DL (ref 65–100)
HCT VFR BLD AUTO: 32.4 % (ref 36.6–50.3)
HGB BLD-MCNC: 10.4 G/DL (ref 12.1–17)
IMM GRANULOCYTES # BLD AUTO: 0.1 K/UL (ref 0–0.04)
IMM GRANULOCYTES NFR BLD AUTO: 1 % (ref 0–0.5)
IRON SATN MFR SERPL: 31 % (ref 20–50)
IRON SERPL-MCNC: 65 UG/DL (ref 35–150)
LYMPHOCYTES # BLD: 1.7 K/UL (ref 0.8–3.5)
LYMPHOCYTES NFR BLD: 25 % (ref 12–49)
MCH RBC QN AUTO: 31.6 PG (ref 26–34)
MCHC RBC AUTO-ENTMCNC: 32.1 G/DL (ref 30–36.5)
MCV RBC AUTO: 98.5 FL (ref 80–99)
MONOCYTES # BLD: 0.4 K/UL (ref 0–1)
MONOCYTES NFR BLD: 6 % (ref 5–13)
NEUTS SEG # BLD: 4.3 K/UL (ref 1.8–8)
NEUTS SEG NFR BLD: 63 % (ref 32–75)
NRBC # BLD: 0 K/UL (ref 0–0.01)
NRBC BLD-RTO: 0 PER 100 WBC
PLATELET # BLD AUTO: 274 K/UL (ref 150–400)
PMV BLD AUTO: 9.2 FL (ref 8.9–12.9)
POTASSIUM SERPL-SCNC: 3.7 MMOL/L (ref 3.5–5.1)
RBC # BLD AUTO: 3.29 M/UL (ref 4.1–5.7)
SODIUM SERPL-SCNC: 136 MMOL/L (ref 136–145)
SODIUM UR-SCNC: 24 MMOL/L
T4 FREE SERPL-MCNC: 0.9 NG/DL (ref 0.8–1.5)
TIBC SERPL-MCNC: 211 UG/DL (ref 250–450)
TSH SERPL DL<=0.05 MIU/L-ACNC: 6.14 UIU/ML (ref 0.36–3.74)
VIT B12 SERPL-MCNC: 1273 PG/ML (ref 193–986)
WBC # BLD AUTO: 6.9 K/UL (ref 4.1–11.1)

## 2023-08-11 PROCEDURE — 6370000000 HC RX 637 (ALT 250 FOR IP): Performed by: FAMILY MEDICINE

## 2023-08-11 PROCEDURE — 83540 ASSAY OF IRON: CPT

## 2023-08-11 PROCEDURE — 36415 COLL VENOUS BLD VENIPUNCTURE: CPT

## 2023-08-11 PROCEDURE — 78300 BONE IMAGING LIMITED AREA: CPT

## 2023-08-11 PROCEDURE — 6370000000 HC RX 637 (ALT 250 FOR IP)

## 2023-08-11 PROCEDURE — 1100000000 HC RM PRIVATE

## 2023-08-11 PROCEDURE — 80048 BASIC METABOLIC PNL TOTAL CA: CPT

## 2023-08-11 PROCEDURE — 84300 ASSAY OF URINE SODIUM: CPT

## 2023-08-11 PROCEDURE — A9503 TC99M MEDRONATE: HCPCS

## 2023-08-11 PROCEDURE — 2580000003 HC RX 258: Performed by: FAMILY MEDICINE

## 2023-08-11 PROCEDURE — 84439 ASSAY OF FREE THYROXINE: CPT

## 2023-08-11 PROCEDURE — 82570 ASSAY OF URINE CREATININE: CPT

## 2023-08-11 PROCEDURE — 85025 COMPLETE CBC W/AUTO DIFF WBC: CPT

## 2023-08-11 PROCEDURE — 82746 ASSAY OF FOLIC ACID SERUM: CPT

## 2023-08-11 PROCEDURE — 3430000000 HC RX DIAGNOSTIC RADIOPHARMACEUTICAL

## 2023-08-11 PROCEDURE — 82607 VITAMIN B-12: CPT

## 2023-08-11 PROCEDURE — 83550 IRON BINDING TEST: CPT

## 2023-08-11 RX ORDER — HYDROCODONE BITARTRATE AND ACETAMINOPHEN 5; 325 MG/1; MG/1
1 TABLET ORAL EVERY 6 HOURS PRN
Status: DISCONTINUED | OUTPATIENT
Start: 2023-08-11 | End: 2023-08-11

## 2023-08-11 RX ORDER — LEVOTHYROXINE SODIUM 0.05 MG/1
1 TABLET ORAL DAILY
COMMUNITY

## 2023-08-11 RX ORDER — LEVOTHYROXINE SODIUM 0.05 MG/1
50 TABLET ORAL DAILY
Status: DISCONTINUED | OUTPATIENT
Start: 2023-08-11 | End: 2023-08-21 | Stop reason: HOSPADM

## 2023-08-11 RX ORDER — LANOLIN ALCOHOL/MO/W.PET/CERES
800 CREAM (GRAM) TOPICAL DAILY
Status: DISCONTINUED | OUTPATIENT
Start: 2023-08-11 | End: 2023-08-21 | Stop reason: HOSPADM

## 2023-08-11 RX ORDER — METOPROLOL SUCCINATE 50 MG/1
50 TABLET, EXTENDED RELEASE ORAL DAILY
Status: DISCONTINUED | OUTPATIENT
Start: 2023-08-11 | End: 2023-08-21 | Stop reason: HOSPADM

## 2023-08-11 RX ORDER — OXYCODONE HYDROCHLORIDE 5 MG/1
5 TABLET ORAL EVERY 4 HOURS PRN
Status: DISCONTINUED | OUTPATIENT
Start: 2023-08-11 | End: 2023-08-17

## 2023-08-11 RX ORDER — SUCRALFATE 1 G/1
1 TABLET ORAL 4 TIMES DAILY
Status: DISCONTINUED | OUTPATIENT
Start: 2023-08-11 | End: 2023-08-21 | Stop reason: HOSPADM

## 2023-08-11 RX ORDER — TC 99M MEDRONATE 20 MG/10ML
21.6 INJECTION, POWDER, LYOPHILIZED, FOR SOLUTION INTRAVENOUS
Status: COMPLETED | OUTPATIENT
Start: 2023-08-11 | End: 2023-08-11

## 2023-08-11 RX ORDER — OXYCODONE HYDROCHLORIDE 5 MG/1
10 TABLET ORAL EVERY 4 HOURS PRN
Status: DISCONTINUED | OUTPATIENT
Start: 2023-08-11 | End: 2023-08-17

## 2023-08-11 RX ORDER — ACETAMINOPHEN 325 MG/1
650 TABLET ORAL EVERY 6 HOURS SCHEDULED
Status: DISCONTINUED | OUTPATIENT
Start: 2023-08-11 | End: 2023-08-21 | Stop reason: HOSPADM

## 2023-08-11 RX ORDER — METOPROLOL SUCCINATE 50 MG/1
1 TABLET, EXTENDED RELEASE ORAL DAILY
Status: ON HOLD | COMMUNITY
End: 2023-08-21 | Stop reason: HOSPADM

## 2023-08-11 RX ORDER — SODIUM CHLORIDE 9 MG/ML
INJECTION, SOLUTION INTRAVENOUS CONTINUOUS
Status: DISCONTINUED | OUTPATIENT
Start: 2023-08-11 | End: 2023-08-18

## 2023-08-11 RX ORDER — SPIRONOLACTONE 25 MG/1
TABLET ORAL
COMMUNITY

## 2023-08-11 RX ORDER — CHOLECALCIFEROL (VITAMIN D3) 125 MCG
1000 CAPSULE ORAL DAILY
Status: DISCONTINUED | OUTPATIENT
Start: 2023-08-11 | End: 2023-08-21 | Stop reason: HOSPADM

## 2023-08-11 RX ORDER — PANTOPRAZOLE SODIUM 40 MG/1
40 TABLET, DELAYED RELEASE ORAL DAILY
Status: DISCONTINUED | OUTPATIENT
Start: 2023-08-11 | End: 2023-08-21 | Stop reason: HOSPADM

## 2023-08-11 RX ADMIN — SUCRALFATE 1 G: 1 TABLET ORAL at 09:28

## 2023-08-11 RX ADMIN — METOPROLOL SUCCINATE 50 MG: 50 TABLET, EXTENDED RELEASE ORAL at 09:28

## 2023-08-11 RX ADMIN — SUCRALFATE 1 G: 1 TABLET ORAL at 20:47

## 2023-08-11 RX ADMIN — OXYCODONE HYDROCHLORIDE 10 MG: 5 TABLET ORAL at 13:29

## 2023-08-11 RX ADMIN — SUCRALFATE 1 G: 1 TABLET ORAL at 13:25

## 2023-08-11 RX ADMIN — CYANOCOBALAMIN TAB 500 MCG 1000 MCG: 500 TAB at 09:32

## 2023-08-11 RX ADMIN — SODIUM CHLORIDE: 9 INJECTION, SOLUTION INTRAVENOUS at 07:15

## 2023-08-11 RX ADMIN — APIXABAN 5 MG: 5 TABLET, FILM COATED ORAL at 02:53

## 2023-08-11 RX ADMIN — SUCRALFATE 1 G: 1 TABLET ORAL at 02:53

## 2023-08-11 RX ADMIN — OXYCODONE HYDROCHLORIDE 10 MG: 5 TABLET ORAL at 21:34

## 2023-08-11 RX ADMIN — OXYCODONE HYDROCHLORIDE 10 MG: 5 TABLET ORAL at 17:23

## 2023-08-11 RX ADMIN — ACETAMINOPHEN 650 MG: 325 TABLET ORAL at 16:40

## 2023-08-11 RX ADMIN — ACETAMINOPHEN 650 MG: 325 TABLET ORAL at 09:33

## 2023-08-11 RX ADMIN — ACETAMINOPHEN 650 MG: 325 TABLET ORAL at 13:25

## 2023-08-11 RX ADMIN — HYDROCODONE BITARTRATE AND ACETAMINOPHEN 1 TABLET: 5; 325 TABLET ORAL at 06:44

## 2023-08-11 RX ADMIN — PANTOPRAZOLE SODIUM 40 MG: 40 TABLET, DELAYED RELEASE ORAL at 09:29

## 2023-08-11 RX ADMIN — SODIUM CHLORIDE, PRESERVATIVE FREE 10 ML: 5 INJECTION INTRAVENOUS at 09:29

## 2023-08-11 RX ADMIN — LEVOTHYROXINE SODIUM 50 MCG: 0.05 TABLET ORAL at 09:29

## 2023-08-11 RX ADMIN — SUCRALFATE 1 G: 1 TABLET ORAL at 16:40

## 2023-08-11 RX ADMIN — TC 99M MEDRONATE 21.6 MILLICURIE: 20 INJECTION, POWDER, LYOPHILIZED, FOR SOLUTION INTRAVENOUS at 11:45

## 2023-08-11 RX ADMIN — Medication 800 MCG: at 09:28

## 2023-08-11 ASSESSMENT — PAIN DESCRIPTION - ORIENTATION
ORIENTATION: LOWER
ORIENTATION: LOWER

## 2023-08-11 ASSESSMENT — PAIN DESCRIPTION - DESCRIPTORS
DESCRIPTORS: ACHING
DESCRIPTORS: ACHING

## 2023-08-11 ASSESSMENT — PAIN SCALES - GENERAL
PAINLEVEL_OUTOF10: 8
PAINLEVEL_OUTOF10: 0
PAINLEVEL_OUTOF10: 10

## 2023-08-11 ASSESSMENT — PAIN DESCRIPTION - LOCATION
LOCATION: BACK
LOCATION: BACK

## 2023-08-11 NOTE — CARE COORDINATION
Care Management Initial Assessment       RUR: 15%   Readmission? No  1st IM letter given? Yes -   1st  letter given: No  Dispo: Home with Family support \"Pending Therapy Recs\"   Pt lives alone in a 1 level home. The pt reported that he does not feel that he would be able to discharge home at this time, due to his challenges with ambulating. The pt reported that he would be open to transition to a SNF if recommended. The pt reported that he is currently receiving SNAP benefits. Medicaid Pending: Application Submitted 4/11 S#[id number] [facility]   The pt reported that he would like to speak with the [facility] to complete an AMD; Consult placed. Rehab HX: Kevin Quiroz a good experience   Preferred Pharmacy: [facility] [address] Transport: [facility]   Barrier: Medical, IR consult pending  Pending Procedure: [place] pending      08/11/23 1251   Service Assessment   Patient Orientation Alert and Oriented   Cognition Alert   History Provided By Patient   Primary Caregiver Self   Support Systems Friends/Neighbors; Family Members;Spouse/Significant Other   Patient's [facility] is: Legal Next of [address] [place] [address]   PCP Verified by CM Yes   Last Visit to PCP Within last 3 months   Prior Functional Level Independent in ADLs/IADLs   Current Functional Level Assistance with the following:;Mobility; Shopping;Cooking   Can patient return to prior living arrangement No   Ability to make needs known: Good   Family able to assist with home care needs: No   Would you like for me to discuss the discharge plan with any other family members/significant others, and if so, who?  Yes   Financial Resources Medicare;Medicaid  (Medicaid Pending application submitted 9/98 F#[id number])   Social/Functional History   Lives With Alone   Type of [address] [place] [address]  One level   Home Access Ramped entrance   Bathroom Shower/Tub Tub/Shower unit   Bathroom Toilet Handicap height   [address] bars in [address] [place] [address] Accessibility

## 2023-08-11 NOTE — ED NOTES
Patient resting in stretcher, A&Ox4, respirations even and unlabored and in no acute distress. Pt requesting pain medication for 8/10 back pain. BP 91/59. Dr. Zayra Meehan notified. Per MD give 1L fluid bolus and then administer pain medication. Bed in low locked position with side rails up x2. Pt updated on plan of care, denies any other needs/concerns at this time.       Erik Mendoza RN  08/10/23 2015           Erik Mendoza RN  08/10/23 4083

## 2023-08-11 NOTE — H&P
History and Physical    Date of Service:  8/11/2023  Primary Care Provider: Polina Morel MD  Source of information: patient, electronic medical record    Chief Complaint: Back Pain      History of Presenting Illness:   Rashaad Arrington is a 69 y.o. male with apmhx a-fib on xarelto, SSS s/p PPM, HTN, obesity, GERD, and DJD who presents with worsening b/l lower back pain, and b/l LE weakness causing him not to be able to ambulate. He presented to the ED on 7/6, and 7/30 for generalized weakness, and  thoracolumbar pain respectively, but feels that his weakness is worsening, and he is unable to continue to live by himself. In the ED, VSS. Previous CT abdomen/pelvis showed chronic compression fracture of T10, T11, and L2. Today CT lumbar spine shows new acute moderate compression fracture of T12. He was discharged with norco for pain, and pcp and ortho follow up. Labs today showed BUN 22, creatinine 1.57 (b/l 1.1), alk phow 184, and albumin 2.4. In the ED, case management was consulted, and will be evaluating for discharge disposition. He received norco.     REVIEW OF SYSTEMS:  A comprehensive review of systems was negative except for that written in the History of Present Illness. Past Medical History:   Diagnosis Date    DJD (degenerative joint disease)     Hypertension     Obesity       Past Surgical History:   Procedure Laterality Date    COLONOSCOPY N/A 12/20/2021    COLONOSCOPY performed by Osiris Vogel MD at [facility] ENDOSCOPY    GASTRIC BYPASS,OBESE<150CM EM-EN-Y      ORTHOPEDIC SURGERY      bilat hip replacements     Prior to Admission medications    Medication Sig Start Date End Date Taking?  Authorizing Provider   ascorbic acid (VITAMIN C) 500 MG tablet Take by mouth daily    Ar Automatic Reconciliation   aspirin 81 MG chewable tablet Take 81 mg by mouth daily    Ar Automatic Reconciliation   cyanocobalamin 1000 MCG tablet Take by mouth daily    Ar Automatic Reconciliation

## 2023-08-11 NOTE — CONSULTS
INTERVENTIONAL RADIOLOGY  Consult Note      Patient:  Earline Cuello  :  1945  Age:  69 y.o. MRN:  [id number]    Today's Date:  2023  Admission Date:  8/10/2023  Hospital Day:  1  Consult requested by:  Marie Vieyra MD      CC / HPI   Earline Cuello is a 69 y.o. male with a history of HTN, DJD, atrial fibrillation on Xarelto, SS s/p PPI, who is admitted with intractable lower back pain and BLE weakness with difficulty ambulating. CT of the lumbar spine revealed acute compression fracture of T12, chronic compression fractures of T11 and L2, grade 2 anterolisthesis of L5 on S1 with chronic bilateral L5 pars defects and multilevel spondylosis. IR consultation is requested for kypoplasty. Follow-up bone scan confirms acute T12 compression fracture. PAST MEDICAL HISTORY  Past Medical History:   Diagnosis Date    DJD (degenerative joint disease)     Hypertension     Obesity      PAST SURGICAL HISTORY  Past Surgical History:   Procedure Laterality Date    COLONOSCOPY N/A 2021    COLONOSCOPY performed by Norma Joyce [facility] at [facility]OSCOPY    GASTRIC BYPASS,OBESE<150CM EM-EN-Y      ORTHOPEDIC SURGERY      bilat hip replacements     SOCIAL HISTORY  Social History     Socioeconomic History    Marital status:      Spouse name: Not on file    Number of children: Not on file    Years of education: Not on file    Highest education level: Not on file   Occupational History    Not on file   Tobacco Use    Smoking status: Never    Smokeless tobacco: Never   Substance and Sexual Activity    Alcohol use:  Yes     Alcohol/week: 136.0 standard drinks    Drug use: Yes     Types: Marijuana Afshan Devi)    Sexual activity: Not on file   Other Topics Concern    Not on file   Social History Narrative    Not on file     Social Determinants of Health     Financial Resource Strain: Not on file   Food Insecurity: Not on file   Transportation Needs: Not on file   Physical Activity: Not on file

## 2023-08-12 LAB
ANION GAP SERPL CALC-SCNC: 6 MMOL/L (ref 5–15)
BASOPHILS # BLD: 0 K/UL (ref 0–0.1)
BASOPHILS NFR BLD: 1 % (ref 0–1)
BUN SERPL-MCNC: 21 MG/DL (ref 6–20)
BUN/CREAT SERPL: 16 (ref 12–20)
CALCIUM SERPL-MCNC: 7.3 MG/DL (ref 8.5–10.1)
CHLORIDE SERPL-SCNC: 109 MMOL/L (ref 97–108)
CO2 SERPL-SCNC: 20 MMOL/L (ref 21–32)
CREAT SERPL-MCNC: 1.29 MG/DL (ref 0.7–1.3)
DIFFERENTIAL METHOD BLD: ABNORMAL
EKG ATRIAL RATE: 63 BPM
EKG DIAGNOSIS: NORMAL
EKG P AXIS: 19 DEGREES
EKG P-R INTERVAL: 184 MS
EKG Q-T INTERVAL: 412 MS
EKG QRS DURATION: 100 MS
EKG QTC CALCULATION (BAZETT): 421 MS
EKG R AXIS: -12 DEGREES
EKG T AXIS: 40 DEGREES
EKG VENTRICULAR RATE: 63 BPM
EOSINOPHIL # BLD: 0.2 K/UL (ref 0–0.4)
EOSINOPHIL NFR BLD: 4 % (ref 0–7)
ERYTHROCYTE [DISTWIDTH] IN BLOOD BY AUTOMATED COUNT: 16.4 % (ref 11.5–14.5)
FERRITIN SERPL-MCNC: 66 NG/ML (ref 26–388)
GLUCOSE SERPL-MCNC: 76 MG/DL (ref 65–100)
HCT VFR BLD AUTO: 28.7 % (ref 36.6–50.3)
HEMOCCULT STL QL: NEGATIVE
HGB BLD-MCNC: 9.3 G/DL (ref 12.1–17)
IMM GRANULOCYTES # BLD AUTO: 0 K/UL (ref 0–0.04)
IMM GRANULOCYTES NFR BLD AUTO: 1 % (ref 0–0.5)
LYMPHOCYTES # BLD: 1.6 K/UL (ref 0.8–3.5)
LYMPHOCYTES NFR BLD: 27 % (ref 12–49)
MCH RBC QN AUTO: 32.1 PG (ref 26–34)
MCHC RBC AUTO-ENTMCNC: 32.4 G/DL (ref 30–36.5)
MCV RBC AUTO: 99 FL (ref 80–99)
MONOCYTES # BLD: 0.3 K/UL (ref 0–1)
MONOCYTES NFR BLD: 6 % (ref 5–13)
NEUTS SEG # BLD: 3.6 K/UL (ref 1.8–8)
NEUTS SEG NFR BLD: 61 % (ref 32–75)
NRBC # BLD: 0 K/UL (ref 0–0.01)
NRBC BLD-RTO: 0 PER 100 WBC
PLATELET # BLD AUTO: 244 K/UL (ref 150–400)
PMV BLD AUTO: 9.3 FL (ref 8.9–12.9)
POTASSIUM SERPL-SCNC: 4.1 MMOL/L (ref 3.5–5.1)
RBC # BLD AUTO: 2.9 M/UL (ref 4.1–5.7)
SODIUM SERPL-SCNC: 135 MMOL/L (ref 136–145)
WBC # BLD AUTO: 5.8 K/UL (ref 4.1–11.1)

## 2023-08-12 PROCEDURE — 85025 COMPLETE CBC W/AUTO DIFF WBC: CPT

## 2023-08-12 PROCEDURE — 36415 COLL VENOUS BLD VENIPUNCTURE: CPT

## 2023-08-12 PROCEDURE — 82272 OCCULT BLD FECES 1-3 TESTS: CPT

## 2023-08-12 PROCEDURE — 2580000003 HC RX 258: Performed by: FAMILY MEDICINE

## 2023-08-12 PROCEDURE — 93010 ELECTROCARDIOGRAM REPORT: CPT | Performed by: SPECIALIST

## 2023-08-12 PROCEDURE — 80048 BASIC METABOLIC PNL TOTAL CA: CPT

## 2023-08-12 PROCEDURE — 6370000000 HC RX 637 (ALT 250 FOR IP)

## 2023-08-12 PROCEDURE — 82728 ASSAY OF FERRITIN: CPT

## 2023-08-12 PROCEDURE — 6370000000 HC RX 637 (ALT 250 FOR IP): Performed by: FAMILY MEDICINE

## 2023-08-12 PROCEDURE — 1100000000 HC RM PRIVATE

## 2023-08-12 RX ADMIN — SUCRALFATE 1 G: 1 TABLET ORAL at 08:43

## 2023-08-12 RX ADMIN — OXYCODONE HYDROCHLORIDE 10 MG: 5 TABLET ORAL at 16:24

## 2023-08-12 RX ADMIN — OXYCODONE HYDROCHLORIDE 10 MG: 5 TABLET ORAL at 07:21

## 2023-08-12 RX ADMIN — ACETAMINOPHEN 650 MG: 325 TABLET ORAL at 12:18

## 2023-08-12 RX ADMIN — SUCRALFATE 1 G: 1 TABLET ORAL at 12:18

## 2023-08-12 RX ADMIN — Medication 800 MCG: at 08:43

## 2023-08-12 RX ADMIN — ACETAMINOPHEN 650 MG: 325 TABLET ORAL at 07:21

## 2023-08-12 RX ADMIN — PANTOPRAZOLE SODIUM 40 MG: 40 TABLET, DELAYED RELEASE ORAL at 08:43

## 2023-08-12 RX ADMIN — OXYCODONE HYDROCHLORIDE 10 MG: 5 TABLET ORAL at 20:58

## 2023-08-12 RX ADMIN — SODIUM CHLORIDE, PRESERVATIVE FREE 10 ML: 5 INJECTION INTRAVENOUS at 08:44

## 2023-08-12 RX ADMIN — LEVOTHYROXINE SODIUM 50 MCG: 0.05 TABLET ORAL at 08:43

## 2023-08-12 RX ADMIN — SUCRALFATE 1 G: 1 TABLET ORAL at 20:58

## 2023-08-12 RX ADMIN — OXYCODONE HYDROCHLORIDE 10 MG: 5 TABLET ORAL at 12:18

## 2023-08-12 RX ADMIN — SUCRALFATE 1 G: 1 TABLET ORAL at 16:25

## 2023-08-12 RX ADMIN — ACETAMINOPHEN 650 MG: 325 TABLET ORAL at 16:24

## 2023-08-12 RX ADMIN — SODIUM CHLORIDE: 9 INJECTION, SOLUTION INTRAVENOUS at 12:20

## 2023-08-12 RX ADMIN — OXYCODONE HYDROCHLORIDE 10 MG: 5 TABLET ORAL at 03:33

## 2023-08-12 RX ADMIN — CYANOCOBALAMIN TAB 500 MCG 1000 MCG: 500 TAB at 08:43

## 2023-08-12 ASSESSMENT — PAIN DESCRIPTION - DESCRIPTORS
DESCRIPTORS: ACHING
DESCRIPTORS: ACHING

## 2023-08-12 ASSESSMENT — PAIN DESCRIPTION - LOCATION
LOCATION: BACK
LOCATION: BACK

## 2023-08-12 ASSESSMENT — PAIN SCALES - GENERAL
PAINLEVEL_OUTOF10: 8
PAINLEVEL_OUTOF10: 8

## 2023-08-12 ASSESSMENT — PAIN DESCRIPTION - ORIENTATION
ORIENTATION: LOWER
ORIENTATION: LOWER

## 2023-08-12 NOTE — ACP (ADVANCE CARE PLANNING)
Advance Care Planning     Advance Care Planning Inpatient Note  Spiritual Care Department    Today's Date: 8/12/2023  Unit: [facility] 5W[facility] SPINE    Received request from IDT Member. Upon review of chart and communication with care team, patient's decision making abilities are not in question. . Patient was/were present in the room during visit. Goals of ACP Conversation:  Discuss advance care planning documents  Facilitate a discussion related to patient's goals of care as they align with the patient's values and beliefs. Health Care Decision Makers:       Primary Decision Maker: Angel Aquino - Brother/Sister - [phone]    Primary Decision Maker: Mesha Wilder - Brother/Sister - [phone]  Summary:  Documented Next of Kin, per patient report    Advance Care Planning Documents (Patient Wishes):  None     Assessment:  Visited patient in response to a consult requesting an AMD consult with him. Explained the purpose of an AMD and the various sections therein. He verbally stated that he wants his brother, Angel Aquino ([phone]) and sister Mesha Wilder ([phone]) to serve jointly as his MPOA. He reports that they are his legal NOK. He chose not to complete an AMD at time of the conversation, but kept a blank copy for later.  He was encouraged to have spiritual care paged if he has additional questions or later chooses to complete an AMD.     Interventions:  Requested patient/family to submit existing document for our records: None    Care Preferences Communicated:   No    Outcomes/Plan:  ACP Discussion: Postponed    Electronically signed by Oren Back, [address] [place] [address] on 8/12/2023 at 11:48 AM

## 2023-08-13 LAB
ANION GAP SERPL CALC-SCNC: 4 MMOL/L (ref 5–15)
BASOPHILS # BLD: 0 K/UL (ref 0–0.1)
BASOPHILS NFR BLD: 1 % (ref 0–1)
BUN SERPL-MCNC: 20 MG/DL (ref 6–20)
BUN/CREAT SERPL: 17 (ref 12–20)
CALCIUM SERPL-MCNC: 7.2 MG/DL (ref 8.5–10.1)
CHLORIDE SERPL-SCNC: 110 MMOL/L (ref 97–108)
CO2 SERPL-SCNC: 23 MMOL/L (ref 21–32)
CREAT SERPL-MCNC: 1.2 MG/DL (ref 0.7–1.3)
DIFFERENTIAL METHOD BLD: ABNORMAL
EOSINOPHIL # BLD: 0.3 K/UL (ref 0–0.4)
EOSINOPHIL NFR BLD: 5 % (ref 0–7)
ERYTHROCYTE [DISTWIDTH] IN BLOOD BY AUTOMATED COUNT: 16.2 % (ref 11.5–14.5)
GLUCOSE SERPL-MCNC: 84 MG/DL (ref 65–100)
HCT VFR BLD AUTO: 30.5 % (ref 36.6–50.3)
HGB BLD-MCNC: 9.9 G/DL (ref 12.1–17)
IMM GRANULOCYTES # BLD AUTO: 0 K/UL (ref 0–0.04)
IMM GRANULOCYTES NFR BLD AUTO: 1 % (ref 0–0.5)
LYMPHOCYTES # BLD: 1.5 K/UL (ref 0.8–3.5)
LYMPHOCYTES NFR BLD: 24 % (ref 12–49)
MCH RBC QN AUTO: 32 PG (ref 26–34)
MCHC RBC AUTO-ENTMCNC: 32.5 G/DL (ref 30–36.5)
MCV RBC AUTO: 98.7 FL (ref 80–99)
MONOCYTES # BLD: 0.4 K/UL (ref 0–1)
MONOCYTES NFR BLD: 7 % (ref 5–13)
NEUTS SEG # BLD: 3.8 K/UL (ref 1.8–8)
NEUTS SEG NFR BLD: 62 % (ref 32–75)
NRBC # BLD: 0 K/UL (ref 0–0.01)
NRBC BLD-RTO: 0 PER 100 WBC
PLATELET # BLD AUTO: 256 K/UL (ref 150–400)
PMV BLD AUTO: 9.2 FL (ref 8.9–12.9)
POTASSIUM SERPL-SCNC: 4.6 MMOL/L (ref 3.5–5.1)
RBC # BLD AUTO: 3.09 M/UL (ref 4.1–5.7)
SODIUM SERPL-SCNC: 137 MMOL/L (ref 136–145)
WBC # BLD AUTO: 6 K/UL (ref 4.1–11.1)

## 2023-08-13 PROCEDURE — 1100000000 HC RM PRIVATE

## 2023-08-13 PROCEDURE — 80048 BASIC METABOLIC PNL TOTAL CA: CPT

## 2023-08-13 PROCEDURE — 6370000000 HC RX 637 (ALT 250 FOR IP)

## 2023-08-13 PROCEDURE — 6370000000 HC RX 637 (ALT 250 FOR IP): Performed by: FAMILY MEDICINE

## 2023-08-13 PROCEDURE — 36415 COLL VENOUS BLD VENIPUNCTURE: CPT

## 2023-08-13 PROCEDURE — 2580000003 HC RX 258: Performed by: FAMILY MEDICINE

## 2023-08-13 PROCEDURE — 85025 COMPLETE CBC W/AUTO DIFF WBC: CPT

## 2023-08-13 RX ADMIN — OXYCODONE HYDROCHLORIDE 10 MG: 5 TABLET ORAL at 07:26

## 2023-08-13 RX ADMIN — OXYCODONE HYDROCHLORIDE 10 MG: 5 TABLET ORAL at 16:01

## 2023-08-13 RX ADMIN — OXYCODONE HYDROCHLORIDE 10 MG: 5 TABLET ORAL at 20:05

## 2023-08-13 RX ADMIN — SODIUM CHLORIDE: 9 INJECTION, SOLUTION INTRAVENOUS at 11:50

## 2023-08-13 RX ADMIN — PANTOPRAZOLE SODIUM 40 MG: 40 TABLET, DELAYED RELEASE ORAL at 08:56

## 2023-08-13 RX ADMIN — OXYCODONE HYDROCHLORIDE 10 MG: 5 TABLET ORAL at 01:21

## 2023-08-13 RX ADMIN — ACETAMINOPHEN 650 MG: 325 TABLET ORAL at 16:49

## 2023-08-13 RX ADMIN — SUCRALFATE 1 G: 1 TABLET ORAL at 20:29

## 2023-08-13 RX ADMIN — SODIUM CHLORIDE, PRESERVATIVE FREE 10 ML: 5 INJECTION INTRAVENOUS at 21:00

## 2023-08-13 RX ADMIN — Medication 800 MCG: at 08:56

## 2023-08-13 RX ADMIN — ACETAMINOPHEN 650 MG: 325 TABLET ORAL at 01:21

## 2023-08-13 RX ADMIN — SODIUM CHLORIDE, PRESERVATIVE FREE 10 ML: 5 INJECTION INTRAVENOUS at 08:57

## 2023-08-13 RX ADMIN — CYANOCOBALAMIN TAB 500 MCG 1000 MCG: 500 TAB at 08:56

## 2023-08-13 RX ADMIN — SUCRALFATE 1 G: 1 TABLET ORAL at 16:49

## 2023-08-13 RX ADMIN — SUCRALFATE 1 G: 1 TABLET ORAL at 11:49

## 2023-08-13 RX ADMIN — ACETAMINOPHEN 650 MG: 325 TABLET ORAL at 11:49

## 2023-08-13 RX ADMIN — SUCRALFATE 1 G: 1 TABLET ORAL at 08:55

## 2023-08-13 RX ADMIN — ACETAMINOPHEN 650 MG: 325 TABLET ORAL at 07:27

## 2023-08-13 RX ADMIN — LEVOTHYROXINE SODIUM 50 MCG: 0.05 TABLET ORAL at 08:56

## 2023-08-13 RX ADMIN — OXYCODONE HYDROCHLORIDE 10 MG: 5 TABLET ORAL at 11:49

## 2023-08-13 ASSESSMENT — PAIN DESCRIPTION - LOCATION
LOCATION: BACK

## 2023-08-13 ASSESSMENT — PAIN SCALES - GENERAL
PAINLEVEL_OUTOF10: 8

## 2023-08-13 ASSESSMENT — PAIN DESCRIPTION - ORIENTATION
ORIENTATION: LOWER
ORIENTATION: MID
ORIENTATION: MID

## 2023-08-13 ASSESSMENT — PAIN DESCRIPTION - DESCRIPTORS
DESCRIPTORS: ACHING

## 2023-08-14 ENCOUNTER — HOSPITAL ENCOUNTER (INPATIENT)
Facility: HOSPITAL | Age: 78
Discharge: HOME | DRG: 516 | End: 2023-08-17
Payer: MEDICARE

## 2023-08-14 VITALS
TEMPERATURE: 98.1 F | HEART RATE: 68 BPM | OXYGEN SATURATION: 95 % | RESPIRATION RATE: 13 BRPM | SYSTOLIC BLOOD PRESSURE: 118 MMHG | DIASTOLIC BLOOD PRESSURE: 74 MMHG

## 2023-08-14 LAB
ANION GAP SERPL CALC-SCNC: 4 MMOL/L (ref 5–15)
BUN SERPL-MCNC: 20 MG/DL (ref 6–20)
BUN/CREAT SERPL: 19 (ref 12–20)
CALCIUM SERPL-MCNC: 7.4 MG/DL (ref 8.5–10.1)
CHLORIDE SERPL-SCNC: 112 MMOL/L (ref 97–108)
CO2 SERPL-SCNC: 21 MMOL/L (ref 21–32)
CREAT SERPL-MCNC: 1.08 MG/DL (ref 0.7–1.3)
ERYTHROCYTE [DISTWIDTH] IN BLOOD BY AUTOMATED COUNT: 16.2 % (ref 11.5–14.5)
GLUCOSE SERPL-MCNC: 110 MG/DL (ref 65–100)
HCT VFR BLD AUTO: 32.4 % (ref 36.6–50.3)
HGB BLD-MCNC: 10.4 G/DL (ref 12.1–17)
INR PPP: 1.2 (ref 0.9–1.1)
MCH RBC QN AUTO: 32.1 PG (ref 26–34)
MCHC RBC AUTO-ENTMCNC: 32.1 G/DL (ref 30–36.5)
MCV RBC AUTO: 100 FL (ref 80–99)
NRBC # BLD: 0 K/UL (ref 0–0.01)
NRBC BLD-RTO: 0 PER 100 WBC
PLATELET # BLD AUTO: 236 K/UL (ref 150–400)
PMV BLD AUTO: 9.3 FL (ref 8.9–12.9)
POTASSIUM SERPL-SCNC: 3.7 MMOL/L (ref 3.5–5.1)
PROTHROMBIN TIME: 12.2 SEC (ref 9–11.1)
RBC # BLD AUTO: 3.24 M/UL (ref 4.1–5.7)
SODIUM SERPL-SCNC: 137 MMOL/L (ref 136–145)
WBC # BLD AUTO: 5.9 K/UL (ref 4.1–11.1)

## 2023-08-14 PROCEDURE — 1100000000 HC RM PRIVATE

## 2023-08-14 PROCEDURE — 36415 COLL VENOUS BLD VENIPUNCTURE: CPT

## 2023-08-14 PROCEDURE — 2580000003 HC RX 258: Performed by: FAMILY MEDICINE

## 2023-08-14 PROCEDURE — 22513 PERQ VERTEBRAL AUGMENTATION: CPT

## 2023-08-14 PROCEDURE — 85610 PROTHROMBIN TIME: CPT

## 2023-08-14 PROCEDURE — 85027 COMPLETE CBC AUTOMATED: CPT

## 2023-08-14 PROCEDURE — 80048 BASIC METABOLIC PNL TOTAL CA: CPT

## 2023-08-14 PROCEDURE — 6360000002 HC RX W HCPCS: Performed by: STUDENT IN AN ORGANIZED HEALTH CARE EDUCATION/TRAINING PROGRAM

## 2023-08-14 PROCEDURE — 2500000003 HC RX 250 WO HCPCS: Performed by: STUDENT IN AN ORGANIZED HEALTH CARE EDUCATION/TRAINING PROGRAM

## 2023-08-14 PROCEDURE — 0PU43JZ SUPPLEMENT THORACIC VERTEBRA WITH SYNTHETIC SUBSTITUTE, PERCUTANEOUS APPROACH: ICD-10-PCS | Performed by: STUDENT IN AN ORGANIZED HEALTH CARE EDUCATION/TRAINING PROGRAM

## 2023-08-14 PROCEDURE — C1713 ANCHOR/SCREW BN/BN,TIS/BN: HCPCS

## 2023-08-14 PROCEDURE — 0PS43ZZ REPOSITION THORACIC VERTEBRA, PERCUTANEOUS APPROACH: ICD-10-PCS | Performed by: STUDENT IN AN ORGANIZED HEALTH CARE EDUCATION/TRAINING PROGRAM

## 2023-08-14 PROCEDURE — 6370000000 HC RX 637 (ALT 250 FOR IP)

## 2023-08-14 PROCEDURE — 2580000003 HC RX 258: Performed by: STUDENT IN AN ORGANIZED HEALTH CARE EDUCATION/TRAINING PROGRAM

## 2023-08-14 PROCEDURE — 6370000000 HC RX 637 (ALT 250 FOR IP): Performed by: FAMILY MEDICINE

## 2023-08-14 RX ORDER — LIDOCAINE HYDROCHLORIDE 10 MG/ML
INJECTION, SOLUTION EPIDURAL; INFILTRATION; INTRACAUDAL; PERINEURAL PRN
Status: COMPLETED | OUTPATIENT
Start: 2023-08-14 | End: 2023-08-14

## 2023-08-14 RX ORDER — DIPHENHYDRAMINE HYDROCHLORIDE 50 MG/ML
25 INJECTION INTRAMUSCULAR; INTRAVENOUS ONCE
Status: COMPLETED | OUTPATIENT
Start: 2023-08-14 | End: 2023-08-14

## 2023-08-14 RX ORDER — KETOROLAC TROMETHAMINE 30 MG/ML
30 INJECTION, SOLUTION INTRAMUSCULAR; INTRAVENOUS ONCE
Status: COMPLETED | OUTPATIENT
Start: 2023-08-14 | End: 2023-08-14

## 2023-08-14 RX ORDER — FENTANYL CITRATE 50 UG/ML
INJECTION, SOLUTION INTRAMUSCULAR; INTRAVENOUS PRN
Status: COMPLETED | OUTPATIENT
Start: 2023-08-14 | End: 2023-08-14

## 2023-08-14 RX ORDER — BUPIVACAINE HYDROCHLORIDE 5 MG/ML
INJECTION, SOLUTION EPIDURAL; INTRACAUDAL PRN
Status: COMPLETED | OUTPATIENT
Start: 2023-08-14 | End: 2023-08-14

## 2023-08-14 RX ORDER — MIDAZOLAM HYDROCHLORIDE 2 MG/2ML
INJECTION, SOLUTION INTRAMUSCULAR; INTRAVENOUS PRN
Status: COMPLETED | OUTPATIENT
Start: 2023-08-14 | End: 2023-08-14

## 2023-08-14 RX ADMIN — LIDOCAINE HYDROCHLORIDE 10 ML: 10 INJECTION, SOLUTION EPIDURAL; INFILTRATION; INTRACAUDAL; PERINEURAL at 16:23

## 2023-08-14 RX ADMIN — SODIUM CHLORIDE: 9 INJECTION, SOLUTION INTRAVENOUS at 21:12

## 2023-08-14 RX ADMIN — SODIUM CHLORIDE, PRESERVATIVE FREE 10 ML: 5 INJECTION INTRAVENOUS at 21:13

## 2023-08-14 RX ADMIN — SODIUM CHLORIDE, PRESERVATIVE FREE 10 ML: 5 INJECTION INTRAVENOUS at 08:16

## 2023-08-14 RX ADMIN — OXYCODONE HYDROCHLORIDE 10 MG: 5 TABLET ORAL at 00:09

## 2023-08-14 RX ADMIN — FENTANYL CITRATE 25 MCG: 50 INJECTION, SOLUTION INTRAMUSCULAR; INTRAVENOUS at 16:10

## 2023-08-14 RX ADMIN — DIPHENHYDRAMINE HYDROCHLORIDE 25 MG: 50 INJECTION INTRAMUSCULAR; INTRAVENOUS at 15:20

## 2023-08-14 RX ADMIN — SODIUM CHLORIDE: 9 INJECTION, SOLUTION INTRAVENOUS at 02:51

## 2023-08-14 RX ADMIN — CEFAZOLIN 2000 MG: 1 INJECTION, POWDER, FOR SOLUTION INTRAMUSCULAR; INTRAVENOUS at 15:16

## 2023-08-14 RX ADMIN — SUCRALFATE 1 G: 1 TABLET ORAL at 21:12

## 2023-08-14 RX ADMIN — MIDAZOLAM HYDROCHLORIDE 2 MG: 1 INJECTION, SOLUTION INTRAMUSCULAR; INTRAVENOUS at 15:57

## 2023-08-14 RX ADMIN — FENTANYL CITRATE 50 MCG: 50 INJECTION, SOLUTION INTRAMUSCULAR; INTRAVENOUS at 15:57

## 2023-08-14 RX ADMIN — OXYCODONE HYDROCHLORIDE 10 MG: 5 TABLET ORAL at 22:35

## 2023-08-14 RX ADMIN — SUCRALFATE 1 G: 1 TABLET ORAL at 17:21

## 2023-08-14 RX ADMIN — LEVOTHYROXINE SODIUM 50 MCG: 0.05 TABLET ORAL at 08:14

## 2023-08-14 RX ADMIN — OXYCODONE HYDROCHLORIDE 10 MG: 5 TABLET ORAL at 09:56

## 2023-08-14 RX ADMIN — ACETAMINOPHEN 650 MG: 325 TABLET ORAL at 17:21

## 2023-08-14 RX ADMIN — ACETAMINOPHEN 650 MG: 325 TABLET ORAL at 05:52

## 2023-08-14 RX ADMIN — ACETAMINOPHEN 650 MG: 325 TABLET ORAL at 00:07

## 2023-08-14 RX ADMIN — OXYCODONE HYDROCHLORIDE 10 MG: 5 TABLET ORAL at 18:24

## 2023-08-14 RX ADMIN — SODIUM CHLORIDE: 9 INJECTION, SOLUTION INTRAVENOUS at 17:20

## 2023-08-14 RX ADMIN — KETOROLAC TROMETHAMINE 30 MG: 30 INJECTION, SOLUTION INTRAMUSCULAR; INTRAVENOUS at 15:23

## 2023-08-14 RX ADMIN — BUPIVACAINE HYDROCHLORIDE 10 ML: 5 INJECTION, SOLUTION EPIDURAL; INTRACAUDAL; PERINEURAL at 16:23

## 2023-08-14 RX ADMIN — OXYCODONE HYDROCHLORIDE 10 MG: 5 TABLET ORAL at 05:52

## 2023-08-14 ASSESSMENT — PAIN SCALES - GENERAL
PAINLEVEL_OUTOF10: 7
PAINLEVEL_OUTOF10: 10
PAINLEVEL_OUTOF10: 8
PAINLEVEL_OUTOF10: 10
PAINLEVEL_OUTOF10: 9

## 2023-08-14 ASSESSMENT — PAIN DESCRIPTION - DESCRIPTORS
DESCRIPTORS: ACHING

## 2023-08-14 ASSESSMENT — PAIN DESCRIPTION - ORIENTATION
ORIENTATION: LOWER
ORIENTATION: MID

## 2023-08-14 ASSESSMENT — PAIN DESCRIPTION - LOCATION
LOCATION: BACK
LOCATION: BACK;LEG
LOCATION: BACK

## 2023-08-14 NOTE — PROCEDURES
Interventional Radiology  Procedure Note        8/14/2023 4:55 PM    Patient: Fadumo Double     Informed consent obtained    Diagnosis: T12 comp fx     Procedure(s): T12 kyphoplasty    Findings: Successful cement fill    EBL: Minimal    Specimens removed: None    Complications: None    Primary Physician: Courtney Spencer MD    Recomendations: 2 hour bedrest    Full dictated report to follow    Signed By: Courtney Spencer MD

## 2023-08-14 NOTE — CARE COORDINATION
Transition of Care Plan:    RUR: 16%   Prior Level of Functioning: Independent    Disposition: Home with Family \"Pending Therapy recs\"    If SNF or IPR: Date FOC offered: Pending   Date FOC received: Pending   Accepting facility: N/A   Date authorization started with reference number: N/A   Date authorization received and expires: N/A   Follow up appointments: PCP   DME needed: Pending   Transportation at discharge: BLS vs. W/C   IM/IMM Medicare/ letter given: 2nd IMM Letter Needed   Caregiver Contact: Ira Genao [phone]  Care Conference needed?  No   Barriers to discharge: Kypho pending, Therapy recs pending, Placement, Luci Rist

## 2023-08-14 NOTE — PROGRESS NOTES
Report received from DIANA Yang and care assumed    1650  TRANSFER - OUT REPORT:    Verbal report given to DIANA Dumont on Aba Clark  being transferred to [facility] for routine progression of patient care       Report consisted of patient's Situation, Background, Assessment and   Recommendations(SBAR). Information from the following report(s) Surgery Report and MAR was reviewed with the receiving nurse. Lines:   Peripheral IV 08/10/23 Left Antecubital (Active)   Site Assessment Clean, dry & intact 08/14/23 0812   Line Status Flushed;Normal saline locked 08/14/23 0812   Line Care Connections checked and tightened 08/14/23 0812   Phlebitis Assessment No symptoms 08/14/23 0812   Infiltration Assessment 0 08/14/23 0812   Alcohol Cap Used Yes 08/14/23 0812   Dressing Status Clean, dry & intact 08/14/23 0812   Dressing Type Transparent 08/14/23 0812       Peripheral IV 08/11/23 Left; Anterior;Ventral Forearm (Active)   Site Assessment Clean, dry & intact 08/14/23 0812   Line Status Infusing 08/14/23 0812   Line Care Connections checked and tightened 08/14/23 0812   Phlebitis Assessment No symptoms 08/14/23 0812   Infiltration Assessment 0 08/14/23 0812   Alcohol Cap Used Yes 08/14/23 0812   Dressing Status Clean, dry & intact 08/14/23 0812   Dressing Type Transparent 08/14/23 5071        Opportunity for questions and clarification was provided.       Patient transported with:  [employer]

## 2023-08-15 LAB
ANION GAP SERPL CALC-SCNC: 5 MMOL/L (ref 5–15)
BUN SERPL-MCNC: 19 MG/DL (ref 6–20)
BUN/CREAT SERPL: 17 (ref 12–20)
CALCIUM SERPL-MCNC: 7.4 MG/DL (ref 8.5–10.1)
CHLORIDE SERPL-SCNC: 115 MMOL/L (ref 97–108)
CO2 SERPL-SCNC: 22 MMOL/L (ref 21–32)
CREAT SERPL-MCNC: 1.09 MG/DL (ref 0.7–1.3)
ERYTHROCYTE [DISTWIDTH] IN BLOOD BY AUTOMATED COUNT: 16.1 % (ref 11.5–14.5)
GLUCOSE SERPL-MCNC: 76 MG/DL (ref 65–100)
HCT VFR BLD AUTO: 31.3 % (ref 36.6–50.3)
HGB BLD-MCNC: 9.8 G/DL (ref 12.1–17)
MCH RBC QN AUTO: 31.8 PG (ref 26–34)
MCHC RBC AUTO-ENTMCNC: 31.3 G/DL (ref 30–36.5)
MCV RBC AUTO: 101.6 FL (ref 80–99)
NRBC # BLD: 0 K/UL (ref 0–0.01)
NRBC BLD-RTO: 0 PER 100 WBC
PLATELET # BLD AUTO: 230 K/UL (ref 150–400)
PMV BLD AUTO: 9.5 FL (ref 8.9–12.9)
POTASSIUM SERPL-SCNC: 4.3 MMOL/L (ref 3.5–5.1)
RBC # BLD AUTO: 3.08 M/UL (ref 4.1–5.7)
SODIUM SERPL-SCNC: 142 MMOL/L (ref 136–145)
WBC # BLD AUTO: 6.9 K/UL (ref 4.1–11.1)

## 2023-08-15 PROCEDURE — 97530 THERAPEUTIC ACTIVITIES: CPT

## 2023-08-15 PROCEDURE — 1100000000 HC RM PRIVATE

## 2023-08-15 PROCEDURE — 80048 BASIC METABOLIC PNL TOTAL CA: CPT

## 2023-08-15 PROCEDURE — 85027 COMPLETE CBC AUTOMATED: CPT

## 2023-08-15 PROCEDURE — 6370000000 HC RX 637 (ALT 250 FOR IP)

## 2023-08-15 PROCEDURE — 6370000000 HC RX 637 (ALT 250 FOR IP): Performed by: FAMILY MEDICINE

## 2023-08-15 PROCEDURE — 2580000003 HC RX 258: Performed by: FAMILY MEDICINE

## 2023-08-15 PROCEDURE — 97162 PT EVAL MOD COMPLEX 30 MIN: CPT

## 2023-08-15 PROCEDURE — 36415 COLL VENOUS BLD VENIPUNCTURE: CPT

## 2023-08-15 RX ADMIN — APIXABAN 5 MG: 5 TABLET, FILM COATED ORAL at 21:06

## 2023-08-15 RX ADMIN — OXYCODONE HYDROCHLORIDE 10 MG: 5 TABLET ORAL at 21:06

## 2023-08-15 RX ADMIN — SUCRALFATE 1 G: 1 TABLET ORAL at 12:43

## 2023-08-15 RX ADMIN — SUCRALFATE 1 G: 1 TABLET ORAL at 16:57

## 2023-08-15 RX ADMIN — SUCRALFATE 1 G: 1 TABLET ORAL at 10:42

## 2023-08-15 RX ADMIN — LEVOTHYROXINE SODIUM 50 MCG: 0.05 TABLET ORAL at 08:59

## 2023-08-15 RX ADMIN — CYANOCOBALAMIN TAB 500 MCG 1000 MCG: 500 TAB at 08:59

## 2023-08-15 RX ADMIN — ACETAMINOPHEN 650 MG: 325 TABLET ORAL at 12:43

## 2023-08-15 RX ADMIN — SODIUM CHLORIDE, PRESERVATIVE FREE 10 ML: 5 INJECTION INTRAVENOUS at 10:42

## 2023-08-15 RX ADMIN — ACETAMINOPHEN 650 MG: 325 TABLET ORAL at 05:44

## 2023-08-15 RX ADMIN — OXYCODONE HYDROCHLORIDE 10 MG: 5 TABLET ORAL at 03:22

## 2023-08-15 RX ADMIN — OXYCODONE HYDROCHLORIDE 10 MG: 5 TABLET ORAL at 12:42

## 2023-08-15 RX ADMIN — SODIUM CHLORIDE: 9 INJECTION, SOLUTION INTRAVENOUS at 10:43

## 2023-08-15 RX ADMIN — SUCRALFATE 1 G: 1 TABLET ORAL at 21:06

## 2023-08-15 RX ADMIN — ACETAMINOPHEN 650 MG: 325 TABLET ORAL at 17:01

## 2023-08-15 RX ADMIN — ACETAMINOPHEN 650 MG: 325 TABLET ORAL at 00:26

## 2023-08-15 RX ADMIN — Medication 800 MCG: at 08:59

## 2023-08-15 RX ADMIN — OXYCODONE HYDROCHLORIDE 10 MG: 5 TABLET ORAL at 16:57

## 2023-08-15 RX ADMIN — PANTOPRAZOLE SODIUM 40 MG: 40 TABLET, DELAYED RELEASE ORAL at 08:59

## 2023-08-15 RX ADMIN — METOPROLOL SUCCINATE 50 MG: 50 TABLET, EXTENDED RELEASE ORAL at 08:59

## 2023-08-15 RX ADMIN — POLYETHYLENE GLYCOL 3350 17 G: 17 POWDER, FOR SOLUTION ORAL at 10:42

## 2023-08-15 ASSESSMENT — PAIN SCALES - GENERAL
PAINLEVEL_OUTOF10: 9
PAINLEVEL_OUTOF10: 8
PAINLEVEL_OUTOF10: 7
PAINLEVEL_OUTOF10: 6
PAINLEVEL_OUTOF10: 9

## 2023-08-15 ASSESSMENT — PAIN DESCRIPTION - LOCATION
LOCATION: BACK

## 2023-08-15 ASSESSMENT — PAIN DESCRIPTION - DESCRIPTORS
DESCRIPTORS: THROBBING
DESCRIPTORS: THROBBING
DESCRIPTORS: ACHING
DESCRIPTORS: ACHING

## 2023-08-15 ASSESSMENT — PAIN DESCRIPTION - ORIENTATION
ORIENTATION: POSTERIOR

## 2023-08-15 NOTE — CARE COORDINATION
Transition of Care Plan:    RUR: 16%   Prior Level of Functioning: Independent    Disposition: SNF   If SNF or IPR: Date FOC offered: 8/15   Date FOC received: 8/15   Accepting facility: Pending   [address] [place] [address] [facility]   Date authorization started with reference number: Awaiting therapy notes   Date authorization received and expires: N/A   Follow up appointments: PCP   DME needed: None  Transportation at discharge: BLS   IM/IMM Medicare/ letter given: 2nd IMM Letter Needed   Caregiver Contact: SisterEleazar Diamond [phone]   Care Conference needed? No   Barriers to discharge: OT Notes pending, Clarissasahra Urbanoadriana?, Placement,       08/15/23 1013   Services At/After Discharge   Transition of Care Consult (CM Consult) Discharge Planning;SNF   Mode of Transport at Discharge Other (see comment)   Confirm Follow Up Transport Family   Condition of Participation: Discharge Planning   The Plan for Transition of Care is related to the following treatment goals: SNF   The Patient and/or Patient Representative was provided with a Choice of Provider? Patient   The Patient and/Or Patient Representative agree with the Discharge Plan? Yes   Freedom of Choice list was provided with basic dialogue that supports the patient's individualized plan of care/goals, treatment preferences, and shares the quality data associated with the providers?   Yes

## 2023-08-16 LAB
ANION GAP SERPL CALC-SCNC: 7 MMOL/L (ref 5–15)
BUN SERPL-MCNC: 18 MG/DL (ref 6–20)
BUN/CREAT SERPL: 18 (ref 12–20)
CALCIUM SERPL-MCNC: 7.2 MG/DL (ref 8.5–10.1)
CHLORIDE SERPL-SCNC: 116 MMOL/L (ref 97–108)
CO2 SERPL-SCNC: 18 MMOL/L (ref 21–32)
CREAT SERPL-MCNC: 1 MG/DL (ref 0.7–1.3)
ERYTHROCYTE [DISTWIDTH] IN BLOOD BY AUTOMATED COUNT: 16.3 % (ref 11.5–14.5)
GLUCOSE SERPL-MCNC: 87 MG/DL (ref 65–100)
HCT VFR BLD AUTO: 29.1 % (ref 36.6–50.3)
HGB BLD-MCNC: 9.4 G/DL (ref 12.1–17)
MCH RBC QN AUTO: 32.1 PG (ref 26–34)
MCHC RBC AUTO-ENTMCNC: 32.3 G/DL (ref 30–36.5)
MCV RBC AUTO: 99.3 FL (ref 80–99)
NRBC # BLD: 0 K/UL (ref 0–0.01)
NRBC BLD-RTO: 0 PER 100 WBC
PLATELET # BLD AUTO: 217 K/UL (ref 150–400)
PMV BLD AUTO: 9.5 FL (ref 8.9–12.9)
POTASSIUM SERPL-SCNC: 3.8 MMOL/L (ref 3.5–5.1)
RBC # BLD AUTO: 2.93 M/UL (ref 4.1–5.7)
SODIUM SERPL-SCNC: 141 MMOL/L (ref 136–145)
WBC # BLD AUTO: 5.8 K/UL (ref 4.1–11.1)

## 2023-08-16 PROCEDURE — 36415 COLL VENOUS BLD VENIPUNCTURE: CPT

## 2023-08-16 PROCEDURE — 80048 BASIC METABOLIC PNL TOTAL CA: CPT

## 2023-08-16 PROCEDURE — 85027 COMPLETE CBC AUTOMATED: CPT

## 2023-08-16 PROCEDURE — 6370000000 HC RX 637 (ALT 250 FOR IP)

## 2023-08-16 PROCEDURE — 6370000000 HC RX 637 (ALT 250 FOR IP): Performed by: STUDENT IN AN ORGANIZED HEALTH CARE EDUCATION/TRAINING PROGRAM

## 2023-08-16 PROCEDURE — 97535 SELF CARE MNGMENT TRAINING: CPT

## 2023-08-16 PROCEDURE — 6370000000 HC RX 637 (ALT 250 FOR IP): Performed by: FAMILY MEDICINE

## 2023-08-16 PROCEDURE — 97165 OT EVAL LOW COMPLEX 30 MIN: CPT

## 2023-08-16 PROCEDURE — 97530 THERAPEUTIC ACTIVITIES: CPT

## 2023-08-16 PROCEDURE — 1100000000 HC RM PRIVATE

## 2023-08-16 PROCEDURE — 2580000003 HC RX 258: Performed by: FAMILY MEDICINE

## 2023-08-16 RX ORDER — BENZONATATE 100 MG/1
100 CAPSULE ORAL 3 TIMES DAILY PRN
Status: DISCONTINUED | OUTPATIENT
Start: 2023-08-16 | End: 2023-08-21 | Stop reason: HOSPADM

## 2023-08-16 RX ORDER — POLYETHYLENE GLYCOL 3350 17 G/17G
17 POWDER, FOR SOLUTION ORAL DAILY
Status: DISCONTINUED | OUTPATIENT
Start: 2023-08-16 | End: 2023-08-21 | Stop reason: HOSPADM

## 2023-08-16 RX ORDER — DOCUSATE SODIUM 100 MG/1
100 CAPSULE, LIQUID FILLED ORAL DAILY
Status: DISCONTINUED | OUTPATIENT
Start: 2023-08-16 | End: 2023-08-17

## 2023-08-16 RX ADMIN — SUCRALFATE 1 G: 1 TABLET ORAL at 17:25

## 2023-08-16 RX ADMIN — LEVOTHYROXINE SODIUM 50 MCG: 0.05 TABLET ORAL at 08:18

## 2023-08-16 RX ADMIN — ACETAMINOPHEN 650 MG: 325 TABLET ORAL at 17:24

## 2023-08-16 RX ADMIN — DOCUSATE SODIUM 100 MG: 100 CAPSULE, LIQUID FILLED ORAL at 09:59

## 2023-08-16 RX ADMIN — ACETAMINOPHEN 650 MG: 325 TABLET ORAL at 07:03

## 2023-08-16 RX ADMIN — OXYCODONE HYDROCHLORIDE 10 MG: 5 TABLET ORAL at 07:03

## 2023-08-16 RX ADMIN — CYANOCOBALAMIN TAB 500 MCG 1000 MCG: 500 TAB at 08:18

## 2023-08-16 RX ADMIN — BENZONATATE 100 MG: 100 CAPSULE ORAL at 17:25

## 2023-08-16 RX ADMIN — MAGNESIUM HYDROXIDE 30 ML: 400 SUSPENSION ORAL at 09:59

## 2023-08-16 RX ADMIN — SUCRALFATE 1 G: 1 TABLET ORAL at 12:31

## 2023-08-16 RX ADMIN — SODIUM CHLORIDE, PRESERVATIVE FREE 10 ML: 5 INJECTION INTRAVENOUS at 08:18

## 2023-08-16 RX ADMIN — OXYCODONE HYDROCHLORIDE 10 MG: 5 TABLET ORAL at 20:22

## 2023-08-16 RX ADMIN — ACETAMINOPHEN 650 MG: 325 TABLET ORAL at 12:31

## 2023-08-16 RX ADMIN — ACETAMINOPHEN 650 MG: 325 TABLET ORAL at 23:58

## 2023-08-16 RX ADMIN — APIXABAN 5 MG: 5 TABLET, FILM COATED ORAL at 08:18

## 2023-08-16 RX ADMIN — OXYCODONE HYDROCHLORIDE 10 MG: 5 TABLET ORAL at 02:03

## 2023-08-16 RX ADMIN — Medication 800 MCG: at 08:18

## 2023-08-16 RX ADMIN — POLYETHYLENE GLYCOL 3350 17 G: 17 POWDER, FOR SOLUTION ORAL at 08:18

## 2023-08-16 RX ADMIN — SODIUM CHLORIDE, PRESERVATIVE FREE 10 ML: 5 INJECTION INTRAVENOUS at 20:52

## 2023-08-16 RX ADMIN — METOPROLOL SUCCINATE 50 MG: 50 TABLET, EXTENDED RELEASE ORAL at 08:18

## 2023-08-16 RX ADMIN — SUCRALFATE 1 G: 1 TABLET ORAL at 08:18

## 2023-08-16 RX ADMIN — PANTOPRAZOLE SODIUM 40 MG: 40 TABLET, DELAYED RELEASE ORAL at 08:18

## 2023-08-16 RX ADMIN — SUCRALFATE 1 G: 1 TABLET ORAL at 20:51

## 2023-08-16 RX ADMIN — APIXABAN 5 MG: 5 TABLET, FILM COATED ORAL at 20:51

## 2023-08-16 RX ADMIN — OXYCODONE HYDROCHLORIDE 10 MG: 5 TABLET ORAL at 15:28

## 2023-08-16 ASSESSMENT — PAIN SCALES - GENERAL
PAINLEVEL_OUTOF10: 8
PAINLEVEL_OUTOF10: 9
PAINLEVEL_OUTOF10: 4
PAINLEVEL_OUTOF10: 7

## 2023-08-16 ASSESSMENT — PAIN DESCRIPTION - LOCATION
LOCATION: BACK

## 2023-08-16 ASSESSMENT — PAIN DESCRIPTION - DESCRIPTORS
DESCRIPTORS: ACHING
DESCRIPTORS: THROBBING

## 2023-08-16 ASSESSMENT — PAIN - FUNCTIONAL ASSESSMENT: PAIN_FUNCTIONAL_ASSESSMENT: ACTIVITIES ARE NOT PREVENTED

## 2023-08-16 ASSESSMENT — PAIN DESCRIPTION - ORIENTATION
ORIENTATION: LOWER
ORIENTATION: POSTERIOR

## 2023-08-16 NOTE — CARE COORDINATION
Transition of Care Plan:    RUR: 16%   Prior Level of Functioning: Independent    Disposition: SNF   If SNF or IPR: Date FOC offered: 8/15   Date FOC received: 8/15   Accepting facility: [facility]   Date authorization started with reference number: 8/16 Reference#[id number]   Clinicals submitted to: [employer] P:[phone]/F:[phone]  Date authorization received and expires: Pending   Follow up appointments: PCP   DME needed: None   Transportation at discharge: BLS   IM/IMM Medicare/ letter given: Received   Caregiver Contact: Ira Genao [phone]   Discharge Caregiver contacted prior to discharge? Care Conference needed?  No   Barriers to discharge: OT Notes Needed, AUTH, Bed available 8/17

## 2023-08-17 PROBLEM — S22.080A T12 COMPRESSION FRACTURE, INITIAL ENCOUNTER (HCC): Status: RESOLVED | Noted: 2023-08-10 | Resolved: 2023-08-17

## 2023-08-17 PROCEDURE — 1100000000 HC RM PRIVATE

## 2023-08-17 PROCEDURE — 6370000000 HC RX 637 (ALT 250 FOR IP)

## 2023-08-17 PROCEDURE — 6370000000 HC RX 637 (ALT 250 FOR IP): Performed by: NURSE PRACTITIONER

## 2023-08-17 PROCEDURE — 2580000003 HC RX 258: Performed by: NURSE PRACTITIONER

## 2023-08-17 PROCEDURE — 6360000002 HC RX W HCPCS: Performed by: FAMILY MEDICINE

## 2023-08-17 PROCEDURE — 2580000003 HC RX 258: Performed by: FAMILY MEDICINE

## 2023-08-17 PROCEDURE — 6370000000 HC RX 637 (ALT 250 FOR IP): Performed by: FAMILY MEDICINE

## 2023-08-17 PROCEDURE — 6370000000 HC RX 637 (ALT 250 FOR IP): Performed by: STUDENT IN AN ORGANIZED HEALTH CARE EDUCATION/TRAINING PROGRAM

## 2023-08-17 PROCEDURE — 6360000002 HC RX W HCPCS: Performed by: NURSE PRACTITIONER

## 2023-08-17 RX ORDER — LIDOCAINE 4 G/G
1 PATCH TOPICAL DAILY
Status: DISCONTINUED | OUTPATIENT
Start: 2023-08-17 | End: 2023-08-21 | Stop reason: HOSPADM

## 2023-08-17 RX ORDER — POLYETHYLENE GLYCOL 3350 17 G/17G
17 POWDER, FOR SOLUTION ORAL DAILY
Qty: 30 EACH | Refills: 0 | Status: SHIPPED
Start: 2023-08-17 | End: 2023-09-16

## 2023-08-17 RX ORDER — DOCUSATE SODIUM 100 MG/1
100 CAPSULE, LIQUID FILLED ORAL 2 TIMES DAILY
Status: DISCONTINUED | OUTPATIENT
Start: 2023-08-17 | End: 2023-08-21 | Stop reason: HOSPADM

## 2023-08-17 RX ORDER — KETOROLAC TROMETHAMINE 30 MG/ML
15 INJECTION, SOLUTION INTRAMUSCULAR; INTRAVENOUS EVERY 6 HOURS
Status: COMPLETED | OUTPATIENT
Start: 2023-08-17 | End: 2023-08-18

## 2023-08-17 RX ORDER — BENZONATATE 100 MG/1
100 CAPSULE ORAL 3 TIMES DAILY PRN
Qty: 21 CAPSULE | Refills: 0 | Status: SHIPPED
Start: 2023-08-17 | End: 2023-08-24

## 2023-08-17 RX ORDER — 0.9 % SODIUM CHLORIDE 0.9 %
500 INTRAVENOUS SOLUTION INTRAVENOUS ONCE
Status: COMPLETED | OUTPATIENT
Start: 2023-08-17 | End: 2023-08-17

## 2023-08-17 RX ORDER — KETOROLAC TROMETHAMINE 30 MG/ML
15 INJECTION, SOLUTION INTRAMUSCULAR; INTRAVENOUS ONCE
Status: COMPLETED | OUTPATIENT
Start: 2023-08-17 | End: 2023-08-17

## 2023-08-17 RX ORDER — BISACODYL 10 MG
10 SUPPOSITORY, RECTAL RECTAL
Status: COMPLETED | OUTPATIENT
Start: 2023-08-17 | End: 2023-08-17

## 2023-08-17 RX ORDER — PSEUDOEPHEDRINE HCL 30 MG
100 TABLET ORAL DAILY
Qty: 30 CAPSULE | Refills: 0 | Status: SHIPPED
Start: 2023-08-17

## 2023-08-17 RX ORDER — OXYCODONE HYDROCHLORIDE 10 MG/1
10 TABLET ORAL EVERY 4 HOURS PRN
Qty: 12 TABLET | Refills: 0 | Status: SHIPPED | OUTPATIENT
Start: 2023-08-17 | End: 2023-08-21 | Stop reason: HOSPADM

## 2023-08-17 RX ADMIN — MAGNESIUM HYDROXIDE 30 ML: 400 SUSPENSION ORAL at 13:50

## 2023-08-17 RX ADMIN — ACETAMINOPHEN 650 MG: 325 TABLET ORAL at 23:28

## 2023-08-17 RX ADMIN — DOCUSATE SODIUM 100 MG: 100 CAPSULE, LIQUID FILLED ORAL at 21:29

## 2023-08-17 RX ADMIN — SODIUM CHLORIDE 500 ML: 9 INJECTION, SOLUTION INTRAVENOUS at 14:00

## 2023-08-17 RX ADMIN — BISACODYL 10 MG: 10 SUPPOSITORY RECTAL at 11:53

## 2023-08-17 RX ADMIN — LEVOTHYROXINE SODIUM 50 MCG: 0.05 TABLET ORAL at 10:22

## 2023-08-17 RX ADMIN — CYANOCOBALAMIN TAB 500 MCG 1000 MCG: 500 TAB at 10:22

## 2023-08-17 RX ADMIN — APIXABAN 5 MG: 5 TABLET, FILM COATED ORAL at 10:22

## 2023-08-17 RX ADMIN — SUCRALFATE 1 G: 1 TABLET ORAL at 13:15

## 2023-08-17 RX ADMIN — ACETAMINOPHEN 650 MG: 325 TABLET ORAL at 06:30

## 2023-08-17 RX ADMIN — KETOROLAC TROMETHAMINE 15 MG: 30 INJECTION, SOLUTION INTRAMUSCULAR; INTRAVENOUS at 17:42

## 2023-08-17 RX ADMIN — METOPROLOL SUCCINATE 50 MG: 50 TABLET, EXTENDED RELEASE ORAL at 10:22

## 2023-08-17 RX ADMIN — DOCUSATE SODIUM 100 MG: 100 CAPSULE, LIQUID FILLED ORAL at 10:22

## 2023-08-17 RX ADMIN — ACETAMINOPHEN 650 MG: 325 TABLET ORAL at 17:47

## 2023-08-17 RX ADMIN — PANTOPRAZOLE SODIUM 40 MG: 40 TABLET, DELAYED RELEASE ORAL at 10:22

## 2023-08-17 RX ADMIN — SUCRALFATE 1 G: 1 TABLET ORAL at 21:29

## 2023-08-17 RX ADMIN — ONDANSETRON 4 MG: 2 INJECTION INTRAMUSCULAR; INTRAVENOUS at 10:35

## 2023-08-17 RX ADMIN — SUCRALFATE 1 G: 1 TABLET ORAL at 17:47

## 2023-08-17 RX ADMIN — SUCRALFATE 1 G: 1 TABLET ORAL at 10:22

## 2023-08-17 RX ADMIN — APIXABAN 5 MG: 5 TABLET, FILM COATED ORAL at 21:29

## 2023-08-17 RX ADMIN — POLYETHYLENE GLYCOL 3350 17 G: 17 POWDER, FOR SOLUTION ORAL at 10:21

## 2023-08-17 RX ADMIN — KETOROLAC TROMETHAMINE 15 MG: 30 INJECTION, SOLUTION INTRAMUSCULAR; INTRAVENOUS at 11:54

## 2023-08-17 RX ADMIN — KETOROLAC TROMETHAMINE 15 MG: 30 INJECTION, SOLUTION INTRAMUSCULAR; INTRAVENOUS at 23:28

## 2023-08-17 RX ADMIN — Medication 800 MCG: at 10:22

## 2023-08-17 RX ADMIN — SODIUM CHLORIDE, PRESERVATIVE FREE 10 ML: 5 INJECTION INTRAVENOUS at 10:23

## 2023-08-17 RX ADMIN — SODIUM CHLORIDE, PRESERVATIVE FREE 10 ML: 5 INJECTION INTRAVENOUS at 21:28

## 2023-08-17 RX ADMIN — ACETAMINOPHEN 650 MG: 325 TABLET ORAL at 11:53

## 2023-08-17 ASSESSMENT — PAIN SCALES - GENERAL
PAINLEVEL_OUTOF10: 3
PAINLEVEL_OUTOF10: 5
PAINLEVEL_OUTOF10: 3
PAINLEVEL_OUTOF10: 7
PAINLEVEL_OUTOF10: 8
PAINLEVEL_OUTOF10: 8
PAINLEVEL_OUTOF10: 2
PAINLEVEL_OUTOF10: 2
PAINLEVEL_OUTOF10: 3
PAINLEVEL_OUTOF10: 4
PAINLEVEL_OUTOF10: 2
PAINLEVEL_OUTOF10: 8
PAINLEVEL_OUTOF10: 8
PAINLEVEL_OUTOF10: 7
PAINLEVEL_OUTOF10: 3
PAINLEVEL_OUTOF10: 7
PAINLEVEL_OUTOF10: 7
PAINLEVEL_OUTOF10: 2
PAINLEVEL_OUTOF10: 2

## 2023-08-17 ASSESSMENT — PAIN DESCRIPTION - LOCATION
LOCATION: BACK

## 2023-08-17 ASSESSMENT — PAIN DESCRIPTION - DESCRIPTORS
DESCRIPTORS: ACHING

## 2023-08-17 ASSESSMENT — PAIN - FUNCTIONAL ASSESSMENT
PAIN_FUNCTIONAL_ASSESSMENT: ACTIVITIES ARE NOT PREVENTED
PAIN_FUNCTIONAL_ASSESSMENT: ACTIVITIES ARE NOT PREVENTED

## 2023-08-17 ASSESSMENT — PAIN DESCRIPTION - ORIENTATION
ORIENTATION: LOWER
ORIENTATION: RIGHT

## 2023-08-17 ASSESSMENT — PAIN SCALES - WONG BAKER
WONGBAKER_NUMERICALRESPONSE: 0

## 2023-08-17 NOTE — CARE COORDINATION
Transition of Care Plan:    RUR: 17%   Prior Level of Functioning: Independent    Disposition: SNF   If SNF or IPR: Date FOC offered: 8/15    Date FOC received: 8/15   Accepting facility: [facility]   Date authorization started with reference number: 8/16; [facility]#[id number]/[facility] Plan [facility]#[id number]  Date authorization received and expires: 8/17-8/21  Transportation at discharge: [place]   IM/IMM Medicare/ letter given: 2nd IMM Letter received   Caregiver Contact: Ira Genao [phone]   Discharge Caregiver contacted prior to discharge? Yes   Care Conference needed?  No   Barrier: Awaiting room assignment

## 2023-08-18 PROCEDURE — 6360000002 HC RX W HCPCS: Performed by: NURSE PRACTITIONER

## 2023-08-18 PROCEDURE — 2580000003 HC RX 258: Performed by: FAMILY MEDICINE

## 2023-08-18 PROCEDURE — 6370000000 HC RX 637 (ALT 250 FOR IP)

## 2023-08-18 PROCEDURE — 97530 THERAPEUTIC ACTIVITIES: CPT

## 2023-08-18 PROCEDURE — 6370000000 HC RX 637 (ALT 250 FOR IP): Performed by: STUDENT IN AN ORGANIZED HEALTH CARE EDUCATION/TRAINING PROGRAM

## 2023-08-18 PROCEDURE — 6370000000 HC RX 637 (ALT 250 FOR IP): Performed by: NURSE PRACTITIONER

## 2023-08-18 PROCEDURE — 1100000000 HC RM PRIVATE

## 2023-08-18 PROCEDURE — P9045 ALBUMIN (HUMAN), 5%, 250 ML: HCPCS | Performed by: NURSE PRACTITIONER

## 2023-08-18 PROCEDURE — 6370000000 HC RX 637 (ALT 250 FOR IP): Performed by: FAMILY MEDICINE

## 2023-08-18 RX ORDER — ALBUMIN, HUMAN INJ 5% 5 %
25 SOLUTION INTRAVENOUS ONCE
Status: DISCONTINUED | OUTPATIENT
Start: 2023-08-18 | End: 2023-08-18

## 2023-08-18 RX ORDER — HYDROCODONE BITARTRATE AND ACETAMINOPHEN 5; 325 MG/1; MG/1
1 TABLET ORAL EVERY 6 HOURS PRN
Status: DISCONTINUED | OUTPATIENT
Start: 2023-08-18 | End: 2023-08-21 | Stop reason: HOSPADM

## 2023-08-18 RX ORDER — ALBUMIN, HUMAN INJ 5% 5 %
25 SOLUTION INTRAVENOUS EVERY 6 HOURS
Status: COMPLETED | OUTPATIENT
Start: 2023-08-18 | End: 2023-08-19

## 2023-08-18 RX ADMIN — ACETAMINOPHEN 650 MG: 325 TABLET ORAL at 18:14

## 2023-08-18 RX ADMIN — DOCUSATE SODIUM 100 MG: 100 CAPSULE, LIQUID FILLED ORAL at 09:43

## 2023-08-18 RX ADMIN — ACETAMINOPHEN 650 MG: 325 TABLET ORAL at 14:08

## 2023-08-18 RX ADMIN — KETOROLAC TROMETHAMINE 15 MG: 30 INJECTION, SOLUTION INTRAMUSCULAR; INTRAVENOUS at 05:58

## 2023-08-18 RX ADMIN — CYANOCOBALAMIN TAB 500 MCG 1000 MCG: 500 TAB at 09:44

## 2023-08-18 RX ADMIN — ALBUMIN (HUMAN) 25 G: 12.5 INJECTION, SOLUTION INTRAVENOUS at 15:38

## 2023-08-18 RX ADMIN — ACETAMINOPHEN 650 MG: 325 TABLET ORAL at 23:41

## 2023-08-18 RX ADMIN — SUCRALFATE 1 G: 1 TABLET ORAL at 18:14

## 2023-08-18 RX ADMIN — APIXABAN 5 MG: 5 TABLET, FILM COATED ORAL at 21:07

## 2023-08-18 RX ADMIN — ACETAMINOPHEN 650 MG: 325 TABLET ORAL at 05:58

## 2023-08-18 RX ADMIN — SUCRALFATE 1 G: 1 TABLET ORAL at 14:08

## 2023-08-18 RX ADMIN — ALBUMIN (HUMAN) 25 G: 12.5 INJECTION, SOLUTION INTRAVENOUS at 21:08

## 2023-08-18 RX ADMIN — SUCRALFATE 1 G: 1 TABLET ORAL at 21:07

## 2023-08-18 RX ADMIN — SODIUM CHLORIDE, PRESERVATIVE FREE 10 ML: 5 INJECTION INTRAVENOUS at 09:44

## 2023-08-18 RX ADMIN — LEVOTHYROXINE SODIUM 50 MCG: 0.05 TABLET ORAL at 09:43

## 2023-08-18 RX ADMIN — SUCRALFATE 1 G: 1 TABLET ORAL at 09:43

## 2023-08-18 RX ADMIN — PANTOPRAZOLE SODIUM 40 MG: 40 TABLET, DELAYED RELEASE ORAL at 09:43

## 2023-08-18 RX ADMIN — SODIUM CHLORIDE, PRESERVATIVE FREE 10 ML: 5 INJECTION INTRAVENOUS at 21:08

## 2023-08-18 RX ADMIN — Medication 800 MCG: at 09:43

## 2023-08-18 RX ADMIN — APIXABAN 5 MG: 5 TABLET, FILM COATED ORAL at 09:43

## 2023-08-18 RX ADMIN — POLYETHYLENE GLYCOL 3350 17 G: 17 POWDER, FOR SOLUTION ORAL at 09:43

## 2023-08-18 ASSESSMENT — PAIN SCALES - GENERAL
PAINLEVEL_OUTOF10: 2
PAINLEVEL_OUTOF10: 3
PAINLEVEL_OUTOF10: 5
PAINLEVEL_OUTOF10: 4
PAINLEVEL_OUTOF10: 5

## 2023-08-18 ASSESSMENT — PAIN DESCRIPTION - DESCRIPTORS
DESCRIPTORS: ACHING
DESCRIPTORS: ACHING

## 2023-08-18 ASSESSMENT — PAIN DESCRIPTION - ORIENTATION
ORIENTATION: LOWER
ORIENTATION: RIGHT

## 2023-08-18 ASSESSMENT — PAIN DESCRIPTION - LOCATION
LOCATION: BACK
LOCATION: SHOULDER

## 2023-08-19 LAB
ANION GAP SERPL CALC-SCNC: 5 MMOL/L (ref 5–15)
BUN SERPL-MCNC: 17 MG/DL (ref 6–20)
BUN/CREAT SERPL: 20 (ref 12–20)
CALCIUM SERPL-MCNC: 7.4 MG/DL (ref 8.5–10.1)
CHLORIDE SERPL-SCNC: 118 MMOL/L (ref 97–108)
CO2 SERPL-SCNC: 19 MMOL/L (ref 21–32)
CREAT SERPL-MCNC: 0.85 MG/DL (ref 0.7–1.3)
GLUCOSE SERPL-MCNC: 77 MG/DL (ref 65–100)
HCT VFR BLD AUTO: 26.1 % (ref 36.6–50.3)
HGB BLD-MCNC: 8.4 G/DL (ref 12.1–17)
POTASSIUM SERPL-SCNC: 4.2 MMOL/L (ref 3.5–5.1)
SODIUM SERPL-SCNC: 142 MMOL/L (ref 136–145)

## 2023-08-19 PROCEDURE — 85014 HEMATOCRIT: CPT

## 2023-08-19 PROCEDURE — 6370000000 HC RX 637 (ALT 250 FOR IP): Performed by: NURSE PRACTITIONER

## 2023-08-19 PROCEDURE — P9045 ALBUMIN (HUMAN), 5%, 250 ML: HCPCS | Performed by: NURSE PRACTITIONER

## 2023-08-19 PROCEDURE — 6360000002 HC RX W HCPCS: Performed by: NURSE PRACTITIONER

## 2023-08-19 PROCEDURE — 1100000000 HC RM PRIVATE

## 2023-08-19 PROCEDURE — 80048 BASIC METABOLIC PNL TOTAL CA: CPT

## 2023-08-19 PROCEDURE — 6370000000 HC RX 637 (ALT 250 FOR IP): Performed by: FAMILY MEDICINE

## 2023-08-19 PROCEDURE — 2580000003 HC RX 258: Performed by: FAMILY MEDICINE

## 2023-08-19 PROCEDURE — 6370000000 HC RX 637 (ALT 250 FOR IP)

## 2023-08-19 PROCEDURE — 36415 COLL VENOUS BLD VENIPUNCTURE: CPT

## 2023-08-19 PROCEDURE — 85018 HEMOGLOBIN: CPT

## 2023-08-19 PROCEDURE — 6370000000 HC RX 637 (ALT 250 FOR IP): Performed by: STUDENT IN AN ORGANIZED HEALTH CARE EDUCATION/TRAINING PROGRAM

## 2023-08-19 RX ORDER — CALCIUM CARBONATE 500 MG/1
500 TABLET, CHEWABLE ORAL 3 TIMES DAILY PRN
Status: DISCONTINUED | OUTPATIENT
Start: 2023-08-19 | End: 2023-08-21 | Stop reason: HOSPADM

## 2023-08-19 RX ORDER — ALBUMIN, HUMAN INJ 5% 5 %
25 SOLUTION INTRAVENOUS EVERY 6 HOURS
Status: COMPLETED | OUTPATIENT
Start: 2023-08-19 | End: 2023-08-20

## 2023-08-19 RX ORDER — FUROSEMIDE 10 MG/ML
40 INJECTION INTRAMUSCULAR; INTRAVENOUS 2 TIMES DAILY
Status: DISPENSED | OUTPATIENT
Start: 2023-08-19 | End: 2023-08-20

## 2023-08-19 RX ADMIN — DOCUSATE SODIUM 100 MG: 100 CAPSULE, LIQUID FILLED ORAL at 22:02

## 2023-08-19 RX ADMIN — SUCRALFATE 1 G: 1 TABLET ORAL at 09:07

## 2023-08-19 RX ADMIN — SODIUM CHLORIDE, PRESERVATIVE FREE 10 ML: 5 INJECTION INTRAVENOUS at 09:10

## 2023-08-19 RX ADMIN — PANTOPRAZOLE SODIUM 40 MG: 40 TABLET, DELAYED RELEASE ORAL at 09:08

## 2023-08-19 RX ADMIN — ACETAMINOPHEN 650 MG: 325 TABLET ORAL at 18:25

## 2023-08-19 RX ADMIN — SUCRALFATE 1 G: 1 TABLET ORAL at 22:01

## 2023-08-19 RX ADMIN — ALBUMIN (HUMAN) 25 G: 12.5 INJECTION, SOLUTION INTRAVENOUS at 18:25

## 2023-08-19 RX ADMIN — POLYETHYLENE GLYCOL 3350 17 G: 17 POWDER, FOR SOLUTION ORAL at 09:08

## 2023-08-19 RX ADMIN — ALBUMIN (HUMAN) 25 G: 12.5 INJECTION, SOLUTION INTRAVENOUS at 03:53

## 2023-08-19 RX ADMIN — LEVOTHYROXINE SODIUM 50 MCG: 0.05 TABLET ORAL at 09:07

## 2023-08-19 RX ADMIN — DOCUSATE SODIUM 100 MG: 100 CAPSULE, LIQUID FILLED ORAL at 09:07

## 2023-08-19 RX ADMIN — FUROSEMIDE 40 MG: 20 INJECTION, SOLUTION INTRAMUSCULAR; INTRAVENOUS at 13:10

## 2023-08-19 RX ADMIN — APIXABAN 5 MG: 5 TABLET, FILM COATED ORAL at 09:07

## 2023-08-19 RX ADMIN — ACETAMINOPHEN 650 MG: 325 TABLET ORAL at 13:11

## 2023-08-19 RX ADMIN — ALBUMIN (HUMAN) 25 G: 12.5 INJECTION, SOLUTION INTRAVENOUS at 09:09

## 2023-08-19 RX ADMIN — SUCRALFATE 1 G: 1 TABLET ORAL at 18:00

## 2023-08-19 RX ADMIN — CYANOCOBALAMIN TAB 500 MCG 1000 MCG: 500 TAB at 09:07

## 2023-08-19 RX ADMIN — APIXABAN 5 MG: 5 TABLET, FILM COATED ORAL at 22:01

## 2023-08-19 RX ADMIN — SUCRALFATE 1 G: 1 TABLET ORAL at 13:11

## 2023-08-19 RX ADMIN — Medication 800 MCG: at 09:07

## 2023-08-19 RX ADMIN — FUROSEMIDE 40 MG: 20 INJECTION, SOLUTION INTRAMUSCULAR; INTRAVENOUS at 18:25

## 2023-08-19 RX ADMIN — ACETAMINOPHEN 650 MG: 325 TABLET ORAL at 05:37

## 2023-08-19 ASSESSMENT — PAIN SCALES - GENERAL
PAINLEVEL_OUTOF10: 3
PAINLEVEL_OUTOF10: 3
PAINLEVEL_OUTOF10: 4
PAINLEVEL_OUTOF10: 4
PAINLEVEL_OUTOF10: 3

## 2023-08-19 ASSESSMENT — PAIN - FUNCTIONAL ASSESSMENT: PAIN_FUNCTIONAL_ASSESSMENT: ACTIVITIES ARE NOT PREVENTED

## 2023-08-19 ASSESSMENT — PAIN DESCRIPTION - LOCATION: LOCATION: BACK;SHOULDER

## 2023-08-19 ASSESSMENT — PAIN DESCRIPTION - ORIENTATION: ORIENTATION: RIGHT

## 2023-08-19 ASSESSMENT — PAIN DESCRIPTION - DESCRIPTORS: DESCRIPTORS: ACHING

## 2023-08-20 LAB
ANION GAP SERPL CALC-SCNC: 5 MMOL/L (ref 5–15)
BUN SERPL-MCNC: 17 MG/DL (ref 6–20)
BUN/CREAT SERPL: 17 (ref 12–20)
CALCIUM SERPL-MCNC: 7.6 MG/DL (ref 8.5–10.1)
CHLORIDE SERPL-SCNC: 117 MMOL/L (ref 97–108)
CO2 SERPL-SCNC: 20 MMOL/L (ref 21–32)
CREAT SERPL-MCNC: 0.98 MG/DL (ref 0.7–1.3)
GLUCOSE SERPL-MCNC: 75 MG/DL (ref 65–100)
HCT VFR BLD AUTO: 25.2 % (ref 36.6–50.3)
HGB BLD-MCNC: 8.2 G/DL (ref 12.1–17)
MAGNESIUM SERPL-MCNC: 1.8 MG/DL (ref 1.6–2.4)
POTASSIUM SERPL-SCNC: 3.7 MMOL/L (ref 3.5–5.1)
SODIUM SERPL-SCNC: 142 MMOL/L (ref 136–145)

## 2023-08-20 PROCEDURE — 6360000002 HC RX W HCPCS: Performed by: NURSE PRACTITIONER

## 2023-08-20 PROCEDURE — 6370000000 HC RX 637 (ALT 250 FOR IP): Performed by: FAMILY MEDICINE

## 2023-08-20 PROCEDURE — 80048 BASIC METABOLIC PNL TOTAL CA: CPT

## 2023-08-20 PROCEDURE — 83735 ASSAY OF MAGNESIUM: CPT

## 2023-08-20 PROCEDURE — 1100000000 HC RM PRIVATE

## 2023-08-20 PROCEDURE — 36415 COLL VENOUS BLD VENIPUNCTURE: CPT

## 2023-08-20 PROCEDURE — 6370000000 HC RX 637 (ALT 250 FOR IP)

## 2023-08-20 PROCEDURE — 97530 THERAPEUTIC ACTIVITIES: CPT

## 2023-08-20 PROCEDURE — P9045 ALBUMIN (HUMAN), 5%, 250 ML: HCPCS | Performed by: NURSE PRACTITIONER

## 2023-08-20 PROCEDURE — 6370000000 HC RX 637 (ALT 250 FOR IP): Performed by: STUDENT IN AN ORGANIZED HEALTH CARE EDUCATION/TRAINING PROGRAM

## 2023-08-20 PROCEDURE — 85014 HEMATOCRIT: CPT

## 2023-08-20 PROCEDURE — 6370000000 HC RX 637 (ALT 250 FOR IP): Performed by: NURSE PRACTITIONER

## 2023-08-20 PROCEDURE — 2580000003 HC RX 258: Performed by: FAMILY MEDICINE

## 2023-08-20 PROCEDURE — 85018 HEMOGLOBIN: CPT

## 2023-08-20 PROCEDURE — P9047 ALBUMIN (HUMAN), 25%, 50ML: HCPCS | Performed by: NURSE PRACTITIONER

## 2023-08-20 RX ORDER — ALBUMIN (HUMAN) 12.5 G/50ML
25 SOLUTION INTRAVENOUS EVERY 12 HOURS
Status: COMPLETED | OUTPATIENT
Start: 2023-08-20 | End: 2023-08-21

## 2023-08-20 RX ORDER — FUROSEMIDE 10 MG/ML
40 INJECTION INTRAMUSCULAR; INTRAVENOUS 2 TIMES DAILY
Status: COMPLETED | OUTPATIENT
Start: 2023-08-20 | End: 2023-08-21

## 2023-08-20 RX ADMIN — ACETAMINOPHEN 650 MG: 325 TABLET ORAL at 23:53

## 2023-08-20 RX ADMIN — APIXABAN 5 MG: 5 TABLET, FILM COATED ORAL at 20:20

## 2023-08-20 RX ADMIN — CALCIUM CARBONATE 500 MG: 500 TABLET, CHEWABLE ORAL at 11:37

## 2023-08-20 RX ADMIN — ACETAMINOPHEN 650 MG: 325 TABLET ORAL at 11:32

## 2023-08-20 RX ADMIN — ACETAMINOPHEN 650 MG: 325 TABLET ORAL at 05:38

## 2023-08-20 RX ADMIN — SODIUM CHLORIDE, PRESERVATIVE FREE 10 ML: 5 INJECTION INTRAVENOUS at 20:24

## 2023-08-20 RX ADMIN — BENZONATATE 100 MG: 100 CAPSULE ORAL at 11:37

## 2023-08-20 RX ADMIN — SUCRALFATE 1 G: 1 TABLET ORAL at 18:05

## 2023-08-20 RX ADMIN — SUCRALFATE 1 G: 1 TABLET ORAL at 11:32

## 2023-08-20 RX ADMIN — APIXABAN 5 MG: 5 TABLET, FILM COATED ORAL at 09:30

## 2023-08-20 RX ADMIN — PANTOPRAZOLE SODIUM 40 MG: 40 TABLET, DELAYED RELEASE ORAL at 09:30

## 2023-08-20 RX ADMIN — DOCUSATE SODIUM 100 MG: 100 CAPSULE, LIQUID FILLED ORAL at 20:20

## 2023-08-20 RX ADMIN — ACETAMINOPHEN 650 MG: 325 TABLET ORAL at 18:05

## 2023-08-20 RX ADMIN — ALBUMIN (HUMAN) 25 G: 12.5 INJECTION, SOLUTION INTRAVENOUS at 01:22

## 2023-08-20 RX ADMIN — SODIUM CHLORIDE, PRESERVATIVE FREE 10 ML: 5 INJECTION INTRAVENOUS at 09:43

## 2023-08-20 RX ADMIN — SUCRALFATE 1 G: 1 TABLET ORAL at 20:20

## 2023-08-20 RX ADMIN — FUROSEMIDE 40 MG: 20 INJECTION, SOLUTION INTRAMUSCULAR; INTRAVENOUS at 11:35

## 2023-08-20 RX ADMIN — ACETAMINOPHEN 650 MG: 325 TABLET ORAL at 01:23

## 2023-08-20 RX ADMIN — ALBUMIN (HUMAN) 25 G: 0.25 INJECTION, SOLUTION INTRAVENOUS at 11:32

## 2023-08-20 RX ADMIN — CALCIUM CARBONATE 500 MG: 500 TABLET, CHEWABLE ORAL at 18:05

## 2023-08-20 RX ADMIN — LEVOTHYROXINE SODIUM 50 MCG: 0.05 TABLET ORAL at 09:30

## 2023-08-20 RX ADMIN — ALBUMIN (HUMAN) 25 G: 0.25 INJECTION, SOLUTION INTRAVENOUS at 23:53

## 2023-08-20 RX ADMIN — ALBUMIN (HUMAN) 25 G: 12.5 INJECTION, SOLUTION INTRAVENOUS at 05:39

## 2023-08-20 RX ADMIN — SUCRALFATE 1 G: 1 TABLET ORAL at 09:43

## 2023-08-20 RX ADMIN — CYANOCOBALAMIN TAB 500 MCG 1000 MCG: 500 TAB at 09:30

## 2023-08-20 RX ADMIN — Medication 800 MCG: at 09:42

## 2023-08-20 RX ADMIN — FUROSEMIDE 40 MG: 20 INJECTION, SOLUTION INTRAMUSCULAR; INTRAVENOUS at 18:05

## 2023-08-20 ASSESSMENT — PAIN SCALES - GENERAL: PAINLEVEL_OUTOF10: 8

## 2023-08-21 VITALS
WEIGHT: 160 LBS | TEMPERATURE: 98.4 F | HEIGHT: 68 IN | RESPIRATION RATE: 17 BRPM | BODY MASS INDEX: 24.25 KG/M2 | OXYGEN SATURATION: 100 % | DIASTOLIC BLOOD PRESSURE: 75 MMHG | HEART RATE: 63 BPM | SYSTOLIC BLOOD PRESSURE: 118 MMHG

## 2023-08-21 LAB
ANION GAP SERPL CALC-SCNC: 6 MMOL/L (ref 5–15)
BUN SERPL-MCNC: 16 MG/DL (ref 6–20)
BUN/CREAT SERPL: 16 (ref 12–20)
CALCIUM SERPL-MCNC: 7.8 MG/DL (ref 8.5–10.1)
CHLORIDE SERPL-SCNC: 112 MMOL/L (ref 97–108)
CO2 SERPL-SCNC: 24 MMOL/L (ref 21–32)
CREAT SERPL-MCNC: 0.97 MG/DL (ref 0.7–1.3)
GLUCOSE SERPL-MCNC: 93 MG/DL (ref 65–100)
MAGNESIUM SERPL-MCNC: 1.6 MG/DL (ref 1.6–2.4)
POTASSIUM SERPL-SCNC: 2.8 MMOL/L (ref 3.5–5.1)
SODIUM SERPL-SCNC: 142 MMOL/L (ref 136–145)

## 2023-08-21 PROCEDURE — 36415 COLL VENOUS BLD VENIPUNCTURE: CPT

## 2023-08-21 PROCEDURE — 97530 THERAPEUTIC ACTIVITIES: CPT

## 2023-08-21 PROCEDURE — 6370000000 HC RX 637 (ALT 250 FOR IP)

## 2023-08-21 PROCEDURE — 80048 BASIC METABOLIC PNL TOTAL CA: CPT

## 2023-08-21 PROCEDURE — P9047 ALBUMIN (HUMAN), 25%, 50ML: HCPCS | Performed by: NURSE PRACTITIONER

## 2023-08-21 PROCEDURE — 97535 SELF CARE MNGMENT TRAINING: CPT

## 2023-08-21 PROCEDURE — 6370000000 HC RX 637 (ALT 250 FOR IP): Performed by: NURSE PRACTITIONER

## 2023-08-21 PROCEDURE — 6360000002 HC RX W HCPCS: Performed by: NURSE PRACTITIONER

## 2023-08-21 PROCEDURE — 6370000000 HC RX 637 (ALT 250 FOR IP): Performed by: FAMILY MEDICINE

## 2023-08-21 PROCEDURE — 97116 GAIT TRAINING THERAPY: CPT

## 2023-08-21 PROCEDURE — 83735 ASSAY OF MAGNESIUM: CPT

## 2023-08-21 PROCEDURE — 2580000003 HC RX 258: Performed by: FAMILY MEDICINE

## 2023-08-21 RX ORDER — FUROSEMIDE 40 MG/1
40 TABLET ORAL DAILY
Qty: 3 TABLET | Refills: 0 | Status: SHIPPED | OUTPATIENT
Start: 2023-08-21 | End: 2023-08-24

## 2023-08-21 RX ORDER — HYDROCODONE BITARTRATE AND ACETAMINOPHEN 5; 325 MG/1; MG/1
1 TABLET ORAL EVERY 6 HOURS PRN
Qty: 10 TABLET | Refills: 0 | Status: SHIPPED | OUTPATIENT
Start: 2023-08-21 | End: 2023-08-24

## 2023-08-21 RX ORDER — LIDOCAINE 4 G/G
1 PATCH TOPICAL DAILY
Qty: 1 EACH | Refills: 0 | Status: SHIPPED | OUTPATIENT
Start: 2023-08-22 | End: 2023-09-21

## 2023-08-21 RX ORDER — HYDROCODONE BITARTRATE AND ACETAMINOPHEN 5; 325 MG/1; MG/1
1 TABLET ORAL EVERY 6 HOURS PRN
Qty: 10 TABLET | Refills: 0 | Status: SHIPPED | OUTPATIENT
Start: 2023-08-21 | End: 2023-08-21 | Stop reason: SDUPTHER

## 2023-08-21 RX ORDER — MAGNESIUM SULFATE IN WATER 40 MG/ML
2000 INJECTION, SOLUTION INTRAVENOUS ONCE
Status: COMPLETED | OUTPATIENT
Start: 2023-08-21 | End: 2023-08-21

## 2023-08-21 RX ORDER — POTASSIUM CHLORIDE 750 MG/1
40 TABLET, FILM COATED, EXTENDED RELEASE ORAL EVERY 4 HOURS
Status: COMPLETED | OUTPATIENT
Start: 2023-08-21 | End: 2023-08-21

## 2023-08-21 RX ADMIN — FUROSEMIDE 40 MG: 20 INJECTION, SOLUTION INTRAMUSCULAR; INTRAVENOUS at 09:51

## 2023-08-21 RX ADMIN — ALBUMIN (HUMAN) 25 G: 0.25 INJECTION, SOLUTION INTRAVENOUS at 10:01

## 2023-08-21 RX ADMIN — Medication 800 MCG: at 09:50

## 2023-08-21 RX ADMIN — MAGNESIUM SULFATE HEPTAHYDRATE 2000 MG: 40 INJECTION, SOLUTION INTRAVENOUS at 11:32

## 2023-08-21 RX ADMIN — PANTOPRAZOLE SODIUM 40 MG: 40 TABLET, DELAYED RELEASE ORAL at 09:50

## 2023-08-21 RX ADMIN — APIXABAN 5 MG: 5 TABLET, FILM COATED ORAL at 09:50

## 2023-08-21 RX ADMIN — ACETAMINOPHEN 650 MG: 325 TABLET ORAL at 05:40

## 2023-08-21 RX ADMIN — ACETAMINOPHEN 650 MG: 325 TABLET ORAL at 11:32

## 2023-08-21 RX ADMIN — LEVOTHYROXINE SODIUM 50 MCG: 0.05 TABLET ORAL at 09:50

## 2023-08-21 RX ADMIN — DOCUSATE SODIUM 100 MG: 100 CAPSULE, LIQUID FILLED ORAL at 09:50

## 2023-08-21 RX ADMIN — SUCRALFATE 1 G: 1 TABLET ORAL at 11:32

## 2023-08-21 RX ADMIN — CYANOCOBALAMIN TAB 500 MCG 1000 MCG: 500 TAB at 09:50

## 2023-08-21 RX ADMIN — POTASSIUM CHLORIDE 40 MEQ: 750 TABLET, FILM COATED, EXTENDED RELEASE ORAL at 09:50

## 2023-08-21 RX ADMIN — SUCRALFATE 1 G: 1 TABLET ORAL at 09:50

## 2023-08-21 RX ADMIN — POTASSIUM CHLORIDE 40 MEQ: 750 TABLET, FILM COATED, EXTENDED RELEASE ORAL at 11:32

## 2023-08-21 RX ADMIN — SODIUM CHLORIDE, PRESERVATIVE FREE 10 ML: 5 INJECTION INTRAVENOUS at 09:52

## 2023-08-21 NOTE — DISCHARGE SUMMARY
Discharge Summary       PATIENT ID: Omayra Farrar  MRN: [id number]   YOB: 1945    DATE OF ADMISSION: 8/10/2023  6:48 PM    DATE OF DISCHARGE: 8/21/2023  PRIMARY CARE PROVIDER: Jhoana Mckeon MD     ATTENDING PHYSICIAN: Dr. Eugene Trent  DISCHARGING PROVIDER: JADE German NP    To contact this individual call [phone] and ask the  to page. If unavailable ask to be transferred the Adult Hospitalist Department. CONSULTATIONS: IP CONSULT TO CASE MANAGEMENT  IP CONSULT TO INTERVENTIONAL RADIOLOGY  IP CONSULT TO SPIRITUAL SERVICES    PROCEDURES/SURGERIES: * No surgery found *    [address] COURSE:   Mr. Jeanette Blake is a 69 y.o. male with a pmhx a-fib (on apixaban), SSS s/p PPM, HTN, obesity, GERD, and DJD who presents with worsening b/l lower back pain, and b/l LE weakness causing him not to be able to ambulate. He presented to the ED on 7/6, and 7/30 for generalized weakness, and thoracolumbar pain respectively, but feels that his weakness is worsening and he is unable to continue to live by himself. Previous CT abdomen/pelvis showed chronic compression fracture of T10, T11, and L2. Repeat CT lumbar spine showed new acute moderate compression fracture of T12. He was discharged with [facility] for pain and PCP/ortho follow up.   In the ED, case management was consulted, and will be evaluating for discharge disposition    8/21/2023  VSS  Mag 1.3, replaced  K 3.5, supplemented  Hgb 8.2  Diuresed 2650 ml   Would continue with diuresis for an additional 3 days, monitor I&O's and electrolytes        DISCHARGE DIAGNOSES / PLAN:      Acute T12 compression fracture  Chronic T11 and L2 compression fracture  Acute on chronic thoracolumbar pain  - nuclear med bone scan showing acute T12 compression fracture  - Status post kyphoplasty done 8/14  - PT/OT following, patient has been accepted to SNF  - Patient is generally declining opioids at this time due to constipation but reports

## 2023-08-21 NOTE — CARE COORDINATION
Transition of Care Plan:    RUR: 17%   Prior Level of Functioning: Independent   Disposition: SNF   If SNF or IPR: Date FOC offered: 8/15  Date FOC received: 8/15  Accepting facility: [facility]  Report: [phone]/HL#[id number]  Date authorization started with reference number: 8/16; [facility]#[id number]/[facility] Plan [employer]#[id number]  Date authorization received and expires: 8/17-8/21  Follow up appointments: PCP   DME needed: None   Transportation at discharge: BLS; [place] 4:30pm   IM/IMM Medicare/ letter given: 2nd IMM Letter Received   Caregiver Contact: Ira Fierro [phone]   Care Conference needed?  No             Medicaid Pending:   Application Submitted 1/04 B#[id number] [facility]

## 2023-10-26 DIAGNOSIS — H61.23 BILATERAL IMPACTED CERUMEN: ICD-10-CM

## 2023-10-26 DIAGNOSIS — K21.00 GASTROESOPHAGEAL REFLUX DISEASE WITH ESOPHAGITIS, UNSPECIFIED WHETHER HEMORRHAGE: ICD-10-CM

## 2023-10-26 DIAGNOSIS — E54 VITAMIN C DEFICIENCY: ICD-10-CM

## 2023-10-26 DIAGNOSIS — R19.7 DIARRHEA, UNSPECIFIED TYPE: Primary | ICD-10-CM

## 2023-10-26 DIAGNOSIS — S22.080D CLOSED WEDGE COMPRESSION FRACTURE OF T11 VERTEBRA WITH ROUTINE HEALING, SUBSEQUENT ENCOUNTER: ICD-10-CM

## 2023-10-26 DIAGNOSIS — E55.9 VITAMIN D DEFICIENCY: ICD-10-CM

## 2023-10-26 DIAGNOSIS — E03.9 ACQUIRED HYPOTHYROIDISM: ICD-10-CM

## 2023-10-26 DIAGNOSIS — E87.6 HYPOKALEMIA DUE TO LOSS OF POTASSIUM: ICD-10-CM

## 2023-10-26 DIAGNOSIS — E83.42 HYPOMAGNESEMIA: ICD-10-CM

## 2023-10-26 DIAGNOSIS — D52.0 DIETARY FOLATE DEFICIENCY ANEMIA: ICD-10-CM

## 2023-10-26 DIAGNOSIS — I48.11 LONGSTANDING PERSISTENT ATRIAL FIBRILLATION (HCC): ICD-10-CM

## 2023-10-26 DIAGNOSIS — E53.8 VITAMIN B12 DEFICIENCY: ICD-10-CM

## 2023-10-26 DIAGNOSIS — F32.0 CURRENT MILD EPISODE OF MAJOR DEPRESSIVE DISORDER, UNSPECIFIED WHETHER RECURRENT (HCC): ICD-10-CM

## 2023-10-26 DIAGNOSIS — I50.32 CHRONIC DIASTOLIC CONGESTIVE HEART FAILURE (HCC): ICD-10-CM

## 2023-10-26 DIAGNOSIS — G89.21 CHRONIC PAIN DUE TO INJURY: ICD-10-CM

## 2023-10-26 RX ORDER — ASCORBIC ACID 500 MG
500 TABLET ORAL DAILY
Qty: 30 TABLET | Refills: 2 | Status: SHIPPED | OUTPATIENT
Start: 2023-10-26 | End: 2024-01-24

## 2023-10-26 RX ORDER — CHOLESTYRAMINE 4 G/9G
1 POWDER, FOR SUSPENSION ORAL DAILY
Qty: 90 PACKET | Refills: 2 | Status: SHIPPED | OUTPATIENT
Start: 2023-10-26

## 2023-10-26 RX ORDER — UREA 10 %
800 LOTION (ML) TOPICAL DAILY
Qty: 30 TABLET | Refills: 2 | Status: SHIPPED | OUTPATIENT
Start: 2023-10-26 | End: 2024-01-24

## 2023-10-26 RX ORDER — LIDOCAINE 4 G/G
1 PATCH TOPICAL DAILY
Qty: 30 PATCH | Refills: 2 | Status: SHIPPED | OUTPATIENT
Start: 2023-10-26 | End: 2024-01-24

## 2023-10-26 RX ORDER — LEVOTHYROXINE SODIUM 0.05 MG/1
50 TABLET ORAL DAILY
Qty: 30 TABLET | Refills: 2 | Status: SHIPPED | OUTPATIENT
Start: 2023-10-26 | End: 2024-01-24

## 2023-10-26 RX ORDER — PANTOPRAZOLE SODIUM 40 MG/1
40 TABLET, DELAYED RELEASE ORAL DAILY
Qty: 30 TABLET | Refills: 2 | Status: SHIPPED | OUTPATIENT
Start: 2023-10-26 | End: 2024-01-24

## 2023-10-26 RX ORDER — HYDROCODONE BITARTRATE AND ACETAMINOPHEN 5; 325 MG/1; MG/1
1 TABLET ORAL EVERY 4 HOURS PRN
Qty: 42 TABLET | Refills: 0 | Status: SHIPPED | OUTPATIENT
Start: 2023-10-26 | End: 2023-11-02

## 2023-10-26 RX ORDER — MAGNESIUM OXIDE 400 MG/1
400 TABLET ORAL DAILY
Qty: 30 TABLET | Refills: 2 | Status: SHIPPED | OUTPATIENT
Start: 2023-10-26

## 2023-10-26 RX ORDER — TRAZODONE HYDROCHLORIDE 50 MG/1
50 TABLET ORAL NIGHTLY
Qty: 30 TABLET | Refills: 2 | Status: SHIPPED | OUTPATIENT
Start: 2023-10-26

## 2023-10-26 RX ORDER — ASPIRIN 81 MG/1
81 TABLET, CHEWABLE ORAL DAILY
Qty: 30 TABLET | Refills: 2 | Status: SHIPPED | OUTPATIENT
Start: 2023-10-26 | End: 2024-01-24

## 2023-10-26 RX ORDER — MIRTAZAPINE 15 MG/1
15 TABLET, FILM COATED ORAL NIGHTLY
Qty: 30 TABLET | Refills: 2 | Status: SHIPPED | OUTPATIENT
Start: 2023-10-26

## 2023-10-26 RX ORDER — ACETAMINOPHEN 325 MG/1
650 TABLET ORAL 3 TIMES DAILY
Qty: 180 TABLET | Refills: 2 | Status: SHIPPED | OUTPATIENT
Start: 2023-10-26 | End: 2024-01-24

## 2023-10-26 RX ORDER — POTASSIUM CHLORIDE 750 MG/1
10 TABLET, EXTENDED RELEASE ORAL DAILY
Qty: 30 TABLET | Refills: 2 | Status: SHIPPED | OUTPATIENT
Start: 2023-10-26 | End: 2023-11-25

## 2023-10-26 RX ORDER — SUCRALFATE 1 G/1
1 TABLET ORAL 4 TIMES DAILY
Qty: 120 TABLET | Refills: 2 | Status: SHIPPED | OUTPATIENT
Start: 2023-10-26 | End: 2024-01-24

## 2023-10-26 RX ORDER — FUROSEMIDE 20 MG/1
20 TABLET ORAL DAILY
Qty: 30 TABLET | Refills: 2 | Status: SHIPPED | OUTPATIENT
Start: 2023-10-26 | End: 2023-11-25

## 2023-10-26 RX ORDER — ERGOCALCIFEROL 1.25 MG/1
50000 CAPSULE ORAL
Qty: 8 CAPSULE | Refills: 2 | Status: SHIPPED | OUTPATIENT
Start: 2023-10-26 | End: 2024-01-24

## 2023-10-26 NOTE — PROGRESS NOTES
Patient discharging from [facility] and rehab. Escribed medications to [employer] [facility] [employer] [facility].  Will need to follow up with PCP

## 2023-10-27 DIAGNOSIS — I95.1 ORTHOSTATIC HYPOTENSION: Primary | ICD-10-CM

## 2023-10-27 RX ORDER — MIDODRINE HYDROCHLORIDE 2.5 MG/1
2.5 TABLET ORAL 3 TIMES DAILY
Qty: 90 TABLET | Refills: 2 | Status: SHIPPED | OUTPATIENT
Start: 2023-10-27

## 2023-12-31 ENCOUNTER — HOSPITAL ENCOUNTER (INPATIENT)
Facility: HOSPITAL | Age: 78
LOS: 7 days | Discharge: NURSING, REHAB OR LONG-TERM CARE | DRG: 273 | End: 2024-01-08
Attending: STUDENT IN AN ORGANIZED HEALTH CARE EDUCATION/TRAINING PROGRAM | Admitting: INTERNAL MEDICINE
Payer: MEDICARE

## 2023-12-31 ENCOUNTER — APPOINTMENT (OUTPATIENT)
Facility: HOSPITAL | Age: 78
DRG: 273 | End: 2023-12-31
Payer: MEDICARE

## 2023-12-31 DIAGNOSIS — R10.32 LEFT LOWER QUADRANT ABDOMINAL PAIN: ICD-10-CM

## 2023-12-31 DIAGNOSIS — M25.511 CHRONIC RIGHT SHOULDER PAIN: ICD-10-CM

## 2023-12-31 DIAGNOSIS — I47.10 PAROXYSMAL SUPRAVENTRICULAR TACHYCARDIA: Primary | ICD-10-CM

## 2023-12-31 DIAGNOSIS — I47.10 PSVT (PAROXYSMAL SUPRAVENTRICULAR TACHYCARDIA): ICD-10-CM

## 2023-12-31 DIAGNOSIS — I48.91 ATRIAL FIBRILLATION (HCC): ICD-10-CM

## 2023-12-31 DIAGNOSIS — R06.02 SHORTNESS OF BREATH: ICD-10-CM

## 2023-12-31 DIAGNOSIS — G89.29 CHRONIC RIGHT SHOULDER PAIN: ICD-10-CM

## 2023-12-31 LAB
ALBUMIN SERPL-MCNC: 2.4 G/DL (ref 3.5–5)
ALBUMIN/GLOB SERPL: 0.8 (ref 1.1–2.2)
ALP SERPL-CCNC: 158 U/L (ref 45–117)
ALT SERPL-CCNC: 14 U/L (ref 12–78)
ANION GAP SERPL CALC-SCNC: 7 MMOL/L (ref 5–15)
AST SERPL-CCNC: 18 U/L (ref 15–37)
BASOPHILS # BLD: 0 K/UL (ref 0–0.1)
BASOPHILS NFR BLD: 0 % (ref 0–1)
BILIRUB SERPL-MCNC: 0.5 MG/DL (ref 0.2–1)
BUN SERPL-MCNC: 20 MG/DL (ref 6–20)
BUN/CREAT SERPL: 17 (ref 12–20)
CALCIUM SERPL-MCNC: 8 MG/DL (ref 8.5–10.1)
CHLORIDE SERPL-SCNC: 107 MMOL/L (ref 97–108)
CO2 SERPL-SCNC: 26 MMOL/L (ref 21–32)
COMMENT:: NORMAL
CREAT SERPL-MCNC: 1.2 MG/DL (ref 0.7–1.3)
DIFFERENTIAL METHOD BLD: ABNORMAL
EOSINOPHIL # BLD: 0 K/UL (ref 0–0.4)
EOSINOPHIL NFR BLD: 0 % (ref 0–7)
ERYTHROCYTE [DISTWIDTH] IN BLOOD BY AUTOMATED COUNT: 14 % (ref 11.5–14.5)
GLOBULIN SER CALC-MCNC: 3.2 G/DL (ref 2–4)
GLUCOSE SERPL-MCNC: 107 MG/DL (ref 65–100)
HCT VFR BLD AUTO: 34.9 % (ref 36.6–50.3)
HGB BLD-MCNC: 11.3 G/DL (ref 12.1–17)
IMM GRANULOCYTES # BLD AUTO: 0.1 K/UL (ref 0–0.04)
IMM GRANULOCYTES NFR BLD AUTO: 1 % (ref 0–0.5)
LIPASE SERPL-CCNC: 123 U/L (ref 13–75)
LYMPHOCYTES # BLD: 1.5 K/UL (ref 0.8–3.5)
LYMPHOCYTES NFR BLD: 12 % (ref 12–49)
MAGNESIUM SERPL-MCNC: 1.9 MG/DL (ref 1.6–2.4)
MCH RBC QN AUTO: 32.8 PG (ref 26–34)
MCHC RBC AUTO-ENTMCNC: 32.4 G/DL (ref 30–36.5)
MCV RBC AUTO: 101.5 FL (ref 80–99)
MONOCYTES # BLD: 0.7 K/UL (ref 0–1)
MONOCYTES NFR BLD: 6 % (ref 5–13)
NEUTS SEG # BLD: 9.7 K/UL (ref 1.8–8)
NEUTS SEG NFR BLD: 81 % (ref 32–75)
NRBC # BLD: 0 K/UL (ref 0–0.01)
NRBC BLD-RTO: 0 PER 100 WBC
NT PRO BNP: 2521 PG/ML
PLATELET # BLD AUTO: 369 K/UL (ref 150–400)
PMV BLD AUTO: 9 FL (ref 8.9–12.9)
POTASSIUM SERPL-SCNC: 4.2 MMOL/L (ref 3.5–5.1)
PROT SERPL-MCNC: 5.6 G/DL (ref 6.4–8.2)
RBC # BLD AUTO: 3.44 M/UL (ref 4.1–5.7)
SODIUM SERPL-SCNC: 140 MMOL/L (ref 136–145)
SPECIMEN HOLD: NORMAL
TROPONIN I SERPL HS-MCNC: 11 NG/L (ref 0–76)
TSH SERPL DL<=0.05 MIU/L-ACNC: 6.16 UIU/ML (ref 0.36–3.74)
WBC # BLD AUTO: 12 K/UL (ref 4.1–11.1)

## 2023-12-31 PROCEDURE — 84484 ASSAY OF TROPONIN QUANT: CPT

## 2023-12-31 PROCEDURE — 83690 ASSAY OF LIPASE: CPT

## 2023-12-31 PROCEDURE — 74177 CT ABD & PELVIS W/CONTRAST: CPT

## 2023-12-31 PROCEDURE — 96374 THER/PROPH/DIAG INJ IV PUSH: CPT

## 2023-12-31 PROCEDURE — 93005 ELECTROCARDIOGRAM TRACING: CPT | Performed by: INTERNAL MEDICINE

## 2023-12-31 PROCEDURE — 83735 ASSAY OF MAGNESIUM: CPT

## 2023-12-31 PROCEDURE — 6370000000 HC RX 637 (ALT 250 FOR IP): Performed by: STUDENT IN AN ORGANIZED HEALTH CARE EDUCATION/TRAINING PROGRAM

## 2023-12-31 PROCEDURE — 71045 X-RAY EXAM CHEST 1 VIEW: CPT

## 2023-12-31 PROCEDURE — 93005 ELECTROCARDIOGRAM TRACING: CPT | Performed by: STUDENT IN AN ORGANIZED HEALTH CARE EDUCATION/TRAINING PROGRAM

## 2023-12-31 PROCEDURE — 83880 ASSAY OF NATRIURETIC PEPTIDE: CPT

## 2023-12-31 PROCEDURE — 36415 COLL VENOUS BLD VENIPUNCTURE: CPT

## 2023-12-31 PROCEDURE — 80053 COMPREHEN METABOLIC PANEL: CPT

## 2023-12-31 PROCEDURE — 99285 EMERGENCY DEPT VISIT HI MDM: CPT

## 2023-12-31 PROCEDURE — 6360000004 HC RX CONTRAST MEDICATION: Performed by: STUDENT IN AN ORGANIZED HEALTH CARE EDUCATION/TRAINING PROGRAM

## 2023-12-31 PROCEDURE — 84443 ASSAY THYROID STIM HORMONE: CPT

## 2023-12-31 PROCEDURE — 85025 COMPLETE CBC W/AUTO DIFF WBC: CPT

## 2023-12-31 RX ORDER — HYDROCODONE BITARTRATE AND ACETAMINOPHEN 10; 325 MG/1; MG/1
1 TABLET ORAL ONCE
Status: COMPLETED | OUTPATIENT
Start: 2023-12-31 | End: 2023-12-31

## 2023-12-31 RX ORDER — ADENOSINE 3 MG/ML
INJECTION, SOLUTION INTRAVENOUS
Status: DISPENSED
Start: 2023-12-31 | End: 2024-01-01

## 2023-12-31 RX ORDER — 0.9 % SODIUM CHLORIDE 0.9 %
500 INTRAVENOUS SOLUTION INTRAVENOUS ONCE
Status: COMPLETED | OUTPATIENT
Start: 2023-12-31 | End: 2024-01-01

## 2023-12-31 RX ORDER — KETOROLAC TROMETHAMINE 30 MG/ML
15 INJECTION, SOLUTION INTRAMUSCULAR; INTRAVENOUS
Status: COMPLETED | OUTPATIENT
Start: 2023-12-31 | End: 2024-01-01

## 2023-12-31 RX ADMIN — HYDROCODONE BITARTRATE AND ACETAMINOPHEN 1 TABLET: 10; 325 TABLET ORAL at 20:13

## 2023-12-31 RX ADMIN — IOPAMIDOL 100 ML: 755 INJECTION, SOLUTION INTRAVENOUS at 20:03

## 2023-12-31 ASSESSMENT — PAIN DESCRIPTION - LOCATION
LOCATION: BACK
LOCATION: BACK

## 2023-12-31 ASSESSMENT — PAIN DESCRIPTION - ORIENTATION
ORIENTATION: MID;LOWER
ORIENTATION: LOWER

## 2023-12-31 ASSESSMENT — PAIN DESCRIPTION - DESCRIPTORS
DESCRIPTORS: STABBING
DESCRIPTORS: ACHING

## 2023-12-31 ASSESSMENT — PAIN - FUNCTIONAL ASSESSMENT
PAIN_FUNCTIONAL_ASSESSMENT: ACTIVITIES ARE NOT PREVENTED
PAIN_FUNCTIONAL_ASSESSMENT: 0-10

## 2023-12-31 ASSESSMENT — PAIN SCALES - GENERAL
PAINLEVEL_OUTOF10: 7
PAINLEVEL_OUTOF10: 9

## 2023-12-31 NOTE — ED TRIAGE NOTES
Patient arrives with c/o chest heaviness that started this morning with nausea and some dizziness.

## 2024-01-01 ENCOUNTER — APPOINTMENT (OUTPATIENT)
Facility: HOSPITAL | Age: 79
DRG: 871 | End: 2024-01-01
Payer: MEDICARE

## 2024-01-01 ENCOUNTER — APPOINTMENT (OUTPATIENT)
Facility: HOSPITAL | Age: 79
DRG: 273 | End: 2024-01-01
Payer: MEDICARE

## 2024-01-01 ENCOUNTER — HOSPICE ADMISSION (OUTPATIENT)
Age: 79
End: 2024-01-01
Payer: MEDICARE

## 2024-01-01 ENCOUNTER — APPOINTMENT (OUTPATIENT)
Facility: HOSPITAL | Age: 79
DRG: 871 | End: 2024-01-01
Attending: INTERNAL MEDICINE
Payer: MEDICARE

## 2024-01-01 ENCOUNTER — HOSPITAL ENCOUNTER (INPATIENT)
Facility: HOSPITAL | Age: 79
LOS: 4 days | Discharge: HOSPICE | DRG: 871 | End: 2024-02-26
Attending: EMERGENCY MEDICINE | Admitting: INTERNAL MEDICINE
Payer: MEDICARE

## 2024-01-01 VITALS
WEIGHT: 149 LBS | DIASTOLIC BLOOD PRESSURE: 73 MMHG | HEART RATE: 95 BPM | RESPIRATION RATE: 16 BRPM | OXYGEN SATURATION: 92 % | BODY MASS INDEX: 22.58 KG/M2 | HEIGHT: 68 IN | SYSTOLIC BLOOD PRESSURE: 113 MMHG | TEMPERATURE: 97.5 F

## 2024-01-01 DIAGNOSIS — K92.2 LOWER GI BLEED: ICD-10-CM

## 2024-01-01 DIAGNOSIS — B95.2 BACTEREMIA DUE TO ENTEROCOCCUS: ICD-10-CM

## 2024-01-01 DIAGNOSIS — K52.9 COLITIS: ICD-10-CM

## 2024-01-01 DIAGNOSIS — R78.81 BACTEREMIA DUE TO ENTEROCOCCUS: ICD-10-CM

## 2024-01-01 DIAGNOSIS — A41.9 SEPTICEMIA (HCC): Primary | ICD-10-CM

## 2024-01-01 PROBLEM — I47.10 PSVT (PAROXYSMAL SUPRAVENTRICULAR TACHYCARDIA): Status: ACTIVE | Noted: 2024-01-01

## 2024-01-01 LAB
ABO + RH BLD: NORMAL
ACCESSION NUMBER, LLC1M: ABNORMAL
ACINETOBACTER CALCOAC BAUMANNII COMPLEX BY PCR: NOT DETECTED
ALBUMIN SERPL-MCNC: 1.2 G/DL (ref 3.5–5)
ALBUMIN SERPL-MCNC: 1.3 G/DL (ref 3.5–5)
ALBUMIN SERPL-MCNC: 1.8 G/DL (ref 3.5–5)
ALBUMIN/GLOB SERPL: 0.5 (ref 1.1–2.2)
ALBUMIN/GLOB SERPL: 0.5 (ref 1.1–2.2)
ALBUMIN/GLOB SERPL: 0.7 (ref 1.1–2.2)
ALP SERPL-CCNC: 105 U/L (ref 45–117)
ALP SERPL-CCNC: 113 U/L (ref 45–117)
ALP SERPL-CCNC: 131 U/L (ref 45–117)
ALT SERPL-CCNC: 11 U/L (ref 12–78)
ALT SERPL-CCNC: 12 U/L (ref 12–78)
ALT SERPL-CCNC: 12 U/L (ref 12–78)
AMPHET UR QL SCN: NEGATIVE
AMPHET UR QL SCN: NEGATIVE
ANION GAP SERPL CALC-SCNC: 10 MMOL/L (ref 5–15)
ANION GAP SERPL CALC-SCNC: 11 MMOL/L (ref 5–15)
ANION GAP SERPL CALC-SCNC: 12 MMOL/L (ref 5–15)
ANION GAP SERPL CALC-SCNC: 14 MMOL/L (ref 5–15)
ANION GAP SERPL CALC-SCNC: 4 MMOL/L (ref 5–15)
APPEARANCE UR: CLEAR
APPEARANCE UR: CLEAR
APTT PPP: 68.3 SEC (ref 22.1–31)
AST SERPL-CCNC: 16 U/L (ref 15–37)
AST SERPL-CCNC: 19 U/L (ref 15–37)
AST SERPL-CCNC: 9 U/L (ref 15–37)
B FRAGILIS DNA BLD POS QL NAA+NON-PROBE: NOT DETECTED
BACTERIA SPEC CULT: ABNORMAL
BACTERIA SPEC CULT: NORMAL
BACTERIA SPEC CULT: NORMAL
BACTERIA URNS QL MICRO: NEGATIVE /HPF
BACTERIA URNS QL MICRO: NEGATIVE /HPF
BARBITURATES UR QL SCN: NEGATIVE
BARBITURATES UR QL SCN: NEGATIVE
BASOPHILS # BLD: 0 K/UL (ref 0–0.1)
BASOPHILS # BLD: 0.1 K/UL (ref 0–0.1)
BASOPHILS NFR BLD: 0 % (ref 0–1)
BASOPHILS NFR BLD: 1 % (ref 0–1)
BENZODIAZ UR QL: NEGATIVE
BENZODIAZ UR QL: NEGATIVE
BILIRUB SERPL-MCNC: 0.3 MG/DL (ref 0.2–1)
BILIRUB SERPL-MCNC: 0.6 MG/DL (ref 0.2–1)
BILIRUB SERPL-MCNC: 0.6 MG/DL (ref 0.2–1)
BILIRUB UR QL: NEGATIVE
BILIRUB UR QL: NEGATIVE
BIOFIRE TEST COMMENT: ABNORMAL
BLD PROD TYP BPU: NORMAL
BLD PROD TYP BPU: NORMAL
BLOOD BANK DISPENSE STATUS: NORMAL
BLOOD BANK DISPENSE STATUS: NORMAL
BLOOD GROUP ANTIBODIES SERPL: NORMAL
BPU ID: NORMAL
BPU ID: NORMAL
BUN SERPL-MCNC: 20 MG/DL (ref 6–20)
BUN SERPL-MCNC: 26 MG/DL (ref 6–20)
BUN SERPL-MCNC: 27 MG/DL (ref 6–20)
BUN SERPL-MCNC: 29 MG/DL (ref 6–20)
BUN SERPL-MCNC: 30 MG/DL (ref 6–20)
BUN/CREAT SERPL: 15 (ref 12–20)
BUN/CREAT SERPL: 17 (ref 12–20)
BUN/CREAT SERPL: 19 (ref 12–20)
BUN/CREAT SERPL: 20 (ref 12–20)
BUN/CREAT SERPL: 22 (ref 12–20)
BUN/CREAT SERPL: 23 (ref 12–20)
BUN/CREAT SERPL: 23 (ref 12–20)
C ALBICANS DNA BLD POS QL NAA+NON-PROBE: NOT DETECTED
C AURIS DNA BLD POS QL NAA+NON-PROBE: NOT DETECTED
C GATTII+NEOFOR DNA BLD POS QL NAA+N-PRB: NOT DETECTED
C GLABRATA DNA BLD POS QL NAA+NON-PROBE: NOT DETECTED
C KRUSEI DNA BLD POS QL NAA+NON-PROBE: NOT DETECTED
C PARAP DNA BLD POS QL NAA+NON-PROBE: NOT DETECTED
C TROPICLS DNA BLD POS QL NAA+NON-PROBE: NOT DETECTED
CALCIUM SERPL-MCNC: 7.1 MG/DL (ref 8.5–10.1)
CALCIUM SERPL-MCNC: 7.3 MG/DL (ref 8.5–10.1)
CALCIUM SERPL-MCNC: 7.3 MG/DL (ref 8.5–10.1)
CALCIUM SERPL-MCNC: 7.4 MG/DL (ref 8.5–10.1)
CALCIUM SERPL-MCNC: 7.4 MG/DL (ref 8.5–10.1)
CALCIUM SERPL-MCNC: 7.5 MG/DL (ref 8.5–10.1)
CALCIUM SERPL-MCNC: 7.5 MG/DL (ref 8.5–10.1)
CANNABINOIDS UR QL SCN: NEGATIVE
CANNABINOIDS UR QL SCN: NEGATIVE
CHLORIDE SERPL-SCNC: 101 MMOL/L (ref 97–108)
CHLORIDE SERPL-SCNC: 102 MMOL/L (ref 97–108)
CHLORIDE SERPL-SCNC: 103 MMOL/L (ref 97–108)
CHLORIDE SERPL-SCNC: 107 MMOL/L (ref 97–108)
CHLORIDE SERPL-SCNC: 108 MMOL/L (ref 97–108)
CHLORIDE SERPL-SCNC: 108 MMOL/L (ref 97–108)
CHLORIDE SERPL-SCNC: 111 MMOL/L (ref 97–108)
CO2 SERPL-SCNC: 17 MMOL/L (ref 21–32)
CO2 SERPL-SCNC: 19 MMOL/L (ref 21–32)
CO2 SERPL-SCNC: 21 MMOL/L (ref 21–32)
CO2 SERPL-SCNC: 23 MMOL/L (ref 21–32)
CO2 SERPL-SCNC: 27 MMOL/L (ref 21–32)
COCAINE UR QL SCN: NEGATIVE
COCAINE UR QL SCN: NEGATIVE
COLOR UR: ABNORMAL
COLOR UR: NORMAL
COMMENT:: NORMAL
CREAT SERPL-MCNC: 1.07 MG/DL (ref 0.7–1.3)
CREAT SERPL-MCNC: 1.15 MG/DL (ref 0.7–1.3)
CREAT SERPL-MCNC: 1.19 MG/DL (ref 0.7–1.3)
CREAT SERPL-MCNC: 1.34 MG/DL (ref 0.7–1.3)
CREAT SERPL-MCNC: 1.37 MG/DL (ref 0.7–1.3)
CREAT SERPL-MCNC: 1.58 MG/DL (ref 0.7–1.3)
CREAT SERPL-MCNC: 1.93 MG/DL (ref 0.7–1.3)
CROSSMATCH RESULT: NORMAL
CROSSMATCH RESULT: NORMAL
CRP SERPL-MCNC: 2.47 MG/DL (ref 0–0.6)
DIFFERENTIAL METHOD BLD: ABNORMAL
E CLOAC COMP DNA BLD POS NAA+NON-PROBE: NOT DETECTED
E COLI DNA BLD POS QL NAA+NON-PROBE: NOT DETECTED
E FAECALIS DNA BLD POS QL NAA+NON-PROBE: DETECTED
E FAECIUM DNA BLD POS QL NAA+NON-PROBE: NOT DETECTED
EKG ATRIAL RATE: 29 BPM
EKG DIAGNOSIS: NORMAL
EKG Q-T INTERVAL: 416 MS
EKG QRS DURATION: 168 MS
EKG QTC CALCULATION (BAZETT): 570 MS
EKG R AXIS: 245 DEGREES
EKG T AXIS: 89 DEGREES
EKG VENTRICULAR RATE: 113 BPM
ENTEROBACTERALES DNA BLD POS NAA+N-PRB: NOT DETECTED
EOSINOPHIL # BLD: 0 K/UL (ref 0–0.4)
EOSINOPHIL # BLD: 0.2 K/UL (ref 0–0.4)
EOSINOPHIL NFR BLD: 0 % (ref 0–7)
EOSINOPHIL NFR BLD: 3 % (ref 0–7)
EPITH CASTS URNS QL MICRO: ABNORMAL /LPF
EPITH CASTS URNS QL MICRO: NORMAL /LPF
ERYTHROCYTE [DISTWIDTH] IN BLOOD BY AUTOMATED COUNT: 14.1 % (ref 11.5–14.5)
ERYTHROCYTE [DISTWIDTH] IN BLOOD BY AUTOMATED COUNT: 15.9 % (ref 11.5–14.5)
ERYTHROCYTE [DISTWIDTH] IN BLOOD BY AUTOMATED COUNT: 16.5 % (ref 11.5–14.5)
ERYTHROCYTE [DISTWIDTH] IN BLOOD BY AUTOMATED COUNT: 16.8 % (ref 11.5–14.5)
ERYTHROCYTE [DISTWIDTH] IN BLOOD BY AUTOMATED COUNT: 17.8 % (ref 11.5–14.5)
FERRITIN SERPL-MCNC: 94 NG/ML (ref 26–388)
FOLATE SERPL-MCNC: 65.2 NG/ML (ref 5–21)
FOLATE SERPL-MCNC: >20 NG/ML
FOLATE SERPL-MCNC: ABNORMAL NG/ML (ref 5–21)
GLOBULIN SER CALC-MCNC: 2.5 G/DL (ref 2–4)
GLOBULIN SER CALC-MCNC: 2.6 G/DL (ref 2–4)
GLOBULIN SER CALC-MCNC: 2.8 G/DL (ref 2–4)
GLUCOSE BLD STRIP.AUTO-MCNC: 113 MG/DL (ref 65–117)
GLUCOSE BLD STRIP.AUTO-MCNC: 70 MG/DL (ref 65–117)
GLUCOSE SERPL-MCNC: 61 MG/DL (ref 65–100)
GLUCOSE SERPL-MCNC: 72 MG/DL (ref 65–100)
GLUCOSE SERPL-MCNC: 75 MG/DL (ref 65–100)
GLUCOSE SERPL-MCNC: 88 MG/DL (ref 65–100)
GLUCOSE SERPL-MCNC: 88 MG/DL (ref 65–100)
GLUCOSE SERPL-MCNC: 89 MG/DL (ref 65–100)
GLUCOSE SERPL-MCNC: 91 MG/DL (ref 65–100)
GLUCOSE UR STRIP.AUTO-MCNC: NEGATIVE MG/DL
GLUCOSE UR STRIP.AUTO-MCNC: NEGATIVE MG/DL
GP B STREP DNA BLD POS QL NAA+NON-PROBE: NOT DETECTED
HAEM INFLU DNA BLD POS QL NAA+NON-PROBE: NOT DETECTED
HCT VFR BLD AUTO: 26.6 % (ref 36.6–50.3)
HCT VFR BLD AUTO: 26.6 % (ref 36.6–50.3)
HCT VFR BLD AUTO: 26.8 % (ref 36.6–50.3)
HCT VFR BLD AUTO: 28.8 % (ref 36.6–50.3)
HCT VFR BLD AUTO: 32.6 % (ref 36.6–50.3)
HCT VFR BLD AUTO: 33.4 % (ref 36.6–50.3)
HCT VFR BLD AUTO: 33.8 % (ref 36.6–50.3)
HGB BLD-MCNC: 10.9 G/DL (ref 12.1–17)
HGB BLD-MCNC: 11.1 G/DL (ref 12.1–17)
HGB BLD-MCNC: 11.1 G/DL (ref 12.1–17)
HGB BLD-MCNC: 8.7 G/DL (ref 12.1–17)
HGB BLD-MCNC: 9 G/DL (ref 12.1–17)
HGB BLD-MCNC: 9.1 G/DL (ref 12.1–17)
HGB BLD-MCNC: 9.5 G/DL (ref 12.1–17)
HGB UR QL STRIP: ABNORMAL
HGB UR QL STRIP: NEGATIVE
HISTORY CHECK: NORMAL
HYALINE CASTS URNS QL MICRO: NORMAL /LPF (ref 0–5)
IMM GRANULOCYTES # BLD AUTO: 0 K/UL
IMM GRANULOCYTES # BLD AUTO: 0 K/UL (ref 0–0.04)
IMM GRANULOCYTES # BLD AUTO: 0.1 K/UL (ref 0–0.04)
IMM GRANULOCYTES # BLD AUTO: 0.2 K/UL (ref 0–0.04)
IMM GRANULOCYTES # BLD AUTO: 0.2 K/UL (ref 0–0.04)
IMM GRANULOCYTES NFR BLD AUTO: 0 %
IMM GRANULOCYTES NFR BLD AUTO: 0 % (ref 0–0.5)
IMM GRANULOCYTES NFR BLD AUTO: 1 % (ref 0–0.5)
INR PPP: 1.8 (ref 0.9–1.1)
IRON SATN MFR SERPL: 25 % (ref 20–50)
IRON SERPL-MCNC: 38 UG/DL (ref 35–150)
K OXYTOCA DNA BLD POS QL NAA+NON-PROBE: NOT DETECTED
KETONES UR QL STRIP.AUTO: ABNORMAL MG/DL
KETONES UR QL STRIP.AUTO: NEGATIVE MG/DL
KLEBSIELLA SP DNA BLD POS QL NAA+NON-PRB: NOT DETECTED
KLEBSIELLA SP DNA BLD POS QL NAA+NON-PRB: NOT DETECTED
L MONOCYTOG DNA BLD POS QL NAA+NON-PROBE: NOT DETECTED
LACTATE BLD-SCNC: 1.83 MMOL/L (ref 0.4–2)
LACTATE SERPL-SCNC: 2.4 MMOL/L (ref 0.4–2)
LACTATE SERPL-SCNC: 2.8 MMOL/L (ref 0.4–2)
LEUKOCYTE ESTERASE UR QL STRIP.AUTO: NEGATIVE
LEUKOCYTE ESTERASE UR QL STRIP.AUTO: NEGATIVE
LIPASE SERPL-CCNC: 54 U/L (ref 13–75)
LYMPHOCYTES # BLD: 1 K/UL (ref 0.8–3.5)
LYMPHOCYTES # BLD: 1.4 K/UL (ref 0.8–3.5)
LYMPHOCYTES # BLD: 1.6 K/UL (ref 0.8–3.5)
LYMPHOCYTES # BLD: 1.6 K/UL (ref 0.8–3.5)
LYMPHOCYTES # BLD: 2.9 K/UL (ref 0.8–3.5)
LYMPHOCYTES NFR BLD: 11 % (ref 12–49)
LYMPHOCYTES NFR BLD: 14 % (ref 12–49)
LYMPHOCYTES NFR BLD: 19 % (ref 12–49)
LYMPHOCYTES NFR BLD: 24 % (ref 12–49)
LYMPHOCYTES NFR BLD: 6 % (ref 12–49)
Lab: ABNORMAL
Lab: ABNORMAL
MAGNESIUM SERPL-MCNC: 1.8 MG/DL (ref 1.6–2.4)
MAGNESIUM SERPL-MCNC: 2.1 MG/DL (ref 1.6–2.4)
MAGNESIUM SERPL-MCNC: 2.2 MG/DL (ref 1.6–2.4)
MAGNESIUM SERPL-MCNC: NORMAL MG/DL (ref 1.6–2.4)
MCH RBC QN AUTO: 31.6 PG (ref 26–34)
MCH RBC QN AUTO: 32.3 PG (ref 26–34)
MCH RBC QN AUTO: 32.6 PG (ref 26–34)
MCH RBC QN AUTO: 33.1 PG (ref 26–34)
MCH RBC QN AUTO: 33.6 PG (ref 26–34)
MCHC RBC AUTO-ENTMCNC: 32.7 G/DL (ref 30–36.5)
MCHC RBC AUTO-ENTMCNC: 32.8 G/DL (ref 30–36.5)
MCHC RBC AUTO-ENTMCNC: 33 G/DL (ref 30–36.5)
MCHC RBC AUTO-ENTMCNC: 33.6 G/DL (ref 30–36.5)
MCHC RBC AUTO-ENTMCNC: 34.2 G/DL (ref 30–36.5)
MCV RBC AUTO: 101.1 FL (ref 80–99)
MCV RBC AUTO: 101.8 FL (ref 80–99)
MCV RBC AUTO: 94.3 FL (ref 80–99)
MCV RBC AUTO: 96.3 FL (ref 80–99)
MCV RBC AUTO: 97.1 FL (ref 80–99)
METHADONE UR QL: NEGATIVE
METHADONE UR QL: NEGATIVE
MONOCYTES # BLD: 0.5 K/UL (ref 0–1)
MONOCYTES # BLD: 0.6 K/UL (ref 0–1)
MONOCYTES # BLD: 0.8 K/UL (ref 0–1)
MONOCYTES # BLD: 0.8 K/UL (ref 0–1)
MONOCYTES # BLD: 0.9 K/UL (ref 0–1)
MONOCYTES NFR BLD: 10 % (ref 5–13)
MONOCYTES NFR BLD: 3 % (ref 5–13)
MONOCYTES NFR BLD: 5 % (ref 5–13)
MONOCYTES NFR BLD: 6 % (ref 5–13)
MONOCYTES NFR BLD: 8 % (ref 5–13)
N MEN DNA BLD POS QL NAA+NON-PROBE: NOT DETECTED
NEUTS SEG # BLD: 10.7 K/UL (ref 1.8–8)
NEUTS SEG # BLD: 11.7 K/UL (ref 1.8–8)
NEUTS SEG # BLD: 15.2 K/UL (ref 1.8–8)
NEUTS SEG # BLD: 4.1 K/UL (ref 1.8–8)
NEUTS SEG # BLD: 8.8 K/UL (ref 1.8–8)
NEUTS SEG NFR BLD: 63 % (ref 32–75)
NEUTS SEG NFR BLD: 76 % (ref 32–75)
NEUTS SEG NFR BLD: 78 % (ref 32–75)
NEUTS SEG NFR BLD: 82 % (ref 32–75)
NEUTS SEG NFR BLD: 88 % (ref 32–75)
NITRITE UR QL STRIP.AUTO: NEGATIVE
NITRITE UR QL STRIP.AUTO: NEGATIVE
NRBC # BLD: 0 K/UL (ref 0–0.01)
NRBC # BLD: 0 K/UL (ref 0–0.01)
NRBC # BLD: 0.02 K/UL (ref 0–0.01)
NRBC BLD-RTO: 0 PER 100 WBC
NRBC BLD-RTO: 0 PER 100 WBC
NRBC BLD-RTO: 0.1 PER 100 WBC
NRBC BLD-RTO: 0.1 PER 100 WBC
NRBC BLD-RTO: 0.2 PER 100 WBC
NT PRO BNP: 3088 PG/ML
NT PRO BNP: ABNORMAL PG/ML
OPIATES UR QL: POSITIVE
OPIATES UR QL: POSITIVE
P AERUGINOSA DNA BLD POS NAA+NON-PROBE: NOT DETECTED
PCP UR QL: NEGATIVE
PCP UR QL: NEGATIVE
PH UR STRIP: 5 (ref 5–8)
PH UR STRIP: 5.5 (ref 5–8)
PHOSPHATE SERPL-MCNC: 3.5 MG/DL (ref 2.6–4.7)
PHOSPHATE SERPL-MCNC: 4.2 MG/DL (ref 2.6–4.7)
PLATELET # BLD AUTO: 286 K/UL (ref 150–400)
PLATELET # BLD AUTO: 289 K/UL (ref 150–400)
PLATELET # BLD AUTO: 330 K/UL (ref 150–400)
PLATELET # BLD AUTO: 331 K/UL (ref 150–400)
PLATELET # BLD AUTO: 395 K/UL (ref 150–400)
PMV BLD AUTO: 10.3 FL (ref 8.9–12.9)
PMV BLD AUTO: 8.8 FL (ref 8.9–12.9)
PMV BLD AUTO: 8.9 FL (ref 8.9–12.9)
PMV BLD AUTO: 9.1 FL (ref 8.9–12.9)
PMV BLD AUTO: 9.2 FL (ref 8.9–12.9)
POTASSIUM SERPL-SCNC: 3.2 MMOL/L (ref 3.5–5.1)
POTASSIUM SERPL-SCNC: 3.3 MMOL/L (ref 3.5–5.1)
POTASSIUM SERPL-SCNC: 3.7 MMOL/L (ref 3.5–5.1)
POTASSIUM SERPL-SCNC: 3.8 MMOL/L (ref 3.5–5.1)
POTASSIUM SERPL-SCNC: 4 MMOL/L (ref 3.5–5.1)
POTASSIUM SERPL-SCNC: 4.3 MMOL/L (ref 3.5–5.1)
POTASSIUM SERPL-SCNC: ABNORMAL MMOL/L (ref 3.5–5.1)
PROCALCITONIN SERPL-MCNC: 36.31 NG/ML
PROT SERPL-MCNC: 3.7 G/DL (ref 6.4–8.2)
PROT SERPL-MCNC: 4.1 G/DL (ref 6.4–8.2)
PROT SERPL-MCNC: 4.4 G/DL (ref 6.4–8.2)
PROT UR STRIP-MCNC: NEGATIVE MG/DL
PROT UR STRIP-MCNC: NEGATIVE MG/DL
PROTEUS SP DNA BLD POS QL NAA+NON-PROBE: NOT DETECTED
PROTHROMBIN TIME: 18.3 SEC (ref 9–11.1)
RBC # BLD AUTO: 2.63 M/UL (ref 4.1–5.7)
RBC # BLD AUTO: 2.76 M/UL (ref 4.1–5.7)
RBC # BLD AUTO: 2.82 M/UL (ref 4.1–5.7)
RBC # BLD AUTO: 2.83 M/UL (ref 4.1–5.7)
RBC # BLD AUTO: 3.51 M/UL (ref 4.1–5.7)
RBC #/AREA URNS HPF: >100 /HPF (ref 0–5)
RBC #/AREA URNS HPF: NORMAL /HPF (ref 0–5)
RBC MORPH BLD: ABNORMAL
RESISTANT GENE TARGETS: ABNORMAL
S AUREUS DNA BLD POS QL NAA+NON-PROBE: NOT DETECTED
S AUREUS+CONS DNA BLD POS NAA+NON-PROBE: NOT DETECTED
S EPIDERMIDIS DNA BLD POS QL NAA+NON-PRB: NOT DETECTED
S LUGDUNENSIS DNA BLD POS QL NAA+NON-PRB: NOT DETECTED
S MALTOPHILIA DNA BLD POS QL NAA+NON-PRB: NOT DETECTED
S MARCESCENS DNA BLD POS NAA+NON-PROBE: NOT DETECTED
S PNEUM DNA BLD POS QL NAA+NON-PROBE: NOT DETECTED
S PYO DNA BLD POS QL NAA+NON-PROBE: NOT DETECTED
SALMONELLA DNA BLD POS QL NAA+NON-PROBE: NOT DETECTED
SARS-COV-2 RDRP RESP QL NAA+PROBE: NOT DETECTED
SARS-COV-2 RNA RESP QL NAA+PROBE: NOT DETECTED
SERVICE CMNT-IMP: ABNORMAL
SERVICE CMNT-IMP: NORMAL
SODIUM SERPL-SCNC: 135 MMOL/L (ref 136–145)
SODIUM SERPL-SCNC: 135 MMOL/L (ref 136–145)
SODIUM SERPL-SCNC: 136 MMOL/L (ref 136–145)
SODIUM SERPL-SCNC: 136 MMOL/L (ref 136–145)
SODIUM SERPL-SCNC: 140 MMOL/L (ref 136–145)
SODIUM SERPL-SCNC: 140 MMOL/L (ref 136–145)
SODIUM SERPL-SCNC: 142 MMOL/L (ref 136–145)
SOURCE: NORMAL
SOURCE: NORMAL
SP GR UR REFRACTOMETRY: 1.02 (ref 1–1.03)
SP GR UR REFRACTOMETRY: >1.03
SPECIMEN EXP DATE BLD: NORMAL
SPECIMEN HOLD: NORMAL
STREPTOCOCCUS DNA BLD POS NAA+NON-PROBE: NOT DETECTED
THERAPEUTIC RANGE: ABNORMAL SECS (ref 58–77)
TIBC SERPL-MCNC: 150 UG/DL (ref 250–450)
TROPONIN I SERPL HS-MCNC: 11 NG/L (ref 0–76)
TROPONIN I SERPL HS-MCNC: 24 NG/L (ref 0–76)
TROPONIN I SERPL HS-MCNC: 33 NG/L (ref 0–76)
TROPONIN I SERPL HS-MCNC: 56 NG/L (ref 0–76)
TROPONIN I SERPL HS-MCNC: 9 NG/L (ref 0–76)
TROPONIN I SERPL HS-MCNC: 9 NG/L (ref 0–76)
TSH SERPL DL<=0.05 MIU/L-ACNC: 2.27 UIU/ML (ref 0.36–3.74)
UNIT DIVISION: 0
UNIT DIVISION: 0
URINE CULTURE IF INDICATED: ABNORMAL
URINE CULTURE IF INDICATED: NORMAL
UROBILINOGEN UR QL STRIP.AUTO: 0.2 EU/DL (ref 0.2–1)
UROBILINOGEN UR QL STRIP.AUTO: 1 EU/DL (ref 0.2–1)
VANA+VANB BLD POS QL NAA+NON-PROBE: NOT DETECTED
VIT B12 SERPL-MCNC: 908 PG/ML (ref 193–986)
VIT B12 SERPL-MCNC: >2000 PG/ML (ref 193–986)
WBC # BLD AUTO: 11.6 K/UL (ref 4.1–11.1)
WBC # BLD AUTO: 13 K/UL (ref 4.1–11.1)
WBC # BLD AUTO: 15.1 K/UL (ref 4.1–11.1)
WBC # BLD AUTO: 17.2 K/UL (ref 4.1–11.1)
WBC # BLD AUTO: 6.5 K/UL (ref 4.1–11.1)
WBC URNS QL MICRO: ABNORMAL /HPF (ref 0–4)
WBC URNS QL MICRO: NORMAL /HPF (ref 0–4)

## 2024-01-01 PROCEDURE — C9113 INJ PANTOPRAZOLE SODIUM, VIA: HCPCS | Performed by: INTERNAL MEDICINE

## 2024-01-01 PROCEDURE — 83690 ASSAY OF LIPASE: CPT

## 2024-01-01 PROCEDURE — 80048 BASIC METABOLIC PNL TOTAL CA: CPT

## 2024-01-01 PROCEDURE — 2060000000 HC ICU INTERMEDIATE R&B

## 2024-01-01 PROCEDURE — 3600007502: Performed by: INTERNAL MEDICINE

## 2024-01-01 PROCEDURE — 0656 HSPC GENERAL INPATIENT

## 2024-01-01 PROCEDURE — 3600007512: Performed by: INTERNAL MEDICINE

## 2024-01-01 PROCEDURE — 85025 COMPLETE CBC W/AUTO DIFF WBC: CPT

## 2024-01-01 PROCEDURE — 2580000003 HC RX 258: Performed by: EMERGENCY MEDICINE

## 2024-01-01 PROCEDURE — 76937 US GUIDE VASCULAR ACCESS: CPT

## 2024-01-01 PROCEDURE — 83735 ASSAY OF MAGNESIUM: CPT

## 2024-01-01 PROCEDURE — 83605 ASSAY OF LACTIC ACID: CPT

## 2024-01-01 PROCEDURE — 36410 VNPNXR 3YR/> PHY/QHP DX/THER: CPT

## 2024-01-01 PROCEDURE — 6370000000 HC RX 637 (ALT 250 FOR IP): Performed by: INTERNAL MEDICINE

## 2024-01-01 PROCEDURE — 87635 SARS-COV-2 COVID-19 AMP PRB: CPT

## 2024-01-01 PROCEDURE — 2709999900 HC NON-CHARGEABLE SUPPLY: Performed by: INTERNAL MEDICINE

## 2024-01-01 PROCEDURE — 6360000004 HC RX CONTRAST MEDICATION: Performed by: STUDENT IN AN ORGANIZED HEALTH CARE EDUCATION/TRAINING PROGRAM

## 2024-01-01 PROCEDURE — A4216 STERILE WATER/SALINE, 10 ML: HCPCS | Performed by: INTERNAL MEDICINE

## 2024-01-01 PROCEDURE — 85610 PROTHROMBIN TIME: CPT

## 2024-01-01 PROCEDURE — 94760 N-INVAS EAR/PLS OXIMETRY 1: CPT

## 2024-01-01 PROCEDURE — 87186 SC STD MICRODIL/AGAR DIL: CPT

## 2024-01-01 PROCEDURE — 6360000002 HC RX W HCPCS: Performed by: HOSPITALIST

## 2024-01-01 PROCEDURE — 86900 BLOOD TYPING SEROLOGIC ABO: CPT

## 2024-01-01 PROCEDURE — 6360000002 HC RX W HCPCS: Performed by: INTERNAL MEDICINE

## 2024-01-01 PROCEDURE — 36415 COLL VENOUS BLD VENIPUNCTURE: CPT

## 2024-01-01 PROCEDURE — 86920 COMPATIBILITY TEST SPIN: CPT

## 2024-01-01 PROCEDURE — 83540 ASSAY OF IRON: CPT

## 2024-01-01 PROCEDURE — 87154 CUL TYP ID BLD PTHGN 6+ TRGT: CPT

## 2024-01-01 PROCEDURE — 99285 EMERGENCY DEPT VISIT HI MDM: CPT

## 2024-01-01 PROCEDURE — 82962 GLUCOSE BLOOD TEST: CPT

## 2024-01-01 PROCEDURE — C1751 CATH, INF, PER/CENT/MIDLINE: HCPCS

## 2024-01-01 PROCEDURE — 2700000000 HC OXYGEN THERAPY PER DAY

## 2024-01-01 PROCEDURE — 7100000010 HC PHASE II RECOVERY - FIRST 15 MIN: Performed by: INTERNAL MEDICINE

## 2024-01-01 PROCEDURE — 85018 HEMOGLOBIN: CPT

## 2024-01-01 PROCEDURE — 2580000003 HC RX 258: Performed by: HOSPITALIST

## 2024-01-01 PROCEDURE — 2709999900 HC NON-CHARGEABLE SUPPLY

## 2024-01-01 PROCEDURE — 84484 ASSAY OF TROPONIN QUANT: CPT

## 2024-01-01 PROCEDURE — 88305 TISSUE EXAM BY PATHOLOGIST: CPT

## 2024-01-01 PROCEDURE — 82607 VITAMIN B-12: CPT

## 2024-01-01 PROCEDURE — 6370000000 HC RX 637 (ALT 250 FOR IP): Performed by: HOSPITALIST

## 2024-01-01 PROCEDURE — 87077 CULTURE AEROBIC IDENTIFY: CPT

## 2024-01-01 PROCEDURE — 84100 ASSAY OF PHOSPHORUS: CPT

## 2024-01-01 PROCEDURE — 6370000000 HC RX 637 (ALT 250 FOR IP)

## 2024-01-01 PROCEDURE — 3700000001 HC ADD 15 MINUTES (ANESTHESIA): Performed by: INTERNAL MEDICINE

## 2024-01-01 PROCEDURE — 96376 TX/PRO/DX INJ SAME DRUG ADON: CPT

## 2024-01-01 PROCEDURE — 82746 ASSAY OF FOLIC ACID SERUM: CPT

## 2024-01-01 PROCEDURE — 86140 C-REACTIVE PROTEIN: CPT

## 2024-01-01 PROCEDURE — 80053 COMPREHEN METABOLIC PANEL: CPT

## 2024-01-01 PROCEDURE — 30233P1 TRANSFUSION OF NONAUTOLOGOUS FROZEN RED CELLS INTO PERIPHERAL VEIN, PERCUTANEOUS APPROACH: ICD-10-PCS | Performed by: HOSPITALIST

## 2024-01-01 PROCEDURE — 2580000003 HC RX 258: Performed by: INTERNAL MEDICINE

## 2024-01-01 PROCEDURE — 96375 TX/PRO/DX INJ NEW DRUG ADDON: CPT

## 2024-01-01 PROCEDURE — 87040 BLOOD CULTURE FOR BACTERIA: CPT

## 2024-01-01 PROCEDURE — 96360 HYDRATION IV INFUSION INIT: CPT

## 2024-01-01 PROCEDURE — 2580000003 HC RX 258: Performed by: STUDENT IN AN ORGANIZED HEALTH CARE EDUCATION/TRAINING PROGRAM

## 2024-01-01 PROCEDURE — 80307 DRUG TEST PRSMV CHEM ANLYZR: CPT

## 2024-01-01 PROCEDURE — 85730 THROMBOPLASTIN TIME PARTIAL: CPT

## 2024-01-01 PROCEDURE — 83880 ASSAY OF NATRIURETIC PEPTIDE: CPT

## 2024-01-01 PROCEDURE — 81001 URINALYSIS AUTO W/SCOPE: CPT

## 2024-01-01 PROCEDURE — 71045 X-RAY EXAM CHEST 1 VIEW: CPT

## 2024-01-01 PROCEDURE — 83550 IRON BINDING TEST: CPT

## 2024-01-01 PROCEDURE — 84145 PROCALCITONIN (PCT): CPT

## 2024-01-01 PROCEDURE — 74178 CT ABD&PLV WO CNTR FLWD CNTR: CPT

## 2024-01-01 PROCEDURE — 70450 CT HEAD/BRAIN W/O DYE: CPT

## 2024-01-01 PROCEDURE — C1889 IMPLANT/INSERT DEVICE, NOC: HCPCS | Performed by: INTERNAL MEDICINE

## 2024-01-01 PROCEDURE — 6360000002 HC RX W HCPCS: Performed by: EMERGENCY MEDICINE

## 2024-01-01 PROCEDURE — 3700000000 HC ANESTHESIA ATTENDED CARE: Performed by: INTERNAL MEDICINE

## 2024-01-01 PROCEDURE — 82728 ASSAY OF FERRITIN: CPT

## 2024-01-01 PROCEDURE — 97530 THERAPEUTIC ACTIVITIES: CPT

## 2024-01-01 PROCEDURE — 96361 HYDRATE IV INFUSION ADD-ON: CPT

## 2024-01-01 PROCEDURE — 86901 BLOOD TYPING SEROLOGIC RH(D): CPT

## 2024-01-01 PROCEDURE — 0DBN8ZX EXCISION OF SIGMOID COLON, VIA NATURAL OR ARTIFICIAL OPENING ENDOSCOPIC, DIAGNOSTIC: ICD-10-PCS | Performed by: INTERNAL MEDICINE

## 2024-01-01 PROCEDURE — P9016 RBC LEUKOCYTES REDUCED: HCPCS

## 2024-01-01 PROCEDURE — 86850 RBC ANTIBODY SCREEN: CPT

## 2024-01-01 PROCEDURE — 97112 NEUROMUSCULAR REEDUCATION: CPT

## 2024-01-01 PROCEDURE — 84443 ASSAY THYROID STIM HORMONE: CPT

## 2024-01-01 PROCEDURE — 30283B1 TRANSFUSION OF NONAUTOLOGOUS 4-FACTOR PROTHROMBIN COMPLEX CONCENTRATE INTO VEIN, PERCUTANEOUS APPROACH: ICD-10-PCS | Performed by: HOSPITALIST

## 2024-01-01 PROCEDURE — 85014 HEMATOCRIT: CPT

## 2024-01-01 PROCEDURE — 96365 THER/PROPH/DIAG IV INF INIT: CPT

## 2024-01-01 PROCEDURE — 99222 1ST HOSP IP/OBS MODERATE 55: CPT | Performed by: INTERNAL MEDICINE

## 2024-01-01 PROCEDURE — 97162 PT EVAL MOD COMPLEX 30 MIN: CPT

## 2024-01-01 PROCEDURE — 97167 OT EVAL HIGH COMPLEX 60 MIN: CPT

## 2024-01-01 PROCEDURE — 7100000011 HC PHASE II RECOVERY - ADDTL 15 MIN: Performed by: INTERNAL MEDICINE

## 2024-01-01 PROCEDURE — 71250 CT THORAX DX C-: CPT

## 2024-01-01 DEVICE — WORKING LENGTH 235CM, WORKING CHANNEL 2.8MM
Type: IMPLANTABLE DEVICE | Site: SIGMOID COLON | Status: FUNCTIONAL
Brand: RESOLUTION 360 CLIP

## 2024-01-01 RX ORDER — TRAZODONE HYDROCHLORIDE 50 MG/1
50 TABLET ORAL NIGHTLY
Status: DISCONTINUED | OUTPATIENT
Start: 2024-01-01 | End: 2024-01-08 | Stop reason: HOSPADM

## 2024-01-01 RX ORDER — CHOLESTYRAMINE 4 G/9G
1 POWDER, FOR SUSPENSION ORAL DAILY PRN
Status: DISCONTINUED | OUTPATIENT
Start: 2024-01-01 | End: 2024-01-08 | Stop reason: HOSPADM

## 2024-01-01 RX ORDER — HYDROMORPHONE HYDROCHLORIDE 1 MG/ML
1 INJECTION, SOLUTION INTRAMUSCULAR; INTRAVENOUS; SUBCUTANEOUS EVERY 6 HOURS PRN
Status: DISCONTINUED | OUTPATIENT
Start: 2024-01-01 | End: 2024-01-01 | Stop reason: SDUPTHER

## 2024-01-01 RX ORDER — ONDANSETRON 4 MG/1
4 TABLET, ORALLY DISINTEGRATING ORAL EVERY 8 HOURS PRN
Status: DISCONTINUED | OUTPATIENT
Start: 2024-01-01 | End: 2024-01-01 | Stop reason: HOSPADM

## 2024-01-01 RX ORDER — MORPHINE SULFATE 2 MG/ML
2 INJECTION, SOLUTION INTRAMUSCULAR; INTRAVENOUS EVERY 4 HOURS PRN
Status: DISCONTINUED | OUTPATIENT
Start: 2024-01-01 | End: 2024-01-01 | Stop reason: HOSPADM

## 2024-01-01 RX ORDER — SODIUM CHLORIDE 0.9 % (FLUSH) 0.9 %
5-40 SYRINGE (ML) INJECTION EVERY 12 HOURS SCHEDULED
Status: DISCONTINUED | OUTPATIENT
Start: 2024-01-01 | End: 2024-01-01 | Stop reason: HOSPADM

## 2024-01-01 RX ORDER — LANOLIN ALCOHOL/MO/W.PET/CERES
400 CREAM (GRAM) TOPICAL DAILY
Status: DISCONTINUED | OUTPATIENT
Start: 2024-01-01 | End: 2024-01-08 | Stop reason: HOSPADM

## 2024-01-01 RX ORDER — ACETAMINOPHEN 650 MG/1
650 SUPPOSITORY RECTAL EVERY 6 HOURS PRN
Status: DISCONTINUED | OUTPATIENT
Start: 2024-01-01 | End: 2024-01-08 | Stop reason: HOSPADM

## 2024-01-01 RX ORDER — HYDROMORPHONE HYDROCHLORIDE 1 MG/ML
0.5 INJECTION, SOLUTION INTRAMUSCULAR; INTRAVENOUS; SUBCUTANEOUS ONCE
Status: COMPLETED | OUTPATIENT
Start: 2024-01-01 | End: 2024-01-01

## 2024-01-01 RX ORDER — ONDANSETRON 4 MG/1
4 TABLET, ORALLY DISINTEGRATING ORAL EVERY 8 HOURS PRN
Status: DISCONTINUED | OUTPATIENT
Start: 2024-01-01 | End: 2024-01-08 | Stop reason: HOSPADM

## 2024-01-01 RX ORDER — LIDOCAINE 4 G/G
1 PATCH TOPICAL DAILY
Status: DISCONTINUED | OUTPATIENT
Start: 2024-01-01 | End: 2024-01-08 | Stop reason: HOSPADM

## 2024-01-01 RX ORDER — ACETAMINOPHEN 325 MG/1
650 TABLET ORAL EVERY 6 HOURS PRN
Status: DISCONTINUED | OUTPATIENT
Start: 2024-01-01 | End: 2024-01-08 | Stop reason: HOSPADM

## 2024-01-01 RX ORDER — SODIUM CHLORIDE 9 MG/ML
50 INJECTION, SOLUTION INTRAVENOUS ONCE
Status: COMPLETED | OUTPATIENT
Start: 2024-01-01 | End: 2024-01-01

## 2024-01-01 RX ORDER — ACETAMINOPHEN 325 MG/1
650 TABLET ORAL EVERY 6 HOURS PRN
Status: DISCONTINUED | OUTPATIENT
Start: 2024-01-01 | End: 2024-01-01 | Stop reason: HOSPADM

## 2024-01-01 RX ORDER — POTASSIUM CHLORIDE 7.45 MG/ML
10 INJECTION INTRAVENOUS PRN
Status: DISCONTINUED | OUTPATIENT
Start: 2024-01-01 | End: 2024-01-08 | Stop reason: HOSPADM

## 2024-01-01 RX ORDER — SODIUM CHLORIDE 9 MG/ML
INJECTION, SOLUTION INTRAVENOUS CONTINUOUS
Status: DISCONTINUED | OUTPATIENT
Start: 2024-01-01 | End: 2024-01-01

## 2024-01-01 RX ORDER — ASPIRIN 81 MG/1
81 TABLET, CHEWABLE ORAL DAILY
Status: DISCONTINUED | OUTPATIENT
Start: 2024-01-01 | End: 2024-01-08 | Stop reason: HOSPADM

## 2024-01-01 RX ORDER — ONDANSETRON 2 MG/ML
4 INJECTION INTRAMUSCULAR; INTRAVENOUS EVERY 6 HOURS PRN
Status: DISCONTINUED | OUTPATIENT
Start: 2024-01-01 | End: 2024-01-08 | Stop reason: HOSPADM

## 2024-01-01 RX ORDER — OXYCODONE HYDROCHLORIDE 5 MG/1
5 TABLET ORAL EVERY 4 HOURS PRN
Status: DISCONTINUED | OUTPATIENT
Start: 2024-01-01 | End: 2024-01-02

## 2024-01-01 RX ORDER — AMOXICILLIN AND CLAVULANATE POTASSIUM 875; 125 MG/1; MG/1
1 TABLET, FILM COATED ORAL ONCE
Status: COMPLETED | OUTPATIENT
Start: 2024-01-01 | End: 2024-01-01

## 2024-01-01 RX ORDER — CHOLESTYRAMINE 4 G/9G
1 POWDER, FOR SUSPENSION ORAL DAILY
Status: DISCONTINUED | OUTPATIENT
Start: 2024-01-01 | End: 2024-01-01 | Stop reason: SDUPTHER

## 2024-01-01 RX ORDER — TRAZODONE HYDROCHLORIDE 50 MG/1
75 TABLET ORAL NIGHTLY
Status: DISCONTINUED | OUTPATIENT
Start: 2024-01-01 | End: 2024-01-01 | Stop reason: HOSPADM

## 2024-01-01 RX ORDER — MAGNESIUM SULFATE IN WATER 40 MG/ML
2000 INJECTION, SOLUTION INTRAVENOUS PRN
Status: DISCONTINUED | OUTPATIENT
Start: 2024-01-01 | End: 2024-01-08 | Stop reason: HOSPADM

## 2024-01-01 RX ORDER — METOPROLOL SUCCINATE 25 MG/1
25 TABLET, EXTENDED RELEASE ORAL DAILY
Status: DISCONTINUED | OUTPATIENT
Start: 2024-01-01 | End: 2024-01-04

## 2024-01-01 RX ORDER — SODIUM CHLORIDE 0.9 % (FLUSH) 0.9 %
5-40 SYRINGE (ML) INJECTION PRN
Status: DISCONTINUED | OUTPATIENT
Start: 2024-01-01 | End: 2024-01-08 | Stop reason: HOSPADM

## 2024-01-01 RX ORDER — POLYETHYLENE GLYCOL 3350 17 G/17G
17 POWDER, FOR SOLUTION ORAL DAILY PRN
Status: DISCONTINUED | OUTPATIENT
Start: 2024-01-01 | End: 2024-01-08 | Stop reason: HOSPADM

## 2024-01-01 RX ORDER — SUCRALFATE 1 G/1
1 TABLET ORAL 4 TIMES DAILY
Status: DISCONTINUED | OUTPATIENT
Start: 2024-01-01 | End: 2024-01-08 | Stop reason: HOSPADM

## 2024-01-01 RX ORDER — LEVOTHYROXINE SODIUM 0.05 MG/1
75 TABLET ORAL DAILY
Status: DISCONTINUED | OUTPATIENT
Start: 2024-01-01 | End: 2024-01-01 | Stop reason: SDUPTHER

## 2024-01-01 RX ORDER — MIRTAZAPINE 15 MG/1
15 TABLET, FILM COATED ORAL NIGHTLY
Status: DISCONTINUED | OUTPATIENT
Start: 2024-01-01 | End: 2024-01-08 | Stop reason: HOSPADM

## 2024-01-01 RX ORDER — 0.9 % SODIUM CHLORIDE 0.9 %
30 INTRAVENOUS SOLUTION INTRAVENOUS ONCE
Status: COMPLETED | OUTPATIENT
Start: 2024-01-01 | End: 2024-01-01

## 2024-01-01 RX ORDER — HYDROCODONE BITARTRATE AND ACETAMINOPHEN 10; 325 MG/1; MG/1
1 TABLET ORAL EVERY 4 HOURS PRN
Status: DISCONTINUED | OUTPATIENT
Start: 2024-01-01 | End: 2024-01-02

## 2024-01-01 RX ORDER — SODIUM CHLORIDE 9 MG/ML
25 INJECTION, SOLUTION INTRAVENOUS PRN
Status: DISCONTINUED | OUTPATIENT
Start: 2024-01-01 | End: 2024-01-01 | Stop reason: HOSPADM

## 2024-01-01 RX ORDER — ASCORBIC ACID 500 MG
500 TABLET ORAL DAILY
Status: DISCONTINUED | OUTPATIENT
Start: 2024-01-01 | End: 2024-01-08 | Stop reason: HOSPADM

## 2024-01-01 RX ORDER — POLYETHYLENE GLYCOL 3350 17 G/17G
17 POWDER, FOR SOLUTION ORAL DAILY PRN
Status: DISCONTINUED | OUTPATIENT
Start: 2024-01-01 | End: 2024-01-01 | Stop reason: HOSPADM

## 2024-01-01 RX ORDER — SODIUM CHLORIDE 9 MG/ML
INJECTION, SOLUTION INTRAVENOUS PRN
Status: COMPLETED | OUTPATIENT
Start: 2024-01-01 | End: 2024-01-01

## 2024-01-01 RX ORDER — BUMETANIDE 0.25 MG/ML
1 INJECTION INTRAMUSCULAR; INTRAVENOUS DAILY
Status: DISCONTINUED | OUTPATIENT
Start: 2024-01-01 | End: 2024-01-01

## 2024-01-01 RX ORDER — OXYCODONE HYDROCHLORIDE 5 MG/1
5 TABLET ORAL EVERY 4 HOURS PRN
Status: DISCONTINUED | OUTPATIENT
Start: 2024-01-01 | End: 2024-01-01 | Stop reason: HOSPADM

## 2024-01-01 RX ORDER — MIDODRINE HYDROCHLORIDE 5 MG/1
2.5 TABLET ORAL 3 TIMES DAILY
Status: DISCONTINUED | OUTPATIENT
Start: 2024-01-01 | End: 2024-01-01 | Stop reason: HOSPADM

## 2024-01-01 RX ORDER — LEVOTHYROXINE SODIUM 0.05 MG/1
50 TABLET ORAL DAILY
Status: DISCONTINUED | OUTPATIENT
Start: 2024-01-01 | End: 2024-01-08 | Stop reason: HOSPADM

## 2024-01-01 RX ORDER — ACETAMINOPHEN 650 MG/1
650 SUPPOSITORY RECTAL EVERY 6 HOURS PRN
Status: DISCONTINUED | OUTPATIENT
Start: 2024-01-01 | End: 2024-01-01 | Stop reason: HOSPADM

## 2024-01-01 RX ORDER — SODIUM CHLORIDE 0.9 % (FLUSH) 0.9 %
5-40 SYRINGE (ML) INJECTION PRN
Status: DISCONTINUED | OUTPATIENT
Start: 2024-01-01 | End: 2024-01-01 | Stop reason: HOSPADM

## 2024-01-01 RX ORDER — MIRTAZAPINE 15 MG/1
15 TABLET, FILM COATED ORAL NIGHTLY
Status: DISCONTINUED | OUTPATIENT
Start: 2024-01-01 | End: 2024-01-01 | Stop reason: HOSPADM

## 2024-01-01 RX ORDER — PANTOPRAZOLE SODIUM 40 MG/1
40 TABLET, DELAYED RELEASE ORAL DAILY
Status: DISCONTINUED | OUTPATIENT
Start: 2024-01-01 | End: 2024-01-08 | Stop reason: HOSPADM

## 2024-01-01 RX ORDER — SODIUM CHLORIDE 0.9 % (FLUSH) 0.9 %
5-40 SYRINGE (ML) INJECTION EVERY 12 HOURS SCHEDULED
Status: DISCONTINUED | OUTPATIENT
Start: 2024-01-01 | End: 2024-01-08 | Stop reason: HOSPADM

## 2024-01-01 RX ORDER — SODIUM CHLORIDE 9 MG/ML
INJECTION, SOLUTION INTRAVENOUS PRN
Status: DISCONTINUED | OUTPATIENT
Start: 2024-01-01 | End: 2024-01-08 | Stop reason: HOSPADM

## 2024-01-01 RX ORDER — MAGNESIUM SULFATE IN WATER 40 MG/ML
2000 INJECTION, SOLUTION INTRAVENOUS PRN
Status: DISCONTINUED | OUTPATIENT
Start: 2024-01-01 | End: 2024-01-01 | Stop reason: HOSPADM

## 2024-01-01 RX ORDER — FENTANYL CITRATE 50 UG/ML
50 INJECTION, SOLUTION INTRAMUSCULAR; INTRAVENOUS ONCE
Status: COMPLETED | OUTPATIENT
Start: 2024-01-01 | End: 2024-01-01

## 2024-01-01 RX ORDER — ONDANSETRON 2 MG/ML
4 INJECTION INTRAMUSCULAR; INTRAVENOUS EVERY 6 HOURS PRN
Status: DISCONTINUED | OUTPATIENT
Start: 2024-01-01 | End: 2024-01-01 | Stop reason: HOSPADM

## 2024-01-01 RX ORDER — CHOLECALCIFEROL (VITAMIN D3) 125 MCG
1000 CAPSULE ORAL DAILY
Status: DISCONTINUED | OUTPATIENT
Start: 2024-01-01 | End: 2024-01-08 | Stop reason: HOSPADM

## 2024-01-01 RX ORDER — LEVOTHYROXINE SODIUM 0.05 MG/1
50 TABLET ORAL DAILY
Status: DISCONTINUED | OUTPATIENT
Start: 2024-01-01 | End: 2024-01-01 | Stop reason: HOSPADM

## 2024-01-01 RX ORDER — CASTOR OIL AND BALSAM, PERU 788; 87 MG/G; MG/G
OINTMENT TOPICAL 2 TIMES DAILY
Status: DISCONTINUED | OUTPATIENT
Start: 2024-01-01 | End: 2024-01-01 | Stop reason: HOSPADM

## 2024-01-01 RX ORDER — SODIUM CHLORIDE, SODIUM LACTATE, POTASSIUM CHLORIDE, CALCIUM CHLORIDE 600; 310; 30; 20 MG/100ML; MG/100ML; MG/100ML; MG/100ML
INJECTION, SOLUTION INTRAVENOUS CONTINUOUS
Status: DISCONTINUED | OUTPATIENT
Start: 2024-01-01 | End: 2024-01-01

## 2024-01-01 RX ORDER — BISACODYL 5 MG/1
10 TABLET, DELAYED RELEASE ORAL ONCE
Status: COMPLETED | OUTPATIENT
Start: 2024-01-01 | End: 2024-01-01

## 2024-01-01 RX ORDER — HYDROCODONE BITARTRATE AND ACETAMINOPHEN 10; 325 MG/1; MG/1
1 TABLET ORAL EVERY 6 HOURS PRN
Status: ON HOLD | COMMUNITY
End: 2024-01-08

## 2024-01-01 RX ORDER — LANOLIN ALCOHOL/MO/W.PET/CERES
800 CREAM (GRAM) TOPICAL DAILY
Status: DISCONTINUED | OUTPATIENT
Start: 2024-01-01 | End: 2024-01-08 | Stop reason: HOSPADM

## 2024-01-01 RX ORDER — POTASSIUM CHLORIDE 7.45 MG/ML
10 INJECTION INTRAVENOUS PRN
Status: DISCONTINUED | OUTPATIENT
Start: 2024-01-01 | End: 2024-01-01 | Stop reason: HOSPADM

## 2024-01-01 RX ORDER — POTASSIUM CHLORIDE 750 MG/1
40 TABLET, FILM COATED, EXTENDED RELEASE ORAL PRN
Status: DISCONTINUED | OUTPATIENT
Start: 2024-01-01 | End: 2024-01-08 | Stop reason: HOSPADM

## 2024-01-01 RX ORDER — ERGOCALCIFEROL 1.25 MG/1
50000 CAPSULE ORAL
Status: DISCONTINUED | OUTPATIENT
Start: 2024-01-02 | End: 2024-01-08 | Stop reason: HOSPADM

## 2024-01-01 RX ORDER — HYDROMORPHONE HYDROCHLORIDE 1 MG/ML
1 INJECTION, SOLUTION INTRAMUSCULAR; INTRAVENOUS; SUBCUTANEOUS EVERY 4 HOURS PRN
Status: DISCONTINUED | OUTPATIENT
Start: 2024-01-01 | End: 2024-01-08 | Stop reason: HOSPADM

## 2024-01-01 RX ORDER — POTASSIUM CHLORIDE 750 MG/1
20 TABLET, FILM COATED, EXTENDED RELEASE ORAL ONCE
Status: DISCONTINUED | OUTPATIENT
Start: 2024-01-01 | End: 2024-01-01

## 2024-01-01 RX ORDER — POTASSIUM CHLORIDE 750 MG/1
40 TABLET, FILM COATED, EXTENDED RELEASE ORAL PRN
Status: DISCONTINUED | OUTPATIENT
Start: 2024-01-01 | End: 2024-01-01 | Stop reason: HOSPADM

## 2024-01-01 RX ORDER — FUROSEMIDE 40 MG/1
40 TABLET ORAL DAILY
Status: DISCONTINUED | OUTPATIENT
Start: 2024-01-01 | End: 2024-01-01

## 2024-01-01 RX ORDER — MIDODRINE HYDROCHLORIDE 5 MG/1
2.5 TABLET ORAL
Status: DISCONTINUED | OUTPATIENT
Start: 2024-01-01 | End: 2024-01-08 | Stop reason: HOSPADM

## 2024-01-01 RX ORDER — BUMETANIDE 0.25 MG/ML
1 INJECTION INTRAMUSCULAR; INTRAVENOUS 2 TIMES DAILY
Status: DISCONTINUED | OUTPATIENT
Start: 2024-01-01 | End: 2024-01-01

## 2024-01-01 RX ORDER — SODIUM CHLORIDE 9 MG/ML
INJECTION, SOLUTION INTRAVENOUS PRN
Status: DISCONTINUED | OUTPATIENT
Start: 2024-01-01 | End: 2024-01-01 | Stop reason: HOSPADM

## 2024-01-01 RX ORDER — METOPROLOL SUCCINATE 25 MG/1
25 TABLET, EXTENDED RELEASE ORAL DAILY
COMMUNITY

## 2024-01-01 RX ADMIN — OXYCODONE 5 MG: 5 TABLET ORAL at 09:30

## 2024-01-01 RX ADMIN — AMPICILLIN AND SULBACTAM 3000 MG: 2; 1 INJECTION, POWDER, FOR SOLUTION INTRAVENOUS at 17:17

## 2024-01-01 RX ADMIN — PANTOPRAZOLE SODIUM 40 MG: 40 INJECTION, POWDER, FOR SOLUTION INTRAVENOUS at 22:18

## 2024-01-01 RX ADMIN — AMPICILLIN AND SULBACTAM 3000 MG: 2; 1 INJECTION, POWDER, FOR SOLUTION INTRAVENOUS at 01:20

## 2024-01-01 RX ADMIN — Medication: at 09:54

## 2024-01-01 RX ADMIN — MIRTAZAPINE 15 MG: 15 TABLET, FILM COATED ORAL at 22:07

## 2024-01-01 RX ADMIN — APIXABAN 5 MG: 5 TABLET, FILM COATED ORAL at 20:47

## 2024-01-01 RX ADMIN — OXYCODONE 5 MG: 5 TABLET ORAL at 01:16

## 2024-01-01 RX ADMIN — Medication 10 ML: at 22:29

## 2024-01-01 RX ADMIN — Medication: at 09:22

## 2024-01-01 RX ADMIN — LEVOTHYROXINE SODIUM 50 MCG: 0.05 TABLET ORAL at 09:22

## 2024-01-01 RX ADMIN — OXYCODONE HYDROCHLORIDE 5 MG: 5 TABLET ORAL at 20:55

## 2024-01-01 RX ADMIN — FENTANYL CITRATE 50 MCG: 0.05 INJECTION, SOLUTION INTRAMUSCULAR; INTRAVENOUS at 03:32

## 2024-01-01 RX ADMIN — PANTOPRAZOLE SODIUM 40 MG: 40 INJECTION, POWDER, FOR SOLUTION INTRAVENOUS at 22:23

## 2024-01-01 RX ADMIN — HYDROMORPHONE HYDROCHLORIDE 1 MG: 1 INJECTION, SOLUTION INTRAMUSCULAR; INTRAVENOUS; SUBCUTANEOUS at 22:09

## 2024-01-01 RX ADMIN — MORPHINE SULFATE 2 MG: 2 INJECTION, SOLUTION INTRAMUSCULAR; INTRAVENOUS at 22:28

## 2024-01-01 RX ADMIN — SUCRALFATE 1 G: 1 TABLET ORAL at 18:19

## 2024-01-01 RX ADMIN — MIDODRINE HYDROCHLORIDE 2.5 MG: 5 TABLET ORAL at 10:58

## 2024-01-01 RX ADMIN — MIDODRINE HYDROCHLORIDE 2.5 MG: 5 TABLET ORAL at 09:22

## 2024-01-01 RX ADMIN — AMPICILLIN AND SULBACTAM 3000 MG: 2; 1 INJECTION, POWDER, FOR SOLUTION INTRAVENOUS at 12:30

## 2024-01-01 RX ADMIN — MIDODRINE HYDROCHLORIDE 2.5 MG: 5 TABLET ORAL at 14:34

## 2024-01-01 RX ADMIN — MORPHINE SULFATE 2 MG: 2 INJECTION, SOLUTION INTRAMUSCULAR; INTRAVENOUS at 06:36

## 2024-01-01 RX ADMIN — SODIUM CHLORIDE: 9 INJECTION, SOLUTION INTRAVENOUS at 22:18

## 2024-01-01 RX ADMIN — SODIUM CHLORIDE: 9 INJECTION, SOLUTION INTRAVENOUS at 06:00

## 2024-01-01 RX ADMIN — HYDROMORPHONE HYDROCHLORIDE 1 MG: 1 INJECTION, SOLUTION INTRAMUSCULAR; INTRAVENOUS; SUBCUTANEOUS at 20:27

## 2024-01-01 RX ADMIN — MIDODRINE HYDROCHLORIDE 2.5 MG: 5 TABLET ORAL at 22:18

## 2024-01-01 RX ADMIN — AMPICILLIN AND SULBACTAM 3000 MG: 2; 1 INJECTION, POWDER, FOR SOLUTION INTRAVENOUS at 18:56

## 2024-01-01 RX ADMIN — MIDODRINE HYDROCHLORIDE 2.5 MG: 5 TABLET ORAL at 22:24

## 2024-01-01 RX ADMIN — Medication: at 22:26

## 2024-01-01 RX ADMIN — MIRTAZAPINE 15 MG: 15 TABLET, FILM COATED ORAL at 22:24

## 2024-01-01 RX ADMIN — APIXABAN 5 MG: 5 TABLET, FILM COATED ORAL at 10:56

## 2024-01-01 RX ADMIN — PIPERACILLIN AND TAZOBACTAM 3375 MG: 3; .375 INJECTION, POWDER, FOR SOLUTION INTRAVENOUS at 02:42

## 2024-01-01 RX ADMIN — SODIUM CHLORIDE: 9 INJECTION, SOLUTION INTRAVENOUS at 11:03

## 2024-01-01 RX ADMIN — KETOROLAC TROMETHAMINE 15 MG: 30 INJECTION INTRAMUSCULAR; INTRAVENOUS at 00:25

## 2024-01-01 RX ADMIN — ACETAMINOPHEN 650 MG: 325 TABLET ORAL at 07:05

## 2024-01-01 RX ADMIN — MORPHINE SULFATE 2 MG: 2 INJECTION, SOLUTION INTRAMUSCULAR; INTRAVENOUS at 15:43

## 2024-01-01 RX ADMIN — Medication 10 ML: at 10:44

## 2024-01-01 RX ADMIN — IOPAMIDOL 100 ML: 755 INJECTION, SOLUTION INTRAVENOUS at 22:49

## 2024-01-01 RX ADMIN — Medication: at 22:07

## 2024-01-01 RX ADMIN — VANCOMYCIN HYDROCHLORIDE 1250 MG: 1.25 INJECTION, POWDER, LYOPHILIZED, FOR SOLUTION INTRAVENOUS at 07:01

## 2024-01-01 RX ADMIN — AMPICILLIN AND SULBACTAM 3000 MG: 2; 1 INJECTION, POWDER, FOR SOLUTION INTRAVENOUS at 13:26

## 2024-01-01 RX ADMIN — Medication 10 ML: at 22:28

## 2024-01-01 RX ADMIN — MIRTAZAPINE 15 MG: 15 TABLET, FILM COATED ORAL at 20:47

## 2024-01-01 RX ADMIN — MORPHINE SULFATE 2 MG: 2 INJECTION, SOLUTION INTRAMUSCULAR; INTRAVENOUS at 11:07

## 2024-01-01 RX ADMIN — PANTOPRAZOLE SODIUM 40 MG: 40 INJECTION, POWDER, FOR SOLUTION INTRAVENOUS at 09:13

## 2024-01-01 RX ADMIN — ASPIRIN 81 MG CHEWABLE TABLET 81 MG: 81 TABLET CHEWABLE at 11:02

## 2024-01-01 RX ADMIN — AMPICILLIN AND SULBACTAM 3000 MG: 2; 1 INJECTION, POWDER, FOR SOLUTION INTRAVENOUS at 18:52

## 2024-01-01 RX ADMIN — Medication 10 ML: at 22:14

## 2024-01-01 RX ADMIN — METOPROLOL SUCCINATE 25 MG: 25 TABLET, EXTENDED RELEASE ORAL at 16:43

## 2024-01-01 RX ADMIN — AMPICILLIN AND SULBACTAM 3000 MG: 2; 1 INJECTION, POWDER, FOR SOLUTION INTRAVENOUS at 06:36

## 2024-01-01 RX ADMIN — APIXABAN 5 MG: 5 TABLET, FILM COATED ORAL at 09:22

## 2024-01-01 RX ADMIN — PIPERACILLIN AND TAZOBACTAM 3375 MG: 3; .375 INJECTION, POWDER, FOR SOLUTION INTRAVENOUS at 19:33

## 2024-01-01 RX ADMIN — MIRTAZAPINE 15 MG: 15 TABLET, FILM COATED ORAL at 22:18

## 2024-01-01 RX ADMIN — MORPHINE SULFATE 2 MG: 2 INJECTION, SOLUTION INTRAMUSCULAR; INTRAVENOUS at 02:12

## 2024-01-01 RX ADMIN — TRAZODONE HYDROCHLORIDE 75 MG: 50 TABLET ORAL at 22:24

## 2024-01-01 RX ADMIN — BUMETANIDE 1 MG: 0.25 INJECTION INTRAMUSCULAR; INTRAVENOUS at 09:13

## 2024-01-01 RX ADMIN — LEVOTHYROXINE SODIUM 50 MCG: 0.05 TABLET ORAL at 10:41

## 2024-01-01 RX ADMIN — LEVOTHYROXINE SODIUM 50 MCG: 0.05 TABLET ORAL at 10:42

## 2024-01-01 RX ADMIN — MORPHINE SULFATE 2 MG: 2 INJECTION, SOLUTION INTRAMUSCULAR; INTRAVENOUS at 12:29

## 2024-01-01 RX ADMIN — PIPERACILLIN AND TAZOBACTAM 3375 MG: 3; .375 INJECTION, POWDER, FOR SOLUTION INTRAVENOUS at 12:14

## 2024-01-01 RX ADMIN — MORPHINE SULFATE 2 MG: 2 INJECTION, SOLUTION INTRAMUSCULAR; INTRAVENOUS at 02:31

## 2024-01-01 RX ADMIN — PANTOPRAZOLE SODIUM 40 MG: 40 INJECTION, POWDER, FOR SOLUTION INTRAVENOUS at 22:07

## 2024-01-01 RX ADMIN — APIXABAN 5 MG: 5 TABLET, FILM COATED ORAL at 09:13

## 2024-01-01 RX ADMIN — TRAZODONE HYDROCHLORIDE 75 MG: 50 TABLET ORAL at 22:07

## 2024-01-01 RX ADMIN — PANTOPRAZOLE SODIUM 40 MG: 40 INJECTION, POWDER, FOR SOLUTION INTRAVENOUS at 10:39

## 2024-01-01 RX ADMIN — Medication 10 ML: at 09:15

## 2024-01-01 RX ADMIN — Medication: at 22:27

## 2024-01-01 RX ADMIN — LEVOTHYROXINE SODIUM 50 MCG: 0.05 TABLET ORAL at 09:13

## 2024-01-01 RX ADMIN — HYDROMORPHONE HYDROCHLORIDE 1 MG: 1 INJECTION, SOLUTION INTRAMUSCULAR; INTRAVENOUS; SUBCUTANEOUS at 05:38

## 2024-01-01 RX ADMIN — HYDROMORPHONE HYDROCHLORIDE 0.5 MG: 1 INJECTION, SOLUTION INTRAMUSCULAR; INTRAVENOUS; SUBCUTANEOUS at 04:40

## 2024-01-01 RX ADMIN — OXYCODONE 5 MG: 5 TABLET ORAL at 07:05

## 2024-01-01 RX ADMIN — TRAZODONE HYDROCHLORIDE 50 MG: 50 TABLET ORAL at 20:55

## 2024-01-01 RX ADMIN — HYDROMORPHONE HYDROCHLORIDE 1 MG: 1 INJECTION, SOLUTION INTRAMUSCULAR; INTRAVENOUS; SUBCUTANEOUS at 02:42

## 2024-01-01 RX ADMIN — TRAZODONE HYDROCHLORIDE 75 MG: 50 TABLET ORAL at 22:18

## 2024-01-01 RX ADMIN — Medication: at 22:17

## 2024-01-01 RX ADMIN — ONDANSETRON 4 MG: 2 INJECTION INTRAMUSCULAR; INTRAVENOUS at 20:26

## 2024-01-01 RX ADMIN — MIDODRINE HYDROCHLORIDE 2.5 MG: 5 TABLET ORAL at 10:42

## 2024-01-01 RX ADMIN — PANTOPRAZOLE SODIUM 40 MG: 40 INJECTION, POWDER, FOR SOLUTION INTRAVENOUS at 10:42

## 2024-01-01 RX ADMIN — SODIUM CHLORIDE 50 ML: 9 INJECTION, SOLUTION INTRAVENOUS at 00:26

## 2024-01-01 RX ADMIN — APIXABAN 5 MG: 5 TABLET, FILM COATED ORAL at 22:24

## 2024-01-01 RX ADMIN — HYDROMORPHONE HYDROCHLORIDE 1 MG: 1 INJECTION, SOLUTION INTRAMUSCULAR; INTRAVENOUS; SUBCUTANEOUS at 10:59

## 2024-01-01 RX ADMIN — Medication: at 10:45

## 2024-01-01 RX ADMIN — Medication 10 ML: at 22:20

## 2024-01-01 RX ADMIN — SUCRALFATE 1 G: 1 TABLET ORAL at 20:47

## 2024-01-01 RX ADMIN — SODIUM CHLORIDE 500 ML: 9 INJECTION, SOLUTION INTRAVENOUS at 00:27

## 2024-01-01 RX ADMIN — SODIUM CHLORIDE: 9 INJECTION, SOLUTION INTRAVENOUS at 22:21

## 2024-01-01 RX ADMIN — APIXABAN 5 MG: 5 TABLET, FILM COATED ORAL at 22:27

## 2024-01-01 RX ADMIN — FENTANYL CITRATE 50 MCG: 0.05 INJECTION, SOLUTION INTRAMUSCULAR; INTRAVENOUS at 00:38

## 2024-01-01 RX ADMIN — AMOXICILLIN AND CLAVULANATE POTASSIUM 1 TABLET: 875; 125 TABLET, FILM COATED ORAL at 05:05

## 2024-01-01 RX ADMIN — VANCOMYCIN HYDROCHLORIDE 1750 MG: 10 INJECTION, POWDER, LYOPHILIZED, FOR SOLUTION INTRAVENOUS at 07:04

## 2024-01-01 RX ADMIN — MIDODRINE HYDROCHLORIDE 2.5 MG: 5 TABLET ORAL at 13:27

## 2024-01-01 RX ADMIN — AMPICILLIN AND SULBACTAM 3000 MG: 2; 1 INJECTION, POWDER, FOR SOLUTION INTRAVENOUS at 00:54

## 2024-01-01 RX ADMIN — MIDODRINE HYDROCHLORIDE 2.5 MG: 5 TABLET ORAL at 16:43

## 2024-01-01 RX ADMIN — PANTOPRAZOLE SODIUM 40 MG: 40 INJECTION, POWDER, FOR SOLUTION INTRAVENOUS at 09:54

## 2024-01-01 RX ADMIN — POTASSIUM CHLORIDE 40 MEQ: 750 TABLET, EXTENDED RELEASE ORAL at 07:21

## 2024-01-01 RX ADMIN — MORPHINE SULFATE 2 MG: 2 INJECTION, SOLUTION INTRAMUSCULAR; INTRAVENOUS at 17:58

## 2024-01-01 RX ADMIN — HYDROMORPHONE HYDROCHLORIDE 1 MG: 1 INJECTION, SOLUTION INTRAMUSCULAR; INTRAVENOUS; SUBCUTANEOUS at 10:47

## 2024-01-01 RX ADMIN — POLYETHYLENE GLYCOL-3350 AND ELECTROLYTES 4000 ML: 236; 6.74; 5.86; 2.97; 22.74 POWDER, FOR SOLUTION ORAL at 19:34

## 2024-01-01 RX ADMIN — Medication 10 ML: at 09:55

## 2024-01-01 RX ADMIN — PANTOPRAZOLE SODIUM 40 MG: 40 INJECTION, POWDER, FOR SOLUTION INTRAVENOUS at 22:27

## 2024-01-01 RX ADMIN — PIPERACILLIN AND TAZOBACTAM 4500 MG: 4; .5 INJECTION, POWDER, LYOPHILIZED, FOR SOLUTION INTRAVENOUS at 00:22

## 2024-01-01 RX ADMIN — BISACODYL 10 MG: 5 TABLET, COATED ORAL at 16:43

## 2024-01-01 RX ADMIN — BUMETANIDE 1 MG: 0.25 INJECTION INTRAMUSCULAR; INTRAVENOUS at 13:26

## 2024-01-01 RX ADMIN — PROTHROMBIN, COAGULATION FACTOR VII HUMAN, COAGULATION FACTOR IX HUMAN, COAGULATION FACTOR X HUMAN, PROTEIN C, PROTEIN S HUMAN, AND WATER 2138 UNITS: KIT at 00:14

## 2024-01-01 RX ADMIN — LEVOTHYROXINE SODIUM 50 MCG: 50 TABLET ORAL at 18:19

## 2024-01-01 RX ADMIN — HYDROMORPHONE HYDROCHLORIDE 1 MG: 1 INJECTION, SOLUTION INTRAMUSCULAR; INTRAVENOUS; SUBCUTANEOUS at 16:44

## 2024-01-01 RX ADMIN — Medication: at 09:14

## 2024-01-01 RX ADMIN — FUROSEMIDE 40 MG: 40 TABLET ORAL at 11:52

## 2024-01-01 RX ADMIN — AMPICILLIN AND SULBACTAM 3000 MG: 2; 1 INJECTION, POWDER, FOR SOLUTION INTRAVENOUS at 07:03

## 2024-01-01 RX ADMIN — AMPICILLIN AND SULBACTAM 3000 MG: 2; 1 INJECTION, POWDER, FOR SOLUTION INTRAVENOUS at 11:50

## 2024-01-01 RX ADMIN — MIDODRINE HYDROCHLORIDE 2.5 MG: 5 TABLET ORAL at 22:07

## 2024-01-01 RX ADMIN — SODIUM CHLORIDE 1920 ML: 9 INJECTION, SOLUTION INTRAVENOUS at 21:35

## 2024-01-01 ASSESSMENT — PAIN SCALES - GENERAL
PAINLEVEL_OUTOF10: 0
PAINLEVEL_OUTOF10: 10
PAINLEVEL_OUTOF10: 8
PAINLEVEL_OUTOF10: 10
PAINLEVEL_OUTOF10: 8
PAINLEVEL_OUTOF10: 0
PAINLEVEL_OUTOF10: 2
PAINLEVEL_OUTOF10: 10
PAINLEVEL_OUTOF10: 0
PAINLEVEL_OUTOF10: 10
PAINLEVEL_OUTOF10: 10
PAINLEVEL_OUTOF10: 7
PAINLEVEL_OUTOF10: 10
PAINLEVEL_OUTOF10: 7
PAINLEVEL_OUTOF10: 10
PAINLEVEL_OUTOF10: 8
PAINLEVEL_OUTOF10: 10
PAINLEVEL_OUTOF10: 10
PAINLEVEL_OUTOF10: 8
PAINLEVEL_OUTOF10: 9
PAINLEVEL_OUTOF10: 9
PAINLEVEL_OUTOF10: 10
PAINLEVEL_OUTOF10: 10
PAINLEVEL_OUTOF10: 2
PAINLEVEL_OUTOF10: 8

## 2024-01-01 ASSESSMENT — PAIN DESCRIPTION - LOCATION
LOCATION: BACK
LOCATION: BACK
LOCATION: GENERALIZED
LOCATION: GENERALIZED
LOCATION: BACK
LOCATION: GENERALIZED
LOCATION: BACK
LOCATION: BACK;HEAD
LOCATION: BACK

## 2024-01-01 ASSESSMENT — PAIN DESCRIPTION - ONSET: ONSET: ON-GOING

## 2024-01-01 ASSESSMENT — PAIN DESCRIPTION - DESCRIPTORS
DESCRIPTORS: ACHING;DISCOMFORT
DESCRIPTORS: THROBBING;STABBING
DESCRIPTORS: ACHING
DESCRIPTORS: ACHING;DISCOMFORT
DESCRIPTORS: ACHING
DESCRIPTORS: ACHING
DESCRIPTORS: DISCOMFORT
DESCRIPTORS: ACHING
DESCRIPTORS: ACHING
DESCRIPTORS: THROBBING;STABBING
DESCRIPTORS: ACHING;THROBBING
DESCRIPTORS: ACHING

## 2024-01-01 ASSESSMENT — PAIN DESCRIPTION - ORIENTATION
ORIENTATION: MID
ORIENTATION: MID;LOWER
ORIENTATION: LOWER;MID
ORIENTATION: MID
ORIENTATION: MID
ORIENTATION: POSTERIOR

## 2024-01-01 ASSESSMENT — PAIN - FUNCTIONAL ASSESSMENT
PAIN_FUNCTIONAL_ASSESSMENT: PREVENTS OR INTERFERES WITH MANY ACTIVE NOT PASSIVE ACTIVITIES
PAIN_FUNCTIONAL_ASSESSMENT: ACTIVITIES ARE NOT PREVENTED
PAIN_FUNCTIONAL_ASSESSMENT: ACTIVITIES ARE NOT PREVENTED
PAIN_FUNCTIONAL_ASSESSMENT: 0-10
PAIN_FUNCTIONAL_ASSESSMENT: PREVENTS OR INTERFERES SOME ACTIVE ACTIVITIES AND ADLS
PAIN_FUNCTIONAL_ASSESSMENT: ACTIVITIES ARE NOT PREVENTED
PAIN_FUNCTIONAL_ASSESSMENT: ACTIVITIES ARE NOT PREVENTED
PAIN_FUNCTIONAL_ASSESSMENT: PREVENTS OR INTERFERES WITH ALL ACTIVE AND SOME PASSIVE ACTIVITIES
PAIN_FUNCTIONAL_ASSESSMENT: ACTIVITIES ARE NOT PREVENTED

## 2024-01-01 ASSESSMENT — PAIN SCALES - WONG BAKER: WONGBAKER_NUMERICALRESPONSE: 2

## 2024-01-01 ASSESSMENT — PAIN DESCRIPTION - FREQUENCY: FREQUENCY: CONTINUOUS

## 2024-01-01 NOTE — ED PROVIDER NOTES
[facility] EMERGENCY DEP  EMERGENCY DEPARTMENT ENCOUNTER      Pt Name: Jacky Verduzco  MRN: [id number]  Birthdate 1945  Date of evaluation: 12/31/2023  Provider: Noe Blount DO    CHIEF COMPLAINT       Chief Complaint   Patient presents with    Chest Pain         HISTORY OF PRESENT ILLNESS   (Location/Symptom, Timing/Onset, Context/Setting, Quality, Duration, Modifying Factors, Severity)  Note limiting factors.   Patient is a 78-year-old male with extensive cardiac history including A-fib and paroxysmal SVT with a pacemaker presenting today secondary to palpitations, shortness of breath and chest pain.  Patient states that he has been having these issues for the past few weeks but markedly worse today.  States that he is having episodes of palpitations where he feels like he is in a pass out and has some heaviness in the chest with shortness of breath.  They last for few minutes but are getting progressively longer and more frequent.  He has seen his cardiologist for this in the past and they plan on doing another procedure.  He is overall a fairly poor historian states that his caretaker manages all of his medications and medical issues but she is unable to be here tonight were contacted.  Noted to be in SVT on my assessment.  Patient also complaining of left-sided abdominal pain upper and lower quadrants x 1 day with no vomiting but has had some nausea.  Also with chronic back pain requesting his dose of hydrocodone.            Review of External Medical Records:     Nursing Notes were reviewed.    REVIEW OF SYSTEMS    (2-9 systems for level 4, 10 or more for level 5)     Review of Systems   Respiratory:  Positive for chest tightness and shortness of breath.    Cardiovascular:  Positive for palpitations.   Gastrointestinal:  Positive for abdominal pain.   Musculoskeletal:  Positive for back pain.       Except as noted above the remainder of the review of systems was reviewed and negative.       PAST

## 2024-01-01 NOTE — H&P
[facility]  HISTORY AND PHYSICAL    Name:  PEDRO KINGSTON  MR#:  [id number]  :  1945  ACCOUNT #:  [id number]  ADMIT DATE:  2023      The patient was seen, evaluated, and admitted by me on 2024.    PRIMARY CARE PHYSICIAN:  Huseyin Savage MD    SOURCE OF INFORMATION:  The patient and review of ED electronic medical records.    CHIEF COMPLAINT:  Palpitations and chest pain.    HISTORY OF PRESENT ILLNESS:  This is a 78-year-old man with past medical history significant for paroxysmal atrial fibrillation, on Eliquis for anticoagulation; sick sinus syndrome, status post pacemaker insertion; hypertension; and hypothyroidism, presented at the emergency room with palpitation.  The patient's symptoms started a couple of days ago and progressively getting worse.  The patient also complained of shortness of breath.  The shortness of breath is worse with mild exertion such as walking.  The patient has also noticed increased bilateral leg swelling.  He also complained of abdominal pain which is located at the left lower quadrant.  The abdominal pain is constant dull ache, with no radiation, with no known aggravating or relieving factors, 5/10 in severity, no associated nausea or vomiting, no diarrhea, no constipation.  The patient was brought to the emergency room for further evaluation.  When the patient arrived at the emergency room, the patient went into paroxysmal supraventricular tachycardia.  The patient's heart rate went up to 180.  The patient was about to receive adenosine when he spontaneously converted to normal sinus rhythm.  The patient was discussed with his cardiologist group on on-call, admission to the hospital for further evaluation of PSVT was advised.  The patient was referred to the hospitalist service for that purpose.  The patient also complained of dizziness.  The patient did not describe the dizziness as room spinning around him.  The dizziness is worse with

## 2024-01-01 NOTE — ED NOTES
Bedside and Verbal shift change report given to Sravani (oncoming nurse) by Juliette (offgoing nurse). Report included the following information Nurse Handoff Report, Index, ED Encounter Summary, and ED SBAR.

## 2024-01-01 NOTE — ED NOTES
Patient states he is unable to tolerate all oral medications without eating prior. Pt educated on importance of taking daily meds, patient agreeable to take midodrine, eliquis, and ASA.

## 2024-01-01 NOTE — ED NOTES
TRANSFER - OUT REPORT:    Verbal report given to Sam on Jacky Verduzco  being transferred to  for routine progression of patient care       Report consisted of patient's Situation, Background, Assessment and   Recommendations(SBAR).     Information from the following report(s) Nurse Handoff Report, Index, ED Encounter Summary, ED SBAR, and Adult Overview was reviewed with the receiving nurse.    [place] Fall Assessment:    Presents to emergency department  because of falls (Syncope, seizure, or loss of consciousness): No  Age > 70: Yes  Altered Mental Status, Intoxication with alcohol or substance confusion (Disorientation, impaired judgment, poor safety awaremess, or inability to follow instructions): No  Impaired Mobility: Ambulates or transfers with assistive devices or assistance; Unable to ambulate or transer.: Yes  Nursing Judgement: Yes          Lines:   Peripheral IV 12/31/23 Right Antecubital (Active)   Site Assessment Clean, dry & intact 12/31/23 1806   Line Status Blood return noted 12/31/23 1806   Phlebitis Assessment No symptoms 12/31/23 1806   Infiltration Assessment 0 12/31/23 1806        Opportunity for questions and clarification was provided.      Patient transported with:  Tech

## 2024-01-02 LAB
ANION GAP SERPL CALC-SCNC: 4 MMOL/L (ref 5–15)
BUN SERPL-MCNC: 19 MG/DL (ref 6–20)
BUN/CREAT SERPL: 18 (ref 12–20)
CALCIUM SERPL-MCNC: 7.2 MG/DL (ref 8.5–10.1)
CHLORIDE SERPL-SCNC: 110 MMOL/L (ref 97–108)
CO2 SERPL-SCNC: 26 MMOL/L (ref 21–32)
CREAT SERPL-MCNC: 1.06 MG/DL (ref 0.7–1.3)
EKG ATRIAL RATE: 76 BPM
EKG DIAGNOSIS: NORMAL
EKG P AXIS: 33 DEGREES
EKG P-R INTERVAL: 122 MS
EKG Q-T INTERVAL: 228 MS
EKG Q-T INTERVAL: 386 MS
EKG Q-T INTERVAL: 390 MS
EKG QRS DURATION: 118 MS
EKG QRS DURATION: 118 MS
EKG QRS DURATION: 122 MS
EKG QTC CALCULATION (BAZETT): 409 MS
EKG QTC CALCULATION (BAZETT): 434 MS
EKG QTC CALCULATION (BAZETT): 435 MS
EKG R AXIS: -21 DEGREES
EKG R AXIS: -36 DEGREES
EKG R AXIS: -56 DEGREES
EKG T AXIS: -29 DEGREES
EKG T AXIS: -62 DEGREES
EKG T AXIS: 28 DEGREES
EKG VENTRICULAR RATE: 194 BPM
EKG VENTRICULAR RATE: 75 BPM
EKG VENTRICULAR RATE: 76 BPM
ERYTHROCYTE [DISTWIDTH] IN BLOOD BY AUTOMATED COUNT: 14.1 % (ref 11.5–14.5)
FERRITIN SERPL-MCNC: 40 NG/ML (ref 26–388)
GLUCOSE SERPL-MCNC: 76 MG/DL (ref 65–100)
HCT VFR BLD AUTO: 27.2 % (ref 36.6–50.3)
HGB BLD-MCNC: 9 G/DL (ref 12.1–17)
IRON SATN MFR SERPL: 11 % (ref 20–50)
IRON SERPL-MCNC: 21 UG/DL (ref 35–150)
MAGNESIUM SERPL-MCNC: 1.8 MG/DL (ref 1.6–2.4)
MCH RBC QN AUTO: 33.2 PG (ref 26–34)
MCHC RBC AUTO-ENTMCNC: 33.1 G/DL (ref 30–36.5)
MCV RBC AUTO: 100.4 FL (ref 80–99)
NRBC # BLD: 0 K/UL (ref 0–0.01)
NRBC BLD-RTO: 0 PER 100 WBC
PHOSPHATE SERPL-MCNC: 3.8 MG/DL (ref 2.6–4.7)
PLATELET # BLD AUTO: 280 K/UL (ref 150–400)
PMV BLD AUTO: 8.8 FL (ref 8.9–12.9)
POTASSIUM SERPL-SCNC: 4.2 MMOL/L (ref 3.5–5.1)
RBC # BLD AUTO: 2.71 M/UL (ref 4.1–5.7)
SODIUM SERPL-SCNC: 140 MMOL/L (ref 136–145)
TIBC SERPL-MCNC: 185 UG/DL (ref 250–450)
WBC # BLD AUTO: 5.3 K/UL (ref 4.1–11.1)

## 2024-01-02 PROCEDURE — 85027 COMPLETE CBC AUTOMATED: CPT

## 2024-01-02 PROCEDURE — 2060000000 HC ICU INTERMEDIATE R&B

## 2024-01-02 PROCEDURE — 2580000003 HC RX 258: Performed by: INTERNAL MEDICINE

## 2024-01-02 PROCEDURE — 97116 GAIT TRAINING THERAPY: CPT

## 2024-01-02 PROCEDURE — 83735 ASSAY OF MAGNESIUM: CPT

## 2024-01-02 PROCEDURE — 97161 PT EVAL LOW COMPLEX 20 MIN: CPT

## 2024-01-02 PROCEDURE — 6360000002 HC RX W HCPCS: Performed by: INTERNAL MEDICINE

## 2024-01-02 PROCEDURE — 6370000000 HC RX 637 (ALT 250 FOR IP): Performed by: INTERNAL MEDICINE

## 2024-01-02 PROCEDURE — 99223 1ST HOSP IP/OBS HIGH 75: CPT | Performed by: INTERNAL MEDICINE

## 2024-01-02 PROCEDURE — 83540 ASSAY OF IRON: CPT

## 2024-01-02 PROCEDURE — 36415 COLL VENOUS BLD VENIPUNCTURE: CPT

## 2024-01-02 PROCEDURE — 97165 OT EVAL LOW COMPLEX 30 MIN: CPT

## 2024-01-02 PROCEDURE — 82728 ASSAY OF FERRITIN: CPT

## 2024-01-02 PROCEDURE — 84100 ASSAY OF PHOSPHORUS: CPT

## 2024-01-02 PROCEDURE — 97530 THERAPEUTIC ACTIVITIES: CPT

## 2024-01-02 PROCEDURE — 83550 IRON BINDING TEST: CPT

## 2024-01-02 PROCEDURE — 80048 BASIC METABOLIC PNL TOTAL CA: CPT

## 2024-01-02 RX ORDER — HYDROCODONE BITARTRATE AND ACETAMINOPHEN 10; 325 MG/1; MG/1
1 TABLET ORAL EVERY 4 HOURS PRN
Status: DISCONTINUED | OUTPATIENT
Start: 2024-01-02 | End: 2024-01-08 | Stop reason: HOSPADM

## 2024-01-02 RX ORDER — OXYCODONE HYDROCHLORIDE 5 MG/1
5 TABLET ORAL EVERY 4 HOURS PRN
Status: DISCONTINUED | OUTPATIENT
Start: 2024-01-02 | End: 2024-01-08 | Stop reason: HOSPADM

## 2024-01-02 RX ADMIN — PANTOPRAZOLE SODIUM 40 MG: 40 TABLET, DELAYED RELEASE ORAL at 10:17

## 2024-01-02 RX ADMIN — OXYCODONE HYDROCHLORIDE AND ACETAMINOPHEN 500 MG: 500 TABLET ORAL at 10:17

## 2024-01-02 RX ADMIN — Medication 10 ML: at 10:17

## 2024-01-02 RX ADMIN — TRAZODONE HYDROCHLORIDE 50 MG: 50 TABLET ORAL at 21:06

## 2024-01-02 RX ADMIN — HYDROMORPHONE HYDROCHLORIDE 1 MG: 1 INJECTION, SOLUTION INTRAMUSCULAR; INTRAVENOUS; SUBCUTANEOUS at 16:47

## 2024-01-02 RX ADMIN — MIRTAZAPINE 15 MG: 15 TABLET, FILM COATED ORAL at 21:06

## 2024-01-02 RX ADMIN — HYDROMORPHONE HYDROCHLORIDE 1 MG: 1 INJECTION, SOLUTION INTRAMUSCULAR; INTRAVENOUS; SUBCUTANEOUS at 21:07

## 2024-01-02 RX ADMIN — ERGOCALCIFEROL 50000 UNITS: 1.25 CAPSULE ORAL at 10:17

## 2024-01-02 RX ADMIN — APIXABAN 5 MG: 5 TABLET, FILM COATED ORAL at 21:06

## 2024-01-02 RX ADMIN — HYDROMORPHONE HYDROCHLORIDE 1 MG: 1 INJECTION, SOLUTION INTRAMUSCULAR; INTRAVENOUS; SUBCUTANEOUS at 05:06

## 2024-01-02 RX ADMIN — LEVOTHYROXINE SODIUM 50 MCG: 50 TABLET ORAL at 10:19

## 2024-01-02 RX ADMIN — MAGNESIUM OXIDE TAB 400 MG (241.3 MG ELEMENTAL MG) 400 MG: 400 (241.3 MG) TAB at 10:17

## 2024-01-02 RX ADMIN — ASPIRIN 81 MG CHEWABLE TABLET 81 MG: 81 TABLET CHEWABLE at 10:17

## 2024-01-02 RX ADMIN — SUCRALFATE 1 G: 1 TABLET ORAL at 12:45

## 2024-01-02 RX ADMIN — MIDODRINE HYDROCHLORIDE 2.5 MG: 5 TABLET ORAL at 16:47

## 2024-01-02 RX ADMIN — METOPROLOL SUCCINATE 25 MG: 25 TABLET, EXTENDED RELEASE ORAL at 10:17

## 2024-01-02 RX ADMIN — Medication 10 ML: at 21:07

## 2024-01-02 RX ADMIN — CYANOCOBALAMIN TAB 500 MCG 1000 MCG: 500 TAB at 10:17

## 2024-01-02 RX ADMIN — SUCRALFATE 1 G: 1 TABLET ORAL at 21:06

## 2024-01-02 RX ADMIN — FOLIC ACID TAB 400 MCG 800 MCG: 400 TAB at 10:17

## 2024-01-02 RX ADMIN — HYDROMORPHONE HYDROCHLORIDE 1 MG: 1 INJECTION, SOLUTION INTRAMUSCULAR; INTRAVENOUS; SUBCUTANEOUS at 11:05

## 2024-01-02 RX ADMIN — APIXABAN 5 MG: 5 TABLET, FILM COATED ORAL at 10:18

## 2024-01-02 RX ADMIN — SUCRALFATE 1 G: 1 TABLET ORAL at 16:47

## 2024-01-02 RX ADMIN — MIDODRINE HYDROCHLORIDE 2.5 MG: 5 TABLET ORAL at 12:58

## 2024-01-02 RX ADMIN — MIDODRINE HYDROCHLORIDE 2.5 MG: 5 TABLET ORAL at 10:30

## 2024-01-02 ASSESSMENT — PAIN SCALES - GENERAL
PAINLEVEL_OUTOF10: 10
PAINLEVEL_OUTOF10: 9
PAINLEVEL_OUTOF10: 6
PAINLEVEL_OUTOF10: 1

## 2024-01-02 ASSESSMENT — PAIN SCALES - WONG BAKER: WONGBAKER_NUMERICALRESPONSE: 2

## 2024-01-02 ASSESSMENT — PAIN DESCRIPTION - DESCRIPTORS
DESCRIPTORS: ACHING

## 2024-01-02 ASSESSMENT — PAIN DESCRIPTION - LOCATION
LOCATION: BACK

## 2024-01-02 ASSESSMENT — PAIN DESCRIPTION - ORIENTATION
ORIENTATION: RIGHT
ORIENTATION: MID

## 2024-01-02 NOTE — CONSULTS
[facility]                          [x]Initial Encounter     [ ]Follow-up     Patient Name: Jacky Verduzco - :1945 - MRN:[id number]   Primary Cardiologist: Dr. George   Consulting Cardiologist: Vivek Seo MD     Reason for encounter: heart rate 194 bpm    HPI:       Jacky Verduzco is a 78 y.o. male with PMH significant for paroxysmal AF, sick sinus syndrome s/p PPM, HTN, and hypothyroidism. He came to the ED with complaints of lightheadedness and heart palpitations. His HR was noted to be in the 190's and EKG showed atypical atrial flutter 2:1 AV node conduction. He spontaneously converted to atrial pacing and ventricular pseudofusion  He and his partner said they have a planned AV frances ablation scheduled for 2024 but he would not want to wait until then, he wants to do during this hospital stay.       Assessment and Plan     Atypical atrial flutter RVR/ PAF: noted on device check 2023 and started on OAC. He was last seen 2023 for frequent events on remote monitoring and Amiodarone was started but he could not continue due to GI side effects and his bp does not tolerate toprol. On admission 2023 K 4.2 Mg 1.9 ProBNP 2521 TSH elevated at 6.16   - Echo pending. Last echo 2022 EF 50-55%.   - Continue metoprolol XL 25 mg daily   I recommended him add digoxin and have av node ablation as planned with Dr. George   He did not want to wait  He wanted to have me do the ablation while he is here at [facility]  I contacted Dr George via NP and Dr George wanted Dr Nima Daniels to see and do ablation for him  I texted Dr Daniels and informed the patient and Dr Barrientos    Anticoagulation: On Eliquis 5 mg BID. He is scheduled for watchman with Dr. George in 2024.     Hypotension: Takes midodrine 2.5 mg TID at home. 's.   - Continue midodrine     ____________________________________________________________     Cardiac testing  echo pending    Most recent HS troponins:   Recent Labs

## 2024-01-03 ENCOUNTER — APPOINTMENT (OUTPATIENT)
Facility: HOSPITAL | Age: 79
DRG: 273 | End: 2024-01-03
Attending: INTERNAL MEDICINE
Payer: MEDICARE

## 2024-01-03 LAB
ANION GAP SERPL CALC-SCNC: 6 MMOL/L (ref 5–15)
BUN SERPL-MCNC: 20 MG/DL (ref 6–20)
BUN/CREAT SERPL: 20 (ref 12–20)
CALCIUM SERPL-MCNC: 7.5 MG/DL (ref 8.5–10.1)
CHLORIDE SERPL-SCNC: 110 MMOL/L (ref 97–108)
CO2 SERPL-SCNC: 24 MMOL/L (ref 21–32)
CREAT SERPL-MCNC: 1 MG/DL (ref 0.7–1.3)
ECHO BSA: 1.67 M2
ERYTHROCYTE [DISTWIDTH] IN BLOOD BY AUTOMATED COUNT: 14.1 % (ref 11.5–14.5)
GLUCOSE SERPL-MCNC: 79 MG/DL (ref 65–100)
HCT VFR BLD AUTO: 26.7 % (ref 36.6–50.3)
HGB BLD-MCNC: 8.5 G/DL (ref 12.1–17)
MAGNESIUM SERPL-MCNC: 1.8 MG/DL (ref 1.6–2.4)
MCH RBC QN AUTO: 33.2 PG (ref 26–34)
MCHC RBC AUTO-ENTMCNC: 31.8 G/DL (ref 30–36.5)
MCV RBC AUTO: 104.3 FL (ref 80–99)
NRBC # BLD: 0 K/UL (ref 0–0.01)
NRBC BLD-RTO: 0 PER 100 WBC
PHOSPHATE SERPL-MCNC: 3.3 MG/DL (ref 2.6–4.7)
PLATELET # BLD AUTO: 274 K/UL (ref 150–400)
PMV BLD AUTO: 8.8 FL (ref 8.9–12.9)
POTASSIUM SERPL-SCNC: 4 MMOL/L (ref 3.5–5.1)
RBC # BLD AUTO: 2.56 M/UL (ref 4.1–5.7)
SODIUM SERPL-SCNC: 140 MMOL/L (ref 136–145)
WBC # BLD AUTO: 6.8 K/UL (ref 4.1–11.1)

## 2024-01-03 PROCEDURE — 6370000000 HC RX 637 (ALT 250 FOR IP): Performed by: INTERNAL MEDICINE

## 2024-01-03 PROCEDURE — 2580000003 HC RX 258: Performed by: INTERNAL MEDICINE

## 2024-01-03 PROCEDURE — 99153 MOD SED SAME PHYS/QHP EA: CPT | Performed by: INTERNAL MEDICINE

## 2024-01-03 PROCEDURE — 83735 ASSAY OF MAGNESIUM: CPT

## 2024-01-03 PROCEDURE — 36415 COLL VENOUS BLD VENIPUNCTURE: CPT

## 2024-01-03 PROCEDURE — 85027 COMPLETE CBC AUTOMATED: CPT

## 2024-01-03 PROCEDURE — C2630 CATH, EP, COOL-TIP: HCPCS | Performed by: INTERNAL MEDICINE

## 2024-01-03 PROCEDURE — 99152 MOD SED SAME PHYS/QHP 5/>YRS: CPT | Performed by: INTERNAL MEDICINE

## 2024-01-03 PROCEDURE — 80048 BASIC METABOLIC PNL TOTAL CA: CPT

## 2024-01-03 PROCEDURE — C1760 CLOSURE DEV, VASC: HCPCS | Performed by: INTERNAL MEDICINE

## 2024-01-03 PROCEDURE — 2709999900 HC NON-CHARGEABLE SUPPLY: Performed by: INTERNAL MEDICINE

## 2024-01-03 PROCEDURE — 2060000000 HC ICU INTERMEDIATE R&B

## 2024-01-03 PROCEDURE — 93650 ICAR CATH ABLTJ AV NODE FUNC: CPT | Performed by: INTERNAL MEDICINE

## 2024-01-03 PROCEDURE — 02583ZZ DESTRUCTION OF CONDUCTION MECHANISM, PERCUTANEOUS APPROACH: ICD-10-PCS | Performed by: INTERNAL MEDICINE

## 2024-01-03 PROCEDURE — C1894 INTRO/SHEATH, NON-LASER: HCPCS | Performed by: INTERNAL MEDICINE

## 2024-01-03 PROCEDURE — 02K83ZZ MAP CONDUCTION MECHANISM, PERCUTANEOUS APPROACH: ICD-10-PCS | Performed by: INTERNAL MEDICINE

## 2024-01-03 PROCEDURE — 84100 ASSAY OF PHOSPHORUS: CPT

## 2024-01-03 PROCEDURE — 6360000002 HC RX W HCPCS: Performed by: INTERNAL MEDICINE

## 2024-01-03 PROCEDURE — 2500000003 HC RX 250 WO HCPCS: Performed by: INTERNAL MEDICINE

## 2024-01-03 RX ORDER — SODIUM CHLORIDE 9 MG/ML
INJECTION, SOLUTION INTRAVENOUS CONTINUOUS PRN
Status: DISCONTINUED | OUTPATIENT
Start: 2024-01-03 | End: 2024-01-03 | Stop reason: HOSPADM

## 2024-01-03 RX ORDER — PHENYLEPHRINE HYDROCHLORIDE 10 MG/ML
INJECTION INTRAVENOUS PRN
Status: DISCONTINUED | OUTPATIENT
Start: 2024-01-03 | End: 2024-01-03 | Stop reason: HOSPADM

## 2024-01-03 RX ORDER — FENTANYL CITRATE 50 UG/ML
INJECTION, SOLUTION INTRAMUSCULAR; INTRAVENOUS PRN
Status: DISCONTINUED | OUTPATIENT
Start: 2024-01-03 | End: 2024-01-03 | Stop reason: HOSPADM

## 2024-01-03 RX ORDER — MIDAZOLAM HYDROCHLORIDE 1 MG/ML
INJECTION INTRAMUSCULAR; INTRAVENOUS PRN
Status: DISCONTINUED | OUTPATIENT
Start: 2024-01-03 | End: 2024-01-03 | Stop reason: HOSPADM

## 2024-01-03 RX ADMIN — MIDODRINE HYDROCHLORIDE 2.5 MG: 5 TABLET ORAL at 13:18

## 2024-01-03 RX ADMIN — MIRTAZAPINE 15 MG: 15 TABLET, FILM COATED ORAL at 20:32

## 2024-01-03 RX ADMIN — SUCRALFATE 1 G: 1 TABLET ORAL at 13:18

## 2024-01-03 RX ADMIN — FOLIC ACID TAB 400 MCG 800 MCG: 400 TAB at 13:18

## 2024-01-03 RX ADMIN — CYANOCOBALAMIN TAB 500 MCG 1000 MCG: 500 TAB at 13:21

## 2024-01-03 RX ADMIN — Medication 10 ML: at 20:32

## 2024-01-03 RX ADMIN — OXYCODONE HYDROCHLORIDE AND ACETAMINOPHEN 500 MG: 500 TABLET ORAL at 13:22

## 2024-01-03 RX ADMIN — HYDROMORPHONE HYDROCHLORIDE 1 MG: 1 INJECTION, SOLUTION INTRAMUSCULAR; INTRAVENOUS; SUBCUTANEOUS at 14:42

## 2024-01-03 RX ADMIN — PANTOPRAZOLE SODIUM 40 MG: 40 TABLET, DELAYED RELEASE ORAL at 13:18

## 2024-01-03 RX ADMIN — SUCRALFATE 1 G: 1 TABLET ORAL at 16:51

## 2024-01-03 RX ADMIN — ASPIRIN 81 MG CHEWABLE TABLET 81 MG: 81 TABLET CHEWABLE at 13:17

## 2024-01-03 RX ADMIN — APIXABAN 5 MG: 5 TABLET, FILM COATED ORAL at 20:32

## 2024-01-03 RX ADMIN — TRAZODONE HYDROCHLORIDE 50 MG: 50 TABLET ORAL at 20:32

## 2024-01-03 RX ADMIN — MIDODRINE HYDROCHLORIDE 2.5 MG: 5 TABLET ORAL at 16:51

## 2024-01-03 RX ADMIN — APIXABAN 5 MG: 5 TABLET, FILM COATED ORAL at 13:20

## 2024-01-03 RX ADMIN — LEVOTHYROXINE SODIUM 50 MCG: 50 TABLET ORAL at 13:21

## 2024-01-03 RX ADMIN — HYDROMORPHONE HYDROCHLORIDE 1 MG: 1 INJECTION, SOLUTION INTRAMUSCULAR; INTRAVENOUS; SUBCUTANEOUS at 20:32

## 2024-01-03 RX ADMIN — MAGNESIUM OXIDE TAB 400 MG (241.3 MG ELEMENTAL MG) 400 MG: 400 (241.3 MG) TAB at 13:21

## 2024-01-03 RX ADMIN — SUCRALFATE 1 G: 1 TABLET ORAL at 20:32

## 2024-01-03 RX ADMIN — HYDROMORPHONE HYDROCHLORIDE 1 MG: 1 INJECTION, SOLUTION INTRAMUSCULAR; INTRAVENOUS; SUBCUTANEOUS at 06:47

## 2024-01-03 RX ADMIN — HYDROCODONE BITARTRATE AND ACETAMINOPHEN 1 TABLET: 10; 325 TABLET ORAL at 12:08

## 2024-01-03 ASSESSMENT — PAIN DESCRIPTION - LOCATION
LOCATION: BACK

## 2024-01-03 ASSESSMENT — PAIN SCALES - GENERAL
PAINLEVEL_OUTOF10: 10
PAINLEVEL_OUTOF10: 2
PAINLEVEL_OUTOF10: 4
PAINLEVEL_OUTOF10: 10
PAINLEVEL_OUTOF10: 10

## 2024-01-03 ASSESSMENT — PAIN DESCRIPTION - DESCRIPTORS
DESCRIPTORS: ACHING

## 2024-01-03 ASSESSMENT — PAIN DESCRIPTION - ORIENTATION
ORIENTATION: LOWER
ORIENTATION: LOWER

## 2024-01-03 ASSESSMENT — PAIN - FUNCTIONAL ASSESSMENT: PAIN_FUNCTIONAL_ASSESSMENT: ACTIVITIES ARE NOT PREVENTED

## 2024-01-04 ENCOUNTER — APPOINTMENT (OUTPATIENT)
Facility: HOSPITAL | Age: 79
DRG: 273 | End: 2024-01-04
Attending: INTERNAL MEDICINE
Payer: MEDICARE

## 2024-01-04 LAB
ANION GAP SERPL CALC-SCNC: 5 MMOL/L (ref 5–15)
BUN SERPL-MCNC: 19 MG/DL (ref 6–20)
BUN/CREAT SERPL: 19 (ref 12–20)
CALCIUM SERPL-MCNC: 7.4 MG/DL (ref 8.5–10.1)
CHLORIDE SERPL-SCNC: 111 MMOL/L (ref 97–108)
CO2 SERPL-SCNC: 25 MMOL/L (ref 21–32)
CREAT SERPL-MCNC: 0.99 MG/DL (ref 0.7–1.3)
ECHO AV AREA PEAK VELOCITY: 2.1 CM2
ECHO AV AREA/BSA PEAK VELOCITY: 1.3 CM2/M2
ECHO AV PEAK GRADIENT: 6 MMHG
ECHO AV PEAK VELOCITY: 1.2 M/S
ECHO AV VELOCITY RATIO: 0.58
ECHO BSA: 1.67 M2
ECHO LA VOL A-L A2C: 58 ML (ref 18–58)
ECHO LA VOL A-L A4C: 80 ML (ref 18–58)
ECHO LA VOL MOD A2C: 55 ML (ref 18–58)
ECHO LA VOL MOD A4C: 78 ML (ref 18–58)
ECHO LA VOLUME AREA LENGTH: 72 ML
ECHO LA VOLUME INDEX A-L A2C: 35 ML/M2 (ref 16–34)
ECHO LA VOLUME INDEX A-L A4C: 48 ML/M2 (ref 16–34)
ECHO LA VOLUME INDEX AREA LENGTH: 43 ML/M2 (ref 16–34)
ECHO LA VOLUME INDEX MOD A2C: 33 ML/M2 (ref 16–34)
ECHO LA VOLUME INDEX MOD A4C: 46 ML/M2 (ref 16–34)
ECHO LV E' SEPTAL VELOCITY: 9 CM/S
ECHO LV EDV A2C: 57 ML
ECHO LV EDV A4C: 102 ML
ECHO LV EDV BP: 78 ML (ref 67–155)
ECHO LV EDV INDEX A4C: 61 ML/M2
ECHO LV EDV INDEX BP: 46 ML/M2
ECHO LV EDV NDEX A2C: 34 ML/M2
ECHO LV EJECTION FRACTION A2C: 25 %
ECHO LV EJECTION FRACTION A4C: 47 %
ECHO LV EJECTION FRACTION BIPLANE: 12 % (ref 55–100)
ECHO LV ESV A2C: 71 ML
ECHO LV ESV A4C: 54 ML
ECHO LV ESV BP: 68 ML (ref 22–58)
ECHO LV ESV INDEX A2C: 42 ML/M2
ECHO LV ESV INDEX A4C: 32 ML/M2
ECHO LV ESV INDEX BP: 40 ML/M2
ECHO LVOT AREA: 3.8 CM2
ECHO LVOT DIAM: 2.2 CM
ECHO LVOT PEAK GRADIENT: 2 MMHG
ECHO LVOT PEAK VELOCITY: 0.7 M/S
ECHO MV A VELOCITY: 0.57 M/S
ECHO MV AREA PHT: 3.4 CM2
ECHO MV E DECELERATION TIME (DT): 225 MS
ECHO MV E VELOCITY: 0.49 M/S
ECHO MV E/A RATIO: 0.86
ECHO MV PRESSURE HALF TIME (PHT): 65.2 MS
ECHO RV TAPSE: 1.8 CM (ref 1.7–?)
ECHO TV REGURGITANT MAX VELOCITY: 1.63 M/S
ECHO TV REGURGITANT PEAK GRADIENT: 11 MMHG
ERYTHROCYTE [DISTWIDTH] IN BLOOD BY AUTOMATED COUNT: 14.2 % (ref 11.5–14.5)
GLUCOSE SERPL-MCNC: 74 MG/DL (ref 65–100)
HCT VFR BLD AUTO: 30.6 % (ref 36.6–50.3)
HGB BLD-MCNC: 9.6 G/DL (ref 12.1–17)
MAGNESIUM SERPL-MCNC: 1.9 MG/DL (ref 1.6–2.4)
MCH RBC QN AUTO: 32.7 PG (ref 26–34)
MCHC RBC AUTO-ENTMCNC: 31.4 G/DL (ref 30–36.5)
MCV RBC AUTO: 104.1 FL (ref 80–99)
NRBC # BLD: 0 K/UL (ref 0–0.01)
NRBC BLD-RTO: 0 PER 100 WBC
PHOSPHATE SERPL-MCNC: 3.5 MG/DL (ref 2.6–4.7)
PLATELET # BLD AUTO: 315 K/UL (ref 150–400)
PMV BLD AUTO: 9.4 FL (ref 8.9–12.9)
POTASSIUM SERPL-SCNC: 4.3 MMOL/L (ref 3.5–5.1)
RBC # BLD AUTO: 2.94 M/UL (ref 4.1–5.7)
SODIUM SERPL-SCNC: 141 MMOL/L (ref 136–145)
WBC # BLD AUTO: 6.9 K/UL (ref 4.1–11.1)

## 2024-01-04 PROCEDURE — 93306 TTE W/DOPPLER COMPLETE: CPT

## 2024-01-04 PROCEDURE — 36415 COLL VENOUS BLD VENIPUNCTURE: CPT

## 2024-01-04 PROCEDURE — 6370000000 HC RX 637 (ALT 250 FOR IP): Performed by: INTERNAL MEDICINE

## 2024-01-04 PROCEDURE — 6360000002 HC RX W HCPCS: Performed by: INTERNAL MEDICINE

## 2024-01-04 PROCEDURE — 93306 TTE W/DOPPLER COMPLETE: CPT | Performed by: INTERNAL MEDICINE

## 2024-01-04 PROCEDURE — 84100 ASSAY OF PHOSPHORUS: CPT

## 2024-01-04 PROCEDURE — 2060000000 HC ICU INTERMEDIATE R&B

## 2024-01-04 PROCEDURE — 83735 ASSAY OF MAGNESIUM: CPT

## 2024-01-04 PROCEDURE — 97116 GAIT TRAINING THERAPY: CPT

## 2024-01-04 PROCEDURE — 97535 SELF CARE MNGMENT TRAINING: CPT

## 2024-01-04 PROCEDURE — 2580000003 HC RX 258: Performed by: INTERNAL MEDICINE

## 2024-01-04 PROCEDURE — 80048 BASIC METABOLIC PNL TOTAL CA: CPT

## 2024-01-04 PROCEDURE — 85027 COMPLETE CBC AUTOMATED: CPT

## 2024-01-04 RX ADMIN — HYDROMORPHONE HYDROCHLORIDE 1 MG: 1 INJECTION, SOLUTION INTRAMUSCULAR; INTRAVENOUS; SUBCUTANEOUS at 14:10

## 2024-01-04 RX ADMIN — APIXABAN 5 MG: 5 TABLET, FILM COATED ORAL at 22:35

## 2024-01-04 RX ADMIN — Medication 10 ML: at 22:50

## 2024-01-04 RX ADMIN — SUCRALFATE 1 G: 1 TABLET ORAL at 16:25

## 2024-01-04 RX ADMIN — MAGNESIUM OXIDE TAB 400 MG (241.3 MG ELEMENTAL MG) 400 MG: 400 (241.3 MG) TAB at 10:47

## 2024-01-04 RX ADMIN — Medication 10 ML: at 01:18

## 2024-01-04 RX ADMIN — HYDROMORPHONE HYDROCHLORIDE 1 MG: 1 INJECTION, SOLUTION INTRAMUSCULAR; INTRAVENOUS; SUBCUTANEOUS at 18:03

## 2024-01-04 RX ADMIN — METOPROLOL SUCCINATE 25 MG: 25 TABLET, EXTENDED RELEASE ORAL at 10:46

## 2024-01-04 RX ADMIN — HYDROMORPHONE HYDROCHLORIDE 1 MG: 1 INJECTION, SOLUTION INTRAMUSCULAR; INTRAVENOUS; SUBCUTANEOUS at 09:32

## 2024-01-04 RX ADMIN — Medication 10 ML: at 10:50

## 2024-01-04 RX ADMIN — CYANOCOBALAMIN TAB 500 MCG 1000 MCG: 500 TAB at 10:47

## 2024-01-04 RX ADMIN — APIXABAN 5 MG: 5 TABLET, FILM COATED ORAL at 10:47

## 2024-01-04 RX ADMIN — MIDODRINE HYDROCHLORIDE 2.5 MG: 5 TABLET ORAL at 10:46

## 2024-01-04 RX ADMIN — MIDODRINE HYDROCHLORIDE 2.5 MG: 5 TABLET ORAL at 16:25

## 2024-01-04 RX ADMIN — ASPIRIN 81 MG CHEWABLE TABLET 81 MG: 81 TABLET CHEWABLE at 10:46

## 2024-01-04 RX ADMIN — HYDROMORPHONE HYDROCHLORIDE 1 MG: 1 INJECTION, SOLUTION INTRAMUSCULAR; INTRAVENOUS; SUBCUTANEOUS at 05:25

## 2024-01-04 RX ADMIN — LEVOTHYROXINE SODIUM 50 MCG: 50 TABLET ORAL at 10:47

## 2024-01-04 RX ADMIN — TRAZODONE HYDROCHLORIDE 50 MG: 50 TABLET ORAL at 22:35

## 2024-01-04 RX ADMIN — FOLIC ACID TAB 400 MCG 800 MCG: 400 TAB at 10:47

## 2024-01-04 RX ADMIN — MIDODRINE HYDROCHLORIDE 2.5 MG: 5 TABLET ORAL at 12:27

## 2024-01-04 RX ADMIN — HYDROMORPHONE HYDROCHLORIDE 1 MG: 1 INJECTION, SOLUTION INTRAMUSCULAR; INTRAVENOUS; SUBCUTANEOUS at 01:18

## 2024-01-04 RX ADMIN — SUCRALFATE 1 G: 1 TABLET ORAL at 22:35

## 2024-01-04 RX ADMIN — OXYCODONE HYDROCHLORIDE AND ACETAMINOPHEN 500 MG: 500 TABLET ORAL at 10:46

## 2024-01-04 RX ADMIN — MIRTAZAPINE 15 MG: 15 TABLET, FILM COATED ORAL at 22:35

## 2024-01-04 RX ADMIN — SUCRALFATE 1 G: 1 TABLET ORAL at 12:28

## 2024-01-04 RX ADMIN — HYDROMORPHONE HYDROCHLORIDE 1 MG: 1 INJECTION, SOLUTION INTRAMUSCULAR; INTRAVENOUS; SUBCUTANEOUS at 22:35

## 2024-01-04 RX ADMIN — OXYCODONE HYDROCHLORIDE 5 MG: 5 TABLET ORAL at 21:23

## 2024-01-04 RX ADMIN — PANTOPRAZOLE SODIUM 40 MG: 40 TABLET, DELAYED RELEASE ORAL at 10:47

## 2024-01-04 ASSESSMENT — PAIN DESCRIPTION - LOCATION
LOCATION: BACK

## 2024-01-04 ASSESSMENT — PAIN DESCRIPTION - DESCRIPTORS
DESCRIPTORS: ACHING
DESCRIPTORS: BURNING;ACHING;SHARP
DESCRIPTORS: ACHING
DESCRIPTORS: ACHING

## 2024-01-04 ASSESSMENT — PAIN SCALES - GENERAL
PAINLEVEL_OUTOF10: 10
PAINLEVEL_OUTOF10: 9
PAINLEVEL_OUTOF10: 10
PAINLEVEL_OUTOF10: 10

## 2024-01-04 ASSESSMENT — PAIN DESCRIPTION - ORIENTATION
ORIENTATION: LOWER
ORIENTATION: LOWER

## 2024-01-05 PROCEDURE — 6370000000 HC RX 637 (ALT 250 FOR IP): Performed by: INTERNAL MEDICINE

## 2024-01-05 PROCEDURE — 2060000000 HC ICU INTERMEDIATE R&B

## 2024-01-05 PROCEDURE — 6360000002 HC RX W HCPCS: Performed by: INTERNAL MEDICINE

## 2024-01-05 PROCEDURE — 2580000003 HC RX 258: Performed by: INTERNAL MEDICINE

## 2024-01-05 RX ORDER — CASTOR OIL AND BALSAM, PERU 788; 87 MG/G; MG/G
OINTMENT TOPICAL EVERY 12 HOURS
Status: CANCELLED | OUTPATIENT
Start: 2024-01-05

## 2024-01-05 RX ADMIN — MIDODRINE HYDROCHLORIDE 2.5 MG: 5 TABLET ORAL at 12:52

## 2024-01-05 RX ADMIN — FOLIC ACID TAB 400 MCG 800 MCG: 400 TAB at 09:24

## 2024-01-05 RX ADMIN — HYDROMORPHONE HYDROCHLORIDE 1 MG: 1 INJECTION, SOLUTION INTRAMUSCULAR; INTRAVENOUS; SUBCUTANEOUS at 18:19

## 2024-01-05 RX ADMIN — HYDROCODONE BITARTRATE AND ACETAMINOPHEN 1 TABLET: 10; 325 TABLET ORAL at 13:00

## 2024-01-05 RX ADMIN — Medication 10 ML: at 09:23

## 2024-01-05 RX ADMIN — HYDROMORPHONE HYDROCHLORIDE 1 MG: 1 INJECTION, SOLUTION INTRAMUSCULAR; INTRAVENOUS; SUBCUTANEOUS at 09:29

## 2024-01-05 RX ADMIN — CYANOCOBALAMIN TAB 500 MCG 1000 MCG: 500 TAB at 09:24

## 2024-01-05 RX ADMIN — ERGOCALCIFEROL 50000 UNITS: 1.25 CAPSULE ORAL at 09:23

## 2024-01-05 RX ADMIN — SUCRALFATE 1 G: 1 TABLET ORAL at 16:30

## 2024-01-05 RX ADMIN — LEVOTHYROXINE SODIUM 50 MCG: 50 TABLET ORAL at 09:24

## 2024-01-05 RX ADMIN — ASPIRIN 81 MG CHEWABLE TABLET 81 MG: 81 TABLET CHEWABLE at 09:23

## 2024-01-05 RX ADMIN — MIDODRINE HYDROCHLORIDE 2.5 MG: 5 TABLET ORAL at 16:30

## 2024-01-05 RX ADMIN — PANTOPRAZOLE SODIUM 40 MG: 40 TABLET, DELAYED RELEASE ORAL at 09:25

## 2024-01-05 RX ADMIN — APIXABAN 5 MG: 5 TABLET, FILM COATED ORAL at 09:24

## 2024-01-05 RX ADMIN — SUCRALFATE 1 G: 1 TABLET ORAL at 12:53

## 2024-01-05 RX ADMIN — SUCRALFATE 1 G: 1 TABLET ORAL at 21:21

## 2024-01-05 RX ADMIN — HYDROCODONE BITARTRATE AND ACETAMINOPHEN 1 TABLET: 10; 325 TABLET ORAL at 21:21

## 2024-01-05 RX ADMIN — OXYCODONE HYDROCHLORIDE AND ACETAMINOPHEN 500 MG: 500 TABLET ORAL at 09:24

## 2024-01-05 RX ADMIN — HYDROMORPHONE HYDROCHLORIDE 1 MG: 1 INJECTION, SOLUTION INTRAMUSCULAR; INTRAVENOUS; SUBCUTANEOUS at 14:01

## 2024-01-05 RX ADMIN — APIXABAN 5 MG: 5 TABLET, FILM COATED ORAL at 21:21

## 2024-01-05 RX ADMIN — HYDROCODONE BITARTRATE AND ACETAMINOPHEN 1 TABLET: 10; 325 TABLET ORAL at 17:07

## 2024-01-05 RX ADMIN — HYDROMORPHONE HYDROCHLORIDE 1 MG: 1 INJECTION, SOLUTION INTRAMUSCULAR; INTRAVENOUS; SUBCUTANEOUS at 22:20

## 2024-01-05 RX ADMIN — HYDROMORPHONE HYDROCHLORIDE 1 MG: 1 INJECTION, SOLUTION INTRAMUSCULAR; INTRAVENOUS; SUBCUTANEOUS at 03:17

## 2024-01-05 RX ADMIN — TRAZODONE HYDROCHLORIDE 50 MG: 50 TABLET ORAL at 21:21

## 2024-01-05 RX ADMIN — MIDODRINE HYDROCHLORIDE 2.5 MG: 5 TABLET ORAL at 07:41

## 2024-01-05 RX ADMIN — Medication 10 ML: at 21:21

## 2024-01-05 RX ADMIN — MAGNESIUM OXIDE TAB 400 MG (241.3 MG ELEMENTAL MG) 400 MG: 400 (241.3 MG) TAB at 09:24

## 2024-01-05 RX ADMIN — MIRTAZAPINE 15 MG: 15 TABLET, FILM COATED ORAL at 21:21

## 2024-01-05 RX ADMIN — SUCRALFATE 1 G: 1 TABLET ORAL at 09:24

## 2024-01-05 ASSESSMENT — PAIN DESCRIPTION - DESCRIPTORS
DESCRIPTORS: ACHING

## 2024-01-05 ASSESSMENT — PAIN DESCRIPTION - ORIENTATION
ORIENTATION: POSTERIOR

## 2024-01-05 ASSESSMENT — PAIN DESCRIPTION - LOCATION
LOCATION: BACK

## 2024-01-05 ASSESSMENT — PAIN SCALES - GENERAL
PAINLEVEL_OUTOF10: 10
PAINLEVEL_OUTOF10: 9
PAINLEVEL_OUTOF10: 10
PAINLEVEL_OUTOF10: 8
PAINLEVEL_OUTOF10: 8

## 2024-01-05 NOTE — WOUND CARE
Wound Care Note:     New consult placed by nurse request for skin tear to right forearm    Chart shows:  Admitted for paroxysmal supraventricular tachycardia  Past Medical History:   Diagnosis Date    DJD (degenerative joint disease)     Hypertension     Obesity      WBC = 6.9 on 1/4/24  Admitted from home    Assessment:   Patient is A&O x 4, communicative, incontinent with some assistance needed in repositioning.    Bed: [place]  Diet: Adult diet regular  Patient reports some pain with dressing removal.      Bilateral heels and buttocks skin intact and with blanchable erythema.    1. POA right lower arm skin tear measures 1 cm x  3 cm x 0.1 cm, wound bed is beefy red, small sero/sang drainage, wound edges re open, louisa--wound intact.  Xeroform gauze, 4 x 4's and roll gauze applied.    2.  Sacrum with wound measuring 0.5 cm x 0.4 cm, wound bed is pink, blanches, wound edges are open, louisa-wound with blanchable erythema.  Appears to be friction/shear.  Venelex ointment and sacral border to be ordered.    Spoke with Dr. Pina, wound care orders obtained.    Patient repositioned on left side.  Heels offloaded on pillow.     Recommendations:    Right lower arm- Every other day cleanse with normal saline, wipe wound bed clean and dry, apply a piece of Xeroform gauze that has been folded in half, cover with 4 x 4's and secure with roll gauze.    Sacrum and bilateral heels- Every 12 hours liberally apply Venelex ointment.  Apply sacral border.    Skin Care & Pressure Prevention:  Minimize layers of linen/pads under patient to optimize support surface.    Turn/reposition approximately every 2 hours and offload heels.  Manage incontinence / promote continence   Nourishing Skin Cream to dry skin, minimize use of briefs when able    Discussed above plan with patient & DIANA Deal    Transition of Care: Plan to follow as needed while admitted to hospital.    Marichuy \"Amisha\"

## 2024-01-06 PROCEDURE — 6370000000 HC RX 637 (ALT 250 FOR IP): Performed by: INTERNAL MEDICINE

## 2024-01-06 PROCEDURE — 2580000003 HC RX 258: Performed by: INTERNAL MEDICINE

## 2024-01-06 PROCEDURE — 6360000002 HC RX W HCPCS: Performed by: INTERNAL MEDICINE

## 2024-01-06 PROCEDURE — 2060000000 HC ICU INTERMEDIATE R&B

## 2024-01-06 RX ADMIN — SUCRALFATE 1 G: 1 TABLET ORAL at 17:17

## 2024-01-06 RX ADMIN — MAGNESIUM OXIDE TAB 400 MG (241.3 MG ELEMENTAL MG) 400 MG: 400 (241.3 MG) TAB at 09:01

## 2024-01-06 RX ADMIN — HYDROMORPHONE HYDROCHLORIDE 1 MG: 1 INJECTION, SOLUTION INTRAMUSCULAR; INTRAVENOUS; SUBCUTANEOUS at 14:10

## 2024-01-06 RX ADMIN — CYANOCOBALAMIN TAB 500 MCG 1000 MCG: 500 TAB at 09:00

## 2024-01-06 RX ADMIN — OXYCODONE HYDROCHLORIDE AND ACETAMINOPHEN 500 MG: 500 TABLET ORAL at 08:59

## 2024-01-06 RX ADMIN — SUCRALFATE 1 G: 1 TABLET ORAL at 09:00

## 2024-01-06 RX ADMIN — TRAZODONE HYDROCHLORIDE 50 MG: 50 TABLET ORAL at 21:16

## 2024-01-06 RX ADMIN — Medication 10 ML: at 21:17

## 2024-01-06 RX ADMIN — HYDROCODONE BITARTRATE AND ACETAMINOPHEN 1 TABLET: 10; 325 TABLET ORAL at 21:16

## 2024-01-06 RX ADMIN — Medication 10 ML: at 09:02

## 2024-01-06 RX ADMIN — PANTOPRAZOLE SODIUM 40 MG: 40 TABLET, DELAYED RELEASE ORAL at 09:00

## 2024-01-06 RX ADMIN — SUCRALFATE 1 G: 1 TABLET ORAL at 13:41

## 2024-01-06 RX ADMIN — HYDROCODONE BITARTRATE AND ACETAMINOPHEN 1 TABLET: 10; 325 TABLET ORAL at 08:59

## 2024-01-06 RX ADMIN — HYDROCODONE BITARTRATE AND ACETAMINOPHEN 1 TABLET: 10; 325 TABLET ORAL at 13:06

## 2024-01-06 RX ADMIN — MIDODRINE HYDROCHLORIDE 2.5 MG: 5 TABLET ORAL at 09:00

## 2024-01-06 RX ADMIN — LEVOTHYROXINE SODIUM 50 MCG: 50 TABLET ORAL at 09:01

## 2024-01-06 RX ADMIN — MIRTAZAPINE 15 MG: 15 TABLET, FILM COATED ORAL at 21:16

## 2024-01-06 RX ADMIN — MIDODRINE HYDROCHLORIDE 2.5 MG: 5 TABLET ORAL at 18:17

## 2024-01-06 RX ADMIN — HYDROCODONE BITARTRATE AND ACETAMINOPHEN 1 TABLET: 10; 325 TABLET ORAL at 17:16

## 2024-01-06 RX ADMIN — SUCRALFATE 1 G: 1 TABLET ORAL at 21:16

## 2024-01-06 RX ADMIN — HYDROMORPHONE HYDROCHLORIDE 1 MG: 1 INJECTION, SOLUTION INTRAMUSCULAR; INTRAVENOUS; SUBCUTANEOUS at 22:24

## 2024-01-06 RX ADMIN — HYDROMORPHONE HYDROCHLORIDE 1 MG: 1 INJECTION, SOLUTION INTRAMUSCULAR; INTRAVENOUS; SUBCUTANEOUS at 18:17

## 2024-01-06 RX ADMIN — ASPIRIN 81 MG CHEWABLE TABLET 81 MG: 81 TABLET CHEWABLE at 09:00

## 2024-01-06 RX ADMIN — APIXABAN 5 MG: 5 TABLET, FILM COATED ORAL at 09:00

## 2024-01-06 RX ADMIN — HYDROMORPHONE HYDROCHLORIDE 1 MG: 1 INJECTION, SOLUTION INTRAMUSCULAR; INTRAVENOUS; SUBCUTANEOUS at 10:13

## 2024-01-06 RX ADMIN — APIXABAN 5 MG: 5 TABLET, FILM COATED ORAL at 21:17

## 2024-01-06 RX ADMIN — MIDODRINE HYDROCHLORIDE 2.5 MG: 5 TABLET ORAL at 13:41

## 2024-01-06 RX ADMIN — FOLIC ACID TAB 400 MCG 800 MCG: 400 TAB at 09:01

## 2024-01-06 ASSESSMENT — PAIN DESCRIPTION - ORIENTATION
ORIENTATION: LOWER
ORIENTATION: LOWER
ORIENTATION: MID
ORIENTATION: LOWER
ORIENTATION: MID

## 2024-01-06 ASSESSMENT — PAIN DESCRIPTION - LOCATION
LOCATION: BACK

## 2024-01-06 ASSESSMENT — PAIN SCALES - GENERAL
PAINLEVEL_OUTOF10: 8
PAINLEVEL_OUTOF10: 8
PAINLEVEL_OUTOF10: 7
PAINLEVEL_OUTOF10: 8
PAINLEVEL_OUTOF10: 7
PAINLEVEL_OUTOF10: 8

## 2024-01-06 ASSESSMENT — PAIN DESCRIPTION - DESCRIPTORS
DESCRIPTORS: STABBING;ACHING
DESCRIPTORS: SORE

## 2024-01-07 PROCEDURE — 2060000000 HC ICU INTERMEDIATE R&B

## 2024-01-07 PROCEDURE — 6360000002 HC RX W HCPCS: Performed by: INTERNAL MEDICINE

## 2024-01-07 PROCEDURE — 6370000000 HC RX 637 (ALT 250 FOR IP): Performed by: INTERNAL MEDICINE

## 2024-01-07 PROCEDURE — 2580000003 HC RX 258: Performed by: INTERNAL MEDICINE

## 2024-01-07 RX ADMIN — MIDODRINE HYDROCHLORIDE 2.5 MG: 5 TABLET ORAL at 13:56

## 2024-01-07 RX ADMIN — APIXABAN 5 MG: 5 TABLET, FILM COATED ORAL at 20:25

## 2024-01-07 RX ADMIN — APIXABAN 5 MG: 5 TABLET, FILM COATED ORAL at 08:16

## 2024-01-07 RX ADMIN — ASPIRIN 81 MG CHEWABLE TABLET 81 MG: 81 TABLET CHEWABLE at 08:15

## 2024-01-07 RX ADMIN — Medication 10 ML: at 08:17

## 2024-01-07 RX ADMIN — HYDROCODONE BITARTRATE AND ACETAMINOPHEN 1 TABLET: 10; 325 TABLET ORAL at 17:03

## 2024-01-07 RX ADMIN — MIDODRINE HYDROCHLORIDE 2.5 MG: 5 TABLET ORAL at 17:03

## 2024-01-07 RX ADMIN — HYDROCODONE BITARTRATE AND ACETAMINOPHEN 1 TABLET: 10; 325 TABLET ORAL at 07:18

## 2024-01-07 RX ADMIN — CYANOCOBALAMIN TAB 500 MCG 1000 MCG: 500 TAB at 08:15

## 2024-01-07 RX ADMIN — SUCRALFATE 1 G: 1 TABLET ORAL at 20:26

## 2024-01-07 RX ADMIN — HYDROCODONE BITARTRATE AND ACETAMINOPHEN 1 TABLET: 10; 325 TABLET ORAL at 21:22

## 2024-01-07 RX ADMIN — SUCRALFATE 1 G: 1 TABLET ORAL at 08:16

## 2024-01-07 RX ADMIN — OXYCODONE HYDROCHLORIDE AND ACETAMINOPHEN 500 MG: 500 TABLET ORAL at 08:16

## 2024-01-07 RX ADMIN — HYDROCODONE BITARTRATE AND ACETAMINOPHEN 1 TABLET: 10; 325 TABLET ORAL at 12:28

## 2024-01-07 RX ADMIN — MIRTAZAPINE 15 MG: 15 TABLET, FILM COATED ORAL at 20:26

## 2024-01-07 RX ADMIN — SUCRALFATE 1 G: 1 TABLET ORAL at 13:56

## 2024-01-07 RX ADMIN — MIDODRINE HYDROCHLORIDE 2.5 MG: 5 TABLET ORAL at 08:16

## 2024-01-07 RX ADMIN — HYDROMORPHONE HYDROCHLORIDE 1 MG: 1 INJECTION, SOLUTION INTRAMUSCULAR; INTRAVENOUS; SUBCUTANEOUS at 13:56

## 2024-01-07 RX ADMIN — HYDROMORPHONE HYDROCHLORIDE 1 MG: 1 INJECTION, SOLUTION INTRAMUSCULAR; INTRAVENOUS; SUBCUTANEOUS at 08:16

## 2024-01-07 RX ADMIN — FOLIC ACID TAB 400 MCG 800 MCG: 400 TAB at 08:16

## 2024-01-07 RX ADMIN — SUCRALFATE 1 G: 1 TABLET ORAL at 17:03

## 2024-01-07 RX ADMIN — PANTOPRAZOLE SODIUM 40 MG: 40 TABLET, DELAYED RELEASE ORAL at 08:16

## 2024-01-07 RX ADMIN — HYDROMORPHONE HYDROCHLORIDE 1 MG: 1 INJECTION, SOLUTION INTRAMUSCULAR; INTRAVENOUS; SUBCUTANEOUS at 18:04

## 2024-01-07 RX ADMIN — TRAZODONE HYDROCHLORIDE 50 MG: 50 TABLET ORAL at 20:26

## 2024-01-07 RX ADMIN — MAGNESIUM OXIDE TAB 400 MG (241.3 MG ELEMENTAL MG) 400 MG: 400 (241.3 MG) TAB at 08:16

## 2024-01-07 RX ADMIN — Medication 10 ML: at 20:28

## 2024-01-07 RX ADMIN — LEVOTHYROXINE SODIUM 50 MCG: 50 TABLET ORAL at 08:16

## 2024-01-07 ASSESSMENT — PAIN DESCRIPTION - DESCRIPTORS
DESCRIPTORS: ACHING
DESCRIPTORS: ACHING;SORE
DESCRIPTORS: ACHING
DESCRIPTORS: ACHING;SORE

## 2024-01-07 ASSESSMENT — PAIN DESCRIPTION - ORIENTATION
ORIENTATION: LOWER
ORIENTATION: MID
ORIENTATION: LOWER

## 2024-01-07 ASSESSMENT — PAIN DESCRIPTION - LOCATION
LOCATION: BACK

## 2024-01-07 ASSESSMENT — PAIN SCALES - GENERAL
PAINLEVEL_OUTOF10: 8
PAINLEVEL_OUTOF10: 9

## 2024-01-07 NOTE — PLAN OF CARE
Problem: Pain  Goal: Verbalizes/displays adequate comfort level or baseline comfort level  Outcome: Progressing     
  Problem: Pain  Goal: Verbalizes/displays adequate comfort level or baseline comfort level  Outcome: Progressing     Problem: Discharge Planning  Goal: Discharge to home or other facility with appropriate resources  Outcome: Progressing     Problem: Safety - Adult  Goal: Free from fall injury  Outcome: Progressing     
  Problem: Pain  Goal: Verbalizes/displays adequate comfort level or baseline comfort level  Outcome: Progressing     Problem: Discharge Planning  Goal: Discharge to home or other facility with appropriate resources  Outcome: Progressing  Flowsheets (Taken 1/1/2024 2017)  Discharge to home or other facility with appropriate resources:   Identify barriers to discharge with patient and caregiver   Arrange for needed discharge resources and transportation as appropriate   Identify discharge learning needs (meds, wound care, etc)   Refer to discharge planning if patient needs post-hospital services based on physician order or complex needs related to functional status, cognitive ability or social support system     Problem: Safety - Adult  Goal: Free from fall injury  Outcome: Progressing     Problem: Skin/Tissue Integrity  Goal: Absence of new skin breakdown  Description: 1.  Monitor for areas of redness and/or skin breakdown  2.  Assess vascular access sites hourly  3.  Every 4-6 hours minimum:  Change oxygen saturation probe site  4.  Every 4-6 hours:  If on nasal continuous positive airway pressure, respiratory therapy assess nares and determine need for appliance change or resting period.  Outcome: Progressing     
  Problem: Pain  Goal: Verbalizes/displays adequate comfort level or baseline comfort level  Outcome: Progressing     Problem: Discharge Planning  Goal: Discharge to home or other facility with appropriate resources  Outcome: Progressing  Flowsheets (Taken 1/4/2024 2000)  Discharge to home or other facility with appropriate resources: Identify barriers to discharge with patient and caregiver     Problem: Safety - Adult  Goal: Free from fall injury  Outcome: Progressing     Problem: Skin/Tissue Integrity  Goal: Absence of new skin breakdown  Description: 1.  Monitor for areas of redness and/or skin breakdown  2.  Assess vascular access sites hourly  3.  Every 4-6 hours minimum:  Change oxygen saturation probe site  4.  Every 4-6 hours:  If on nasal continuous positive airway pressure, respiratory therapy assess nares and determine need for appliance change or resting period.  Outcome: Progressing     Problem: Occupational Therapy - Adult  Goal: By Discharge: Performs self-care activities at highest level of function for planned discharge setting.  See evaluation for individualized goals.  Description: FUNCTIONAL STATUS PRIOR TO ADMISSION:  Pt reports he lives alone in a one-level home and was IND with ADLs prior to admission. Pt states he has a personal caregiver that comes to assist \"PRN\".     HOME SUPPORT: Patient lived alone with PCA to provide assistance PRN.    Occupational Therapy Goals:  Initiated 1/2/2024  1.  Patient will perform grooming task seated with Supervision within 7 day(s).  2.  Patient will perform upper body dressing seated with Minimal Assist within 7 day(s).  3.  Patient will perform lower body dressing seated with Maximal Assist within 7 day(s).  5.  Patient will perform all aspects of toileting with Maximal Assist within 7 day(s).  6.  Patient will participate in upper extremity therapeutic exercise/activities with Stand by Assist for 5 minutes within 7 day(s).    7.  Patient will utilize 
  Problem: Pain  Goal: Verbalizes/displays adequate comfort level or baseline comfort level  Outcome: Progressing     Problem: Safety - Adult  Goal: Free from fall injury  Recent Flowsheet Documentation  Taken 1/6/2024 5879 by Sarah Irizarry, RN  Free From Fall Injury: Instruct family/caregiver on patient safety     
  Problem: Physical Therapy - Adult  Goal: By Discharge: Performs mobility at highest level of function for planned discharge setting.  See evaluation for individualized goals.  Description: FUNCTIONAL STATUS PRIOR TO ADMISSION: Patient was modified independent using a rollator for functional mobility.  He also utilizes a lift chair and hospital bed at home.    HOME SUPPORT PRIOR TO ADMISSION: The patient lived alone with a daily caregiver to provide assistance.    Physical Therapy Goals  Initiated 1/2/2024  1.  Patient will move from supine to sit and sit to supine and roll side to side in bed with modified independence within 7 day(s).    2.  Patient will perform sit to stand with modified independence within 7 day(s).  3.  Patient will transfer from bed to chair and chair to bed with modified independence using the least restrictive device within 7 day(s).  4.  Patient will ambulate with modified independence for 150 feet with the least restrictive device within 7 day(s).   5.  Patient will ascend/descend 4 stairs with 1 handrail(s) with modified independence within 7 day(s).   Outcome: Progressing   PHYSICAL THERAPY EVALUATION    Patient: Jacky Verduzco (78 y.o. male)  Date: 1/2/2024  Primary Diagnosis: Paroxysmal supraventricular tachycardia [I47.10]  Shortness of breath [R06.02]  PSVT (paroxysmal supraventricular tachycardia) [I47.10]  Left lower quadrant abdominal pain [R10.32]       Precautions: Fall Risk                    ASSESSMENT :   DEFICITS/IMPAIRMENTS:   The patient is limited by decreased  overall mobility compared to baseline after being admitted with symptomatic SVT.  His PMHx is significant for multi-joint arthritis, including knees and shoulders, compression fractures.  He is hopeful that he can find a long term care situation at the [facility], but he does have a caregiver at home most days of the week.  Prior to admission, he was independent with his basic self care and mobility using a 
  Problem: Physical Therapy - Adult  Goal: By Discharge: Performs mobility at highest level of function for planned discharge setting.  See evaluation for individualized goals.  Description: FUNCTIONAL STATUS PRIOR TO ADMISSION: Patient was modified independent using a rollator for functional mobility.  He also utilizes a lift chair and hospital bed at home.    HOME SUPPORT PRIOR TO ADMISSION: The patient lived alone with a daily caregiver to provide assistance.    Physical Therapy Goals  Initiated 1/2/2024  1.  Patient will move from supine to sit and sit to supine and roll side to side in bed with modified independence within 7 day(s).    2.  Patient will perform sit to stand with modified independence within 7 day(s).  3.  Patient will transfer from bed to chair and chair to bed with modified independence using the least restrictive device within 7 day(s).  4.  Patient will ambulate with modified independence for 150 feet with the least restrictive device within 7 day(s).   5.  Patient will ascend/descend 4 stairs with 1 handrail(s) with modified independence within 7 day(s).   Outcome: Progressing   PHYSICAL THERAPY TREATMENT    Patient: Jacky Verduzco (78 y.o. male)  Date: 1/4/2024  Diagnosis: Paroxysmal supraventricular tachycardia [I47.10]  Shortness of breath [R06.02]  PSVT (paroxysmal supraventricular tachycardia) [I47.10]  Left lower quadrant abdominal pain [R10.32] PSVT (paroxysmal supraventricular tachycardia)  Procedure(s) (LRB):  Ablation AV node (N/A)  Ultrasound guided vascular access (N/A) 1 Day Post-Op  Precautions: Fall Risk                    ASSESSMENT:  Patient continues to benefit from skilled PT services and is slowly progressing towards goals. He remains quite debilitated in terms of his overall endurance, with BP tending to drop slightly with activity and HR to 100s-120s with minimal activity, compared to 80s at baseline.    Because he lives alone and only has a caregiver during the day on 
intact  Safety Judgement: Decreased awareness of need for assistance  Insights: Decreased awareness of deficits  Initiation: Requires cues for some  Sequencing: Requires cues for some    Functional Mobility and Transfers for ADLs:  Bed Mobility:  Bed Mobility Training  Supine to Sit: Moderate assistance     Transfers:   Transfer Training  Sit to Stand: Minimum assistance;Additional time;Adaptive equipment;Moderate assistance (mod assist from lower surface)  Stand to Sit: Minimum assistance;Adaptive equipment;Additional time  Bed to Chair: Minimum assistance;Additional time;Adaptive equipment         Balance:  Standing: Impaired  Balance  Sitting: Intact  Standing: Impaired  Standing - Static: Good;Constant support  Standing - Dynamic: Fair;Constant support (needs at least one hand on a support at all times, balance is quite fatigue dependent)      ADL Intervention:    Grooming: Setup   Grooming Skilled Clinical Factors: sitting in chair    Pain Rating:  10/10     Pain Intervention(s):   nursing notified and addressing and repositioning      Activity Tolerance:   Fair   Please refer to the flowsheet for vital signs taken during this treatment.    After treatment:   Patient left in no apparent distress sitting up in chair, Call bell within reach, Bed/ chair alarm activated, Caregiver / family present, and RN aware/bedside    COMMUNICATION/EDUCATION:   The patient's plan of care was discussed with: physical therapist and registered nurse    Patient Education  Education Given To: Patient;Family  Education Provided: Role of Therapy;Transfer Training  Education Method: Verbal  Barriers to Learning: Cognition  Education Outcome: Verbalized understanding;Continued education needed    Thank you for this referral.  Susan Hernandez OT  Minutes: 13  
stated increased pain \"all over\", did not quantify, limited session   Pain Intervention(s):   nursing notified and rest    Activity Tolerance:   Poor    After treatment:   Patient left in no apparent distress in bed, Call bell within reach, and Side rails x3    COMMUNICATION/EDUCATION:   The patient's plan of care was discussed with: registered nurse    Patient Education  Education Given To: Patient  Education Provided: Role of Therapy;Plan of Care  Education Method: Verbal  Barriers to Learning: Cognition  Education Outcome: Verbalized understanding;Continued education needed    Thank you for this referral.  Dafne Benton OT  Minutes: 14    Occupational Therapy Evaluation Charge Determination   History Examination Decision-Making   LOW Complexity : Brief history review  LOW Complexity: 1-3 Performance deficits relating to physical, cognitive, or psychosocial skills that result in activity limitations and/or participation restrictions LOW Complexity: No comorbidities that affect functional and  no verbal  or physical assist needed to complete eval tasks   Based on the above components, the patient evaluation is determined to be of the following complexity level: Low

## 2024-01-07 NOTE — PROGRESS NOTES
[facility]                                                                                          Hospitalist Progress Note  Curtis Coker MD  Answering service: [phone] or [phone] from in house phone        Date of Service:  2024  NAME:  Jacky Verduzco  :  1945  MRN:  [id number]       Admission Summary:   Patient is a very pleasant 78-year-old gentleman with a past medical history of paroxysmal atrial fibrillation, on Eliquis for anticoagulation, sick sinus syndrome, status post pacemaker insertion, hypertension, hypothyroidism, who came to the emergency room with chief complaint of lightheadedness and heart palpitations.  In the emergency room, patient's heart rate was as high as 180s.  EKG showed supraventricular tachycardia.  Patient spontaneously converted to normal sinus rhythm.  He was admitted for an overnight observation and treatment.       Interval history / Subjective:   Follow up SVT  S/p AVN ablation 1/3   No new issues  Assessment & Plan:         Supraventricular cardia  Recurrent atrial fibrillation/atrial flutter  -Status post AV ablation on 1/3/2024.  TTE 2024 EF 55 to 60% assessed by cardiology appreciate help    -Continue Eliquis 5 mg twice daily  -Open follow-up with cardiology    History of GERD  -Continue PPI and Carafate    Sick sinus syndrome, status post pacemaker insertion;   Hypothyroidism:Continue home dose of levothyroxine;  Hypertension: Continue home BP medications;  Chronic back pain: Continue as needed hydrocodone;   Anemia chronic: outpatient follow up       Code status: Full  Prophylaxis: Eliquis    Plan: Stable for discharge  Care Plan discussed with: Patient, RN;  Anticipated Disposition: snf vs home      Principal Problem:    PSVT (paroxysmal supraventricular tachycardia)  Resolved Problems:    * No resolved hospital problems. *         Social Determinants of Health     Tobacco Use: Low Risk  (1/3/2024)    
        [facility]                                                                                          Hospitalist Progress Note  Curtis Coker MD  Answering service: [phone] or [phone] from in house phone        Date of Service:  2024  NAME:  Jacky Verduzco  :  1945  MRN:  [id number]       Admission Summary:   Patient is a very pleasant 78-year-old gentleman with a past medical history of paroxysmal atrial fibrillation, on Eliquis for anticoagulation, sick sinus syndrome, status post pacemaker insertion, hypertension, hypothyroidism, who came to the emergency room with chief complaint of lightheadedness and heart palpitations.  In the emergency room, patient's heart rate was as high as 180s.  EKG showed supraventricular tachycardia.  Patient spontaneously converted to normal sinus rhythm.  He was admitted for an overnight observation and treatment.       Interval history / Subjective:     NAEON    Telemtry 17  suspect PVC      Assessment & Plan:         Supraventricular cardia  Recurrent atrial fibrillation/atrial flutter  -Status post AV ablation on 1/3/2024.  TTE 2024 EF 55 to 60% assessed by cardiology appreciate help    -Continue Eliquis 5 mg twice daily  -Follow up with cardiology as outpatient     History of GERD  -Continue PPI and Carafate    Sick sinus syndrome, status post pacemaker insertion;   Hypothyroidism:Continue home dose of levothyroxine;  Hypertension: Continue home BP medications;  Chronic back pain: Continue as needed hydrocodone;   Anemia chronic: outpatient follow up       Code status: Full  Prophylaxis: Eliquis    Plan: Stable for discharge  Care Plan discussed with: Patient, RN;  Anticipated Disposition: snf vs home      Principal Problem:    PSVT (paroxysmal supraventricular tachycardia)  Resolved Problems:    * No resolved hospital problems. *         Social Determinants of Health     Tobacco Use: Low Risk  (1/3/2024) 
        [facility]                                                                                          Hospitalist Progress Note  Joselo Pina MD  Answering service: [phone] or [phone] from in house phone        Date of Service:  2024  NAME:  Jacky Verduzco  :  1945  MRN:  [id number]       Admission Summary:   Patient is a very pleasant 78-year-old gentleman with a past medical history of paroxysmal atrial fibrillation, on Eliquis for anticoagulation, sick sinus syndrome, status post pacemaker insertion, hypertension, hypothyroidism, who came to the emergency room with chief complaint of lightheadedness and heart palpitations.  In the emergency room, patient's heart rate was as high as 180s.  EKG showed supraventricular tachycardia.  Patient spontaneously converted to normal sinus rhythm.  He was admitted for an overnight observation and treatment.       Interval history / Subjective:   Follow up SVT  S/p AVN ablation 1/3   No new issues  Assessment & Plan:     Supraventricular tachycardia/ recurrent Afib/ AFlutter:  -No recurrence since yesterday;  -Outpatient follow-up with Dr. George;   -Cardiology consulted;  -s/p AVN ablation on ;     Sick sinus syndrome, status post pacemaker insertion;   -Monitor on telemetry;    Hypothyroidism:  -Continue home dose of levothyroxine;    Hypertension:  -Continue home BP medications;    Paroxysmal atrial fibrillation:  -Continue metoprolol;  -Continue Eliquis;    Chronic back pain:  -Continue as needed hydrocodone;     Anemia chronic: outpatient follow up       Code status: Full  Prophylaxis: Eliquis    Plan: Medically stable, awaiting SNF  Care Plan discussed with: Patient, RN;  Anticipated Disposition: snf vs home      Principal Problem:    PSVT (paroxysmal supraventricular tachycardia)  Resolved Problems:    * No resolved hospital problems. *         Social Determinants of Health     Tobacco Use: Low Risk  (1/3/2024) 
        [facility]                                                                                          Hospitalist Progress Note  Ivis Barrientos MD  Answering service: [phone] OR [phone] from in house phone        Date of Service:  2024  NAME:  Jacky Verduzco  :  1945  MRN:  [id number]       Admission Summary:   Patient is a very pleasant 78-year-old gentleman with a past medical history of paroxysmal atrial fibrillation, on Eliquis for anticoagulation, sick sinus syndrome, status post pacemaker insertion, hypertension, hypothyroidism, who came to the emergency room with chief complaint of lightheadedness and heart palpitations.  In the emergency room, patient's heart rate was as high as 180s.  EKG showed supraventricular tachycardia.  Patient spontaneously converted to normal sinus rhythm.  He was admitted for an overnight observation and treatment.       Interval history / Subjective:   Patient seen and examined, feels tired, has not come up from ED to the room until around 6 or 7 pm last night.   Patient would like to get the AVN ablation done while he is here, planning to get the procedure down prior to discharge.   Discussed with him and caregiver at the bedside.      Assessment & Plan:        Supraventricular tachycardia:  -No recurrence since yesterday;  -Outpatient follow-up with Dr. George;   -Cardiology consulted, will attempt to complete the AVN ablation while inpatient;     Sick sinus syndrome, status post pacemaker insertion;     Hypothyroidism:  -Continue home dose of levothyroxine;    Hypertension:  -Continue home BP medications;    Paroxysmal atrial fibrillation:  -Continue metoprolol;  -Continue Eliquis;    Chronic back pain:  -Continue as needed hydrocodone;     Anemia:   -anemia work-up with AM labs;        Code status: Full  Prophylaxis: Eliquis  Care Plan discussed with: Patient;  Anticipated Disposition: TBD;  Home after Cardiology 
        [facility]                                                                                          Hospitalist Progress Note  Joselo Pina MD  Answering service: [phone] or [phone] from in house phone        Date of Service:  2024  NAME:  Jacky Verduzco  :  1945  MRN:  [id number]       Admission Summary:   Patient is a very pleasant 78-year-old gentleman with a past medical history of paroxysmal atrial fibrillation, on Eliquis for anticoagulation, sick sinus syndrome, status post pacemaker insertion, hypertension, hypothyroidism, who came to the emergency room with chief complaint of lightheadedness and heart palpitations.  In the emergency room, patient's heart rate was as high as 180s.  EKG showed supraventricular tachycardia.  Patient spontaneously converted to normal sinus rhythm.  He was admitted for an overnight observation and treatment.       Interval history / Subjective:   Follow up SVT  S/p AVN ablation 1/3   No new issues  Assessment & Plan:     Supraventricular tachycardia/ recurrent Afib/ AFlutter:  -No recurrence since yesterday;  -Outpatient follow-up with Dr. George;   -Cardiology consulted;  -s/p AVN ablation on ;   -Continue metoprolol/Eliquis;    Sick sinus syndrome, status post pacemaker insertion;   Hypothyroidism:Continue home dose of levothyroxine;  Hypertension: Continue home BP medications;  Chronic back pain: Continue as needed hydrocodone;   Anemia chronic: outpatient follow up       Code status: Full  Prophylaxis: Eliquis    Plan: Medically stable, will discharge the patient. The patient would likely appeal against it  Care Plan discussed with: Patient, RN;  Anticipated Disposition: snf vs home      Principal Problem:    PSVT (paroxysmal supraventricular tachycardia)  Resolved Problems:    * No resolved hospital problems. *         Social Determinants of Health     Tobacco Use: Low Risk  (1/3/2024)    Patient History     
        [facility]                                                                                          Hospitalist Progress Note  Ivis Barrientos MD  Answering service: [phone] OR [phone] from in house phone        Date of Service:  2024  NAME:  Jacky Verduzco  :  1945  MRN:  [id number]       Admission Summary:   Patient is a very pleasant 78-year-old gentleman with a past medical history of paroxysmal atrial fibrillation, on Eliquis for anticoagulation, sick sinus syndrome, status post pacemaker insertion, hypertension, hypothyroidism, who came to the emergency room with chief complaint of lightheadedness and heart palpitations.  In the emergency room, patient's heart rate was as high as 180s.  EKG showed supraventricular tachycardia.  Patient spontaneously converted to normal sinus rhythm.  He was admitted for an overnight observation and treatment.       Interval history / Subjective:   Patient seen and examined in the emergency room.  He is scheduled for what it sounds like an ablation next week with Dr. George.   He denies any chest pain, shortness of breath, or recurrence of palpitations since last night.     Assessment & Plan:        Supraventricular tachycardia:  -No recurrence since yesterday;  -Outpatient follow-up with Dr. George; per patient he is scheduled for a procedure early next week (?  Ablation);  -Cardiology consulted, awaiting recommendations;     Sick sinus syndrome, status post pacemaker insertion;     Hypothyroidism:  -Continue home dose of levothyroxine;    Hypertension:  -Continue home BP medications;    Paroxysmal atrial fibrillation:  -Continue metoprolol;  -Continue Eliquis;    Chronic back pain:  -Continue as needed hydrocodone;     Anemia:   -anemia work-up with AM labs;        Code status: Full  Prophylaxis: Eliquis  Care Plan discussed with: Patient;  Anticipated Disposition: Home tomorrow if cleared by cardiology;      
  Cardiac Cath Lab Recovery Arrival Note:      Jacky Verduzco arrived to Cardiac Cath Lab, Recovery Area. Staff introduced to patient. Patient identifiers verified with NAME and DATE OF BIRTH. Procedure verified with patient. Consent forms reviewed and signed by patient or authorized representative and verified. Allergies verified.     Patient and family oriented to department. Patient and family informed of procedure and plan of care.     Questions answered with review. Patient prepped for procedure, per orders from physician, prior to arrival.    Patient on cardiac monitor, non-invasive blood pressure, SPO2 monitor. On Room air. Patient is A&Ox 4. Patient reports No Pain at rest.     Patient in stretcher, in low position, with side rails up, call bell within reach, patient instructed to call if assistance as needed.    Patient prep in: [facility] Recovery Area, [place] [address].   Patient family : Negrita ( Friend)  Family in: Waiting room .   Prep by: Abigail Mena RN   
  Physician Progress Note      PATIENT:               PEDRO KINGSTON  CSN #:                  [id number]  :                       1945  ADMIT DATE:       2023 5:31 PM  DISCH DATE:  RESPONDING  PROVIDER #:        Ivis Barrientos MD          QUERY TEXT:    Patient admitted with Paroxysmal supraventricular tachycardia, noted to have   Paroxysmal atrial fibrillation and is maintained on Eliquis. If possible,   please document in progress notes and discharge summary if you are evaluating   and/or treating any of the following:    The medical record reflects the following:  Risk Factors: Age 78, HTN, CHF  Clinical Indicators: H&P  \"Paroxysmal atrial fibrillation\"  Treatment: Eliquis    Thank you  Candice Siddiqui RN, CDI. CCDS, CRCR  Certified  Clinical Documentation   O: [phone]  [email or web address]  I can also be reached by perfect serve  Options provided:  -- Secondary hypercoagulable state in a patient with atrial fibrillation  -- Other - I will add my own diagnosis  -- Disagree - Not applicable / Not valid  -- Disagree - Clinically unable to determine / Unknown  -- Refer to Clinical Documentation Reviewer    PROVIDER RESPONSE TEXT:    This patient has secondary hypercoagulable state in a patient with atrial   fibrillation.    Query created by: Candice Siddiqui on 1/3/2024 10:55 AM      Electronically signed by:  Ivis Barrientos MD 1/3/2024 11:07 AM          
78 yom with sydni Schmidt scheduled for outpatient AV node ablation with Dr. George for recurrent PAF/flutter with rvr.  Admitted and seen by Dr. Seo yesterday.    Discussed av node ablation today.  Procedure risks/benefits reviewed.  
Admission Medication Reconciliation:    Information obtained from:  Patient's caregiver - Negrita Muse  RxQuery data available¹:  Yes    Comments/Recommendations: Updated PTA meds/reviewed patient's allergies.    1)  Patient was not able to provide medication history. He stated that he does not manage his medications. He has a caregiver who fixes his pill box and monitors medication administration. I contacted caregiver. She was able to provide current medication list. Note: patient is taking apixaban. Last dose 12/31/23 at 1530. This was morning dose. Ergicalciferol is twice weekly, Tues and Fri. Last dose was Friday 12/29/23. The correct strength of Levothyroxine is 50 mcg.     2)  Medication changes (since last review):  Added  - Metoprolol, Hydrocodone/ Apap    Adjusted  - Apap to PRN, Cholestyramine to PRN, Lidoderm to PRN    Removed  - Potassium chloride, Ascorbic Acid    3)  Will contact provider with recommendations.      ¹RxQuery pharmacy benefit data reflects medications filled and processed through the patient's insurance, however   this data does NOT capture whether the medication was picked up or is currently being taken by the patient.    Allergies:  Patient has no known allergies.    Significant PMH/Disease States:   Past Medical History:   Diagnosis Date    DJD (degenerative joint disease)     Hypertension     Obesity      Chief Complaint for this Admission:    Chief Complaint   Patient presents with    Chest Pain     Prior to Admission Medications:   Prior to Admission Medications   Prescriptions Last Dose Informant   HYDROcodone-acetaminophen (NORCO)  MG per tablet     Sig: Take 1 tablet by mouth every 6 hours as needed for Pain. Max Daily Amount: 4 tablets   acetaminophen (AMINOFEN) 325 MG tablet     Sig: Take 2 tablets by mouth 3 times daily   Patient taking differently: Take 2 tablets by mouth 3 times daily as needed for Pain   apixaban (ELIQUIS) 5 MG TABS tablet 12/31/2023 at 1530  
Bedside shift change report given to Say RN (oncoming nurse) by Ana RN (offgoing nurse). Report included the following information Nurse Handoff Report.    
Cardiac Cath Lab Procedure Area Arrival Note:    Jacky Verduzco arrived to Cardiac Cath Lab, Procedure Area. Patient identifiers verified with NAME and DATE OF BIRTH. Procedure verified with patient. Consent forms verified. Allergies verified. Patient informed of procedure and plan of care. Questions answered with review. Patient voiced understanding of procedure and plan of care.    Patient on cardiac monitor, non-invasive blood pressure, SPO2 monitor. On room air;  placed on O2 @ 3 lpm via nasal cannula for procedure.  IV of normal saline on pump at 50 ml/hr. Patient status doing well with some problems : 9/10 pain, post recent fall. Patient is A&Ox 4. Patient reports pain, placed in position of comfort.     Patient medicated during procedure with orders obtained and verified by Dr. MOLLY Daniels.    Refer to patients Cardiac Cath Lab PROCEDURE REPORT for vital signs, assessment, status, and response during procedure, printed at end of case. Printed report on chart or scanned into chart.    
EP LAB to Recovery Room Report    Procedure: AV Node Ablation    MD: Nima Daniels    Pre-procedure heart rhythm: Paced  Verbal Report given to Recovery Nurse on patient being transferred to Recovery Room for routine post-op. Patient stable upon transfer to .  Pt had conscious sedation. Vitals, mental status, MAR, procedural summary discussed with recovery RN.     Conscious sedation medication given by EP RN:  Versed 4 mg  Fentanyl 50mcg    Phenylephrine HCl  IV push 120mcg    Post-procedure heart rhythm: Paced    Sheaths:  1111hrs:  Right femoral vein 8fr sheath removed, closed with Perclose.    
Occupational Therapy   01.05.2023    Chart reviewed in prep for OT treatment. Patient received in bed and reporting anxiety about where he will go when discharged - on phone with sister. Patient politely but adamantly refusing therapy at this time. Will defer and follow up as able. Thank you.    Susan Hernandez MS, OTR/L  
Patient received in bed and reporting anxiety about where he will go when discharged Patient politely but adamantly refusing therapy at this time. Will defer and follow up as able. Thank you.   
Patient stated he does not need to take Metoprolol after the ablation per his cardiologist. Per Dr. Frances cordero d/c Metoprolol.   
Pt of the floor for AV node ablation today. Will continue to follow and progress with therapy as able    Janki Merino, CHRISTINAT, PT    
Referral source:  initiated visit to Jacky Verduzco due to length of stay in [facility] SURG. Reviewed the patient's medical record prior to this encounter.      Assessment: Patient was experiencing spiritual concern as he shared about uncertainty around his medical condition.  Patient's [employer] jamaal is a significant source of support.  Patient said a prayer.  Provided healing touch via hand holding during prayer by patient request.  Patient is supported by family and a friend who is like a son to him. Provided active listening and reinforced his support system.       Outcome: Patient appeared to feel encouraged and connected to the sacred as evidenced by prayer, healing touch, and patient's request for future visits.     Plan of Care: Patient requested future visits if a  is available. Patient is aware of 's availability and was encouraged to request support as needed.      For additional spiritual care, please contact the  on-call at [phone] ([phone]).     Rev. Pricilla Ruano MDiv  Resident     
Spiritual Care Assessment/Progress Note  [facility]    Name: Jacky Verduzco MRN: [id number]    Age: 78 y.o.     Sex: male   Language: English     Date: 1/4/2024            Total Time Calculated: 14 min              Spiritual Assessment begun in [address] SURG  Service Provided For:: Patient     Encounter Overview/Reason : Initial Encounter    Spiritual beliefs:      [x] Involved in a jamaal tradition/spiritual practice: Denominational     [] Supported by a jamaal community:      [] Claims no spiritual orientation:      [] Seeking spiritual identity:           [] Adheres to an individual form of spirituality:      [] Not able to assess:                Identified resources for coping and support system:   Support System: Family members       [] Prayer                  [] Devotional reading               [] Music                  [] Guided Imagery     [] Pet visits                                        [] Other: (COMMENT)     Specific area/focus of visit   Encounter:    Crisis:    Spiritual/Emotional needs:    Ritual, Rites and Sacraments:    Grief, Loss, and Adjustments:    Ethics/Mediation:    Behavioral Health:    Palliative Care:    Advance Care Planning:      Plan/Referrals: Other (Comment) (Would appreciate a follow up visit)    Narrative:  initiated visit to Jacky Verduzco due to length of stay in [address] SURG. Reviewed the patient's medical record prior to this encounter. Patient requested a visit at another time due to how he was feeling physically.  Patient shared that his [employer] jamaal is a source of daily support.  Patient's family is also a support to him.  Encouraged patient to use his jamaal as a resource.  Exchanged words of blessing with patient.  Patient appeared encouraged at the end of the visit.  Patient is aware of 's availability. For additional spiritual care, please contact the  on-call at [phone] ([phone]).     Rev. Pricilla Ruano MDiv  Resident         
TRANSFER - IN REPORT:    Verbal report received from DIANA Segovia on Jacky Verduzco , from the Cardiac Cath lab, for routine progression of care. Report consisted of patient’s Situation, Background, Assessment and Recommendations(SBAR). Opportunity for questions and clarification was provided. Assessment completed upon patient’s arrival to Cardiac Cath Lab RECOVERY AREA and care assumed.    Cardiac Cath Lab Recovery Arrival Note:     Jacky Verduzco arrived to [facility] recovery area.  Patient procedure= AV node ablation. Patient on cardiac monitor, non-invasive blood pressure, Patient status doing well without problems. Patient is A&Ox 4. Patient reports no complaints. Procedure site without any bleeding and no hematoma.    
TRANSFER - IN REPORT:    Verbal report received from Maddi on Jacky Verduzco  being received from  for routine progression of patient care      Report consisted of patient's Situation, Background, Assessment and   Recommendations(SBAR).     Information from the following report(s) Nurse Handoff Report was reviewed with the receiving nurse.    Opportunity for questions and clarification was provided.      Assessment completed upon patient's arrival to unit and care assumed.    
TRANSFER - OUT REPORT:    Verbal report given to DIANA Rubio on Jacky Verduzco  being transferred to  for routine progression of patient care       Report consisted of patient's Situation, Background, Assessment and   Recommendations(SBAR).     Information from the following report(s) Nurse Handoff Report and MAR was reviewed with the receiving nurse.           Lines:   Peripheral IV 01/01/24 Distal;Right;Anterior Cephalic (Active)   Site Assessment Clean, dry & intact 01/02/24 2227   Line Status Brisk blood return;Flushed;Normal saline locked 01/02/24 2227   Line Care Ports disinfected 01/02/24 2227   Phlebitis Assessment No symptoms 01/02/24 2227   Infiltration Assessment 0 01/02/24 2227   Alcohol Cap Used Yes 01/02/24 2227   Dressing Status Clean, dry & intact 01/02/24 2227   Dressing Type Transparent 01/02/24 2227   Dressing Intervention New 01/01/24 1730        Opportunity for questions and clarification was provided.      Patient transported with:  Monitor and Registered Nurse        
    Principal Problem:    PSVT (paroxysmal supraventricular tachycardia)  Resolved Problems:    * No resolved hospital problems. *         Social Determinants of Health     Tobacco Use: Low Risk  (1/3/2024)    Patient History     Smoking Tobacco Use: Never     Smokeless Tobacco Use: Never     Passive Exposure: Not on file   Alcohol Use: Not At Risk (7/29/2023)    AUDIT-C     Frequency of Alcohol Consumption: Monthly or less     Average Number of Drinks: 1 or 2     Frequency of Binge Drinking: Never   Financial Resource Strain: Not on file   Food Insecurity: Not on file   Transportation Needs: Not on file   Physical Activity: Not on file   Stress: Not on file   Social Connections: Not on file   Intimate Partner Violence: Not on file   Depression: Not on file   Housing Stability: Not on file   Interpersonal Safety: Not on file   Utilities: Not on file       Review of Systems:   Pertinent items are noted in HPI.       Vital Signs:    Last 24hrs VS reviewed since prior progress note. Most recent are:  Vitals:    01/03/24 1400   BP: 103/71   Pulse: 70   Resp: 14   Temp:    SpO2: 99%       No intake or output data in the 24 hours ending 01/03/24 1514       Physical Examination:     I had a face to face encounter with this patient and independently examined them on 1/3/2024 as outlined below:          General : alert x 3, awake, no acute distress,   HEENT:  EOMI, moist mucus membrane  Neck: supple, no JVD, no meningeal signs  Chest: Clear to auscultation bilaterally, no rales, rhonchi or wheezing, on RA;  CVS: S1 S2 heard, RRR  Abd: soft/ non tender, non distended, BS physiological,   Ext: no clubbing, no cyanosis, trace bilateral pedal and lower leg edema  Neuro/Psych: pleasant mood and affect, CN 2-12 grossly intact, sensory grossly within normal limit, Strength 5/5 in all extremities, DTR 1+ x 4  Skin: warm     Data Review:    Review and/or order of clinical lab test  Review and/or order of tests in the radiology

## 2024-01-08 VITALS
BODY MASS INDEX: 20.42 KG/M2 | WEIGHT: 130.07 LBS | RESPIRATION RATE: 20 BRPM | OXYGEN SATURATION: 95 % | HEIGHT: 67 IN | SYSTOLIC BLOOD PRESSURE: 120 MMHG | DIASTOLIC BLOOD PRESSURE: 81 MMHG | HEART RATE: 74 BPM | TEMPERATURE: 98.2 F

## 2024-01-08 PROCEDURE — 6360000002 HC RX W HCPCS: Performed by: INTERNAL MEDICINE

## 2024-01-08 PROCEDURE — 6370000000 HC RX 637 (ALT 250 FOR IP): Performed by: INTERNAL MEDICINE

## 2024-01-08 PROCEDURE — 2580000003 HC RX 258: Performed by: INTERNAL MEDICINE

## 2024-01-08 RX ORDER — HYDROCODONE BITARTRATE AND ACETAMINOPHEN 10; 325 MG/1; MG/1
1 TABLET ORAL EVERY 8 HOURS PRN
Qty: 4 TABLET | Refills: 0 | Status: SHIPPED | OUTPATIENT
Start: 2024-01-08 | End: 2024-01-09

## 2024-01-08 RX ADMIN — HYDROCODONE BITARTRATE AND ACETAMINOPHEN 1 TABLET: 10; 325 TABLET ORAL at 12:33

## 2024-01-08 RX ADMIN — OXYCODONE HYDROCHLORIDE AND ACETAMINOPHEN 500 MG: 500 TABLET ORAL at 08:42

## 2024-01-08 RX ADMIN — HYDROMORPHONE HYDROCHLORIDE 1 MG: 1 INJECTION, SOLUTION INTRAMUSCULAR; INTRAVENOUS; SUBCUTANEOUS at 08:50

## 2024-01-08 RX ADMIN — MAGNESIUM OXIDE TAB 400 MG (241.3 MG ELEMENTAL MG) 400 MG: 400 (241.3 MG) TAB at 08:42

## 2024-01-08 RX ADMIN — FOLIC ACID TAB 400 MCG 800 MCG: 400 TAB at 08:42

## 2024-01-08 RX ADMIN — SUCRALFATE 1 G: 1 TABLET ORAL at 12:33

## 2024-01-08 RX ADMIN — SUCRALFATE 1 G: 1 TABLET ORAL at 08:42

## 2024-01-08 RX ADMIN — Medication 10 ML: at 08:50

## 2024-01-08 RX ADMIN — APIXABAN 5 MG: 5 TABLET, FILM COATED ORAL at 08:41

## 2024-01-08 RX ADMIN — PANTOPRAZOLE SODIUM 40 MG: 40 TABLET, DELAYED RELEASE ORAL at 08:42

## 2024-01-08 RX ADMIN — MIDODRINE HYDROCHLORIDE 2.5 MG: 5 TABLET ORAL at 12:33

## 2024-01-08 RX ADMIN — ASPIRIN 81 MG CHEWABLE TABLET 81 MG: 81 TABLET CHEWABLE at 08:43

## 2024-01-08 RX ADMIN — LEVOTHYROXINE SODIUM 50 MCG: 50 TABLET ORAL at 08:42

## 2024-01-08 RX ADMIN — CYANOCOBALAMIN TAB 500 MCG 1000 MCG: 500 TAB at 08:41

## 2024-01-08 RX ADMIN — MIDODRINE HYDROCHLORIDE 2.5 MG: 5 TABLET ORAL at 08:42

## 2024-01-08 ASSESSMENT — PAIN SCALES - WONG BAKER: WONGBAKER_NUMERICALRESPONSE: 0

## 2024-01-08 ASSESSMENT — PAIN DESCRIPTION - ORIENTATION
ORIENTATION: LOWER;MID;POSTERIOR
ORIENTATION: POSTERIOR

## 2024-01-08 ASSESSMENT — PAIN DESCRIPTION - LOCATION
LOCATION: BACK
LOCATION: BACK

## 2024-01-08 ASSESSMENT — PAIN SCALES - GENERAL
PAINLEVEL_OUTOF10: 6
PAINLEVEL_OUTOF10: 10

## 2024-01-08 NOTE — DISCHARGE INSTRUCTIONS
- Follow up cardiology    I have reviewed discharge instructions with the patient.  The patient verbalized understanding.

## 2024-01-08 NOTE — CARE COORDINATION
Transition of Care Plan:Hopefully SNF, referrals pending     RUR: 15% Moderate  Prior Level of Functioning: From home; has personal care aid M-F 9-2  Disposition: SNF  If SNF or IPR: Date FOC offered: 1/4/24  Date FOC received: 1/4/24  Accepting facility: Referrals pending, declined [facility].  Follow up appointments: PCP, Specialist  DME needed: Owns hospital bed, wheelchair, rollator, shower chair, raised toilet seat  Transportation at discharge: BLS  IM/IMM Medicare/ letter given: 1/1/24; 1/4/24  Caregiver Contact: Negrita [phone]  Discharge Caregiver contacted prior to discharge? NA  Care Conference needed? No  Barriers to discharge: Not enough help at home, SNF referrals pending         CM met with pt regarding SNF choices. Pt appealed his discharge yesterday, and is awaiting appeal decision per Negrita (pt's Caregiver). CM contacted pt's caregiver, Negrita regarding appeal information and discharge plan. CM contacted Sheryl, Liaison with [facility], [facility], and [facility] regarding bed availability. There are no beds available at [facility]. There is a bed possible for   Monday 1/8 at [facility]. Sheryl is checking on [facility] for possible bed on 1/7.   No beds available until next week at [facility].   DR. Coker is made awre. CM following.    2:00 pm Pt offered additional choices for SNF. [facility] and [facility] per request, referral pending in [facility].    Hillary Mahoney, RN BSN  Care Manager   
Medicaid LTSS Screening submitted for processing on the [facility] portal.      EDUARDA RODRIGUEZ    
Patient appealed discharge [id number]. EMR Key- [id number]  DND delivered to patient.   Lucas MORALES, A   [phone]   
Transition of Care Plan:     RUR: 15% Moderate  Prior Level of Functioning: From home; has personal care aid M-F 9-2  Disposition: SNF  If SNF or IPR: Date FOC offered: 1/4/24  Date FOC received: 1/4/24  Accepting facility: Referrals pending, declined [facility]  Follow up appointments: PCP, Specialist  DME needed: Owns hospital bed, wheelchair, rollator, shower chair, raised toilet seat  Transportation at discharge: BLS  IM/IMM Medicare/ letter given: 1/1/24; 1/4/24  Caregiver Contact: Negrita [phone]  Discharge Caregiver contacted prior to discharge? NA  Care Conference needed? No  Barriers to discharge: Not enough help at home, SNF referrals pending       CM met with patient and caregiver Negrita. CM informed the only accepting facility at this time is [facility] which Negrita stated was not a good facility. CM offered additional choices to include [facility], [facility], [facility], [facility], [facility]. CM placed call to liaison Shu [phone] to verify bed availability at either facility-Shu will follow up with CM. Patient has no particular preference out of those facilities and today reports he would return to [facility] if he had to. Patient also wanted CM to follow up with [facility]. CM placed call to Varinder in admissions at [facility] and was informed they do not have an available male bed but would review referral-would possibly have something next week. *Patient may appeal DC*. Attending updated. CM to monitor.       3:15 Shu ([facility]) met with patient and caregiver Negrita at [facility] to review options. [facility] does not have any available bed today. Patient not agreeable to [facility]. Patient will be appealing DC. CM notified CM leadership, attending, and nurse.    Tamara Brice, MS  [phone]  
Transition of Care Plan:    Discharge to [facility] at 2pm today.  [facility] scheduled for 2pm    RN call report to [phone]    RUR: 11% Moderate  Prior Level of Functioning: From home; has personal care aid M-F 9-2  Disposition: SNF-[facility] has accepted after 2pm today into Room [address]B  CM spoke to Shu Ivory [phone]  If SNF or IPR: Date FOC offered: 1/4/24  Date FOC received: 1/4/24  Accepting facility: [facility]  Follow up appointments: PCP, Specialist  DME needed: Owns hospital bed, wheelchair, rollator, shower chair, raised toilet seat  Transportation at discharge: [facility] scheduled for 2pm today.  IM/IMM Medicare/ letter given: 1/1/24; 1/4/24  Caregiver Contact: Negrita [phone]  Discharge Caregiver contacted prior to discharge? NA  Care Conference needed? No  Barriers to discharge: None    Discharge appeal was denied.    Irma Soriano, RN/CRM  [phone]    
Transition of Care Plan:    RUR: 15% Moderate  Prior Level of Functioning: From home; has personal care aid M-F 9-2  Disposition: SNF  If SNF or IPR: Date FOC offered: 1/4/24  Date FOC received: 1/4/24  Accepting facility: Referrals pending  Follow up appointments: PCP, Specialist  DME needed: Owns hospital bed, wheelchair, rollator, shower chair, raised toilet seat  Transportation at discharge: BLS  IM/IMM Medicare/ letter given: 1/1/24  Caregiver Contact: Negrita [phone]  Discharge Caregiver contacted prior to discharge? NA  Care Conference needed? No  Barriers to discharge: Not enough help at home, SNF referrals pending    CM met with patient and caregiver Negrita [phone] to discuss transitional care plan. Patient reports that he is not safe going home due to limited support. He will have no one available to assist him until Monday. Negrita provides caregiving assistance M-F  from about 9am to 2:30 pm. Patient typically has assistance from friends/family over the weekend. Patient is below normal baseline. Patient is agreeable to CM sending SNF referrals. He does not want a referral to [facility] or [facility]. Attending agreed with SNF referrals, no auth needed. CM to monitor for acceptance.       4:22 So far, [facility] is the only accepting SNF at this time. [facility] which is first choice, does not have a bed. All other referrals are still pending. Attending and caregiver Negrita updated. Negrita is not sure patient/family would want [facility]. CM to monitor.    Tamara Brice, MS  [phone]  
ransition of Care Plan:Hopefully SNF, referrals pending     RUR: 15% Moderate  Prior Level of Functioning: From home; has personal care aid M-F 9-2  Disposition: SNF  If SNF or IPR: Date FOC offered: 1/4/24  Date FOC received: 1/4/24  Accepting facility: Referrals pending, declined [facility] and [facility].  Follow up appointments: PCP, Specialist  DME needed: Owns hospital bed, wheelchair, rollator, shower chair, raised toilet seat  Transportation at discharge: BLS  IM/IMM Medicare/ letter given: 1/1/24; 1/4/24  Caregiver Contact: Negrita [phone]  Discharge Caregiver contacted prior to discharge? NA  Care Conference needed? No  Barriers to discharge: Not enough help at home, SNF referrals pending    CM met with patient who confirmed that he defers discharge planning arrangements to caregiver Negrita.   Also, patient said that he has a brother at [facility]. He has a sister at [facility] with his long range plan to also go to the [facility].    QUIRINO spoke with caregiver Negrita who said that she spoke with Lindsey and appeal of discharge was denied. She was advised that patient must leave 12 noon Monday 1/8/24 to avoid financial consequence.  QUIRINO subsequently was advised that Lindsey had sent a fax regarding the denial to the Care Management office.     Negrita advised CM that she has been calling SNF facilities to get update on bed availability. The status of referrals thus far is as follows:  [facility]: possible bed Monday 1/8/24  [facility]: possible bed Monday 1/8/23  [facility]: possible bed Monday 1/8/24  [facility]: no bed till Wednesday 1/10/24 at the earliest.  [facility]: no bed.  [facility]: Accepted  [facility]: Accepted  [facility]: no bed till later in the week.  [facility]: choice withdrawn due to distance from family.         
At/After Discharge None   [facility] Resource Information Provided? No   Mode of Transport at Discharge Other (see comment)  (friend)   Confirm Follow Up Transport Friends     CM met with patient bedside and verified demographics, insurance, and PCP. Patient currently lives alone and as of recent now requiring assistance with his ADLs/IADLs.  Patient has a hospital bed, ramp, wheelchair, raised toilet seat, rollator, and shower chair. Patient has a care giver Irma who comes occasionally to provide assistance. Patient uses [facility] for prescriptions. Patient has been to [facility] and [facility] in the past.  Patient recently obtained Medicaid and is likely needing LTC placement and is interested in the [facility] as he is a [employer] and in good standing.     Medicare pt has received, reviewed, and signed 1st IM letter informing them of their right to appeal the discharge.  Signed copy has been placed on pt bedside chart.      Estephania Jane  Care Manager  [phone]

## 2024-01-08 NOTE — DISCHARGE SUMMARY
Discharge Summary   Please note that this dictation was completed with watAgame, the computer voice recognition software.  Quite often unanticipated grammatical, syntax, homophones, and other interpretive errors are inadvertently transcribed by the computer software.  Please disregard these errors.  Please excuse any errors that have escaped final proofreading.    PATIENT ID: Jacky Verduzco  MRN: [id number]   YOB: 1945    DATE OF ADMISSION: 12/31/2023  5:31 PM    DATE OF DISCHARGE: 1/8/2024  PRIMARY CARE PROVIDER: Huseyin Savage MD         ATTENDING PHYSICIAN: Curtis Coker MD  DISCHARGING PROVIDER: Curtis Coker MD       CONSULTATIONS: IP CONSULT TO CARDIOLOGY  IP CONSULT TO CARDIOLOGY  IP CONSULT TO CARDIOLOGY  IP CONSULT TO CARDIOLOGY  IP WOUND CARE NURSE CONSULT TO EVAL    PROCEDURES/SURGERIES: Procedure(s):  Ablation AV node  Ultrasound guided vascular access    ADMITTING HPI from excerpted H&P   Patient is a very pleasant 78-year-old gentleman with a past medical history of paroxysmal atrial fibrillation, on Eliquis for anticoagulation, sick sinus syndrome, status post pacemaker insertion, hypertension, hypothyroidism, who came to the emergency room with chief complaint of lightheadedness and heart palpitations.  In the emergency room, patient's heart rate was as high as 180s.  EKG showed supraventricular tachycardia.  Patient spontaneously converted to normal sinus rhythm.  He was admitted for an overnight observation and treatment.             HOSPITAL COURSE & DISCHARGE DIAGNOSIS/ PLAN:         Supraventricular cardia  Recurrent atrial fibrillation/atrial flutter  -Status post AV ablation on 1/3/2024.  TTE 1/4/2024 EF 55 to 60% assessed by cardiology appreciate help     -Continue Eliquis 5 mg twice daily  -Follow up with cardiology as outpatient      History of GERD  -Continue PPI and Carafate     Sick sinus syndrome, status post pacemaker insertion;   Hypothyroidism:Continue home dose

## 2024-01-29 ENCOUNTER — OFFICE VISIT (OUTPATIENT)
Facility: CLINIC | Age: 79
End: 2024-01-29
Payer: MEDICARE

## 2024-01-29 VITALS
DIASTOLIC BLOOD PRESSURE: 59 MMHG | TEMPERATURE: 97.9 F | OXYGEN SATURATION: 99 % | RESPIRATION RATE: 20 BRPM | SYSTOLIC BLOOD PRESSURE: 99 MMHG | WEIGHT: 133.6 LBS | BODY MASS INDEX: 20.92 KG/M2 | HEART RATE: 71 BPM

## 2024-01-29 DIAGNOSIS — E43 SEVERE PROTEIN-CALORIE MALNUTRITION (HCC): ICD-10-CM

## 2024-01-29 DIAGNOSIS — F33.1 MODERATE EPISODE OF RECURRENT MAJOR DEPRESSIVE DISORDER (HCC): ICD-10-CM

## 2024-01-29 DIAGNOSIS — M47.817 SPONDYLOSIS OF LUMBOSACRAL REGION, UNSPECIFIED SPINAL OSTEOARTHRITIS COMPLICATION STATUS: ICD-10-CM

## 2024-01-29 DIAGNOSIS — D64.9 ANEMIA, UNSPECIFIED TYPE: ICD-10-CM

## 2024-01-29 DIAGNOSIS — I50.32 CHRONIC DIASTOLIC HEART FAILURE (HCC): ICD-10-CM

## 2024-01-29 DIAGNOSIS — I95.9 HYPOTENSION, UNSPECIFIED HYPOTENSION TYPE: ICD-10-CM

## 2024-01-29 DIAGNOSIS — E83.42 HYPOMAGNESEMIA: ICD-10-CM

## 2024-01-29 DIAGNOSIS — R53.1 WEAKNESS: ICD-10-CM

## 2024-01-29 DIAGNOSIS — J40 BRONCHITIS: Primary | ICD-10-CM

## 2024-01-29 DIAGNOSIS — E03.9 HYPOTHYROIDISM, UNSPECIFIED TYPE: ICD-10-CM

## 2024-01-29 PROCEDURE — 1123F ACP DISCUSS/DSCN MKR DOCD: CPT | Performed by: NURSE PRACTITIONER

## 2024-01-29 PROCEDURE — G8484 FLU IMMUNIZE NO ADMIN: HCPCS | Performed by: NURSE PRACTITIONER

## 2024-01-29 PROCEDURE — 99309 SBSQ NF CARE MODERATE MDM 30: CPT | Performed by: NURSE PRACTITIONER

## 2024-01-29 RX ORDER — FUROSEMIDE 40 MG/1
40 TABLET ORAL DAILY
COMMUNITY

## 2024-01-29 RX ORDER — AMOXICILLIN 250 MG
1 CAPSULE ORAL DAILY
COMMUNITY

## 2024-01-29 RX ORDER — ACETAMINOPHEN 325 MG/1
650 TABLET ORAL EVERY 6 HOURS PRN
COMMUNITY

## 2024-01-29 RX ORDER — IPRATROPIUM BROMIDE AND ALBUTEROL SULFATE 2.5; .5 MG/3ML; MG/3ML
1 SOLUTION RESPIRATORY (INHALATION) 3 TIMES DAILY
COMMUNITY
End: 2024-01-31

## 2024-01-29 RX ORDER — PREDNISONE 20 MG/1
40 TABLET ORAL DAILY
COMMUNITY
End: 2024-01-30

## 2024-01-29 RX ORDER — OMEPRAZOLE 20 MG/1
20 CAPSULE, DELAYED RELEASE ORAL DAILY
COMMUNITY

## 2024-01-29 RX ORDER — CHOLECALCIFEROL (VITAMIN D3) 125 MCG
5 CAPSULE ORAL DAILY
COMMUNITY

## 2024-01-29 NOTE — PROGRESS NOTES
PLACE OF SERVICE:  [facility] [address] [place] [address]    SKILLED VISIT    1/29/2024    Chief Complaint:   Chief Complaint   Patient presents with    Cough    Follow-up         HPI : Jacky Verduzco is a 78 y.o. male here for follow up.    Previous history prior to admission:  78-year-old male with history of paroxysmal atrial fibrillation on Eliquis for  anticoagulation, sick sinus syndrome status post pacemaker insertion, hypertension,  hypothyroidism who came to emergency room with chief complaints of  lightheadedness and heart palpitations. In the emergency room patient's heart rate  was as high as 188. EKG showed supraventricular tachycardia. Patient  spontaneously converted to normal sinus rhythm. He was admitted to the hospital  and underwent AV ablation on 1/3/2024. TTE on 1/4/2024 showed ejection fraction  55 to 60.He continues on Eliquis 5 mg twice daily and will follow-up with cardiology as  outpatient. He has history of hypertension. Blood pressure remains well-controlled on  current regimen. He also has history of GERD. He continues on PPI and Carafate.  Remains asymptomatic. He has also history of congestive heart failure and is not  currently on any diuretics. No shortness of breath orthopnea PND or any other  symptoms suggestive of decompensated heart failure. Ejection fraction was 55 to  60.Blood pressure also continues to run in normal range. He was seen by PT OT who  recommended discharge to SNF for rehab. He is now admitted to [facility] for skilled rehab.    Current visit:  Patient is lying in bed today he is awake alert. Vital signs reviewed blood pressures  ranging . He denies lightheadedness or dizziness.  On previous visit he had noted that he was having some cough.  He was seen on 1/26/2024 with temperature of 101.4.  Flu AB RSV and COVID PCR were done and were negative.  His white blood cell count 6.5.  His renal function

## 2024-01-31 ENCOUNTER — OFFICE VISIT (OUTPATIENT)
Facility: CLINIC | Age: 79
End: 2024-01-31
Payer: MEDICARE

## 2024-01-31 VITALS
BODY MASS INDEX: 20.92 KG/M2 | OXYGEN SATURATION: 98 % | HEART RATE: 67 BPM | RESPIRATION RATE: 18 BRPM | SYSTOLIC BLOOD PRESSURE: 110 MMHG | WEIGHT: 133.6 LBS | TEMPERATURE: 98.4 F | DIASTOLIC BLOOD PRESSURE: 60 MMHG

## 2024-01-31 DIAGNOSIS — E43 SEVERE PROTEIN-CALORIE MALNUTRITION (HCC): ICD-10-CM

## 2024-01-31 DIAGNOSIS — I95.9 HYPOTENSION, UNSPECIFIED HYPOTENSION TYPE: ICD-10-CM

## 2024-01-31 DIAGNOSIS — I50.32 CHRONIC DIASTOLIC HEART FAILURE (HCC): ICD-10-CM

## 2024-01-31 DIAGNOSIS — J40 BRONCHITIS: Primary | ICD-10-CM

## 2024-01-31 DIAGNOSIS — M47.817 SPONDYLOSIS OF LUMBOSACRAL REGION, UNSPECIFIED SPINAL OSTEOARTHRITIS COMPLICATION STATUS: ICD-10-CM

## 2024-01-31 DIAGNOSIS — F33.1 MODERATE EPISODE OF RECURRENT MAJOR DEPRESSIVE DISORDER (HCC): ICD-10-CM

## 2024-01-31 DIAGNOSIS — R53.1 WEAKNESS: ICD-10-CM

## 2024-01-31 DIAGNOSIS — E83.42 HYPOMAGNESEMIA: ICD-10-CM

## 2024-01-31 DIAGNOSIS — D64.9 ANEMIA, UNSPECIFIED TYPE: ICD-10-CM

## 2024-01-31 PROBLEM — E46 PROTEIN CALORIE MALNUTRITION (HCC): Status: ACTIVE | Noted: 2024-01-31

## 2024-01-31 PROCEDURE — 99309 SBSQ NF CARE MODERATE MDM 30: CPT | Performed by: NURSE PRACTITIONER

## 2024-01-31 PROCEDURE — 1123F ACP DISCUSS/DSCN MKR DOCD: CPT | Performed by: NURSE PRACTITIONER

## 2024-01-31 PROCEDURE — G8484 FLU IMMUNIZE NO ADMIN: HCPCS | Performed by: NURSE PRACTITIONER

## 2024-01-31 RX ORDER — IPRATROPIUM BROMIDE AND ALBUTEROL SULFATE 2.5; .5 MG/3ML; MG/3ML
1 SOLUTION RESPIRATORY (INHALATION) EVERY 6 HOURS
COMMUNITY
End: 2024-02-07

## 2024-01-31 RX ORDER — GUAIFENESIN 200 MG/10ML
400 LIQUID ORAL EVERY 6 HOURS
COMMUNITY
End: 2024-02-07

## 2024-01-31 NOTE — PROGRESS NOTES
dry;  Musculoskeletal: No erythema swelling or joint tenderness, extremities without cyanosis, neck supple, ROM intact spine and extremities;  Psychiatric: Not agitated.  Appropriate affect, mood, judgment and insight.  Genitourinary: No suprapubic tenderness or flank tenderness  Heme, lymph, immuno: No pallor;       Labs: Last labs were reviewed from [facility] and rehab appointment cell count 6.5 hemoglobin 9.5 hematocrit 23 platelet 278 sodium 136 potassium 4.5 chloride 104 BUN 27 creatinine 1.21    Chest x-ray from 1/26/2024 notes no focal pneumonia seen findings suggest bronchitis.    Current Outpatient Medications   Medication Sig Dispense Refill    ipratropium 0.5 mg-albuterol 2.5 mg (DUONEB) 0.5-2.5 (3) MG/3ML SOLN nebulizer solution Inhale 3 mLs into the lungs every 6 hours      guaiFENesin (ROBITUSSIN) 100 MG/5ML liquid Take 20 mLs by mouth every 6 hours      omeprazole (PRILOSEC) 20 MG delayed release capsule Take 1 capsule by mouth daily      acetaminophen (TYLENOL) 325 MG tablet Take 2 tablets by mouth every 6 hours as needed      senna-docusate (PERICOLACE) 8.6-50 MG per tablet Take 1 tablet by mouth daily      furosemide (LASIX) 40 MG tablet Take 1 tablet by mouth daily      melatonin 5 MG TABS tablet Take 1 tablet by mouth daily      HYDROcodone-acetaminophen (NORCO)  MG per tablet Take 1 tablet by mouth every 6 hours as needed for Pain. Max Daily Amount: 4 tablets      midodrine (PROAMATINE) 2.5 MG tablet Take 1 tablet by mouth 3 times daily 90 tablet 2    traZODone (DESYREL) 50 MG tablet Take 1 tablet by mouth nightly 30 tablet 2    mirtazapine (REMERON) 15 MG tablet Take 1 tablet by mouth nightly 30 tablet 2    magnesium oxide (MAG-OX) 400 MG tablet Take 1 tablet by mouth daily 30 tablet 2    cyanocobalamin 1000 MCG tablet Take 1 tablet by mouth daily 30 tablet 2    apixaban (ELIQUIS) 5 MG TABS tablet Take 1 tablet by mouth 2 times daily 60 tablet 2    sucralfate (CARAFATE) 1 GM

## 2024-01-31 NOTE — PROGRESS NOTES
medications for this visit.        Social History:  no history of smoking. Does NOT Drink alcohol regularly  Family History:  y positive for diabetes, heart disease, stroke       Vitals:   Vitals:    01/31/24 0739   BP: 110/60   Pulse: 67   Resp: 18   Temp: 98.4 °F (36.9 °C)   SpO2: 98%          Exam:  Constitutional: No acute distress;   Eyes: Sclera clear, PERRLA;   Ears/nose/mouth/throat:mmm, OP clear, trachea midline;  Cardiovascular: RRR,nml S1 and S2, no rubs murmurs or gallops, no edema, no cyanosis;   Respiratory: Clear to auscultation, symmetric, no respiratory distress;  Gastrointestinal: Abdomen soft, NT, ND, no masses, normal bowel sounds;  Neurologic: Cranial nerves II through VII grossly intact, no speech or motor deficits A&O in time place and person  Skin: No rash, warm and dry;  Musculoskeletal: No erythema swelling or joint tenderness, extremities without cyanosis, neck supple, ROM intact spine and extremities;  Psychiatric: Not agitated.  Appropriate affect, mood, judgment and insight.  Genitourinary: No suprapubic tenderness or flank tenderness  Heme, lymph, immuno: No pallor;       Labs: Last labs were reviewed from [facility] and rehab appointment cell count 6.5 hemoglobin 9.5 hematocrit 23 platelet 278 sodium 136 potassium 4.5 chloride 104 BUN 27 creatinine 1.21    Chest x-ray from 1/26/2024 notes no focal pneumonia seen findings suggest bronchitis.    Current Outpatient Medications   Medication Sig Dispense Refill    omeprazole (PRILOSEC) 20 MG delayed release capsule Take 1 capsule by mouth daily      acetaminophen (TYLENOL) 325 MG tablet Take 2 tablets by mouth every 6 hours as needed      senna-docusate (PERICOLACE) 8.6-50 MG per tablet Take 1 tablet by mouth daily      furosemide (LASIX) 40 MG tablet Take 1 tablet by mouth daily      melatonin 5 MG TABS tablet Take 1 tablet by mouth daily      ipratropium 0.5 mg-albuterol 2.5 mg (DUONEB) 0.5-2.5 (3) MG/3ML SOLN nebulizer solution

## 2024-02-02 ENCOUNTER — OFFICE VISIT (OUTPATIENT)
Facility: CLINIC | Age: 79
End: 2024-02-02

## 2024-02-02 VITALS
RESPIRATION RATE: 18 BRPM | TEMPERATURE: 98.3 F | OXYGEN SATURATION: 93 % | SYSTOLIC BLOOD PRESSURE: 131 MMHG | HEART RATE: 65 BPM | WEIGHT: 140.8 LBS | BODY MASS INDEX: 22.05 KG/M2 | DIASTOLIC BLOOD PRESSURE: 81 MMHG

## 2024-02-02 DIAGNOSIS — R53.1 WEAKNESS: ICD-10-CM

## 2024-02-02 DIAGNOSIS — M47.817 SPONDYLOSIS OF LUMBOSACRAL REGION, UNSPECIFIED SPINAL OSTEOARTHRITIS COMPLICATION STATUS: ICD-10-CM

## 2024-02-02 DIAGNOSIS — E73.9 LACTOSE INTOLERANCE: ICD-10-CM

## 2024-02-02 DIAGNOSIS — J40 BRONCHITIS: Primary | ICD-10-CM

## 2024-02-02 DIAGNOSIS — I95.9 HYPOTENSION, UNSPECIFIED HYPOTENSION TYPE: ICD-10-CM

## 2024-02-02 DIAGNOSIS — E43 SEVERE PROTEIN-CALORIE MALNUTRITION (HCC): ICD-10-CM

## 2024-02-02 DIAGNOSIS — E03.9 HYPOTHYROIDISM, UNSPECIFIED TYPE: ICD-10-CM

## 2024-02-02 DIAGNOSIS — E83.42 HYPOMAGNESEMIA: ICD-10-CM

## 2024-02-02 DIAGNOSIS — F33.1 MODERATE EPISODE OF RECURRENT MAJOR DEPRESSIVE DISORDER (HCC): ICD-10-CM

## 2024-02-02 DIAGNOSIS — I50.32 CHRONIC DIASTOLIC HEART FAILURE (HCC): ICD-10-CM

## 2024-02-02 DIAGNOSIS — D64.9 ANEMIA, UNSPECIFIED TYPE: ICD-10-CM

## 2024-02-02 NOTE — PROGRESS NOTES
reviewed blood pressures  ranging . He denies lightheadedness or dizziness.  On previous visit he had noted that he was having some cough.  He was seen on 1/26/2024 with temperature of 101.4.  Flu AB RSV and COVID PCR were done and were negative.  His white blood cell count 6.5.  His renal function was stable.  He was given IM Rocephin and this was continued daily until chest x-ray could be done.  Chest x-ray is now returned and is negative.  Has not had any reoccurrence with fever.  Rocephin has been discontinued. Last visit, was having significant wheezing and rhonchi.  Chest x-ray supported diagnosis of bronchitis, was negative for pneumonia.  He was started on breathing treatments 3 times daily, prednisone and guaifenesin 3 times daily.  Today 1/31/2024 he notes that he feels like his cough is better he still has rhonchi on exam saturations are 98%.  He notes his fatigue is better but he is willing to participate with therapy today.  Wheezing still present in bilateral lower lobes but he feels better.  He has a history of atrial fibrillation status post Watchman procedure.  He continues on Eliquis.  His metoprolol has been stopped.  He denies any chest pain or palpitations.  Patient also has chronic back pain history of fracture and lumbar back.  Notes that he takes hydrocodone at home routinely.  Since he has been admitted to the facility he has continued to require doses of hydrocodone  mg every 6 hours.  He notes that he wants to take his Tylenol with the hydrocodone and is not to be scheduled.  He is not a good candidate for NSAIDs due to Eliquis use.  He also notes that Tylenol by itself has been ineffective.  He has a history of constipation his cholestyramine was discontinued upon admission.  He continues on senna and his bowel movements are regular.  Also of note patient has a history of congestive heart failure.  When he was admitted he was not admitted on any diuretics they were as needed.  
Appropriate affect, mood, judgment and insight.  Genitourinary: No suprapubic tenderness or flank tenderness  Heme, lymph, immuno: No pallor;       Labs: Last labs were reviewed from [facility] and rehab appointment cell count 6.5 hemoglobin 9.5 hematocrit 23 platelet 278 sodium 136 potassium 4.5 chloride 104 BUN 27 creatinine 1.21    Chest x-ray from 1/26/2024 notes no focal pneumonia seen findings suggest bronchitis.    Current Outpatient Medications   Medication Sig Dispense Refill    ipratropium 0.5 mg-albuterol 2.5 mg (DUONEB) 0.5-2.5 (3) MG/3ML SOLN nebulizer solution Inhale 3 mLs into the lungs every 6 hours      guaiFENesin (ROBITUSSIN) 100 MG/5ML liquid Take 20 mLs by mouth every 6 hours      omeprazole (PRILOSEC) 20 MG delayed release capsule Take 1 capsule by mouth daily      acetaminophen (TYLENOL) 325 MG tablet Take 2 tablets by mouth every 6 hours as needed      senna-docusate (PERICOLACE) 8.6-50 MG per tablet Take 1 tablet by mouth daily      furosemide (LASIX) 40 MG tablet Take 1 tablet by mouth daily      melatonin 5 MG TABS tablet Take 1 tablet by mouth daily      HYDROcodone-acetaminophen (NORCO)  MG per tablet Take 1 tablet by mouth every 6 hours as needed for Pain. Max Daily Amount: 4 tablets      midodrine (PROAMATINE) 2.5 MG tablet Take 1 tablet by mouth 3 times daily 90 tablet 2    traZODone (DESYREL) 50 MG tablet Take 1 tablet by mouth nightly 30 tablet 2    mirtazapine (REMERON) 15 MG tablet Take 1 tablet by mouth nightly 30 tablet 2    magnesium oxide (MAG-OX) 400 MG tablet Take 1 tablet by mouth daily 30 tablet 2    cyanocobalamin 1000 MCG tablet Take 1 tablet by mouth daily 30 tablet 2    apixaban (ELIQUIS) 5 MG TABS tablet Take 1 tablet by mouth 2 times daily 60 tablet 2    sucralfate (CARAFATE) 1 GM tablet Take 1 tablet by mouth 4 times daily 120 tablet 2    levothyroxine (SYNTHROID) 50 MCG tablet Take 1 tablet by mouth daily 30 tablet 2    ergocalciferol

## 2024-02-06 ENCOUNTER — OFFICE VISIT (OUTPATIENT)
Facility: CLINIC | Age: 79
End: 2024-02-06
Payer: MEDICARE

## 2024-02-06 VITALS
OXYGEN SATURATION: 99 % | SYSTOLIC BLOOD PRESSURE: 110 MMHG | WEIGHT: 143.4 LBS | TEMPERATURE: 97.9 F | RESPIRATION RATE: 21 BRPM | HEART RATE: 61 BPM | DIASTOLIC BLOOD PRESSURE: 68 MMHG | BODY MASS INDEX: 22.45 KG/M2

## 2024-02-06 DIAGNOSIS — E73.9 LACTOSE INTOLERANCE: ICD-10-CM

## 2024-02-06 DIAGNOSIS — D64.9 ANEMIA, UNSPECIFIED TYPE: ICD-10-CM

## 2024-02-06 DIAGNOSIS — E83.42 HYPOMAGNESEMIA: ICD-10-CM

## 2024-02-06 DIAGNOSIS — E43 SEVERE PROTEIN-CALORIE MALNUTRITION (HCC): ICD-10-CM

## 2024-02-06 DIAGNOSIS — E03.9 HYPOTHYROIDISM, UNSPECIFIED TYPE: ICD-10-CM

## 2024-02-06 DIAGNOSIS — F33.1 MODERATE EPISODE OF RECURRENT MAJOR DEPRESSIVE DISORDER (HCC): ICD-10-CM

## 2024-02-06 DIAGNOSIS — R53.1 WEAKNESS: ICD-10-CM

## 2024-02-06 DIAGNOSIS — I50.32 CHRONIC DIASTOLIC HEART FAILURE (HCC): ICD-10-CM

## 2024-02-06 DIAGNOSIS — M47.817 SPONDYLOSIS OF LUMBOSACRAL REGION, UNSPECIFIED SPINAL OSTEOARTHRITIS COMPLICATION STATUS: ICD-10-CM

## 2024-02-06 DIAGNOSIS — J40 BRONCHITIS: Primary | ICD-10-CM

## 2024-02-06 DIAGNOSIS — I95.9 HYPOTENSION, UNSPECIFIED HYPOTENSION TYPE: ICD-10-CM

## 2024-02-06 PROCEDURE — 99309 SBSQ NF CARE MODERATE MDM 30: CPT | Performed by: NURSE PRACTITIONER

## 2024-02-06 PROCEDURE — 1123F ACP DISCUSS/DSCN MKR DOCD: CPT | Performed by: NURSE PRACTITIONER

## 2024-02-06 PROCEDURE — G8484 FLU IMMUNIZE NO ADMIN: HCPCS | Performed by: NURSE PRACTITIONER

## 2024-02-06 NOTE — PROGRESS NOTES
His renal function was stable.  He was given IM Rocephin and this was continued daily until chest x-ray could be done.  Chest x-ray is now returned and is negative.  Has not had any reoccurrence with fever.  Rocephin has been discontinued.  Previously was having lwheezing and rhonchi.  Chest x-ray supported diagnosis of bronchitis, was negative for pneumonia.  He was started on breathing treatments 3 times daily, prednisone and guaifenesin 3 times daily.  On 1/31/2024 he still endorsed the symptoms on his DuoNebs were changed to every 6 hours and guaifenesin every 6 hours for 7 days.  Today 2/6/2024 he notes that his cough has improved and does not have any wheezing on exam.  His oxygen saturations are 96%.  He notes that he is not short of breath.  And that has improved.  He continues on treatments with DuoNebs and guaifenesin until 2/7/2024.    He has a history of atrial fibrillation status post Watchman procedure.  He continues on Eliquis.  His metoprolol has been stopped.  He denies any chest pain or palpitations.  Patient also has chronic back pain history of fracture and lumbar back.  Notes that he takes hydrocodone at home routinely.  Today he reports not wanting any hydrocodone and that is binds him up. He fluctuates between diarrhea/constipation. He denies constipation today however. Discussed discontinuation of hydrocodone since he notes he likely won't use it anymore - he declined discontinuation and says that he may need it \"if he has a bad day or works hard with therapy\". Will leave this available at this time. Encourage him to work, situp and attempt to progress with therapy.    He is not a good candidate for NSAIDs due to Eliquis use.  He also notes that Tylenol by itself has been ineffective.      He has a history of constipation his cholestyramine was discontinued upon admission.  He continues on senna and his bowel movements are regular.      Also of note patient has a history of congestive heart

## 2024-02-08 ENCOUNTER — OFFICE VISIT (OUTPATIENT)
Facility: CLINIC | Age: 79
End: 2024-02-08
Payer: MEDICARE

## 2024-02-08 VITALS
BODY MASS INDEX: 22.36 KG/M2 | WEIGHT: 142.8 LBS | DIASTOLIC BLOOD PRESSURE: 89 MMHG | SYSTOLIC BLOOD PRESSURE: 122 MMHG | TEMPERATURE: 97.8 F | HEART RATE: 70 BPM | RESPIRATION RATE: 18 BRPM | OXYGEN SATURATION: 97 %

## 2024-02-08 DIAGNOSIS — E83.51 HYPOCALCEMIA: ICD-10-CM

## 2024-02-08 DIAGNOSIS — R79.0 LOW SERUM PHOSPHORUS FOR AGE: ICD-10-CM

## 2024-02-08 DIAGNOSIS — M47.817 SPONDYLOSIS OF LUMBOSACRAL REGION, UNSPECIFIED SPINAL OSTEOARTHRITIS COMPLICATION STATUS: ICD-10-CM

## 2024-02-08 DIAGNOSIS — E03.9 HYPOTHYROIDISM, UNSPECIFIED TYPE: ICD-10-CM

## 2024-02-08 DIAGNOSIS — R53.1 WEAKNESS: ICD-10-CM

## 2024-02-08 DIAGNOSIS — D64.9 ANEMIA, UNSPECIFIED TYPE: ICD-10-CM

## 2024-02-08 DIAGNOSIS — F33.1 MODERATE EPISODE OF RECURRENT MAJOR DEPRESSIVE DISORDER (HCC): ICD-10-CM

## 2024-02-08 DIAGNOSIS — E43 SEVERE PROTEIN-CALORIE MALNUTRITION (HCC): ICD-10-CM

## 2024-02-08 DIAGNOSIS — I95.9 HYPOTENSION, UNSPECIFIED HYPOTENSION TYPE: ICD-10-CM

## 2024-02-08 DIAGNOSIS — J40 BRONCHITIS: Primary | ICD-10-CM

## 2024-02-08 DIAGNOSIS — E83.42 HYPOMAGNESEMIA: ICD-10-CM

## 2024-02-08 DIAGNOSIS — I50.32 CHRONIC DIASTOLIC HEART FAILURE (HCC): ICD-10-CM

## 2024-02-08 PROCEDURE — G8484 FLU IMMUNIZE NO ADMIN: HCPCS | Performed by: NURSE PRACTITIONER

## 2024-02-08 PROCEDURE — 1123F ACP DISCUSS/DSCN MKR DOCD: CPT | Performed by: NURSE PRACTITIONER

## 2024-02-08 PROCEDURE — 99309 SBSQ NF CARE MODERATE MDM 30: CPT | Performed by: NURSE PRACTITIONER

## 2024-02-08 NOTE — PROGRESS NOTES
midline;  Cardiovascular: RRR,nml S1 and S2, no rubs murmurs or gallops, 1 edema in bilateral hips and is not in lower extremities that has got a tightness to this area, no cyanosis;   Respiratory: Clear to auscultation, symmetric, no respiratory distress;  Gastrointestinal: Abdomen soft, NT, ND, no masses, normal bowel sounds;  Neurologic: Cranial nerves II through VII grossly intact, no speech or motor deficits A&O in time place and person  Skin: No rash, warm and dry;  Musculoskeletal: No erythema swelling or joint tenderness, extremities without cyanosis, neck supple, ROM intact spine and extremities;  Psychiatric: Not agitated.  Appropriate affect, mood, judgment and insight.  Genitourinary: No suprapubic tenderness or flank tenderness  Heme, lymph, immuno: No pallor;       Labs: Last labs were reviewed from [facility] and rehab on 2/7/2024 sodium 134 potassium 3.3 chloride 99 BUN 20.3 creatinine 0.72 albumin from previous labs 2.1, current calcium 7.2 but when corrected with previous albumin 8.7 magnesium 1.9 phosphorus 2.6    Chest x-ray from 1/26/2024 notes no focal pneumonia seen findings suggest bronchitis.    Current Outpatient Medications   Medication Sig Dispense Refill    omeprazole (PRILOSEC) 20 MG delayed release capsule Take 1 capsule by mouth daily      acetaminophen (TYLENOL) 325 MG tablet Take 2 tablets by mouth every 6 hours as needed      senna-docusate (PERICOLACE) 8.6-50 MG per tablet Take 1 tablet by mouth daily      furosemide (LASIX) 40 MG tablet Take 1 tablet by mouth daily      melatonin 5 MG TABS tablet Take 1 tablet by mouth daily      HYDROcodone-acetaminophen (NORCO)  MG per tablet Take 1 tablet by mouth every 6 hours as needed for Pain. Max Daily Amount: 4 tablets      midodrine (PROAMATINE) 2.5 MG tablet Take 1 tablet by mouth 3 times daily 90 tablet 2    traZODone (DESYREL) 50 MG tablet Take 1 tablet by mouth nightly 30 tablet 2    mirtazapine (REMERON) 15 MG tablet

## 2024-02-13 ENCOUNTER — OFFICE VISIT (OUTPATIENT)
Facility: CLINIC | Age: 79
End: 2024-02-13
Payer: MEDICARE

## 2024-02-13 VITALS
BODY MASS INDEX: 21.55 KG/M2 | OXYGEN SATURATION: 96 % | RESPIRATION RATE: 18 BRPM | DIASTOLIC BLOOD PRESSURE: 66 MMHG | TEMPERATURE: 98.3 F | SYSTOLIC BLOOD PRESSURE: 97 MMHG | WEIGHT: 137.6 LBS | HEART RATE: 61 BPM

## 2024-02-13 DIAGNOSIS — J40 BRONCHITIS: ICD-10-CM

## 2024-02-13 DIAGNOSIS — D64.9 ANEMIA, UNSPECIFIED TYPE: ICD-10-CM

## 2024-02-13 DIAGNOSIS — R79.0 LOW SERUM PHOSPHORUS FOR AGE: ICD-10-CM

## 2024-02-13 DIAGNOSIS — I50.32 CHRONIC DIASTOLIC HEART FAILURE (HCC): Primary | ICD-10-CM

## 2024-02-13 DIAGNOSIS — B02.9 HERPES ZOSTER WITHOUT COMPLICATION: ICD-10-CM

## 2024-02-13 DIAGNOSIS — I95.9 HYPOTENSION, UNSPECIFIED HYPOTENSION TYPE: ICD-10-CM

## 2024-02-13 DIAGNOSIS — E03.9 HYPOTHYROIDISM, UNSPECIFIED TYPE: ICD-10-CM

## 2024-02-13 DIAGNOSIS — I48.11 LONGSTANDING PERSISTENT ATRIAL FIBRILLATION (HCC): ICD-10-CM

## 2024-02-13 DIAGNOSIS — M47.817 SPONDYLOSIS OF LUMBOSACRAL REGION, UNSPECIFIED SPINAL OSTEOARTHRITIS COMPLICATION STATUS: ICD-10-CM

## 2024-02-13 DIAGNOSIS — E83.42 HYPOMAGNESEMIA: ICD-10-CM

## 2024-02-13 DIAGNOSIS — E43 SEVERE PROTEIN-CALORIE MALNUTRITION (HCC): ICD-10-CM

## 2024-02-13 DIAGNOSIS — R53.1 WEAKNESS: ICD-10-CM

## 2024-02-13 DIAGNOSIS — F33.1 MODERATE EPISODE OF RECURRENT MAJOR DEPRESSIVE DISORDER (HCC): ICD-10-CM

## 2024-02-13 DIAGNOSIS — E83.51 HYPOCALCEMIA: ICD-10-CM

## 2024-02-13 PROCEDURE — 99309 SBSQ NF CARE MODERATE MDM 30: CPT | Performed by: NURSE PRACTITIONER

## 2024-02-13 PROCEDURE — G8484 FLU IMMUNIZE NO ADMIN: HCPCS | Performed by: NURSE PRACTITIONER

## 2024-02-13 PROCEDURE — 1123F ACP DISCUSS/DSCN MKR DOCD: CPT | Performed by: NURSE PRACTITIONER

## 2024-02-13 RX ORDER — HYDROCODONE BITARTRATE AND ACETAMINOPHEN 10; 325 MG/1; MG/1
1 TABLET ORAL EVERY 12 HOURS PRN
Qty: 14 TABLET | Refills: 0 | Status: SHIPPED | OUTPATIENT
Start: 2024-02-13 | End: 2024-02-20

## 2024-02-13 RX ORDER — HYDROCODONE BITARTRATE AND ACETAMINOPHEN 10; 325 MG/1; MG/1
1 TABLET ORAL 2 TIMES DAILY
Qty: 20 TABLET | Refills: 0 | Status: SHIPPED | OUTPATIENT
Start: 2024-02-13 | End: 2024-02-23

## 2024-02-13 RX ORDER — OMEPRAZOLE 20 MG/1
20 CAPSULE, DELAYED RELEASE ORAL DAILY
Qty: 30 CAPSULE | Refills: 1 | Status: SHIPPED | OUTPATIENT
Start: 2024-02-13 | End: 2024-04-13

## 2024-02-13 RX ORDER — AMOXICILLIN 250 MG
1 CAPSULE ORAL DAILY
Qty: 30 TABLET | Refills: 1 | Status: SHIPPED | OUTPATIENT
Start: 2024-02-13 | End: 2024-04-13

## 2024-02-13 RX ORDER — UREA 10 %
800 LOTION (ML) TOPICAL DAILY
Qty: 30 TABLET | Refills: 1 | Status: SHIPPED | OUTPATIENT
Start: 2024-02-13 | End: 2024-04-13

## 2024-02-13 RX ORDER — SUCRALFATE 1 G/1
1 TABLET ORAL 4 TIMES DAILY
Qty: 120 TABLET | Refills: 2 | Status: SHIPPED | OUTPATIENT
Start: 2024-02-13 | End: 2024-05-13

## 2024-02-13 RX ORDER — MIRTAZAPINE 15 MG/1
15 TABLET, FILM COATED ORAL NIGHTLY
Qty: 30 TABLET | Refills: 1 | Status: SHIPPED | OUTPATIENT
Start: 2024-02-13 | End: 2024-04-13

## 2024-02-13 RX ORDER — LEVOTHYROXINE SODIUM 0.05 MG/1
50 TABLET ORAL DAILY
Qty: 30 TABLET | Refills: 1 | Status: SHIPPED | OUTPATIENT
Start: 2024-02-13 | End: 2024-04-13

## 2024-02-13 RX ORDER — ERGOCALCIFEROL 1.25 MG/1
50000 CAPSULE ORAL
Qty: 8 CAPSULE | Refills: 1 | Status: SHIPPED | OUTPATIENT
Start: 2024-02-15 | End: 2024-05-15

## 2024-02-13 RX ORDER — MAGNESIUM OXIDE 400 MG/1
400 TABLET ORAL DAILY
Qty: 30 TABLET | Refills: 1 | Status: SHIPPED | OUTPATIENT
Start: 2024-02-13 | End: 2024-04-13

## 2024-02-13 RX ORDER — TRAZODONE HYDROCHLORIDE 50 MG/1
50 TABLET ORAL NIGHTLY
Qty: 30 TABLET | Refills: 1 | Status: SHIPPED | OUTPATIENT
Start: 2024-02-13 | End: 2024-02-13

## 2024-02-13 RX ORDER — FUROSEMIDE 40 MG/1
40 TABLET ORAL DAILY
Qty: 30 TABLET | Refills: 1 | Status: SHIPPED | OUTPATIENT
Start: 2024-02-13 | End: 2024-04-13

## 2024-02-13 RX ORDER — MIDODRINE HYDROCHLORIDE 2.5 MG/1
2.5 TABLET ORAL 3 TIMES DAILY
Qty: 90 TABLET | Refills: 1 | Status: SHIPPED | OUTPATIENT
Start: 2024-02-13

## 2024-02-13 RX ORDER — CHOLECALCIFEROL (VITAMIN D3) 125 MCG
5 CAPSULE ORAL DAILY
Qty: 30 TABLET | Refills: 1 | Status: SHIPPED | OUTPATIENT
Start: 2024-02-13 | End: 2024-04-13

## 2024-02-13 RX ORDER — ASPIRIN 81 MG/1
81 TABLET, CHEWABLE ORAL DAILY
Qty: 30 TABLET | Refills: 1 | Status: SHIPPED | OUTPATIENT
Start: 2024-02-13 | End: 2024-04-13

## 2024-02-13 RX ORDER — HYDROCODONE BITARTRATE AND ACETAMINOPHEN 10; 325 MG/1; MG/1
1 TABLET ORAL EVERY 6 HOURS PRN
Qty: 20 TABLET | Refills: 0 | Status: SHIPPED | OUTPATIENT
Start: 2024-02-13 | End: 2024-02-13

## 2024-02-13 RX ORDER — TRAZODONE HYDROCHLORIDE 50 MG/1
75 TABLET ORAL NIGHTLY
Qty: 45 TABLET | Refills: 1
Start: 2024-02-13 | End: 2024-04-13

## 2024-02-13 NOTE — PROGRESS NOTES
Place of Service:  [facility] [address] [place] [address]                                                                     Discharge Summary       Admit Dx:  ***    Disposition: Home    Presentation:  []    Discharge time : > 30 mins    Brief SNF Course:  This is an 78 y.o. male []      Code Status:  DNR    Exam:  Constitutional: No acute distress;   Eyes: Sclera clear, PERRLA;   Ears/nose/mouth/throat:mmm, OP clear, trachea midline;  Cardiovascular: RRR,nml S1 and S2, no rubs murmurs or gallops, no edema, no cyanosis;   Respiratory: Clear to auscultation, symmetric, no respiratory distress;  Gastrointestinal: Abdomen soft, NT, ND, no masses, normal bowel sounds;  Neurologic: Cranial nerves II through VII grossly intact, no speech or motor deficits A&O in time place and person  Skin: No rash, warm and dry;  Musculoskeletal: No erythema swelling or joint tenderness, extremities without cyanosis, neck supple, ROM intact spine and extremities;  Psychiatric: Not agitated.  Appropriate affect, mood, judgment and insight.  Genitourinary: No suprapubic tenderness or flank tenderness  Heme, lymph, immuno: No pallor;      Current Outpatient Medications   Medication Sig Dispense Refill    omeprazole (PRILOSEC) 20 MG delayed release capsule Take 1 capsule by mouth daily      acetaminophen (TYLENOL) 325 MG tablet Take 2 tablets by mouth every 6 hours as needed      senna-docusate (PERICOLACE) 8.6-50 MG per tablet Take 1 tablet by mouth daily      furosemide (LASIX) 40 MG tablet Take 1 tablet by mouth daily      melatonin 5 MG TABS tablet Take 1 tablet by mouth daily      HYDROcodone-acetaminophen (NORCO)  MG per tablet Take 1 tablet by mouth every 6 hours as needed for Pain. Max Daily Amount: 4 tablets      midodrine (PROAMATINE) 2.5 MG tablet Take 1 tablet by mouth 3 times daily 90 tablet 2    traZODone (DESYREL) 50 MG tablet Take 1 tablet by mouth nightly 30 tablet 2    
midline;  Cardiovascular: RRR,nml S1 and S2, no rubs murmurs or gallops, 1 edema in bilateral hips and is not in lower extremities that has got a tightness to this area, no cyanosis;   Respiratory: Clear to auscultation, symmetric, no respiratory distress;  Gastrointestinal: Abdomen soft, NT, ND, no masses, normal bowel sounds;  Neurologic: Cranial nerves II through VII grossly intact, no speech or motor deficits A&O in time place and person  Skin: No rash, warm and dry;  Musculoskeletal: No erythema swelling or joint tenderness, extremities without cyanosis, neck supple, ROM intact spine and extremities;  Psychiatric: Not agitated.  Appropriate affect, mood, judgment and insight.  Genitourinary: No suprapubic tenderness or flank tenderness  Heme, lymph, immuno: No pallor;       Labs: Last labs were reviewed from [facility] and rehab on 2/7/2024 sodium 134 potassium 3.3 chloride 99 BUN 20.3 creatinine 0.72 albumin from previous labs 2.1, current calcium 7.2 but when corrected with previous albumin 8.7 magnesium 1.9 phosphorus 2.6    Chest x-ray from 1/26/2024 notes no focal pneumonia seen findings suggest bronchitis.    Current Outpatient Medications   Medication Sig Dispense Refill    traZODone (DESYREL) 50 MG tablet Take 1 tablet by mouth nightly 30 tablet 1    apixaban (ELIQUIS) 5 MG TABS tablet Take 1 tablet by mouth 2 times daily 60 tablet 1    mirtazapine (REMERON) 15 MG tablet Take 1 tablet by mouth nightly 30 tablet 1    cyanocobalamin 1000 MCG tablet Take 1 tablet by mouth daily 30 tablet 1    levothyroxine (SYNTHROID) 50 MCG tablet Take 1 tablet by mouth daily 30 tablet 1    midodrine (PROAMATINE) 2.5 MG tablet Take 1 tablet by mouth 3 times daily 90 tablet 1    omeprazole (PRILOSEC) 20 MG delayed release capsule Take 1 capsule by mouth daily 30 capsule 1    aspirin 81 MG chewable tablet Take 1 tablet by mouth daily 30 tablet 1    folic acid (FOLVITE) 800 MCG tablet Take 1 tablet by mouth daily 30 
labs 2.1, current calcium 7.2 but when corrected with previous albumin 8.7 magnesium 1.9 phosphorus 2.6    Chest x-ray from 1/26/2024 notes no focal pneumonia seen findings suggest bronchitis.    Current Outpatient Medications   Medication Sig Dispense Refill    traZODone (DESYREL) 50 MG tablet Take 1 tablet by mouth nightly 30 tablet 1    apixaban (ELIQUIS) 5 MG TABS tablet Take 1 tablet by mouth 2 times daily 60 tablet 1    mirtazapine (REMERON) 15 MG tablet Take 1 tablet by mouth nightly 30 tablet 1    cyanocobalamin 1000 MCG tablet Take 1 tablet by mouth daily 30 tablet 1    levothyroxine (SYNTHROID) 50 MCG tablet Take 1 tablet by mouth daily 30 tablet 1    midodrine (PROAMATINE) 2.5 MG tablet Take 1 tablet by mouth 3 times daily 90 tablet 1    omeprazole (PRILOSEC) 20 MG delayed release capsule Take 1 capsule by mouth daily 30 capsule 1    aspirin 81 MG chewable tablet Take 1 tablet by mouth daily 30 tablet 1    folic acid (FOLVITE) 800 MCG tablet Take 1 tablet by mouth daily 30 tablet 1    [START ON 2/15/2024] ergocalciferol (ERGOCALCIFEROL) 1.25 MG (52762 UT) capsule Take 1 capsule by mouth Twice a Week Take on Tuesdays and Fridays 8 capsule 1    magnesium oxide (MAG-OX) 400 MG tablet Take 1 tablet by mouth daily 30 tablet 1    sucralfate (CARAFATE) 1 GM tablet Take 1 tablet by mouth 4 times daily 120 tablet 2    senna-docusate (PERICOLACE) 8.6-50 MG per tablet Take 1 tablet by mouth daily 30 tablet 1    furosemide (LASIX) 40 MG tablet Take 1 tablet by mouth daily 30 tablet 1    melatonin 5 MG TABS tablet Take 1 tablet by mouth daily 30 tablet 1    HYDROcodone-acetaminophen (NORCO)  MG per tablet Take 1 tablet by mouth every 6 hours as needed for Pain for up to 5 days. Max Daily Amount: 4 tablets 20 tablet 0    acetaminophen (TYLENOL) 325 MG tablet Take 2 tablets by mouth every 6 hours as needed       No current facility-administered medications for this visit.        Assessment/Plans:    Diagnosis

## 2024-02-15 ENCOUNTER — OFFICE VISIT (OUTPATIENT)
Facility: CLINIC | Age: 79
End: 2024-02-15
Payer: MEDICARE

## 2024-02-15 VITALS
RESPIRATION RATE: 18 BRPM | TEMPERATURE: 98.1 F | BODY MASS INDEX: 22.11 KG/M2 | SYSTOLIC BLOOD PRESSURE: 104 MMHG | DIASTOLIC BLOOD PRESSURE: 62 MMHG | HEART RATE: 72 BPM | WEIGHT: 141.2 LBS | OXYGEN SATURATION: 96 %

## 2024-02-15 DIAGNOSIS — E83.42 HYPOMAGNESEMIA: ICD-10-CM

## 2024-02-15 DIAGNOSIS — D64.9 ANEMIA, UNSPECIFIED TYPE: ICD-10-CM

## 2024-02-15 DIAGNOSIS — I48.11 LONGSTANDING PERSISTENT ATRIAL FIBRILLATION (HCC): ICD-10-CM

## 2024-02-15 DIAGNOSIS — I50.32 CHRONIC DIASTOLIC HEART FAILURE (HCC): Primary | ICD-10-CM

## 2024-02-15 DIAGNOSIS — E43 SEVERE PROTEIN-CALORIE MALNUTRITION (HCC): ICD-10-CM

## 2024-02-15 DIAGNOSIS — E03.9 HYPOTHYROIDISM, UNSPECIFIED TYPE: ICD-10-CM

## 2024-02-15 DIAGNOSIS — I95.9 HYPOTENSION, UNSPECIFIED HYPOTENSION TYPE: ICD-10-CM

## 2024-02-15 DIAGNOSIS — R79.0 LOW SERUM PHOSPHORUS FOR AGE: ICD-10-CM

## 2024-02-15 DIAGNOSIS — M47.817 SPONDYLOSIS OF LUMBOSACRAL REGION, UNSPECIFIED SPINAL OSTEOARTHRITIS COMPLICATION STATUS: ICD-10-CM

## 2024-02-15 DIAGNOSIS — E83.51 HYPOCALCEMIA: ICD-10-CM

## 2024-02-15 DIAGNOSIS — R53.1 WEAKNESS: ICD-10-CM

## 2024-02-15 DIAGNOSIS — B02.9 HERPES ZOSTER WITHOUT COMPLICATION: ICD-10-CM

## 2024-02-15 DIAGNOSIS — F33.1 MODERATE EPISODE OF RECURRENT MAJOR DEPRESSIVE DISORDER (HCC): ICD-10-CM

## 2024-02-15 PROCEDURE — 1123F ACP DISCUSS/DSCN MKR DOCD: CPT | Performed by: NURSE PRACTITIONER

## 2024-02-15 PROCEDURE — G8484 FLU IMMUNIZE NO ADMIN: HCPCS | Performed by: NURSE PRACTITIONER

## 2024-02-15 PROCEDURE — 99309 SBSQ NF CARE MODERATE MDM 30: CPT | Performed by: NURSE PRACTITIONER

## 2024-02-15 NOTE — PROGRESS NOTES
PLACE OF SERVICE:  [facility] [address] [place] [address]    SKILLED VISIT    2/15/2024    Chief Complaint:   Chief Complaint   Patient presents with    Follow-up         HPI : Jacky Verduzco is a 78 y.o. male here for follow up.    Previous history prior to admission:  78-year-old male with history of paroxysmal atrial fibrillation on Eliquis for  anticoagulation, sick sinus syndrome status post pacemaker insertion, hypertension,  hypothyroidism who came to emergency room with chief complaints of  lightheadedness and heart palpitations. In the emergency room patient's heart rate  was as high as 188. EKG showed supraventricular tachycardia. Patient  spontaneously converted to normal sinus rhythm. He was admitted to the hospital  and underwent AV ablation on 1/3/2024. TTE on 1/4/2024 showed ejection fraction  55 to 60.He continues on Eliquis 5 mg twice daily and will follow-up with cardiology as  outpatient. He has history of hypertension. Blood pressure remains well-controlled on  current regimen. He also has history of GERD. He continues on PPI and Carafate.  Remains asymptomatic. He has also history of congestive heart failure and is not  currently on any diuretics. No shortness of breath orthopnea PND or any other  symptoms suggestive of decompensated heart failure. Ejection fraction was 55 to  60.Blood pressure also continues to run in normal range. He was seen by PT OT who  recommended discharge to SNF for rehab. He is now admitted to [facility] for skilled rehab.    Current visit:    Patient is seen today lying in bed he is awake alert vital signs reviewed blood pressures ranging .  He is afebrile heart rate is rate controlled denies any lightheadedness dizziness no cough or congestion.  Previously been treated for bronchitis with DuoNebs guaifenesin it did take some time but his symptoms have now ultimately resolved.  He has a history of atrial

## 2024-02-22 PROBLEM — A41.9 SEPSIS (HCC): Status: ACTIVE | Noted: 2024-02-22

## 2024-02-22 NOTE — ACP (ADVANCE CARE PLANNING)
Advance Care Planning     Advance Care Planning (ACP) Physician/NP/PA Conversation    Date of Conversation: 2/21/2024  Conducted with: Patient with Decision Making Capacity    Healthcare Decision Maker:    Primary Decision Maker: Mich Verduzco - Brother/Sister - [phone]    Primary Decision Maker: Ally Shelley - Brother/Sister - [phone]  Click here to complete Healthcare Decision Makers including selection of the Healthcare Decision Maker Relationship (ie \"Primary\").     Today we documented Decision Maker(s) consistent with Legal Next of Kin hierarchy.    Care Preferences:    Hospitalization:  \"If your health worsens and it becomes clear that your chance of recovery is unlikely, what would be your preference regarding hospitalization?\"  The patient would prefer hospitalization.    Ventilation:  \"If you were unable to breath on your own and your chance of recovery was unlikely, what would be your preference about the use of a ventilator (breathing machine) if it was available to you?\"  The patient would NOT desire the use of a ventilator.    Resuscitation:  \"In the event your heart stopped as a result of an underlying serious health condition, would you want attempts made to restart your heart, or would you prefer a natural death?\"  No, do NOT attempt to resuscitate.    treatment goals, benefit/burden of treatment options, artificial nutrition, ventilation preferences, and hospitalization preferences    Conversation Outcomes / Follow-Up Plan:  ACP complete - no further action today  Reviewed DNR/DNI and patient confirms current DNR status - completed forms on file (place new order if needed)    Length of Voluntary ACP Conversation in minutes:  16 minutes    Brett Rodriguez MD

## 2024-02-22 NOTE — WOUND CARE
WOCN Note:     New consult placed for assessment of wounds present on admission.    Chart reviewed.  Assessed in ER06/06.    Jacky Verduzco is a 78 y.o. y/o male who presented for Sepsis   Admitted on 2/21/2024    Past Medical History:   Diagnosis Date    DJD (degenerative joint disease)     Hypertension     Obesity      Lab Results   Component Value Date/Time    WBC 11.6 (H) 02/22/2024 06:48 AM    POCGLU 113 02/21/2024 09:59 PM    POCGLU 97 04/11/2021 04:35 PM    HGB 11.1 (L) 02/22/2024 06:48 AM    HCT 33.8 (L) 02/22/2024 06:48 AM     02/22/2024 06:48 AM        Tobacco Use      Smoking status: Never      Smokeless tobacco: Never     Diet NPO Exceptions are: Ice Chips, Sips of Water with Meds     Assessment:   Patient is A&O x 3, communicative and requires assist with repositioning.    Bed: stretcher  GI/: urinal; incontinent of loose stool  Patient reports no pain.    Heels offloaded with pillows.  Sacrum and buttocks intact without erythema.      Wound Assessment  POA Left arm and right arms, skin tears: 100% red; scant serosang drainage.  Tx:  cleansed with saline; applied Xeroform and foam dressing.          2.  Heel, blanching red erythema.  Tx:  covered with foam      3.  Mid back, blanching pink erythema.  Tx:  covered with foam    4.  Left hip, blanching erythema with resolving abrasion.      5.  Buttocks and perineal, tender red erythema from exposure to loose stools.  Tx:  applied Zinc cream.  6.  Right groin, denudation from moisture.  Tx:  applied Zinc cream.        Wound, Pressure Prevention & Skin Care Recommendations:    Minimize layers of linen/pads under patient to optimize support surface.    2.  Turn/reposition approximately every 2 hours and offload heels.   3.  Manage moisture/ Keep skin folds clean and dry/minimize brief usage.  4.  Specialty bed: low air loss ordered. Use only flat sheet and one incontinence pad.  5.  Heels, back and sacrum:  venelex bid  6.  Bilateral arms:  Every

## 2024-02-22 NOTE — ED TRIAGE NOTES
Pt in through EMS from rehab facility. Pt in rehab for a broken back and wounds on arms bilaterally. Pt also reports rectal bleeding with dark brown blood since 1800 this afternoon. In route, pt's , BP: 85/48 and A&O x4.    Pt covid positive. Pt on blood thinners but unsure which ones    Pt has a pacemaker

## 2024-02-22 NOTE — ED NOTES
I have discussed with the patient the rationale for blood component transfusion; its benefits in treating or preventing fatigue, organ damage, or death; and its risk which includes mild transfusion reactions, rare risk of blood borne infection, or more serious but rare reactions. I have discussed the alternatives to transfusion, including the risk and consequences of not receiving transfusion. The patient had an opportunity to ask questions and had agreed to proceed with transfusion of blood components.      MD Darwin Campbell Robert M, MD  02/21/24 [id number]

## 2024-02-22 NOTE — ED NOTES
Verbal shift change report given to Ayah RN (oncoming nurse) by Amanda RN (offgoing nurse). Report included the following information Nurse Handoff Report, ED Encounter Summary, ED SBAR, Adult Overview, MAR, and Recent Results.

## 2024-02-22 NOTE — ED NOTES
TRANSFER - OUT REPORT:    Verbal report given to DIANA Smith on Jacky Verduzco  being transferred to 214/01 for routine progression of patient care       Report consisted of patient's Situation, Background, Assessment and   Recommendations(SBAR).     Information from the following report(s) Nurse Handoff Report, ED Encounter Summary, ED SBAR, Adult Overview, Intake/Output, MAR, and Recent Results was reviewed with the receiving nurse.    [place] Fall Assessment:    Presents to emergency department  because of falls (Syncope, seizure, or loss of consciousness): No  Age > 70: Yes  Altered Mental Status, Intoxication with alcohol or substance confusion (Disorientation, impaired judgment, poor safety awaremess, or inability to follow instructions): No  Impaired Mobility: Ambulates or transfers with assistive devices or assistance; Unable to ambulate or transer.: No  Nursing Judgement: Yes          Lines:   Peripheral IV 02/21/24 Right Antecubital (Active)   Site Assessment Clean, dry & intact 02/21/24 2228   Line Status Blood return noted 02/21/24 2228   Phlebitis Assessment No symptoms 02/21/24 2228   Infiltration Assessment 0 02/21/24 2228        Opportunity for questions and clarification was provided.      Patient transported with:

## 2024-02-22 NOTE — H&P
[address]  [address]                           HISTORY & PHYSICAL      PATIENT NAME: PEDRO KINGSTON              : 1945  MED REC NO: [id number]                       ROOM:   ACCOUNT NO: [id number]                       ADMIT DATE: 2024  PROVIDER: Sara Sales MD    The patient was seen, evaluated, and admitted by me on 2024.    SOURCE OF INFORMATION:  The patient and review of the ED electronic medical record.    CHIEF COMPLAINT:  Rectal bleeding.    HISTORY OF PRESENT ILLNESS:  This is a 78-year-old man with a past medical history significant for hypothyroidism, recent injury to the back, presented at the emergency room from a rehab facility with rectal bleeding.  The rectal bleeding started on the day of the presentation at the emergency room.  It was described as a dark blood without clot formation.  The patient's symptoms started at about 6 p.m. and progressively getting worse.  EMS was called.  EMS arrived at the scene.  The patient was found with heart rate of 116 with a blood pressure of 85/48.  He was brought to the emergency room for further evaluation.  When the patient arrived at the emergency room, CT scan of the abdomen and pelvis was obtained.  It did not show any evidence for acute GI bleed, but it showed mild colitis.  The patient also has leukocytosis as well as elevated lactic acid level, concerning for sepsis.  The patient was started on blood transfusion as well as antibiotics and was referred to the hospitalist service for admission.  There was no history of fever, rigors and chills.    PAST MEDICAL HISTORY:    1. Hypothyroidism.  2. Degenerative joint disease.  3. Hypertension.    ALLERGIES:  NO KNOWN DRUG ALLERGIES.      MEDICATIONS:    1. Trazodone 75 mg daily at bedtime.  2. Midodrine 2.5 mg 3 times daily.  3. Synthroid 50 mcg daily.  4. Remeron 15 mg daily at bedtime.  5. Eliquis 5 mg twice

## 2024-02-22 NOTE — ED PROVIDER NOTES
BEDSIDE ULTRASOUND:   Performed by ED Physician    LABS:  Labs Reviewed   CBC WITH AUTO DIFFERENTIAL - Abnormal; Notable for the following components:       Result Value    WBC 13.0 (*)     RBC 2.83 (*)     Hemoglobin 9.5 (*)     Hematocrit 28.8 (*)     .8 (*)     RDW 15.9 (*)     Nucleated RBCs 0.2 (*)     nRBC 0.02 (*)     Neutrophils % 82 (*)     Lymphocytes % 11 (*)     Immature Granulocytes 1 (*)     Neutrophils Absolute 10.7 (*)     Absolute Immature Granulocyte 0.1 (*)     All other components within normal limits   APTT - Abnormal; Notable for the following components:    APTT 68.3 (*)     All other components within normal limits   LACTIC ACID - Abnormal; Notable for the following components:    Lactic Acid, Plasma 2.8 (*)     All other components within normal limits   PROTIME-INR - Abnormal; Notable for the following components:    INR 1.8 (*)     Protime 18.3 (*)     All other components within normal limits   CULTURE, BLOOD 1   CULTURE, BLOOD 2   EXTRA TUBES HOLD   PROCALCITONIN   EXTRA TUBES HOLD   URINALYSIS WITH REFLEX TO CULTURE   EXTRA TUBE, BLOOD BANK   LACTIC ACID   COMPREHENSIVE METABOLIC PANEL   POCT GLUCOSE   POCT LACTIC ACID   POCT LACTIC ACID   POCT GLUCOSE   PREPARE RBC (CROSSMATCH)   PREPARE UNCROSSMATCHED RBCS       All other labs were unremarkable or not returned as of this dictation.    EMERGENCY DEPARTMENT COURSE and DIFFERENTIAL DIAGNOSIS/MDM:   Medical Decision Making    78-year-old male presents with rectal bleeding.  He is on Eliquis.  Initial blood pressures are quite low with 70s systolic.  He was given IV fluids.  I did consent him to emergent uncrossed blood given hypotension and tachycardia.  I also reversed his Eliquis with Kcentra.  Hemoglobin is at his baseline.  He was fluid responsive.  Lactic acid level 2.8.  He does have an elevated procalcitonin of 36.31.  Given these elevated laboratory values I will cover him with broad-spectrum antibiotics including Zosyn

## 2024-02-22 NOTE — ED NOTES
Verbal order received from MD Darwin for emergent transfusion of 2 units of uncrossed match blood.    21:45 - Unable to scan unit of blood. Blood bank notified. Unit of blood verified by RN x 2.   [id number] transfused @ 999 mL/hr per MD order. Completed @ 2215. VSS.    22:00 - Unable to scan unit of blood. Unit of blood verified by RN x 2. [id number] transfused @ 999 mL/hr per MD order. VSS. Completed @ 2243.    *Blood administration documented on downtime forms and scanned into patient chart.

## 2024-02-22 NOTE — ED NOTES
Changed patient's bedsheets and brief after incontinent episode of dark brown stool. Pt actively bled bright red stool. MD aware

## 2024-02-23 ENCOUNTER — ANESTHESIA (OUTPATIENT)
Facility: HOSPITAL | Age: 79
End: 2024-02-23
Payer: MEDICARE

## 2024-02-23 ENCOUNTER — ANESTHESIA EVENT (OUTPATIENT)
Facility: HOSPITAL | Age: 79
End: 2024-02-23
Payer: MEDICARE

## 2024-02-23 PROCEDURE — 6360000002 HC RX W HCPCS: Performed by: NURSE ANESTHETIST, CERTIFIED REGISTERED

## 2024-02-23 PROCEDURE — 2580000003 HC RX 258: Performed by: EMERGENCY MEDICINE

## 2024-02-23 PROCEDURE — 2500000003 HC RX 250 WO HCPCS: Performed by: NURSE ANESTHETIST, CERTIFIED REGISTERED

## 2024-02-23 RX ORDER — LIDOCAINE HYDROCHLORIDE 20 MG/ML
INJECTION, SOLUTION EPIDURAL; INFILTRATION; INTRACAUDAL; PERINEURAL PRN
Status: DISCONTINUED | OUTPATIENT
Start: 2024-02-23 | End: 2024-02-23 | Stop reason: SDUPTHER

## 2024-02-23 RX ADMIN — LIDOCAINE HYDROCHLORIDE 20 MG: 20 INJECTION, SOLUTION EPIDURAL; INFILTRATION; INTRACAUDAL; PERINEURAL at 15:39

## 2024-02-23 RX ADMIN — PROPOFOL 10 MG: 10 INJECTION, EMULSION INTRAVENOUS at 16:15

## 2024-02-23 RX ADMIN — PROPOFOL 20 MG: 10 INJECTION, EMULSION INTRAVENOUS at 15:53

## 2024-02-23 RX ADMIN — PROPOFOL 50 MG: 10 INJECTION, EMULSION INTRAVENOUS at 15:38

## 2024-02-23 RX ADMIN — PROPOFOL 10 MG: 10 INJECTION, EMULSION INTRAVENOUS at 15:48

## 2024-02-23 RX ADMIN — PROPOFOL 10 MG: 10 INJECTION, EMULSION INTRAVENOUS at 15:43

## 2024-02-23 RX ADMIN — PROPOFOL 10 MG: 10 INJECTION, EMULSION INTRAVENOUS at 16:00

## 2024-02-23 RX ADMIN — SODIUM CHLORIDE: 9 INJECTION, SOLUTION INTRAVENOUS at 15:10

## 2024-02-23 NOTE — ED NOTES
Lab called ED with critical lab results. I communicated with Doni ORTIZ on 2N of positive blood cultures containing possible Enterococcus Faecalis with readback.

## 2024-02-23 NOTE — OP NOTE
[facility]  Formerly [facility]  SAMANTHA Christine Jr, MD  [phone]      2024    Colonoscopy Procedure Note  Jacky Verduzco  :  1945  [facility] Record Number: [id number]    Indications:   Hematochezia, abnormal CT of the GI tract  PCP:  Huseyin Savage MD  Anesthesia/Sedation: See Anesthesia Record  Endoscopist:  Dr. SAMANTHA Christine Jr  Complications:  None  Estimated Blood Loss:  None    Surgical assistant: Circulator: Amanda Perrin RN  Endoscopy Technician: Roger wOusu none unless otherwise specified.     Permit:  The indications, risks, benefits and alternatives were reviewed with the patient or their decision maker who was provided an opportunity to ask questions and all questions were answered.  The specific risks of colonoscopy with conscious sedation were reviewed, including but not limited to anesthetic complication, bleeding, adverse drug reaction, missed lesion, infection, IV site reactions, and intestinal perforation which would lead to the need for surgical repair.  Alternatives to colonoscopy including radiographic imaging, observation without testing, or laboratory testing were reviewed including the limitations of those alternatives.  After considering the options and having all their questions answered, the patient or their decision maker provided both verbal and written consent to proceed.        Procedure in Detail:  After obtaining informed consent, positioning of the patient in the left lateral decubitus position, and conduction of a pre-procedure pause or \"time out\" the endoscope was introduced into the anus and advanced to the cecum, which was identified by the ileocecal valve and appendiceal orifice.  The quality of the colonic preparation was fair.  A careful inspection was made as the colonoscope was withdrawn, findings and interventions are described

## 2024-02-23 NOTE — ANESTHESIA POSTPROCEDURE EVALUATION
Department of Anesthesiology  Postprocedure Note    Patient: Jacky Verduzco  MRN: [id number]  YOB: 1945  Date of evaluation: 2/23/2024    Procedure Summary       Date: 02/23/24 Room / Location: [facility] ENDO 04 / [facility] ENDOSCOPY    Anesthesia Start: 1529 Anesthesia Stop: 1624    Procedure: COLONOSCOPY DIAGNOSTIC (Lower GI Region) Diagnosis:       Rectal bleeding      (Rectal bleeding [K62.5])    Surgeons: Stuart Christine MD Responsible Provider: Stef Tapia MD    Anesthesia Type: MAC ASA Status: 3            Anesthesia Type: MAC    Twan Phase I: Twan Score: 10    Twan Phase II:      Anesthesia Post Evaluation    Patient location during evaluation: bedside  Patient participation: complete - patient participated  Level of consciousness: awake  Airway patency: patent  Nausea & Vomiting: no nausea  Cardiovascular status: blood pressure returned to baseline  Respiratory status: acceptable  Hydration status: euvolemic  Pain management: adequate    No notable events documented.

## 2024-02-24 NOTE — PLAN OF CARE
Problem: Physical Therapy - Adult  Goal: By Discharge: Performs mobility at highest level of function for planned discharge setting.  See evaluation for individualized goals.  Description: FUNCTIONAL STATUS PRIOR TO ADMISSION: pt unable to provide accurate prior level of function. Per chart, pt was admitted from SNF    HOME SUPPORT PRIOR TO ADMISSION: pt lived alone. Pt admitted from SNF    Physical Therapy Goals  Initiated 2/24/2024  1.  Patient will move from supine to sit and sit to supine and roll side to side in bed with maximal assistance within 7 day(s).    2.  Patient will perform sit to stand with maximal assistance within 7 day(s).  3.  Patient will transfer from bed to chair and chair to bed with maximal assistance using the least restrictive device within 7 day(s).  4.  Patient will ambulate with maximal assistance for 3 feet with the least restrictive device within 7 day(s).   5.  Patient will tolerate seated EOB  x 10 minutes with min A within 7 days    Outcome: Progressing   PHYSICAL THERAPY EVALUATION    Patient: Jacky Verduzco (78 y.o. male)  Date: 2/24/2024  Primary Diagnosis: Colitis [K52.9]  Septicemia (HCC) [A41.9]  Lower GI bleed [K92.2]  Sepsis (HCC) [A41.9]  Procedure(s) (LRB):  COLONOSCOPY DIAGNOSTIC (N/A) 1 Day Post-Op   Precautions: Restrictions/Precautions: Fall Risk (DNR)                      ASSESSMENT :   DEFICITS/IMPAIRMENTS:   The patient is limited by decreased functional mobility, ROM, strength, activity tolerance, cognition, command following, attention/concentration, increased pain levels s/p admission from SNF on 2/21 with GI bleed, sepsis. Pt oriented to self only.    Based on the impairments listed above pt required total A rolling L and R. Pt weeping from LUE>RUE. Pt with increased pain with AAROM in bilateral Les and Ues. Pt with poor tolerance to activity at this time. Recommend return to SNF upon discharge.    Patient will benefit from skilled intervention to address 
  Problem: Skin/Tissue Integrity  Goal: Absence of new skin breakdown  Description: 1.  Monitor for areas of redness and/or skin breakdown  2.  Assess vascular access sites hourly  3.  Every 4-6 hours minimum:  Change oxygen saturation probe site  4.  Every 4-6 hours:  If on nasal continuous positive airway pressure, respiratory therapy assess nares and determine need for appliance change or resting period.  Outcome: Progressing     
rehabilitation; comparison of the responsiveness of the Barthel Index and Functional [place] Measure. Journal of Neurology, Neurosurgery, and Psychiatry, 66(4), 291-598.  -VAHID Ashton.A, MARILYNN Trent, & CONCHIS Mendoza (2004) Assessment of post-stroke quality of life in cost-effectiveness studies: The usefulness of the Barthel Index and the EuroQoL-5D. Quality of Life Research, 13, 427-43                                                                                                                                                                                                                                 Pain Rating:  Did not rate/10   Pain Intervention(s):       Activity Tolerance:   Poor, requires frequent rest breaks, and DEBILITY!!!    After treatment:   Call bell within reach, Caregiver / family present, Side rails x3, and Patient offloaded in partial L side lying for pressure relief    COMMUNICATION/EDUCATION:   The patient's plan of care was discussed with: physical therapist and registered nurse    Patient Education  Education Given To: Patient;Staff  Education Provided: Precautions;ADL Adaptive Strategies;Plan of Care;Role of Therapy  Education Provided Comments: bed positioning  Education Method: Demonstration;Verbal  Barriers to Learning: Cognition  Education Outcome: Continued education needed    Thank you for this referral.  Erinn Dobbins OT  Minutes: 20    Occupational Therapy Evaluation Charge Determination   History Examination Decision-Making   HIGH Complexity : Extensive review of history including physical, cognitive and psychocial history  HIGH Complexity: 5 Performance deficits relating to physical, cognitive, or psychosocial skills that result in activity limitations and/or participation restrictions  HIGH Complexity: Patient presents with comorbidities that affect occupational performance.  Significant modifications of tasks or assistance (eg. physical or

## 2024-02-24 NOTE — PROCEDURES
Midline Insertion and Progress Note  Patient with extensive bruising and skin tears on both upper extremities. Ultrasound assessment also revealed diminutive vasculature as well as non-compressible veins. Larger vessels of the upper arm assessed and amenable to access.    PRE-PROCEDURE VERIFICATION  Correct Procedure: yes  Correct Site: yes  Temperature: Temp: 97.5 °F (36.4 °C), Temperature Source:    Recent Labs     02/21/24  2215 02/22/24  0648 02/23/24  0049 02/24/24  0536   BUN 26* 27*   < > 27*   PLT  --  331  --   --    INR 1.8*  --   --   --    WBC  --  11.6*  --   --     < > = values in this interval not displayed.     Allergies: Patient has no known allergies.    PROCEDURE DETAIL  A single midline IV catheter was started for desire for reliable access. The following documentation is in addition to the Midline properties in the lines/airways flowsheet:  Xylocaine 1% used intradermally: Yes  Lot #: [id number]  Catheter vein ratio: 29 %  Catheter Total Length: 10 cm  External Catheter Length: 0 cm  Circumference at 10 cm above ac: 27 cm  Vein Selection for Midline: left brachial  Complication Related to Insertion: none    Line is okay to use.    Andre Jeter RN

## 2024-02-25 NOTE — CONSULTS
S Cardiology Consultation    Date of Consult:  02/23/24  Date of Admission: 2/21/2024  Primary Cardiologist: [facility]  Physician Requesting consult: Dr. Brett Rodriguez    Chief Complaint / Reason for Consult:   Acute blood loss anemia  Anticoagulation recommendations    Assessment/Recommendations:    Acute blood loss anemia due to GIB  Hold Eliquis and aspirin until GIB/anemia has resolved  Would evaluate for Watchman implantation; this is scheduled for April 2024  Paroxysmal AF  Anticoagulation recommendations as above  SSS s/p PPM  Bacteremia with sepsis  Per primary  Hypothyroidism  Hypotension  On midodrine at home  Pleural effusions  Probable acute on chronic diastolic CHF, NYHA class II  Continue home diuretic, furosemide 40 mg po once daily  Strict Is and Os, daily weights    Follow-up with Dr. George after discharge.    History of Present Illness:  Jacky Verduzco is a 78 y.o. male with the below listed medical history who was admitted with GIB.    From notes:  \"78-year-old man with a past medical history significant for hypothyroidism, recent injury to the back, presented at the emergency room from a rehab facility with rectal bleeding. The rectal bleeding started on the day of the presentation at the emergency room. It was described as a dark blood without clot formation. The patient's symptoms started at about 6 p.m. and progressively getting worse. EMS was called. EMS arrived at the scene. The patient was found with heart rate of 116 with a blood pressure of 85/48. He was brought to the emergency room for further evaluation. When the patient arrived at the emergency room, CT scan of the abdomen and pelvis was obtained. It did not show any evidence for acute GI bleed, but it showed mild colitis. The patient also has leukocytosis as well as elevated lactic acid level, concerning for sepsis. The patient was started on blood transfusion as well as antibiotics and was referred to the 
needed       Social History:  Social History     Tobacco Use    Smoking status: Never    Smokeless tobacco: Never   Substance Use Topics    Alcohol use: Yes     Alcohol/week: 136.0 standard drinks of alcohol       Family History:  Family History   Problem Relation Age of Onset    Diabetes Maternal Grandfather     Heart Disease Maternal Grandmother     Diabetes Maternal Grandmother     Stroke Father     No Known Problems Mother     Heart Disease Maternal Grandfather        Review of Systems:    Constitutional: negative fever, negative chills, negative weight loss  Eyes:   negative visual changes  ENT:   negative sore throat, tongue or lip swelling  Respiratory:  negative cough, negative dyspnea  Cards:  negative for chest pain, palpitations, lower extremity edema  GI:   See HPI  :  negative for frequency, dysuria  Integument:  negative for rash and pruritus  Heme:  negative for easy bruising and gum/nose bleeding  Musculoskel: negative for myalgias,  back pain and muscle weakness  Neuro: negative for headaches, dizziness, vertigo  Psych:  negative for feelings of anxiety, depression      Objective:   Patient Vitals for the past 8 hrs:   BP Temp Temp src Pulse Resp SpO2   02/22/24 1047 -- -- -- -- 16 --   02/22/24 1030 124/74 -- -- 97 16 92 %   02/22/24 1000 121/79 -- -- 98 26 100 %   02/22/24 0930 124/76 -- -- 98 15 100 %   02/22/24 0900 (!) 127/90 -- -- 84 17 99 %   02/22/24 0830 131/83 98.1 °F (36.7 °C) Oral 95 21 100 %   02/22/24 0806 (!) 139/91 -- -- 88 10 98 %   02/22/24 0751 (!) 136/96 -- -- 100 13 99 %   02/22/24 0706 (!) 145/78 -- -- 94 20 99 %   02/22/24 0651 (!) 149/80 -- -- 93 13 96 %   02/22/24 0636 136/74 -- -- 92 14 98 %   02/22/24 0621 (!) 148/76 -- -- 98 18 100 %   02/22/24 0606 (!) 144/73 -- -- 98 13 91 %   02/22/24 0551 (!) 127/95 -- -- 87 11 98 %   02/22/24 0536 137/74 -- -- 91 14 99 %   02/22/24 0521 137/71 -- -- 83 13 99 %   02/22/24 0451 139/74 -- -- 74 22 98 %   02/22/24 0448 (!) 139/98 -- -- 
bottles                                   Alpha Strep 2/4 bottles                                    GPC pairs/chains 02/23/24 in  2/2 bottles                                    Blood cultures drawn 02/24/2024 pending                                     Repeat blood cultures every 48-72 hours until negative    3.  Continue Unasyn for duration to be determined    4.  Consider swallow evaluation                       Thank for the opportunity to participate in the care of this patient. Please contact with questions or concerns.

## 2024-02-26 PROBLEM — Z51.5 HOSPICE CARE: Status: ACTIVE | Noted: 2024-01-01

## 2024-02-26 PROBLEM — R52 GENERALIZED PAIN: Status: ACTIVE | Noted: 2024-01-01

## 2024-02-26 PROBLEM — R06.02 SHORTNESS OF BREATH: Status: ACTIVE | Noted: 2024-01-01

## 2024-02-26 NOTE — DISCHARGE SUMMARY
Discharge Summary       PATIENT ID: Jacky Verduzco  MRN: [id number]   YOB: 1945    DATE OF ADMISSION: 2/21/2024  9:04 PM    DATE OF DISCHARGE: 2/26/24   PRIMARY CARE PROVIDER: Huseyin Savage MD     ATTENDING PHYSICIAN: brett rodriguez  DISCHARGING PROVIDER: Brett Rodriguez MD    To contact this individual call [phone] and ask the  to page.  If unavailable ask to be transferred the Adult Hospitalist Department.    CONSULTATIONS: IP CONSULT TO GI  IP CONSULT TO HOSPITALIST  IP CONSULT TO GI  IP CONSULT TO PHARMACY  IP WOUND CARE NURSE CONSULT TO EVAL  IP CONSULT TO CASE MANAGEMENT  IP CONSULT TO INFECTIOUS DISEASES  IP CONSULT TO CARDIOLOGY  IP CONSULT TO PALLIATIVE CARE  IP CONSULT TO HOSPICE    PROCEDURES/SURGERIES: Procedure(s):  COLONOSCOPY DIAGNOSTIC    ADMITTING DIAGNOSES & HOSPITAL COURSE: pt d/c to hospice care     This is a 78-year-old man with a past medical history significant for hypothyroidism, recent injury to the back, presented at the emergency room from a rehab facility with rectal bleeding. The rectal bleeding started on the day of the presentation at the emergency room. It was described as a dark blood without clot formation. The patient's symptoms started at about 6 p.m. and progressively getting worse. EMS was called. EMS arrived at the scene. The patient was found with heart rate of 116 with a blood pressure of 85/48. He was brought to the emergency room for further evaluation. When the patient arrived at the emergency room, CT scan of the abdomen and pelvis was obtained. It did not show any evidence for acute GI bleed, but it showed mild colitis. The patient also has leukocytosis as well as elevated lactic acid level, concerning for sepsis. The patient was started on blood transfusion as well as antibiotics and was referred to the hospitalist service for admission. There was no history of fever, rigors and chills.       Sepsis with Enterococcus bacteremia ( Bld Cx

## 2024-02-26 NOTE — CARE COORDINATION
CM arranged transport for 12 noon for patient [facility]. DC envelope provided to nurse.    Tamara Brice, MS  [phone]    
Transition of Care Plan:    RUR: 21% High Risk   Prior Level of Functioning: Dependent  Disposition: Hospice  If SNF or IPR: Date FOC offered: 2/22/2024  Date FOC received: 2/22/2024  Accepting facility[facility]  Date authorization started with reference number: N/A  DME needed: TBD  Transportation at discharge: Stretcher  IM/IMM Medicare/ letter given: 2/22/2024  Is patient a [place] and connected with VA? No   If yes, was  transfer form completed and VA notified?   Caregiver Contact: brother De La Fuente - [phone]  Discharge Caregiver contacted prior to discharge? Yes  Care Conference needed? No  Barriers to discharge: None    Mr. Mich Verduzco was seen with another family member.  They were offered choice for hospice services.  They were given the choice list for hospice.  They chose to use [facility].    A referral was sent through [employer] to [facility].      Will continue to follow for discharge planning.  MANSOOR AVALOS LCSW      
place.    Discussed legal decision maker. Patient said he thinks he has an AMD but is not sure. He wants his sister Ally Shelley ([phone]) and brother Mich Verduzco ([phone]) to be his decision makers. He thinks this is what is listed on the AMD. His sister may have a copy. His brother helps him with his finances.     Spoke with Evita ([phone]), liaison for [place]. She said patient is able to return. He is currently holding the bed.        02/22/24 1604   Service Assessment   Patient Orientation Alert and Oriented   Cognition Alert   History Provided By Patient   Primary Caregiver Self  (staff at [facility])   Patient's Healthcare Decision Maker is: Legal Next of Kin  (sister, brother)   Prior Functional Level Assistance with the following:;Bathing;Dressing;Toileting;Cooking;Housework;Shopping;Mobility   Current Functional Level Mobility;Shopping;Housework;Cooking;Toileting;Dressing;Bathing;Assistance with the following:   Can patient return to prior living arrangement Yes  ([facility])   Ability to make needs known: Good   Family able to assist with home care needs: No   Would you like for me to discuss the discharge plan with any other family members/significant others, and if so, who? Yes  (sister-Ally Shelley, brother-Mich Verduzco, caregiver-Negrita Muse)   Financial Resources Medicare;Medicaid   Community Resources Other (Comment)  (caregiver)   Social/Functional History   Lives With Other (comment)  (Normally lives alone, but at [facility])   Home Equipment Cane;Wheelchair-manual;Rollator  (shower chair with back, elevated toilet seat, ramp)   Receives Help From Other (comment)  (facility)   ADL Assistance Needs assistance   Ambulation Assistance Needs assistance   Transfer Assistance Needs assistance   Active  No   Occupation Retired   Type of Occupation [employer]   Discharge Planning   Type of Residence Skilled Nursing Facility  ([facility])   Current

## 2024-02-26 NOTE — PROGRESS NOTES
Hospitalist Progress Note  Aj Giordano MD  Answering service: [phone] OR [phone] from in house phone        Date of Service:  2024  NAME:  Jacky Verduzco  :  1945  MRN:  [id number]      Admission Summary:   This is a 78-year-old man with a past medical history significant for hypothyroidism, recent injury to the back, presented at the emergency room from a rehab facility with rectal bleeding. The rectal bleeding started on the day of the presentation at the emergency room. It was described as a dark blood without clot formation. The patient's symptoms started at about 6 p.m. and progressively getting worse. EMS was called. EMS arrived at the scene. The patient was found with heart rate of 116 with a blood pressure of 85/48. He was brought to the emergency room for further evaluation. When the patient arrived at the emergency room, CT scan of the abdomen and pelvis was obtained. It did not show any evidence for acute GI bleed, but it showed mild colitis. The patient also has leukocytosis as well as elevated lactic acid level, concerning for sepsis. The patient was started on blood transfusion as well as antibiotics and was referred to the hospitalist service for admission. There was no history of fever, rigors and chills.        Interval history / Subjective:   F/u sepsis / blood culture enterococcus, anasarca, GIB, anemia  Looks weak and sleepy  Edema improved with bumex     Assessment & Plan:     Sepsis with Enterococcus bacteremia ( Bld Cx 24)  Rectosigmoid stercoral colitis   - CT abdomen: reviewed  - CT chest: no PNA  -S/P Colonscopy: Cecal polyp without obvious endoscopic features of malignancy or bleeding. No blood or bleeding throughout colon. There is diverticulosis. There is stercoral colitis at the rectosigmoid without deep ulceration.    -repeat Blood Cx  NG  -continue IV unasyn  -Get 
                                                                                                Hospitalist Progress Note  Brett Rodriguez MD  Answering service: [phone] OR [phone] from in house phone        Date of Service:  2024  NAME:  Jacky Verduzco  :  1945  MRN:  [id number]      Admission Summary:   This is a 78-year-old man with a past medical history significant for hypothyroidism, recent injury to the back, presented at the emergency room from a rehab facility with rectal bleeding. The rectal bleeding started on the day of the presentation at the emergency room. It was described as a dark blood without clot formation. The patient's symptoms started at about 6 p.m. and progressively getting worse. EMS was called. EMS arrived at the scene. The patient was found with heart rate of 116 with a blood pressure of 85/48. He was brought to the emergency room for further evaluation. When the patient arrived at the emergency room, CT scan of the abdomen and pelvis was obtained. It did not show any evidence for acute GI bleed, but it showed mild colitis. The patient also has leukocytosis as well as elevated lactic acid level, concerning for sepsis. The patient was started on blood transfusion as well as antibiotics and was referred to the hospitalist service for admission. There was no history of fever, rigors and chills.        Interval history / Subjective:   F/u sepsis / blood culture enterococcus and strep   Abx changed to amp + ceftriaxone final to follow   ID consult, PT/OT      Assessment & Plan:     1. Sepsis with bacteremia from unclear source    -- Continue antibiotics follow cultures possible sepsis source is colitis  - amp + ceftriaxone , ID consult   --CT abdomen-- and Chest  --IMPRESSION:  1. No CT evidence for acute GI bleeding at this time.  2. Rectosigmoid mucosal enhancement, not seen on prior examinations, correlate  for mild colitis.  3. Diverticulosis without diverticulitis.  4. 
  Cardiology Progress Note  2024     Admit Date: 2024  Admit Diagnosis: Colitis [K52.9]  Septicemia (HCC) [A41.9]  Lower GI bleed [K92.2]  Sepsis (HCC) [A41.9]  CC: none currently    Assessment:   Principal Problem:    Sepsis (HCC)  Resolved Problems:    * No resolved hospital problems. *    Plan:     Agree with IV diuresis  Back on NOAC  Cont other cardiac meds  Echo pending    Subjective:      Jacky Verduzco feels a little better.      Objective:    Physical Exam:  Overall VSSAF;  BP (!) 145/79   Pulse (!) 108   Temp 97.3 °F (36.3 °C) (Axillary)   Resp 18   Ht 1.727 m (5' 8\")   Wt 76.2 kg (167 lb 15.9 oz)   SpO2 95%   BMI 25.54 kg/m²   Temp (24hrs), Av.3 °F (36.3 °C), Min:97.2 °F (36.2 °C), Max:97.5 °F (36.4 °C)    Patient Vitals for the past 8 hrs:   Pulse   24 1547 (!) 108   24 1400 75   24 1316 81   24 1200 75   24 1000 75    Patient Vitals for the past 8 hrs:   Resp   24 1613 18   24 1547 20   24 1543 22   24 1316 20   24 1000 18   24 0930 18    Patient Vitals for the past 8 hrs:   BP   24 1547 (!) 145/79   24 1316 (!) 135/56       1901 -  0700  In: 100 [P.O.:100]  Out: -       General Appearance: Well developed, well nourished, no acute distress.   Ears/Nose/Mouth/Throat:   Normal MM; anicteric.     JVP: WNL   Resp:   Lungs clear to auscultation bilaterally.  Nl resp effort.   Cardiovascular:  RRR, S1, S2 normal, no new murmur. No gallop or rub.   Abdomen:   Soft, non-tender, bowel sounds are present.   Extremities: No edema bilaterally.    Skin:  Neuro: Warm and dry.  A/O x3, grossly nonfocal                         Data Review:     Telemetry independently reviewed :   NSR       ECG independently reviewed: NSR  Labs:   Recent Results (from the past 24 hour(s))   Basic Metabolic Panel    Collection Time: 24  5:36 AM   Result Value Ref Range    Sodium 135 (L) 136 - 145 mmol/L    Potassium 3.3 (L) 
  Cardiology Progress Note  2024     Admit Date: 2024  Admit Diagnosis: Colitis [K52.9]  Septicemia (HCC) [A41.9]  Lower GI bleed [K92.2]  Sepsis (HCC) [A41.9]  CC: none currently    Assessment:   Principal Problem:    Sepsis (HCC)  Resolved Problems:    * No resolved hospital problems. *    Plan:     Held additional IV 2/2 increase in Cr  Back on NOAC  Cont other cardiac meds    Family states interest in comfort-focused care. Signing off, please call with questions.    Subjective:      Jacky Verduzco has had ELLIOT.      Objective:    Physical Exam:  Overall VSSAF;  /74   Pulse (!) 110   Temp 97.3 °F (36.3 °C) (Axillary)   Resp 20   Ht 1.727 m (5' 8\")   Wt 67.7 kg (149 lb 4 oz)   SpO2 98%   BMI 22.69 kg/m²   Temp (24hrs), Av.6 °F (36.4 °C), Min:97.3 °F (36.3 °C), Max:98.1 °F (36.7 °C)    Patient Vitals for the past 8 hrs:   Pulse   24 1400 (!) 110   24 1232 68   24 1200 79   24 1000 71   24 0821 70   24 0800 74      Patient Vitals for the past 8 hrs:   Resp   24 1259 20   24 1232 20   24 1229 20   24 0821 20      Patient Vitals for the past 8 hrs:   BP   24 1232 128/74   24 0821 (!) 140/77        No intake/output data recorded.      General Appearance: Well developed, well nourished, no acute distress.   Ears/Nose/Mouth/Throat:   Normal MM; anicteric.     JVP: WNL   Resp:   Lungs clear to auscultation bilaterally.  Nl resp effort.   Cardiovascular:  RRR, S1, S2 normal, no new murmur. No gallop or rub.   Abdomen:   Soft, non-tender, bowel sounds are present.   Extremities: No edema bilaterally.    Skin:  Neuro: Warm and dry.  A/O x3, grossly nonfocal                         Data Review:     Telemetry independently reviewed :   NSR       ECG independently reviewed: NSR  Labs:   Recent Results (from the past 24 hour(s))   Basic Metabolic Panel    Collection Time: 24  7:08 AM   Result Value Ref Range    Sodium 140 136 
A Spiritual Care Partner Volunteer visited Jacky Verduzco at [facility] in [facility] 2N MED SURG on 2/23/2024     Documented by: Chaplain Lydia Mercedes  
Admission Medication Reconciliation:    Information obtained from:  [facility] Med Review Report  RxQuery data available¹:  Yes    Comments/Recommendations: Updated PTA meds/reviewed patient's allergies.    1)  med list dated 2/20/24 brought in by caregiver    2)  Medication changes (since last review):  Added  - none    Adjusted  - none    Removed  - none    3)  valacyclovir 1g TID x7 days started 2/14/24 and would be completed prior to presentation.       ¹RxQuery pharmacy benefit data reflects medications filled and processed through the patient's insurance, however   this data does NOT capture whether the medication was picked up or is currently being taken by the patient.    Allergies:  Patient has no known allergies.    Significant PMH/Disease States:   Past Medical History:   Diagnosis Date    DJD (degenerative joint disease)     Hypertension     Obesity      Chief Complaint for this Admission:    Chief Complaint   Patient presents with    GI Bleeding     Prior to Admission Medications:   Prior to Admission Medications   Prescriptions Last Dose Informant   HYDROcodone-acetaminophen (NORCO)  MG per tablet     Sig: Take 1 tablet by mouth in the morning and at bedtime for 10 days. Intended supply: 30 days Max Daily Amount: 2 tablets   acetaminophen (TYLENOL) 325 MG tablet     Sig: Take 2 tablets by mouth every 6 hours as needed for Pain   apixaban (ELIQUIS) 5 MG TABS tablet     Sig: Take 1 tablet by mouth 2 times daily   aspirin 81 MG chewable tablet     Sig: Take 1 tablet by mouth daily   cyanocobalamin 1000 MCG tablet     Sig: Take 1 tablet by mouth daily   ergocalciferol (ERGOCALCIFEROL) 1.25 MG (70426 UT) capsule     Sig: Take 1 capsule by mouth Twice a Week Take on Tuesdays and Fridays   folic acid (FOLVITE) 800 MCG tablet     Sig: Take 1 tablet by mouth daily   furosemide (LASIX) 40 MG tablet     Sig: Take 1 tablet by mouth daily   levothyroxine (SYNTHROID) 50 MCG tablet     Sig: 
[facility]  Good Help to Those in Need  [phone]    Patient Name: Jacky Verduzco  YOB: 1945  Age: 78 y.o.    [facility] RN Note:  Hospice consult noted. Chart reviewed. Plan of care discussed with patients nurse & care manager.   In to meet with patient, brother and sister.   Discussed Hospice philosophy, general plan of care, levels of care, services and on call procedures.  Family information packet provided & reviewed with family.  Patient is very weak and frail, labored breathing with use of accessory muscles, pain.  Dr Fuentes agrees with [facility] admission.  Bed confirmed at [facility].  CM to set transport: requesting 12 noon  CTI: sepsis  Consents: signed and scanned  DDNR: signed and on chart for transport  Rapid Covid: pending    Thank you for the opportunity to be of service to this patient.     Christina Castillo RN  Clinical Nurse Liaison  [facility]  [phone] Mobile  [phone] Office   Available on Perfect Serve     
Called to evaluate pt for IV access. Pt has sever bruising an open wounds on both upper extremities.  Pt has no options in arms for IV access at this point, unless possibly a PICC line up in the axillary area.     I would suggest that if he needs access, please call the OR in the AM and we can schedule him for a central line.   
IV abx stopped due to loss of IV access.    This RN attempted to place IV access x2 without success.    Pt skin is very swollen, bruised, and fragile.    CARLOS weeping serousang drainage. Cleansed, applied petroleum gauze, abd, and kerlix.    Requested an ultrasound guided IV.    0255: Secure message sent to CASIE Cunningham \"Unable to administer 2300 and 0500 dose of unasyn due to loss of IV access. Anesthesiologist attempted to place IV with ultrasound without success due to swelling, bruising, and skin tears. Recommended in his note PICC placement with OR in the am. \"  
[facility]  Good Help to Those in Need  [phone]     Patient Name: Jacky Verduzco  YOB: 1945  Age: 78 y.o.    [facility] Hospice RN Note:  Hospice consult received, reviewing chart. Will follow up with Unit Nurse and Care Manager to discuss plan of care, patient status and discharge disposition within the hour.     Thank you for the opportunity to be of service to this patient.    16:30: Phone call to patient's brother, Mich Verduzco. Mich and other family have left bedside for a brief time, planning to return bedside. Mich states he is interested in Hospice services. When asked about discharge options and location, Mich was adamant that pt can not return home.     Plan to meet with Mich and his family on Monday, 2/26/24 between 9:30-10:00 am.    [facility] has verified home availability to provide hospice services at patient's home, if this is an option for family.      Sheyla Reeder RN, [facility]  Hospice Nurse Liaison  [phone] Mobile  [phone] Office  Available on Perfect Serve    
Occupational Therapy   02.26.2024    Chart reviewed in prep for OT treatment, RN reporting patient not appropriate at this time. Noted plan for hospice meeting with family at 10 AM today - patient with significant decline over weekend. Will defer and follow up as able. Thank you.     Update: patient to D/C to [facility] at noon today, will sign off.    Susan Hernandez MS, OTR/L  
Occupational Therapy  OT orders received and acknowledged. Chart reviewed. Noted, patient declines at this time stating he is exhausted and feels horrible. Will continue to follow.   Hillary Hsu, OTR/L    
PICC order noted. Blood cx drawn today. PICCs not placed until blood cxs negative for 48 hours. Additionally needs d/c plan and final antibiotic recs. Goal is to place PICCs < 48 prior to d/c, ideally < 24 hours. Will follow.  
Pharmacist Note - Vancomycin Dosing    Consult provided for this 78 y.o. male for indication of Sepsis of Unknown Etiology.  Antibiotic regimen(s): Vancomycin + Zosyn  Patient on vancomycin PTA? NO     Recent Labs     24  2139 24  2215 24  0648   WBC 13.0*  --  11.6*   CREATININE  --  1.15 1.19   BUN  --  26* 27*     Frequency of BMP: none, will order daily x3  Height: 172.7 cm  Weight: 76.2 kg  Est CrCl: 50 ml/min; UO: -- ml/kg/hr  Temp (24hrs), Av.8 °F (36.6 °C), Min:97.6 °F (36.4 °C), Max:97.9 °F (36.6 °C)    Cultures:   Blood x2: NG pending   BioFire: pending    MRSA Swab ordered (if applicable)? N/A    The plan below is expected to result in a target range of AUC/JESSI 400-600    Therapy was initiated with a loading dose of 1750 mg IV x 1 to be followed by a maintenance dose of 1250 mg IV every 24 hours.  Pharmacy to follow patient daily and order levels / make dose adjustments as appropriate.    *Vancomycin has been dosed used Bayesian kinetics software to target an AUC/JESSI of 400-600, which provides adequate exposure for an assumed infection due to MRSA with an JESSI of 1 or less while reducing the risk of nephrotoxicity as seen with traditional trough based dosing goals.     
Physical Therapy    Chart reviewed.  Note family is pursuing hospice at this time, will have meeting this morning.  Will hold therapy at this time and follow per GOC.    Diana Balderrama MS, PT  
Physical Therapy Note     PT orders received and acknowledged. Chart reviewed. Patient declines acute PT evaluation at this time stating he is exhausted and feels horrible. He is agreeable to re-attempting PT evaluation tomorrow.   
Pt and family discussed discharge instructions regarding discharge to hospice house. Pt and family expressed understanding. Pt discharged with IV in place per hospice team. Pt discharged via stretcher to community hospice house. Report called to DIANA Dias.   
Pt finally beginning to have bowel movements this morning, the most recent being large but only loose - not liquid. Pt having a hard time tolerating more than small amounts of bowel prep at a time and has, at times, refused to drink any more.   
Received consult request, he is followed by [facility]. We are not on unreferred today. Please consult VCS.   
Renal Dosing/Monitoring  Medication: Zosyn   Current regimen:  3.375 gram every 6 hr  Recent Labs     02/21/24  2215 02/22/24  0648   CREATININE 1.15 1.19   BUN 26* 27*     Estimated CrCl:  50 ml/min  Plan: Change to Zosyn 3.75 gram q8h for CrCL>20mL/min    
SLP Contact Note    Pt and family pursuing hospice. Given this, would recommend any requested items but do not force feed. Would not make any diet modifications unless it is reported by patient to be uncomfortable to swallow some items. No intensive SLP indicated. Will sign off. Please reach out if SLP can be of any assistance.      Jessica Enrique M.Ed, PhD(c), CCC-SLP  Speech-Language Pathologist    
TRANSFER - IN REPORT:    Verbal report received from Lora ORTIZ on Jacky Verduzco  being received from  for ordered procedure      Report consisted of patient's Situation, Background, Assessment and   Recommendations(SBAR).     Information from the following report(s) Nurse Handoff Report was reviewed with the receiving nurse.    Opportunity for questions and clarification was provided.      Initial RN admission and assessment performed and documented in Endoscopy navigator.     Patient evaluated by anesthesia in pre-procedure holding.     All procedural vital signs, airway assessment, and level of consciousness information monitored and recorded by anesthesia staff on the anesthesia record.     Report received from CRNA post procedure.  Patient transported to recovery area by RN.    Endoscope was pre-cleaned at bedside immediately following procedure by Roger.    Recovery room report given to Keiry LARSON RN     TRANSFER - OUT REPORT:    Verbal report given to Lora ORTIZ on Jacky Verduzco  being transferred to  for routine progression of patient care       Report consisted of patient's Situation, Background, Assessment and   Recommendations(SBAR).     Information from the following report(s) Nurse Handoff Report was reviewed with the receiving nurse.           Lines:   Peripheral IV 02/21/24 Right Antecubital (Active)   Site Assessment Clean, dry & intact 02/23/24 1341   Line Status Infusing 02/23/24 1341   Line Care Connections checked and tightened 02/23/24 1341   Phlebitis Assessment No symptoms 02/23/24 1341   Infiltration Assessment 0 02/23/24 1341   Alcohol Cap Used Yes 02/23/24 0400   Dressing Status Clean, dry & intact 02/23/24 1341   Dressing Type Transparent 02/23/24 1341        Opportunity for questions and clarification was provided.      Patient transported with:  Tech       
Wound care completed for Buttocks, perineal, and groin as ordered w/ the use of skin cleanser.  
--  11.6*  --    HGB 9.5*   < > 11.1* 11.1*   HCT 28.8*   < > 33.8* 33.4*     --  331  --     < > = values in this interval not displayed.     Recent Labs     02/21/24 2215 02/22/24 0648    135*   K 3.7 3.8    102   CO2 23 23   BUN 26* 27*   MG  --  2.2   PHOS  --  4.2     Recent Labs     02/21/24 2215 02/22/24 0648   ALT 12 12   GLOB 2.5 2.8     Recent Labs     02/21/24 2215   INR 1.8*   APTT 68.3*      Recent Labs     02/22/24 0648   TIBC 150*      No results found for: \"FOL\", \"RBCF\"   No results for input(s): \"PH\", \"PCO2\", \"PO2\" in the last 72 hours.  No results for input(s): \"CPK\" in the last 72 hours.    Invalid input(s): \"CPKMB\", \"CKNDX\", \"TROIQ\"  Lab Results   Component Value Date/Time    CHOL 78 12/07/2022 04:26 AM    HDL 42 12/07/2022 04:26 AM     No results found for: \"GLUCPOC\"        Medications Reviewed:     Current Facility-Administered Medications   Medication Dose Route Frequency    levothyroxine (SYNTHROID) tablet 50 mcg  50 mcg Oral Daily    mirtazapine (REMERON) tablet 15 mg  15 mg Oral Nightly    midodrine (PROAMATINE) tablet 2.5 mg  2.5 mg Oral TID    traZODone (DESYREL) tablet 75 mg  75 mg Oral Nightly    sodium chloride flush 0.9 % injection 5-40 mL  5-40 mL IntraVENous 2 times per day    sodium chloride flush 0.9 % injection 5-40 mL  5-40 mL IntraVENous PRN    0.9 % sodium chloride infusion   IntraVENous PRN    potassium chloride (KLOR-CON) extended release tablet 40 mEq  40 mEq Oral PRN    Or    potassium bicarb-citric acid (EFFER-K) effervescent tablet 40 mEq  40 mEq Oral PRN    Or    potassium chloride 10 mEq/100 mL IVPB (Peripheral Line)  10 mEq IntraVENous PRN    magnesium sulfate 2000 mg in 50 mL IVPB premix  2,000 mg IntraVENous PRN    ondansetron (ZOFRAN-ODT) disintegrating tablet 4 mg  4 mg Oral Q8H PRN    Or    ondansetron (ZOFRAN) injection 4 mg  4 mg IntraVENous Q6H PRN    polyethylene glycol (GLYCOLAX) packet 17 g  17 g Oral Daily PRN    acetaminophen 
IBW. Weight Source: Bed Scale  Current BMI (kg/m2): 25.5  Usual Body Weight: 70.3 kg (155 lb)  % Weight Change (Calculated): 8.4  Weight Adjustment For: No Adjustment                 BMI Categories: Underweight (BMI less than 22) age over 65 (using euvolemic weight)      Weight History Weight - Scale Weight - Scale Weight Method   2/22/2024 168 lbs 76.2 kg Bed scale   2/15/2024 141 lbs 3 oz 64 kg -   2/13/2024 137 lbs 10 oz 62.4 kg -   2/8/2024 142 lbs 13 oz 64.8 kg -   2/6/2024 143 lbs 6 oz 65 kg -   2/2/2024 140 lbs 13 oz 63.9 kg -   1/31/2024 133 lbs 10 oz 60.6 kg -   1/29/2024 133 lbs 10 oz 60.6 kg -   1/4/2024 130 lbs 1 oz 59 kg -   12/31/2023 130 lbs 59 kg Stated   8/11/2023 160 lbs 72.6 kg -   7/29/2023 160 lbs 72.6 kg Stated   7/6/2023 155 lbs 10 oz 70.6 kg Actual   12/11/2022 155 lbs 10 oz  157 lbs 14 oz 70.6 kg  71.6 kg -   12/9/2022 155 lbs 10 oz 70.6 kg -   12/8/2022 155 lbs 10 oz 70.6 kg -   12/2/2022 158 lbs 71.7 kg -   12/1/2022 158 lbs 12 oz 72 kg -   11/29/2022 141 lbs 64 kg -   12/20/2021 154 lbs 69.9 kg -   4/10/2021 190 lbs 86.2 kg -       Nutrition Diagnosis:   Severe malnutrition, In context of chronic, non-illness related related to inadequate protein-energy intake, early satiety, altered GI function as evidenced by weight loss, Criteria as identified in malnutrition assessment    Nutrition Interventions:   Food and/or Nutrient Delivery: Continue NPO, Start Oral Diet (diet per GI)  Nutrition Education/Counseling: Counseling initiated  Coordination of Nutrition Care: Continue to monitor while inpatient       Goals:     Goals: Initiate PO diet, PO intake 50% or greater, by next RD assessment       Nutrition Monitoring and Evaluation:   Behavioral-Environmental Outcomes: None Identified  Food/Nutrient Intake Outcomes: Diet Advancement/Tolerance, Food and Nutrient Intake  Physical Signs/Symptoms Outcomes: Biochemical Data, GI Status, Diarrhea, Meal Time Behavior, Fluid Status or Edema, Nutrition 
them on 2/24/2024 as outlined below:          PHYSICAL EXAM:  General: WD, WN. Alert, cooperative, no acute distress    EENT:  EOMI. Anicteric sclerae. MMM  Resp:  Diminished BS bases.  No accessory muscle use  CV:  Regular  rhythm,  (+) trunk and BL LE pitting edema  GI:  Soft, Non distended, Non tender.  +Bowel sounds  Neurologic:  Alert and oriented X 3, normal speech,   Psych:   Good insight. Not anxious nor agitated  Skin:  No rashes.  No jaundic           Data Review:    I personally reviewed  Image and labs    I have independently reviewed and interpreted patient's lab and all other diagnostic data    Notes reviewed from all clinical/nonclinical/nursing services involved in patient's clinical care. Care coordination discussions were held with appropriate clinical/nonclinical/ nursing providers based on care coordination needs.     Labs:     Recent Labs     02/21/24  2139 02/22/24  0251 02/22/24  0648 02/22/24  1027   WBC 13.0*  --  11.6*  --    HGB 9.5*   < > 11.1* 11.1*   HCT 28.8*   < > 33.8* 33.4*     --  331  --     < > = values in this interval not displayed.       Recent Labs     02/22/24  0648 02/23/24  0049 02/24/24  0536   * 136 135*   K 3.8 4.0 3.3*    103 108   CO2 23 23 17*   BUN 27* 30* 27*   MG 2.2  --   --    PHOS 4.2  --   --        Recent Labs     02/21/24  2215 02/22/24  0648   ALT 12 12   GLOB 2.5 2.8       Recent Labs     02/21/24 2215   INR 1.8*   APTT 68.3*        Recent Labs     02/22/24  0648   TIBC 150*        No results found for: \"FOL\", \"RBCF\"   No results for input(s): \"PH\", \"PCO2\", \"PO2\" in the last 72 hours.  No results for input(s): \"CPK\" in the last 72 hours.    Invalid input(s): \"CPKMB\", \"CKNDX\", \"TROIQ\"  Lab Results   Component Value Date/Time    CHOL 78 12/07/2022 04:26 AM    HDL 42 12/07/2022 04:26 AM     No results found for: \"GLUCPOC\"        Medications Reviewed:     Current Facility-Administered Medications   Medication Dose Route Frequency

## 2024-03-02 ENCOUNTER — HOME CARE VISIT (OUTPATIENT)
Facility: HOME HEALTH | Age: 79
End: 2024-03-02
Payer: MEDICARE

## 2024-06-05 NOTE — ANESTHESIA PRE PROCEDURE
Department of Anesthesiology  Preprocedure Note       Name:  Jacky Verduzco   Age:  78 y.o.  :  1945                                          MRN:  [id number]         Date:  2024      Surgeon: Surgeon(s):  Stuart Christine MD    Procedure: Procedure(s):  COLONOSCOPY DIAGNOSTIC    Medications prior to admission:   Prior to Admission medications    Medication Sig Start Date End Date Taking? Authorizing Provider   apixaban (ELIQUIS) 5 MG TABS tablet Take 1 tablet by mouth 2 times daily 24  Susy Riley APRN - NP   mirtazapine (REMERON) 15 MG tablet Take 1 tablet by mouth nightly 24  Susy Riley APRN - NP   cyanocobalamin 1000 MCG tablet Take 1 tablet by mouth daily 24  Susy Riley APRN - NP   levothyroxine (SYNTHROID) 50 MCG tablet Take 1 tablet by mouth daily 24  Susy Riley APRN - NP   midodrine (PROAMATINE) 2.5 MG tablet Take 1 tablet by mouth 3 times daily 24   Susy Riley APRN - NP   omeprazole (PRILOSEC) 20 MG delayed release capsule Take 1 capsule by mouth daily 24  Susy Riley APRN - NP   aspirin 81 MG chewable tablet Take 1 tablet by mouth daily 24  Susy Riley APRN - NP   folic acid (FOLVITE) 800 MCG tablet Take 1 tablet by mouth daily 24  Susy Riley APRN - NP   ergocalciferol (ERGOCALCIFEROL) 1.25 MG (85342 UT) capsule Take 1 capsule by mouth Twice a Week Take on  and Fridays 2/15/24 5/15/24  Susy Riley APRN - NP   magnesium oxide (MAG-OX) 400 MG tablet Take 1 tablet by mouth daily 24  Susy Riley APRN - NP   sucralfate (CARAFATE) 1 GM tablet Take 1 tablet by mouth 4 times daily 24  Susy Riley APRN - NP   senna-docusate (PERICOLACE) 8.6-50 MG per tablet Take 1 tablet by mouth daily 24  Susy Riley APRN - NP   furosemide (LASIX) 40 MG tablet Take 1 tablet by mouth daily 24  
PA for Tacrolimus 0.1% ointment     Submitted via    [x]CMM-KEY [id number]  [][facility]-Case ID #   []Faxed to plan   []Other website   []Phone call Case ID #     Office notes sent, clinical questions answered. Awaiting determination    Turnaround time for your insurance to make a decision on your Prior Authorization can take 7-21 business days.             
PA for Tacrolimus 0.1% ointment Denied    Reason:(Screenshot if applicable)        Message sent to office clinical pool Yes    Denial letter scanned into Media Yes    Appeal started No ( Provider will need to decide if appeal is warranted and send clinical documentation to PA team for initiation.)    **Please follow up with your patient regarding denial and next steps**    
Sent in pimecrolimus Rx to [facility]- set price for $65.09 for 30 g tube.     Will send note for staff to call to make her aware.   
fasciitis M72.2   • ED (erectile dysfunction) N52.9   • Gastroesophageal reflux disease without esophagitis K21.9   • Venous insufficiency of both lower extremities I87.2   • HTN (hypertension) I10   • Chronic right shoulder pain M25.511, G89.29   • Weakness R53.1   • Severe protein-calorie malnutrition (HCC) E43   • Anasarca R60.1   • Hypotension I95.9   • Sinus bradycardia R00.1   • PSVT (paroxysmal supraventricular tachycardia) I47.10   • Chronic diastolic heart failure (HCC) I50.32   • Hypomagnesemia E83.42   • Protein calorie malnutrition E46   • Sepsis (HCC) A41.9       Past Medical History:        Diagnosis Date   • DJD (degenerative joint disease)    • Hypertension        Past Surgical History:        Procedure Laterality Date   • COLONOSCOPY N/A 12/20/2021    COLONOSCOPY performed by Sunday Carey MD at [facility] ENDOSCOPY   • EP DEVICE PROCEDURE N/A 1/3/2024    Ablation AV node performed by Nima Daniels MD at [facility] CARDIAC CATH LAB   • GASTRIC BYPASS,OBESE<150CM EM-EN-Y     • INVASIVE VASCULAR N/A 1/3/2024    Ultrasound guided vascular access performed by Nima Daniels MD at [facility] CARDIAC CATH LAB   • IR KYPHOPLASTY THORACIC FIRST LEVEL  8/14/2023    IR KYPHOPLASTY THORACIC FIRST LEVEL 8/14/2023 [facility] RAD ANGIO IR   • ORTHOPEDIC SURGERY      bilat hip replacements       Social History:    Social History     Tobacco Use   • Smoking status: Never   • Smokeless tobacco: Never   Substance Use Topics   • Alcohol use: Yes     Alcohol/week: 136.0 standard drinks of alcohol                                Counseling given: Not Answered      Vital Signs (Current):   Vitals:    02/23/24 1000 02/23/24 1200 02/23/24 1216 02/23/24 1400   BP:   (!) 129/90    Pulse: 87 (!) 106 (!) 103 74   Resp:   20    Temp:   98.8 °F (37.1 °C)    TempSrc:   Oral    SpO2:   95%    Weight:       Height:                                                  BP Readings from Last 3 Encounters:   02/23/24 (!) 129/90   02/15/24 104/62   02/13/24

## 2025-01-01 NOTE — TELEPHONE ENCOUNTER
Bed: 06  Expected date:   Expected time:   Means of arrival:   Comments:  1st triage   Referral placed and fax to [address] [place] [address] at [phone] per pt request

## 2025-05-06 NOTE — PROGRESS NOTES
PATIENT ACCOMPANIED BY:  with Mother  History provided by the mother  Also reviewed walk-in care note from 5/3/2025    PEDIATRIC ILLNESS VISIT     Office Visit, Follow-up, and fatigue            SUBJECTIVE:  Brooks Siu is a 18 year old year old female  who is complaining of swollen lymph node and fatigue.  Present for several days and is stable.  Present treatments include - None.   Previous medical contacts for the problem - Walk in Care  Symptoms:  Fever: No elevation of temperature  General: ACHY  Brought in by her mother for follow-up of fatigue sore throat and a previously noted swollen lymph gland.  She was seen on 5/3/2025 and had a negative throat culture viral syndrome was entertained at that time.  She also noted a swollen lymph gland in her right axillary area but this has resolved over the last 24 hours.  She definitely is feeling better throat is better.  She is somewhat tired.  Did have a gastrointestinal infection in the past and had to remain home for 1 week  This has improved.  There is no recent vomiting or diarrhea  No difficulty breathing shortness of breath chest pain or cough.    ROS: fatigue, sore throat, and negative except for previous mentioned concerns    Allergies:  ALLERGIES:   Allergen Reactions   • Amoxicillin RASH       Current Outpatient Medications   Medication Sig Dispense Refill   • methylpheniDATE ER (CONCERTA) 36 MG CR tablet Take 36 mg by mouth every morning.     • methylpheniDATE (RITALIN) 10 MG tablet Take 10 mg by mouth daily.     • fluvoxaMINE (LUVOX) 50 MG tablet Take 50 mg by mouth at bedtime.     • hydrOXYzine (ATARAX) 25 MG tablet as needed.     • escitalopram (LEXAPRO) 5 MG tablet Take 1 tablet by mouth daily. 90 tablet 0   • tretinoin (RETIN-A) 0.05 % cream APPLY PEA SIZED AMOUNT TO ENTIRE FACE AT BEDTIME REFER TO PRACTICE HANDOUT FOR SPECIFIC INSTRUCTIONS.       No current facility-administered medications for this visit.     Family history:   No other family  Hospitalist Progress Note    NAME: Quiana Lema   :  1945   MRN:  [id number]     Interim Hospital Summary: 69 y.o. male whom presented on 2022 with      Assessment / Plan:  KEISHA:  Barriers:   SNF    Lower extremity swelling  Generalized weakness  -BLE dopplers no DVT   -CXR nothing acute  -Echocardiogram pending. NT proBNP >2K  -swelling could be from hypoalbuminemia   -continue IV lasix. Follow daily weight and lytes  -PT OT eval and treat. Rec SNF rehab     Malnutrition, seems mod to severe  Hypoalbuminemia  -nutritionist input appreciated    Anemia, macrocytic   -B12 and folate WNL    Hypothyroid  -TSH 9  -increase synthroid to 50mcg daily. Repeat TSH in 8 weeks     Indigestion / dyspepsia   -continue protonix and carafate      18.5 - 24.9 Normal weight / Body mass index is 21.44 kg/m². Code status: Full  Prophylaxis: Lovenox  Recommended Disposition: SNF/LTC       Subjective:     Chief Complaint / Reason for Physician Visit  Follow up of weakness, leg edema  Chart reviewed in detail. Discussed with RN events overnight. Review of Systems:  Symptom Y/N Comments  Symptom Y/N Comments   Fever/Chills    Chest Pain     Poor Appetite    Edema y    Cough    Abdominal Pain     Sputum    Joint Pain     SOB/[place]    Pruritis/Rash     Nausea/vomit    Tolerating PT/OT     Diarrhea    Tolerating Diet     Constipation    Other       Could NOT obtain due to:      PO intake: No data found. Objective:     VITALS:   Last 24hrs VS reviewed since prior progress note.  Most recent are:  Patient Vitals for the past 24 hrs:   Temp Pulse Resp BP SpO2   22 1147 97.3 °F (36.3 °C) 98 18 95/79 100 %   22 1026 -- 70 -- 114/83 --   22 1020 -- -- -- 93/77 --   22 1016 -- (!) 123 -- (!) 72/60 --   22 1011 -- (!) 101 -- 90/80 --   22 1003 -- 85 -- 116/74 --   22 0801 97.6 °F (36.4 °C) 73 14 99/60 98 %   22 0518 97.8 °F (36.6 °C) 60 14 109/72 98 %   11/30/22 2300 98 °F (36.7 °C) 69 13 120/63 99 %   11/30/22 2256 98 °F (36.7 °C) 71 16 (!) 102/29 96 %   11/30/22 2234 -- 60 18 120/76 98 %   11/30/22 2226 -- 61 -- 110/70 --       Intake/Output Summary (Last 24 hours) at 12/1/2022 1639  Last data filed at 12/1/2022 7132  Gross per 24 hour   Intake 50 ml   Output 1025 ml   Net -975 ml        I had a face to face encounter, and independently examined this patient on 12/1/2022, as outlined below:  PHYSICAL EXAM:  General: WD, WN. Alert, cooperative, no acute distress    EENT:  EOMI. Anicteric sclerae. MMM  Resp:  CTA bilaterally, no wheezing or rales. No accessory muscle use  CV:  Regular  rhythm,  (+) LE edema  GI:  Soft, Non distended, Non tender. +Bowel sounds  Neurologic:  Alert and oriented X 3, normal speech,   Psych:   Good insight. Not anxious nor agitated  Skin:  No rashes. No jaundice    Reviewed most current lab test results and cultures  YES  Reviewed most current radiology test results   YES  Review and summation of old records today    NO  Reviewed patient's current orders and MAR    YES  PMH/SH reviewed - no change compared to H&P  ________________________________________________________________________  Care Plan discussed with:    Comments   Patient x    Family      RN     Care Manager     Consultant                        Multidiciplinary team rounds were held today with , nursing, pharmacist and clinical coordinator. Patient's plan of care was discussed; medications were reviewed and discharge planning was addressed.      ________________________________________________________________________  Total NON critical care TIME:  25   Minutes    Total CRITICAL CARE TIME Spent:   Minutes non procedure based      Comments   >50% of visit spent in counseling and coordination of care x     This includes time during multidisciplinary rounds if indicated above members have acute illness     Social history:   Attends school    OBJECTIVE:  Visit Vitals  Pulse (!) 109   Temp 98.4 °F (36.9 °C)   Wt 56.8 kg (125 lb 3.5 oz)   LMP 05/03/2025   SpO2 99%      GENERAL: healthy appearing, alert, in no acute distress, and does not appear acutely or chronically ill  HEAD & SCALP: normocephalic with no lesions    EYES: + red reflex b/l, no redness, no drainage, EOMI, ISSA, normal b/l conjunctiva, and nonicteric  EARS: normal appearance to external ears, canals, TMs visualized b/l   NOSE: Patent, no drainage, no swelling  MOUTH: moist mucus membranes, no lesions  THROAT: no erythema, no lesions and no significant erythema was noted uvula was midline there is no trismus there is no exudate.  No stridor at rest.  NECK: supple, no adenopathy, no masses and no posterior nodes noted no supraclavicular nodes noted excellent aeration.  CHEST:  Clear to ausculation b/l no wheezes, no retractions and Good air flow b/l  CARDIO: regular rate and rhythm, no murmur  ABDOMEN: +BS, soft, nontender, nondistended, no masses, no hepatosplenomegaly and no  rigidity no guarding no rebound no palpable masses  SKIN: dry skin patches no vesicles no ulcerations petechiae no purpura    ASSESSMENT/PLAN:  Pharyngitis -  symptomatic treatment with fluids, vaporizer, OTC medication recommended, no antibiotics pending lab results, CBC and mono test if no improvement after 7 days of illness she had a negative culture on 5/3/2025.  She does feel as if she is getting better every day and there is definitely no progression of symptoms.  No significant adenopathy and no adenopathy appreciated in the axillary area at this time no redness or irritation.  Since she is currently improving steadily would recommend observation and comfort measures.  Consider testing for mono if there is an increase in adenopathy, worsening sore throat.  Although her sister had mono they have not been in close proximity.  Medication changes:  Yes   Immunizations given today? NONE  See Orders:  Instructed to call if the problem worsens or does not improve within the next 24 to 48 hours.  Schedule follow-up: in a few day(s)    Total time spent was 25 minutes.  This includes:   Review of patient's chart prior to the visit and any other outside reports including consults, ER/WIC visits, and labs and imaging.  The actual patient visit.  The time required to complete the appropriate documentation after the visit    Taz Green MD,FAAP                 ________________________________________________________________________  Jojo Valenzuela MD     Procedures: see electronic medical records for all procedures/Xrays and details which were not copied into this note but were reviewed prior to creation of Plan. LABS:  I reviewed today's most current labs and imaging studies.   Pertinent labs include:  Recent Labs     12/01/22 0523 11/29/22 2215   WBC 5.0 5.8   HGB 9.7* 10.9*   HCT 28.1* 32.1*    220     Recent Labs     12/01/22 0523 11/29/22  2215    140   K 2.7* 3.3*    104   CO2 31 30   GLU 80 114*   BUN 12 11   CREA 1.20 1.22   CA 6.9* 7.3*   MG 1.2*  --    PHOS 2.9  --    ALB 1.5* 1.9*   TBILI 0.8 1.6*   ALT 24 26

## (undated) DEVICE — WASTE KIT - ST MARY: Brand: MEDLINE INDUSTRIES, INC.

## (undated) DEVICE — ELECTRODE,RADIOTRANSLUCENT,FOAM,5PK: Brand: MEDLINE

## (undated) DEVICE — SYR 10ML LUER LOK 1/5ML GRAD --

## (undated) DEVICE — TOWEL 4 PLY TISS 19X30 SUE WHT

## (undated) DEVICE — SUTURE V-LOC 180 SZ 2-0 L12IN ABSRB VLT GS-21 L37MM 1/2 CIR VLOCM0315

## (undated) DEVICE — KIT ACCS INTRO 4FR L10CM NDL 21GA L7CM GWIRE L40CM

## (undated) DEVICE — NON-REM POLYHESIVE PATIENT RETURN ELECTRODE: Brand: VALLEYLAB

## (undated) DEVICE — Device

## (undated) DEVICE — INTENDED FOR TISSUE SEPARATION, AND OTHER PROCEDURES THAT REQUIRE A SHARP SURGICAL BLADE TO PUNCTURE OR CUT.: Brand: BARD-PARKER ®  SAFETY SCALPED

## (undated) DEVICE — SYR 3ML LL TIP 1/10ML GRAD --

## (undated) DEVICE — CONTAINER SPEC 20 ML LID NEUT BUFF FORMALIN 10 % POLYPR STS

## (undated) DEVICE — FORCEPS BX L240CM JAW DIA2.8MM L CAP W/ NDL MIC MESH TOOTH

## (undated) DEVICE — NEONATAL-ADULT SPO2 SENSOR: Brand: NELLCOR

## (undated) DEVICE — PACK PROCEDURE SURG HRT CATH

## (undated) DEVICE — 3M™ IOBAN™ 2 ANTIMICROBIAL INCISE DRAPE 6650EZ: Brand: IOBAN™ 2

## (undated) DEVICE — SNARE ENDOSCP M L240CM W27MM SHTH DIA2.4MM CHN 2.8MM OVL

## (undated) DEVICE — PINNACLE INTRODUCER SHEATH: Brand: PINNACLE

## (undated) DEVICE — INTRO PEELWY HEMVLV 7F 13CM -- SHRT PRELUDE SNAP

## (undated) DEVICE — BASIN EMSIS 16OZ GRAPHITE PLAS KID SHP MOLD GRAD FOR ORAL

## (undated) DEVICE — TRAP,MUCUS SPECIMEN, 80CC: Brand: MEDLINE

## (undated) DEVICE — TUBING HYDR IRR --

## (undated) DEVICE — ABSORBABLE WOUND CLOSURE DEVICE: Brand: V-LOC 90

## (undated) DEVICE — REM POLYHESIVE ADULT PATIENT RETURN ELECTRODE: Brand: VALLEYLAB

## (undated) DEVICE — SYSTEM INTRO SHTH 9FR L13CM BLK HUB W SIDEPRT SPLITTABLE

## (undated) DEVICE — HYPODERMIC SAFETY NEEDLE: Brand: MAGELLAN

## (undated) DEVICE — SET TBNG L260CM IRRIG RF THER COOL PNT

## (undated) DEVICE — KIT ELECTRD SURF FOR DISPLAYING THE 3D POS OF EP CATH

## (undated) DEVICE — PROVE COVER: Brand: UNBRANDED

## (undated) DEVICE — FORCEPS BX L240CM JAW DIA2.4MM ORNG L CAP W/ NDL DISP RAD

## (undated) DEVICE — PADPRO DEFIBRILLATION/PACING/CARDIOVERSION/MONITORING ELECTRODES, ADULT/CHILD GREATER THAN 10 KG RADIOTRANSPARENT ELECTRODE, PHYSIO-CONTROL QUIK-COMBO (M) 60" (152 CM): Brand: PADPRO

## (undated) DEVICE — DERMABOND SKIN ADH 0.7ML -- DERMABOND ADVANCED 12/BX

## (undated) DEVICE — LIMB HOLDER, WRIST/ANKLE: Brand: DEROYAL

## (undated) DEVICE — 3M™ TEGADERM™ TRANSPARENT FILM DRESSING FRAME STYLE, 1626W, 4 IN X 4-3/4 IN (10 CM X 12 CM), 50/CT 4CT/CASE: Brand: 3M™ TEGADERM™

## (undated) DEVICE — CATHETER ABLAT D-F CRV BIDIR TACTICATH CNTCT FORC SENS

## (undated) DEVICE — Z DISCONTINUED PER MEDLINE LINE GAS SAMPLING O2/CO2 LNG AD 13 FT NSL W/ TBNG FILTERLINE

## (undated) DEVICE — CATH IV AUTOGRD BC PNK 20GA 25 -- INSYTE

## (undated) DEVICE — ELECTRODE PT RET AD L9FT HI MOIST COND ADH HYDRGEL CORDED

## (undated) DEVICE — SUTURE PERMAHAND SZ 0 L30IN NONABSORBABLE BLK L26MM SH 1/2 K834H

## (undated) DEVICE — STRAINER URIN CALC RNL MSH -- CONVERT TO ITEM 357634

## (undated) DEVICE — SOLIDIFIER FLD 2OZ 1500CC N DISINF IN BTL DISP SAFESORB

## (undated) DEVICE — TRAY,IRRIGATION,PISTON SYRINGE,60ML,STRL: Brand: MEDLINE

## (undated) DEVICE — SET ADMIN 16ML TBNG L100IN 2 Y INJ SITE IV PIGGY BK DISP

## (undated) DEVICE — 1200 GUARD II KIT W/5MM TUBE W/O VAC TUBE: Brand: GUARDIAN

## (undated) DEVICE — PERCLOSE PROGLIDE™ SUTURE-MEDIATED CLOSURE SYSTEM: Brand: PERCLOSE PROGLIDE™